# Patient Record
Sex: FEMALE | Race: BLACK OR AFRICAN AMERICAN | NOT HISPANIC OR LATINO | Employment: UNEMPLOYED | RURAL
[De-identification: names, ages, dates, MRNs, and addresses within clinical notes are randomized per-mention and may not be internally consistent; named-entity substitution may affect disease eponyms.]

---

## 2017-02-07 ENCOUNTER — TELEPHONE (OUTPATIENT)
Dept: FAMILY MEDICINE CLINIC | Facility: CLINIC | Age: 57
End: 2017-02-07

## 2017-02-07 RX ORDER — CEFDINIR 300 MG/1
300 CAPSULE ORAL 2 TIMES DAILY
Qty: 20 CAPSULE | Refills: 0 | Status: SHIPPED | OUTPATIENT
Start: 2017-02-07 | End: 2017-04-21

## 2017-02-15 ENCOUNTER — TELEPHONE (OUTPATIENT)
Dept: FAMILY MEDICINE CLINIC | Facility: CLINIC | Age: 57
End: 2017-02-15

## 2017-02-15 RX ORDER — LOSARTAN POTASSIUM 100 MG/1
100 TABLET ORAL DAILY
Qty: 90 TABLET | Refills: 0 | Status: SHIPPED | OUTPATIENT
Start: 2017-02-15 | End: 2017-10-04 | Stop reason: SDUPTHER

## 2017-04-05 PROCEDURE — G0123 SCREEN CERV/VAG THIN LAYER: HCPCS | Performed by: OBSTETRICS & GYNECOLOGY

## 2017-04-06 ENCOUNTER — LAB REQUISITION (OUTPATIENT)
Dept: LAB | Facility: HOSPITAL | Age: 57
End: 2017-04-06

## 2017-04-06 DIAGNOSIS — Z12.72 ENCOUNTER FOR SCREENING FOR MALIGNANT NEOPLASM OF VAGINA: ICD-10-CM

## 2017-04-06 LAB
GEN CATEG CVX/VAG CYTO-IMP: NORMAL
LAB AP CASE REPORT: NORMAL
LAB AP GYN ADDITIONAL INFORMATION: NORMAL
Lab: NORMAL
PATH INTERP SPEC-IMP: NORMAL
STAT OF ADQ CVX/VAG CYTO-IMP: NORMAL

## 2017-04-20 PROBLEM — Z00.00 WELLNESS EXAMINATION: Status: ACTIVE | Noted: 2017-04-20

## 2017-04-21 ENCOUNTER — OFFICE VISIT (OUTPATIENT)
Dept: FAMILY MEDICINE CLINIC | Facility: CLINIC | Age: 57
End: 2017-04-21

## 2017-04-21 VITALS
OXYGEN SATURATION: 94 % | TEMPERATURE: 98 F | BODY MASS INDEX: 33.13 KG/M2 | SYSTOLIC BLOOD PRESSURE: 130 MMHG | WEIGHT: 180 LBS | RESPIRATION RATE: 18 BRPM | HEIGHT: 62 IN | HEART RATE: 66 BPM | DIASTOLIC BLOOD PRESSURE: 70 MMHG

## 2017-04-21 DIAGNOSIS — F32.A DEPRESSION, UNSPECIFIED DEPRESSION TYPE: ICD-10-CM

## 2017-04-21 DIAGNOSIS — K21.9 GASTROESOPHAGEAL REFLUX DISEASE, ESOPHAGITIS PRESENCE NOT SPECIFIED: Chronic | ICD-10-CM

## 2017-04-21 DIAGNOSIS — N18.30 CHRONIC KIDNEY DISEASE, STAGE 3 (HCC): Chronic | ICD-10-CM

## 2017-04-21 DIAGNOSIS — I50.32 CHRONIC DIASTOLIC CONGESTIVE HEART FAILURE (HCC): Chronic | ICD-10-CM

## 2017-04-21 DIAGNOSIS — F41.9 ANXIETY: ICD-10-CM

## 2017-04-21 DIAGNOSIS — M06.9 RHEUMATOID ARTHRITIS, INVOLVING UNSPECIFIED SITE, UNSPECIFIED RHEUMATOID FACTOR PRESENCE: Chronic | ICD-10-CM

## 2017-04-21 DIAGNOSIS — I10 ESSENTIAL HYPERTENSION: Primary | Chronic | ICD-10-CM

## 2017-04-21 PROCEDURE — 99213 OFFICE O/P EST LOW 20 MIN: CPT | Performed by: FAMILY MEDICINE

## 2017-04-21 RX ORDER — CLORAZEPATE DIPOTASSIUM 7.5 MG/1
7.5 TABLET ORAL 2 TIMES DAILY
COMMUNITY
Start: 2017-04-18 | End: 2018-02-27 | Stop reason: ALTCHOICE

## 2017-06-05 ENCOUNTER — TELEPHONE (OUTPATIENT)
Dept: GASTROENTEROLOGY | Facility: CLINIC | Age: 57
End: 2017-06-05

## 2017-06-05 NOTE — TELEPHONE ENCOUNTER
No need to set up an office visit.  She just needs to have the colonoscopy done to reassess the polypectomy site.  Okay to schedule.    Abigail Weathers MD

## 2017-06-05 NOTE — TELEPHONE ENCOUNTER
She called stating that she has received recall letters for repeat colon in 1 year. I will scan everything to media for you to review.     Does she need to come in for office visit or can we just schedule colon? She is not having any problems.

## 2017-06-06 ENCOUNTER — PREP FOR SURGERY (OUTPATIENT)
Dept: OTHER | Facility: HOSPITAL | Age: 57
End: 2017-06-06

## 2017-06-06 DIAGNOSIS — Z86.010 HISTORY OF COLON POLYPS: Primary | ICD-10-CM

## 2017-06-06 NOTE — TELEPHONE ENCOUNTER
I have scheduled her colon for 7/28. I have pended colon order sets to you. Please sign and also send prep to pharmacy on file.

## 2017-06-12 RX ORDER — VENLAFAXINE HYDROCHLORIDE 75 MG/1
CAPSULE, EXTENDED RELEASE ORAL
Qty: 30 CAPSULE | Refills: 2 | Status: ON HOLD | OUTPATIENT
Start: 2017-06-12 | End: 2017-11-14

## 2017-07-28 ENCOUNTER — ANESTHESIA EVENT (OUTPATIENT)
Dept: GASTROENTEROLOGY | Facility: HOSPITAL | Age: 57
End: 2017-07-28

## 2017-07-28 ENCOUNTER — ANESTHESIA (OUTPATIENT)
Dept: GASTROENTEROLOGY | Facility: HOSPITAL | Age: 57
End: 2017-07-28

## 2017-07-28 ENCOUNTER — HOSPITAL ENCOUNTER (OUTPATIENT)
Facility: HOSPITAL | Age: 57
Setting detail: HOSPITAL OUTPATIENT SURGERY
Discharge: HOME | End: 2017-07-28
Attending: INTERNAL MEDICINE | Admitting: INTERNAL MEDICINE

## 2017-07-28 VITALS
BODY MASS INDEX: 32.2 KG/M2 | HEART RATE: 77 BPM | RESPIRATION RATE: 21 BRPM | DIASTOLIC BLOOD PRESSURE: 104 MMHG | HEIGHT: 62 IN | WEIGHT: 175 LBS | OXYGEN SATURATION: 99 % | TEMPERATURE: 97 F | SYSTOLIC BLOOD PRESSURE: 182 MMHG

## 2017-07-28 DIAGNOSIS — Z86.010 HISTORY OF COLON POLYPS: ICD-10-CM

## 2017-07-28 PROCEDURE — 45385 COLONOSCOPY W/LESION REMOVAL: CPT | Performed by: INTERNAL MEDICINE

## 2017-07-28 PROCEDURE — 25010000002 PROPOFOL 10 MG/ML EMULSION: Performed by: NURSE ANESTHETIST, CERTIFIED REGISTERED

## 2017-07-28 PROCEDURE — 88305 TISSUE EXAM BY PATHOLOGIST: CPT | Performed by: INTERNAL MEDICINE

## 2017-07-28 DEVICE — DEV CLIP ENDO RESOLUTION360 CONTRL ROT 235CM: Type: IMPLANTABLE DEVICE | Status: FUNCTIONAL

## 2017-07-28 RX ORDER — SODIUM CHLORIDE 9 MG/ML
500 INJECTION, SOLUTION INTRAVENOUS CONTINUOUS PRN
Status: DISCONTINUED | OUTPATIENT
Start: 2017-07-28 | End: 2017-07-28 | Stop reason: HOSPADM

## 2017-07-28 RX ORDER — PROPOFOL 10 MG/ML
VIAL (ML) INTRAVENOUS AS NEEDED
Status: DISCONTINUED | OUTPATIENT
Start: 2017-07-28 | End: 2017-07-28 | Stop reason: SURG

## 2017-07-28 RX ORDER — SODIUM CHLORIDE 0.9 % (FLUSH) 0.9 %
3 SYRINGE (ML) INJECTION AS NEEDED
Status: DISCONTINUED | OUTPATIENT
Start: 2017-07-28 | End: 2017-07-28 | Stop reason: HOSPADM

## 2017-07-28 RX ORDER — LIDOCAINE HYDROCHLORIDE 10 MG/ML
0.5 INJECTION, SOLUTION INFILTRATION; PERINEURAL ONCE AS NEEDED
Status: DISCONTINUED | OUTPATIENT
Start: 2017-07-28 | End: 2017-07-28 | Stop reason: CLARIF

## 2017-07-28 RX ADMIN — SODIUM CHLORIDE 500 ML: 9 INJECTION, SOLUTION INTRAVENOUS at 10:26

## 2017-07-28 RX ADMIN — PROPOFOL 50 MG: 10 INJECTION, EMULSION INTRAVENOUS at 11:34

## 2017-07-28 RX ADMIN — PROPOFOL 20 MG: 10 INJECTION, EMULSION INTRAVENOUS at 11:11

## 2017-07-28 RX ADMIN — PROPOFOL 80 MG: 10 INJECTION, EMULSION INTRAVENOUS at 11:10

## 2017-07-28 RX ADMIN — PROPOFOL 50 MG: 10 INJECTION, EMULSION INTRAVENOUS at 11:13

## 2017-07-28 RX ADMIN — PROPOFOL 50 MG: 10 INJECTION, EMULSION INTRAVENOUS at 11:21

## 2017-07-28 RX ADMIN — PROPOFOL 50 MG: 10 INJECTION, EMULSION INTRAVENOUS at 11:30

## 2017-07-28 RX ADMIN — PROPOFOL 50 MG: 10 INJECTION, EMULSION INTRAVENOUS at 11:18

## 2017-07-28 RX ADMIN — PROPOFOL 50 MG: 10 INJECTION, EMULSION INTRAVENOUS at 11:24

## 2017-07-28 RX ADMIN — PROPOFOL 50 MG: 10 INJECTION, EMULSION INTRAVENOUS at 11:15

## 2017-07-28 RX ADMIN — PROPOFOL 50 MG: 10 INJECTION, EMULSION INTRAVENOUS at 11:32

## 2017-07-28 RX ADMIN — PROPOFOL 50 MG: 10 INJECTION, EMULSION INTRAVENOUS at 11:38

## 2017-07-28 RX ADMIN — PROPOFOL 50 MG: 10 INJECTION, EMULSION INTRAVENOUS at 11:27

## 2017-07-28 NOTE — ANESTHESIA POSTPROCEDURE EVALUATION
Patient: Emilie Soto    Procedure Summary     Date Anesthesia Start Anesthesia Stop Room / Location    07/28/17 1104 1144 [facility] ENDOSCOPY 5 / [facility] ENDOSCOPY       Procedure Diagnosis Surgeon Provider    COLONOSCOPY WITH ANESTHESIA (N/A ) History of colon polyps  (History of colon polyps [Z86.010]) MD Moshe Hernandez CRNA          Anesthesia Type: general  Last vitals  BP   (!) 182/104 (07/28/17 1205)    Temp        Pulse   69 (07/28/17 1205)   Resp   20 (07/28/17 1205)    SpO2   99 % (07/28/17 1153)      Post Anesthesia Care and Evaluation    Patient location during evaluation: PHASE II  Patient participation: complete - patient participated  Level of consciousness: awake  Pain score: 0  Pain management: adequate  Airway patency: patent  Anesthetic complications: No anesthetic complications  PONV Status: none  Cardiovascular status: acceptable  Respiratory status: acceptable  Hydration status: acceptable  No anesthesia care post op

## 2017-07-28 NOTE — ANESTHESIA PREPROCEDURE EVALUATION
Anesthesia Evaluation     Patient summary reviewed   no history of anesthetic complications:  NPO Solid Status: > 8 hours       Airway   Mallampati: II  TM distance: >3 FB  Neck ROM: full  Dental      Pulmonary    (+) sleep apnea,   (-) asthma, not a smoker  Cardiovascular     Patient on routine beta blocker and Beta blocker given within 24 hours of surgery    (+) hypertension, hyperlipidemia  (-) pacemaker, past MI, cardiac stents    ROS comment: Pt has HOCM on previous TTE.    Neuro/Psych  (-) seizures, CVA  GI/Hepatic/Renal/Endo    (+) obesity,    (-) GERD, liver disease, no renal disease, diabetes    ROS Comment: Lupus with neuropathy on cellcept    Musculoskeletal     Abdominal    Substance History      OB/GYN          Other                                        Anesthesia Plan    ASA 3     general     intravenous induction   Anesthetic plan and risks discussed with patient.

## 2017-07-31 LAB
LAB AP CASE REPORT: NORMAL
LAB AP CLINICAL INFORMATION: NORMAL
Lab: NORMAL
PATH REPORT.FINAL DX SPEC: NORMAL
PATH REPORT.GROSS SPEC: NORMAL

## 2017-08-08 RX ORDER — VENLAFAXINE HYDROCHLORIDE 150 MG/1
CAPSULE, EXTENDED RELEASE ORAL
Qty: 30 CAPSULE | Refills: 5 | Status: ON HOLD | OUTPATIENT
Start: 2017-08-08 | End: 2017-11-14 | Stop reason: SDUPTHER

## 2017-09-22 PROBLEM — Z01.89 LABORATORY TEST: Status: ACTIVE | Noted: 2017-09-22

## 2017-10-04 RX ORDER — LOSARTAN POTASSIUM 100 MG/1
TABLET ORAL
Qty: 90 TABLET | Refills: 1 | Status: ON HOLD | OUTPATIENT
Start: 2017-10-04 | End: 2017-11-14

## 2017-10-06 RX ORDER — SPIRONOLACTONE 25 MG/1
TABLET ORAL
Qty: 30 TABLET | Refills: 5 | Status: ON HOLD | OUTPATIENT
Start: 2017-10-06 | End: 2017-11-14

## 2017-10-16 ENCOUNTER — OFFICE VISIT (OUTPATIENT)
Dept: FAMILY MEDICINE CLINIC | Facility: CLINIC | Age: 57
End: 2017-10-16

## 2017-10-16 VITALS
BODY MASS INDEX: 32.57 KG/M2 | WEIGHT: 177 LBS | SYSTOLIC BLOOD PRESSURE: 166 MMHG | DIASTOLIC BLOOD PRESSURE: 90 MMHG | OXYGEN SATURATION: 99 % | HEIGHT: 62 IN | HEART RATE: 57 BPM

## 2017-10-16 DIAGNOSIS — R53.83 FATIGUE, UNSPECIFIED TYPE: ICD-10-CM

## 2017-10-16 DIAGNOSIS — F41.9 ANXIETY: ICD-10-CM

## 2017-10-16 DIAGNOSIS — R03.0 ELEVATED BLOOD PRESSURE READING: ICD-10-CM

## 2017-10-16 DIAGNOSIS — I10 HYPERTENSION, UNSPECIFIED TYPE: Primary | Chronic | ICD-10-CM

## 2017-10-16 DIAGNOSIS — F32.A DEPRESSION, UNSPECIFIED DEPRESSION TYPE: ICD-10-CM

## 2017-10-16 DIAGNOSIS — I50.32 CHRONIC DIASTOLIC CONGESTIVE HEART FAILURE (HCC): Chronic | ICD-10-CM

## 2017-10-16 DIAGNOSIS — G47.33 OBSTRUCTIVE SLEEP APNEA: Chronic | ICD-10-CM

## 2017-10-16 PROCEDURE — 99214 OFFICE O/P EST MOD 30 MIN: CPT | Performed by: FAMILY MEDICINE

## 2017-10-16 RX ORDER — CYCLOBENZAPRINE HCL 5 MG
5 TABLET ORAL NIGHTLY PRN
COMMUNITY
Start: 2017-10-02 | End: 2018-08-28

## 2017-10-16 NOTE — PROGRESS NOTES
Subjective   Emilie Soto is a 57 y.o. female presenting with chief complaint of:   Chief Complaint   Patient presents with   • Hypertension   • Congestive Heart Failure   • Depression   • Rheumatoid Arthritis   • Chronic Kidney Disease        History of Present Illness :  Alone.   Has multiple problems to consider;  interval appointment.  One of these problems is HTN.       The HTN has been present for years/it is chronic.  The HTN is assumed essential/without testing needed to look for other.  The HTN is ? controlled manifest by todays blood pressure and no home monitoring.  Associated illness below.   Other chronic problem/s to consider:  Anxiety: This has been present for years/over a year.  It is chronic.  It is worse.  It is associated with stressors: work, taking care of father on dialysis, grandchildren, others.  Medications being used help.  Medications/Rx change not requested.  Depression: This has been present for years/over a year.  It is chronic.  It is worse.  It is associated with stressors: same as anxiety.   Medications being used help.Medications/Rx change not requested. No suicide ideation/intent.   Congestive heart failure/echo changes: This has been present for years/over a year.  It is chronic.  The CHF had features on echo of diastolic dysfunction.  This has been associated with SOB, LE edema on/off.   Saw cardiology years ago.   Arthritis-other:  Has chronic joint pain, stiffness, occ swelling involving: .   Has diagnosis: RA.  Sees  rheumatology.  Has plaquenil disease modifying Rx that requires monitoring.   Obstructive Sleep Apnea/Nocturnal hypoxemia:  This has been present for years/over a year.  It is chronic.    They are not compliant with use of Cpap machine    Has an/another acute issue today: fatigue.    The following portions of the patient's history were reviewed and updated as appropriate: allergies, current medications, past family history, past medical history, past social  history, past surgical history and problem list.  Records acquired and reviewed; TCC migrated.      Current Outpatient Prescriptions:   •  carvedilol (COREG) 25 MG tablet, Take 1 tablet by mouth 2 (Two) Times a Day., Disp: , Rfl:   •  cloNIDine (CATAPRES) 0.1 MG tablet, Take 0.1 mg by mouth 2 (Two) Times a Day. One by mouth twice daily as needed if BP greater than 160/100, Disp: , Rfl:   •  clorazepate (TRANXENE) 7.5 MG tablet, Take 1 tablet by mouth 2 (Two) Times a Day., Disp: , Rfl:   •  cyclobenzaprine (FLEXERIL) 5 MG tablet, , Disp: , Rfl:   •  estradiol (ESTRACE) 2 MG tablet, Take 2 mg by mouth Daily., Disp: , Rfl:   •  HYDROcodone-acetaminophen (NORCO) 7.5-325 MG per tablet, Take 1 tablet by mouth 2 (Two) Times a Day As Needed., Disp: , Rfl:   •  hydroxychloroquine (PLAQUENIL) 200 MG tablet, Take 1 tablet by mouth 2 (Two) Times a Day., Disp: , Rfl:   •  losartan (COZAAR) 100 MG tablet, TAKE 1 TABLET DAILY, Disp: 90 tablet, Rfl: 1  •  mycophenolate (CELLCEPT) 500 MG tablet, Take 1 tablet by mouth 2 (Two) Times a Day., Disp: , Rfl:   •  potassium chloride (K-DUR) 10 MEQ CR tablet, Take 10 mEq by mouth 2 (Two) Times a Day., Disp: , Rfl:   •  spironolactone (ALDACTONE) 25 MG tablet, TAKE ONE TABLET DAILY GENERIC FOR ALDACTONE, Disp: 30 tablet, Rfl: 5  •  traZODone (DESYREL) 100 MG tablet, Take 1 tablet by mouth Every Night. (Patient taking differently: Take 50 mg by mouth At Night As Needed.), Disp: 30 tablet, Rfl: 1  •  venlafaxine XR (EFFEXOR-XR) 150 MG 24 hr capsule, TAKE 1 CAPSULE DAILY GENERIC FOR EFFEXOR XR ALONG WITH 75MG XR, Disp: 30 capsule, Rfl: 5  •  venlafaxine XR (EFFEXOR-XR) 75 MG 24 hr capsule, TAKE 1 CAPSULE DAILY ALONG WITH 150MG TO EQUAL 225MG GENERIC FOR EFFEXOR XR, Disp: 30 capsule, Rfl: 2  •  verapamil SR (CALAN-SR) 240 MG CR tablet, Take 1 tablet by mouth 2 (Two) Times a Day., Disp: , Rfl:   •  vitamin D (ERGOCALCIFEROL) 99997 UNITS capsule capsule, Take 1 capsule by mouth 1 (One) Time Per  Week., Disp: , Rfl:     Allergies   Allergen Reactions   • Biaxin [Clarithromycin] Nausea And Vomiting       Review of Systems  GENERAL:  Active/slower with limits, speed, stamina for age work/home; wakes up fatigued even after 10 yrs sleep. . Sleep is not restful. No fever now.  ENDO:  No syncope, near or diaphoretic sweaty spells.  HEENT: No head injury  headache,  No vision change, No significant hearing loss.  Ears without pain/drainage.  No sore throat.  No significant nasal/sinus congestion/drainage. No epistaxis.  CHEST: No chest wall tenderness or mass. No cough, without wheeze.  NoSOB; no hemoptysis.  CV: No chest pain, palpitations, ankle edema.  GI: No heartburn, dysphagia.  No abdominal pain, diarrhea, constipation.  No rectal bleeding, or melena.    Colonoscopy/[facility]/Jasiel/4.22.16/1y  Colon-polyp/[facility]/Jasiel7.28.17/3y    :  Voids without dysuria, or incontinence to completion.  ORTHO: No painful/swollen joints but various on /off sore.  No change sore neck or back.  No acute neck or back pain without recent injury.   NEURO: No dizziness, weakness of extremities.  No numbness/paresthesias.   PSYCH: No memory loss.  Mood good; often anxious, depressed but/and not suicidal.  Tries to tolerate stress .     Results for orders placed or performed during the hospital encounter of 07/28/17   Tissue Pathology Exam   Result Value Ref Range    Case Report       Surgical Pathology Report                         Case: [id number]                                  Authorizing Provider:  Abigail Weathers MD          Collected:           07/28/2017 11:36 AM          Ordering Location:     [facility]     Received:            07/28/2017 02:25 PM                                 ENDOSCOPY                                                                    Pathologist:           Leann Jenkins MD                                                          Specimen:    Large Intestine, colon polyp at transverse               "                                   Clinical Information       PCP Alexandru Miles      Final Diagnosis       Colon, transverse polyp, polypectomy:  Tubular adenoma.  Negative for high grade dysplasia.       Gross Description       Submitted in formalin in a container labeled with the patient's name and date of birth and designated polyp at transverse are 2 polypoid fragments of reddish tan tissue measuring 0.5 x 0.4 x 0.2 cm and 0.6 x 0.4 x 0.2 cm.  The specimens are bisected and submitted in toto in block 1A.      Embedded Images         Lab Results   Component Value Date    HGBA1C 5.7 02/04/2016       Lab Results   Component Value Date     09/21/2016     02/05/2016     02/04/2016    K 4.6 09/21/2016    K 3.7 02/05/2016    K 3.5 02/04/2016     09/21/2016     02/05/2016     02/04/2016    CO2 28.0 09/21/2016    CO2 28 02/05/2016    CO2 29 02/04/2016    GLUCOSE 88 02/05/2016    GLUCOSE 110 (H) 02/04/2016    GLUCOSE 84 10/03/2015    BUN 12 09/21/2016    BUN 12 02/05/2016    BUN 10 02/04/2016    CREATININE 1.01 09/21/2016    CREATININE 1.09 02/05/2016    CREATININE 1.05 02/04/2016    CALCIUM 9.2 09/21/2016    CALCIUM 8.7 02/05/2016    CALCIUM 8.9 02/04/2016    EGFRCLEARA 63 02/05/2016    EGFRCLEARA 66 02/04/2016    EGFRCLEARA >60 10/03/2015       No results found for: PSA     Lab Results:  CBC:    Lab Results - Last 18 Months  Lab Units 09/21/16  0735   WBC 10*3/mm3 4.32*   HEMATOCRIT % 40.7     CMP:    Lab Results - Last 18 Months  Lab Units 09/21/16  0735   SODIUM mmol/L 143   CHLORIDE mmol/L 102   TOTAL CO2, ARTERIAL mmol/L 28.0   BUN mg/dL 12   CREATININE mg/dL 1.01   EGFR IF NONAFRICN AM mL/min/1.73 57*   EGFR IF AFRICN AM mL/min/1.73 69   CALCIUM mg/dL 9.2     THYROID:    Lab Results - Last 18 Months  Lab Units 09/21/16  0735   TSH mIU/mL 1.240     A1C:No results for input(s): HGBA1C in the last 93645 hours.    Objective   /90  Pulse 57  Ht 62\" (157.5 cm)  Wt 177 lb (80.3 " kg)  SpO2 99%  BMI 32.37 kg/m2    Physical Exam  GENERAL:  Well nourished/developed in no acute distress. Obese   SKIN: Turgor excellent, without wound, rash, lesion.  HEENT: Normal cephalic without trauma.  Pupils equal round reactive to light. Extraocular motions full without nystagmus.   External canals nonobstructive nontender without reddness. Tymphatic membranes josiane with cesar structures intact.   Oral cavity without growths, exudates, and moist.  Posterior pharynx without mass, obstruction, redness.  No thyromegaly, mass, tenderness, lymphadenopathy and supple.  CV: Regular rhythm.  No murmur, gallop,  edema. Posterior pulses intact.  No carotid bruits.  CHEST: No chest wall tenderness or mass.   LUNGS: Symmetric motion with clear to auscultation.  No dullness to percussion  ABD: Soft, nontender without mass.   ORTHO: Symmetric extremities without swelling/point tenderness.  Full gross range of motion.  NEURO: CN 2-12 grossly intact.  Symmetric facies. 1/4 x bicep knee equal reflexes.  UE/LE   3/5 strength throughout.  Nonfocal use extremities. Speech clear.  Intact light touch with monofilament, vibratory sensation with tuning fork; equal toes/distal feet.    PSYCH: Oriented x 3.  Pleasant calm, well kept.  Purposeful/directed conservation with intact short/long gross memory.     Assessment/Plan     1. Hypertension, unspecified type  ? controlled    2. Elevated blood pressure reading  asx acutely    3. Obstructive sleep apnea-not treating    4. Chronic diastolic congestive heart failure  Worse? Part of fatigue    5. Anxiety  chronic    6. Depression, unspecified depression type  chronic    7. Fatigue, unspecified type  No obvious exam findings; suggest component of depression/anxiety but ? Cardiac   - T4, Free  - TSH  - Adult Transthoracic Echo Complete W/ Cont if Necessary Per Protocol; Future      Rx: reviewed.  Any other changes above and:   LAB: reviewed/above.  Orders above and:   Wrap-up/other  instructions: discussed as applicable  Regular cardio exercise something everyone should consider and try to do; even if health limitations (ie find that exercise UE/LE/cardio that they can tolerate).  Normal weight a goal for everyone.  Healthy diet helpful for weight management, illness prevention.  If over 50-screening exams include men PSA/rectal exam, women mammograms, and  everyone colonoscopy screening for colon cancer.     Patient Instructions   Goal for your blood pressure is 140-130 top and 90-80 bottom and you feeling better. Use us/or home monitoring to prove this.     1st; lets check thyroid and echocardiogram  2nd; lets check sleep apnea/treatment          Follow up: Return for lab today;, Dr Miles-.  Future Appointments  Date Time Provider Department Center   1/23/2018 3:30 PM Alexandru Miles MD [facility] None

## 2017-10-16 NOTE — PATIENT INSTRUCTIONS
Goal for your blood pressure is 140-130 top and 90-80 bottom and you feeling better. Use us/or home monitoring to prove this.     1st; lets check thyroid and echocardiogram  2nd; lets check sleep apnea/treatment

## 2017-10-17 LAB
T4 FREE SERPL-MCNC: 0.98 NG/DL (ref 0.78–2.19)
TSH SERPL DL<=0.005 MIU/L-ACNC: 1.54 MIU/ML (ref 0.47–4.68)

## 2017-11-07 ENCOUNTER — HOSPITAL ENCOUNTER (OUTPATIENT)
Dept: CARDIOLOGY | Facility: HOSPITAL | Age: 57
Discharge: HOME | End: 2017-11-07
Attending: FAMILY MEDICINE | Admitting: FAMILY MEDICINE

## 2017-11-07 VITALS
HEIGHT: 62 IN | BODY MASS INDEX: 32.57 KG/M2 | SYSTOLIC BLOOD PRESSURE: 166 MMHG | WEIGHT: 177 LBS | DIASTOLIC BLOOD PRESSURE: 90 MMHG

## 2017-11-07 DIAGNOSIS — R53.83 FATIGUE, UNSPECIFIED TYPE: ICD-10-CM

## 2017-11-07 LAB
BH CV ECHO MEAS - AO MAX PG (FULL): 0.2 MMHG
BH CV ECHO MEAS - AO MAX PG: 6.5 MMHG
BH CV ECHO MEAS - AO MEAN PG (FULL): 1 MMHG
BH CV ECHO MEAS - AO MEAN PG: 4 MMHG
BH CV ECHO MEAS - AO ROOT AREA (BSA CORRECTED): 1.3
BH CV ECHO MEAS - AO ROOT AREA: 4.5 CM^2
BH CV ECHO MEAS - AO ROOT DIAM: 2.4 CM
BH CV ECHO MEAS - AO V2 MAX: 127 CM/SEC
BH CV ECHO MEAS - AO V2 MEAN: 85.6 CM/SEC
BH CV ECHO MEAS - AO V2 VTI: 27.2 CM
BH CV ECHO MEAS - AVA(I,A): 4 CM^2
BH CV ECHO MEAS - AVA(I,D): 4 CM^2
BH CV ECHO MEAS - AVA(V,A): 2.8 CM^2
BH CV ECHO MEAS - AVA(V,D): 2.8 CM^2
BH CV ECHO MEAS - BSA(HAYCOCK): 1.9 M^2
BH CV ECHO MEAS - BSA: 1.8 M^2
BH CV ECHO MEAS - BZI_BMI: 32.4 KILOGRAMS/M^2
BH CV ECHO MEAS - BZI_METRIC_HEIGHT: 157.5 CM
BH CV ECHO MEAS - BZI_METRIC_WEIGHT: 80.3 KG
BH CV ECHO MEAS - CONTRAST EF 4CH: 75.2 ML/M^2
BH CV ECHO MEAS - EDV(CUBED): 102.5 ML
BH CV ECHO MEAS - EDV(MOD-SP4): 122 ML
BH CV ECHO MEAS - EDV(TEICH): 101.3 ML
BH CV ECHO MEAS - EF(CUBED): 82 %
BH CV ECHO MEAS - EF(MOD-SP4): 75.2 %
BH CV ECHO MEAS - EF(TEICH): 74.8 %
BH CV ECHO MEAS - ESV(CUBED): 18.4 ML
BH CV ECHO MEAS - ESV(MOD-SP4): 30.2 ML
BH CV ECHO MEAS - ESV(TEICH): 25.6 ML
BH CV ECHO MEAS - FS: 43.6 %
BH CV ECHO MEAS - IVS/LVPW: 0.96
BH CV ECHO MEAS - IVSD: 1.8 CM
BH CV ECHO MEAS - LA DIMENSION: 4.7 CM
BH CV ECHO MEAS - LA/AO: 2
BH CV ECHO MEAS - LAT PEAK E' VEL: 4.7 CM/SEC
BH CV ECHO MEAS - LV DIASTOLIC VOL/BSA (35-75): 67.2 ML/M^2
BH CV ECHO MEAS - LV MASS(C)D: 412 GRAMS
BH CV ECHO MEAS - LV MASS(C)DI: 227 GRAMS/M^2
BH CV ECHO MEAS - LV MAX PG: 6.3 MMHG
BH CV ECHO MEAS - LV MEAN PG: 3 MMHG
BH CV ECHO MEAS - LV SYSTOLIC VOL/BSA (12-30): 16.6 ML/M^2
BH CV ECHO MEAS - LV V1 MAX: 125 CM/SEC
BH CV ECHO MEAS - LV V1 MEAN: 80.2 CM/SEC
BH CV ECHO MEAS - LV V1 VTI: 38.2 CM
BH CV ECHO MEAS - LVIDD: 4.7 CM
BH CV ECHO MEAS - LVIDS: 2.6 CM
BH CV ECHO MEAS - LVLD AP4: 6.6 CM
BH CV ECHO MEAS - LVLS AP4: 6 CM
BH CV ECHO MEAS - LVOT AREA (M): 2.8 CM^2
BH CV ECHO MEAS - LVOT AREA: 2.8 CM^2
BH CV ECHO MEAS - LVOT DIAM: 1.9 CM
BH CV ECHO MEAS - LVPWD: 1.9 CM
BH CV ECHO MEAS - MR ALIAS VEL: 30.8 CM/SEC
BH CV ECHO MEAS - MR ERO: 0.04 CM^2
BH CV ECHO MEAS - MR FLOW RATE: 17.4 CM^3/SEC
BH CV ECHO MEAS - MR MAX PG: 89.9 MMHG
BH CV ECHO MEAS - MR MAX VEL: 474 CM/SEC
BH CV ECHO MEAS - MR MEAN PG: 71 MMHG
BH CV ECHO MEAS - MR MEAN VEL: 400 CM/SEC
BH CV ECHO MEAS - MR PISA RADIUS: 0.3 CM
BH CV ECHO MEAS - MR PISA: 0.57 CM^2
BH CV ECHO MEAS - MR VOLUME: 7.5 ML
BH CV ECHO MEAS - MR VTI: 205 CM
BH CV ECHO MEAS - MV A MAX VEL: 108 CM/SEC
BH CV ECHO MEAS - MV DEC TIME: 0.26 SEC
BH CV ECHO MEAS - MV E MAX VEL: 93.5 CM/SEC
BH CV ECHO MEAS - MV E/A: 0.87
BH CV ECHO MEAS - RVDD: 2.6 CM
BH CV ECHO MEAS - SI(AO): 67.8 ML/M^2
BH CV ECHO MEAS - SI(CUBED): 46.3 ML/M^2
BH CV ECHO MEAS - SI(LVOT): 59.7 ML/M^2
BH CV ECHO MEAS - SI(MOD-SP4): 50.6 ML/M^2
BH CV ECHO MEAS - SI(TEICH): 41.8 ML/M^2
BH CV ECHO MEAS - SV(AO): 123.1 ML
BH CV ECHO MEAS - SV(CUBED): 84.1 ML
BH CV ECHO MEAS - SV(LVOT): 108.3 ML
BH CV ECHO MEAS - SV(MOD-SP4): 91.8 ML
BH CV ECHO MEAS - SV(TEICH): 75.8 ML
E/E' RATIO: 20
LEFT ATRIUM VOLUME INDEX: 35 ML/M2
LEFT ATRIUM VOLUME: 63.4 CM3
MAXIMAL PREDICTED HEART RATE: 164 BPM
STRESS TARGET HR: 139 BPM

## 2017-11-07 PROCEDURE — 93306 TTE W/DOPPLER COMPLETE: CPT | Performed by: INTERNAL MEDICINE

## 2017-11-07 PROCEDURE — 25010000002 PERFLUTREN 6.52 MG/ML SUSPENSION: Performed by: FAMILY MEDICINE

## 2017-11-07 PROCEDURE — 93306 TTE W/DOPPLER COMPLETE: CPT

## 2017-11-07 RX ADMIN — PERFLUTREN 8.48 MG: 6.52 INJECTION, SUSPENSION INTRAVENOUS at 09:03

## 2017-11-13 ENCOUNTER — APPOINTMENT (OUTPATIENT)
Dept: GENERAL RADIOLOGY | Facility: HOSPITAL | Age: 57
End: 2017-11-13

## 2017-11-13 ENCOUNTER — TELEPHONE (OUTPATIENT)
Dept: FAMILY MEDICINE CLINIC | Facility: CLINIC | Age: 57
End: 2017-11-13

## 2017-11-13 ENCOUNTER — HOSPITAL ENCOUNTER (OUTPATIENT)
Facility: HOSPITAL | Age: 57
Setting detail: OBSERVATION
Discharge: HOME | End: 2017-11-14
Attending: EMERGENCY MEDICINE | Admitting: FAMILY MEDICINE

## 2017-11-13 DIAGNOSIS — R07.9 CHEST PAIN, UNSPECIFIED TYPE: Primary | ICD-10-CM

## 2017-11-13 LAB
ALBUMIN SERPL-MCNC: 4.3 G/DL (ref 3.5–5)
ALBUMIN/GLOB SERPL: 1.2 G/DL (ref 1.1–2.5)
ALP SERPL-CCNC: 68 U/L (ref 24–120)
ALT SERPL W P-5'-P-CCNC: 26 U/L (ref 0–54)
ANION GAP SERPL CALCULATED.3IONS-SCNC: 11 MMOL/L (ref 4–13)
AST SERPL-CCNC: 26 U/L (ref 7–45)
BASOPHILS # BLD AUTO: 0.02 10*3/MM3 (ref 0–0.2)
BASOPHILS NFR BLD AUTO: 0.4 % (ref 0–2)
BILIRUB SERPL-MCNC: 0.6 MG/DL (ref 0.1–1)
BILIRUB UR QL STRIP: NEGATIVE
BUN BLD-MCNC: 12 MG/DL (ref 5–21)
BUN/CREAT SERPL: 9.7 (ref 7–25)
CALCIUM SPEC-SCNC: 9.3 MG/DL (ref 8.4–10.4)
CHLORIDE SERPL-SCNC: 99 MMOL/L (ref 98–110)
CLARITY UR: CLEAR
CO2 SERPL-SCNC: 30 MMOL/L (ref 24–31)
COLOR UR: YELLOW
CREAT BLD-MCNC: 1.24 MG/DL (ref 0.5–1.4)
D DIMER PPP FEU-MCNC: <0.22 MG/L (FEU) (ref 0–0.5)
DEPRECATED RDW RBC AUTO: 42.1 FL (ref 40–54)
EOSINOPHIL # BLD AUTO: 0.21 10*3/MM3 (ref 0–0.7)
EOSINOPHIL NFR BLD AUTO: 4.7 % (ref 0–4)
ERYTHROCYTE [DISTWIDTH] IN BLOOD BY AUTOMATED COUNT: 12.9 % (ref 12–15)
GFR SERPL CREATININE-BSD FRML MDRD: 54 ML/MIN/1.73
GLOBULIN UR ELPH-MCNC: 3.7 GM/DL
GLUCOSE BLD-MCNC: 79 MG/DL (ref 70–100)
GLUCOSE UR STRIP-MCNC: NEGATIVE MG/DL
HCT VFR BLD AUTO: 40.9 % (ref 37–47)
HGB BLD-MCNC: 13.5 G/DL (ref 12–16)
HGB UR QL STRIP.AUTO: NEGATIVE
HOLD SPECIMEN: NORMAL
HOLD SPECIMEN: NORMAL
IMM GRANULOCYTES # BLD: 0.01 10*3/MM3 (ref 0–0.03)
IMM GRANULOCYTES NFR BLD: 0.2 % (ref 0–5)
KETONES UR QL STRIP: NEGATIVE
LEUKOCYTE ESTERASE UR QL STRIP.AUTO: NEGATIVE
LYMPHOCYTES # BLD AUTO: 1.31 10*3/MM3 (ref 0.72–4.86)
LYMPHOCYTES NFR BLD AUTO: 29.1 % (ref 15–45)
MCH RBC QN AUTO: 29.7 PG (ref 28–32)
MCHC RBC AUTO-ENTMCNC: 33 G/DL (ref 33–36)
MCV RBC AUTO: 89.9 FL (ref 82–98)
MONOCYTES # BLD AUTO: 0.9 10*3/MM3 (ref 0.19–1.3)
MONOCYTES NFR BLD AUTO: 20 % (ref 4–12)
NEUTROPHILS # BLD AUTO: 2.05 10*3/MM3 (ref 1.87–8.4)
NEUTROPHILS NFR BLD AUTO: 45.6 % (ref 39–78)
NITRITE UR QL STRIP: NEGATIVE
PH UR STRIP.AUTO: <=5 [PH] (ref 5–8)
PLATELET # BLD AUTO: 252 10*3/MM3 (ref 130–400)
PMV BLD AUTO: 10.3 FL (ref 6–12)
POTASSIUM BLD-SCNC: 3.9 MMOL/L (ref 3.5–5.3)
PROT SERPL-MCNC: 8 G/DL (ref 6.3–8.7)
PROT UR QL STRIP: NEGATIVE
RBC # BLD AUTO: 4.55 10*6/MM3 (ref 4.2–5.4)
SODIUM BLD-SCNC: 140 MMOL/L (ref 135–145)
SP GR UR STRIP: 1.02 (ref 1–1.03)
TROPONIN I SERPL-MCNC: 0.01 NG/ML (ref 0–0.03)
TROPONIN I SERPL-MCNC: <0.012 NG/ML (ref 0–0.03)
TROPONIN I SERPL-MCNC: <0.012 NG/ML (ref 0–0.03)
UROBILINOGEN UR QL STRIP: NORMAL
WBC NRBC COR # BLD: 4.5 10*3/MM3 (ref 4.8–10.8)
WHOLE BLOOD HOLD SPECIMEN: NORMAL
WHOLE BLOOD HOLD SPECIMEN: NORMAL

## 2017-11-13 PROCEDURE — 81003 URINALYSIS AUTO W/O SCOPE: CPT | Performed by: FAMILY MEDICINE

## 2017-11-13 PROCEDURE — 85025 COMPLETE CBC W/AUTO DIFF WBC: CPT | Performed by: EMERGENCY MEDICINE

## 2017-11-13 PROCEDURE — 80053 COMPREHEN METABOLIC PANEL: CPT | Performed by: EMERGENCY MEDICINE

## 2017-11-13 PROCEDURE — 93005 ELECTROCARDIOGRAM TRACING: CPT | Performed by: EMERGENCY MEDICINE

## 2017-11-13 PROCEDURE — 99284 EMERGENCY DEPT VISIT MOD MDM: CPT

## 2017-11-13 PROCEDURE — 25010000002 ENOXAPARIN PER 10 MG: Performed by: FAMILY MEDICINE

## 2017-11-13 PROCEDURE — G0378 HOSPITAL OBSERVATION PER HR: HCPCS

## 2017-11-13 PROCEDURE — 71010 HC CHEST PA OR AP: CPT

## 2017-11-13 PROCEDURE — 84484 ASSAY OF TROPONIN QUANT: CPT | Performed by: EMERGENCY MEDICINE

## 2017-11-13 PROCEDURE — 85379 FIBRIN DEGRADATION QUANT: CPT | Performed by: EMERGENCY MEDICINE

## 2017-11-13 PROCEDURE — 96372 THER/PROPH/DIAG INJ SC/IM: CPT

## 2017-11-13 PROCEDURE — 93005 ELECTROCARDIOGRAM TRACING: CPT

## 2017-11-13 PROCEDURE — 84484 ASSAY OF TROPONIN QUANT: CPT | Performed by: INTERNAL MEDICINE

## 2017-11-13 PROCEDURE — 93010 ELECTROCARDIOGRAM REPORT: CPT | Performed by: INTERNAL MEDICINE

## 2017-11-13 RX ORDER — CLONIDINE HYDROCHLORIDE 0.1 MG/1
0.1 TABLET ORAL ONCE
Status: COMPLETED | OUTPATIENT
Start: 2017-11-13 | End: 2017-11-13

## 2017-11-13 RX ORDER — ASPIRIN 81 MG/1
324 TABLET, CHEWABLE ORAL ONCE
Status: COMPLETED | OUTPATIENT
Start: 2017-11-13 | End: 2017-11-13

## 2017-11-13 RX ORDER — SODIUM CHLORIDE 0.9 % (FLUSH) 0.9 %
1-10 SYRINGE (ML) INJECTION AS NEEDED
Status: DISCONTINUED | OUTPATIENT
Start: 2017-11-13 | End: 2017-11-14 | Stop reason: HOSPADM

## 2017-11-13 RX ORDER — SODIUM CHLORIDE 0.9 % (FLUSH) 0.9 %
10 SYRINGE (ML) INJECTION AS NEEDED
Status: DISCONTINUED | OUTPATIENT
Start: 2017-11-13 | End: 2017-11-14 | Stop reason: HOSPADM

## 2017-11-13 RX ADMIN — ASPIRIN 81 MG CHEWABLE TABLET 324 MG: 81 TABLET CHEWABLE at 14:54

## 2017-11-13 RX ADMIN — CLONIDINE HYDROCHLORIDE 0.1 MG: 0.1 TABLET ORAL at 16:44

## 2017-11-13 RX ADMIN — ENOXAPARIN SODIUM 40 MG: 40 INJECTION SUBCUTANEOUS at 18:22

## 2017-11-13 NOTE — ED PROVIDER NOTES
Subjective   Patient is a 57 y.o. female presenting with chest pain.   Chest Pain   Pain location:  Substernal area  Pain quality: aching and sharp    Pain radiates to:  Does not radiate  Pain severity:  Mild  Onset quality:  Gradual  Timing:  Intermittent  Progression:  Worsening  Chronicity:  New  Context: breathing    Context: not eating, not lifting, not movement, not raising an arm, not at rest, not stress and not trauma    Relieved by:  Nothing  Worsened by:  Deep breathing  Ineffective treatments:  None tried  Associated symptoms: no abdominal pain, no AICD problem, no altered mental status, no anxiety, no back pain, no claudication, no cough, no diaphoresis, no dizziness, no dysphagia, no fatigue, no fever, no headache, no heartburn, no lower extremity edema, no nausea, no numbness, no orthopnea, no palpitations, no shortness of breath, no syncope, no vomiting and no weakness    Risk factors: no aortic disease, no birth control, no coronary artery disease, no diabetes mellitus, no immobilization, not male, not obese, no prior DVT/PE, no smoking and no surgery        Review of Systems   Constitutional: Negative.  Negative for activity change, appetite change, chills, diaphoresis, fatigue and fever.   HENT: Negative for congestion, drooling, ear pain, facial swelling, hearing loss, sinus pressure, sore throat and trouble swallowing.    Eyes: Negative.  Negative for discharge.   Respiratory: Negative for cough and shortness of breath.    Cardiovascular: Positive for chest pain. Negative for palpitations, orthopnea, claudication and syncope.   Gastrointestinal: Negative for abdominal distention, abdominal pain, blood in stool, diarrhea, heartburn, nausea and vomiting.   Endocrine: Negative.  Negative for cold intolerance, heat intolerance, polydipsia, polyphagia and polyuria.   Genitourinary: Negative.  Negative for dysuria, flank pain and urgency.   Musculoskeletal: Negative.  Negative for arthralgias, back  pain, myalgias and neck stiffness.   Skin: Negative.  Negative for color change, pallor and rash.   Allergic/Immunologic: Negative.    Neurological: Negative.  Negative for dizziness, seizures, speech difficulty, weakness, numbness and headaches.   Hematological: Negative.  Negative for adenopathy.   All other systems reviewed and are negative.      Past Medical History:   Diagnosis Date   • CHF (congestive heart failure)    • CKD (chronic kidney disease)    • DDD (degenerative disc disease), cervical    • Depression    • GERD (gastroesophageal reflux disease)    • Heart murmur    • Hepatitis    • HTN (hypertension)    • RA (rheumatoid arthritis)    • Renal disease    • Sleep apnea        Allergies   Allergen Reactions   • Biaxin [Clarithromycin] Nausea And Vomiting       Past Surgical History:   Procedure Laterality Date   • COLONOSCOPY  04/22/2016   • COLONOSCOPY N/A 7/28/2017    Procedure: COLONOSCOPY WITH ANESTHESIA;  Surgeon: Abigail Weathers MD;  Location: [facility] ENDOSCOPY;  Service:    • EXPLORATORY LAPAROTOMY     • HYSTERECTOMY         History reviewed. No pertinent family history.    Social History     Social History   • Marital status:      Spouse name: N/A   • Number of children: N/A   • Years of education: N/A     Social History Main Topics   • Smoking status: Never Smoker   • Smokeless tobacco: Never Used   • Alcohol use 1.2 oz/week     2 Glasses of wine per week   • Drug use: No   • Sexual activity: Yes     Partners: Male     Birth control/ protection: Post-menopausal     Other Topics Concern   • None     Social History Narrative           Objective   Physical Exam   Constitutional: She is oriented to person, place, and time. She appears well-developed and well-nourished.   HENT:   Head: Normocephalic.   Right Ear: External ear normal.   Eyes: Conjunctivae are normal. Pupils are equal, round, and reactive to light.   Neck: Normal range of motion. Neck supple.   Cardiovascular: Normal rate, regular  rhythm, normal heart sounds and intact distal pulses.  PMI is not displaced.  Exam reveals no decreased pulses.    No murmur heard.  Pulmonary/Chest: Effort normal and breath sounds normal. No accessory muscle usage. No tachypnea. No respiratory distress. She has no decreased breath sounds. She has no wheezes. She has no rales. She exhibits no tenderness.   Abdominal: Soft. Bowel sounds are normal. There is no tenderness.   Musculoskeletal: Normal range of motion. She exhibits no edema or tenderness.   Lower extremity exam bilaterally is unremarkable.  There is no right or left calf tenderness .  There is no palpable venous cord.  No obvious difference in the size of the legs.  No pitting edema.  The dorsalis pedis and posterior tibial femoral and popliteal pulses are palpable and +2 bilaterally.  Homans sign is negative       Vascular Status -  Her exam exhibits right foot vasculature normal. Her exam exhibits no right foot edema. Her exam exhibits left foot vasculature normal. Her exam exhibits no left foot edema.  Neurological: She is alert and oriented to person, place, and time. She has normal reflexes. No cranial nerve deficit. Coordination normal.   Skin: Skin is warm. No rash noted. No erythema.   Nursing note and vitals reviewed.      Procedures         ED Course  ED Course   Comment By Time   Discussed with Dr. Miles the patient will be admitted for rule out Akash Lagos MD 11/13 1613   Wells’ Criteria for Pulmonary Embolism   Clinical Signs and Symptoms of DVT  Yes:3  no:0  PE Is #1 Diagnosis, or Equally Likely  Yes:3  No:0  Heart Rate greater than  100  Yes: 1.5  no: 0  Immobilization at least 3 days, or Surgery in the Previous 4 weeks  Yes: 1.5  no:0  Previous, objectively diagnosed PE or DVT  Yes:1.5  no:0   Hemoptysis  Yes:1  No:0  Malignancy w/ Treatment within 6 mo, or palliative  Yes 1  no 0  ?Patient risk is determined to be “PE Unlikely” (0-4 points, 12.1% incidence of PE): consider high  sensitivity d-dimer testing. ?If the dimer is negative consider stopping workup.  ?If the dimer is positive consider CTA.  ?Patient risk is determined to be “PE Likely” (>4 points, 37.1% incidence of PE): consider CTA testing. Akash Lagos MD 11/13 1613                  [facility]    Final diagnoses:   Chest pain, unspecified type            Akash Lagos MD  11/13/17 [id number]

## 2017-11-14 ENCOUNTER — APPOINTMENT (OUTPATIENT)
Dept: CARDIOLOGY | Facility: HOSPITAL | Age: 57
End: 2017-11-14
Attending: INTERNAL MEDICINE

## 2017-11-14 VITALS
HEIGHT: 60 IN | SYSTOLIC BLOOD PRESSURE: 93 MMHG | WEIGHT: 172.2 LBS | OXYGEN SATURATION: 98 % | RESPIRATION RATE: 20 BRPM | DIASTOLIC BLOOD PRESSURE: 51 MMHG | TEMPERATURE: 98.1 F | BODY MASS INDEX: 33.81 KG/M2 | HEART RATE: 65 BPM

## 2017-11-14 LAB
ANION GAP SERPL CALCULATED.3IONS-SCNC: 8 MMOL/L (ref 4–13)
BH CV STRESS BP STAGE 1: NORMAL
BH CV STRESS BP STAGE 2: NORMAL
BH CV STRESS DURATION MIN STAGE 1: 3
BH CV STRESS DURATION MIN STAGE 2: 2
BH CV STRESS DURATION SEC STAGE 1: 0
BH CV STRESS DURATION SEC STAGE 2: 36
BH CV STRESS GRADE STAGE 1: 10
BH CV STRESS GRADE STAGE 2: 12
BH CV STRESS HR STAGE 1: 115
BH CV STRESS HR STAGE 2: 139
BH CV STRESS METS STAGE 1: 5
BH CV STRESS METS STAGE 2: 7.5
BH CV STRESS PROTOCOL 1: NORMAL
BH CV STRESS RECOVERY BP: NORMAL MMHG
BH CV STRESS RECOVERY HR: 60 BPM
BH CV STRESS SPEED STAGE 1: 1.7
BH CV STRESS SPEED STAGE 2: 2.5
BH CV STRESS STAGE 1: 1
BH CV STRESS STAGE 2: 2
BUN BLD-MCNC: 15 MG/DL (ref 5–21)
BUN/CREAT SERPL: 11.2 (ref 7–25)
CALCIUM SPEC-SCNC: 9 MG/DL (ref 8.4–10.4)
CHLORIDE SERPL-SCNC: 103 MMOL/L (ref 98–110)
CO2 SERPL-SCNC: 32 MMOL/L (ref 24–31)
CREAT BLD-MCNC: 1.34 MG/DL (ref 0.5–1.4)
DEPRECATED RDW RBC AUTO: 42.8 FL (ref 40–54)
EOSINOPHIL # BLD MANUAL: 0.12 10*3/MM3 (ref 0–0.7)
EOSINOPHIL NFR BLD MANUAL: 3 % (ref 0–4)
ERYTHROCYTE [DISTWIDTH] IN BLOOD BY AUTOMATED COUNT: 12.9 % (ref 12–15)
GFR SERPL CREATININE-BSD FRML MDRD: 50 ML/MIN/1.73
GLUCOSE BLD-MCNC: 83 MG/DL (ref 70–100)
HCT VFR BLD AUTO: 38.5 % (ref 37–47)
HGB BLD-MCNC: 12.2 G/DL (ref 12–16)
LYMPHOCYTES # BLD MANUAL: 0.98 10*3/MM3 (ref 0.72–4.86)
LYMPHOCYTES NFR BLD MANUAL: 17 % (ref 4–12)
LYMPHOCYTES NFR BLD MANUAL: 25 % (ref 15–45)
MAXIMAL PREDICTED HEART RATE: 164 BPM
MCH RBC QN AUTO: 28.8 PG (ref 28–32)
MCHC RBC AUTO-ENTMCNC: 31.7 G/DL (ref 33–36)
MCV RBC AUTO: 90.8 FL (ref 82–98)
MONOCYTES # BLD AUTO: 0.67 10*3/MM3 (ref 0.19–1.3)
NEUTROPHILS # BLD AUTO: 2.04 10*3/MM3 (ref 1.87–8.4)
NEUTROPHILS NFR BLD MANUAL: 50 % (ref 39–78)
NEUTS BAND NFR BLD MANUAL: 2 % (ref 0–10)
PERCENT MAX PREDICTED HR: 84.76 %
PLAT MORPH BLD: NORMAL
PLATELET # BLD AUTO: 224 10*3/MM3 (ref 130–400)
PMV BLD AUTO: 10.3 FL (ref 6–12)
POTASSIUM BLD-SCNC: 4.2 MMOL/L (ref 3.5–5.3)
RBC # BLD AUTO: 4.24 10*6/MM3 (ref 4.2–5.4)
RBC MORPH BLD: NORMAL
SCAN SLIDE: NORMAL
SODIUM BLD-SCNC: 143 MMOL/L (ref 135–145)
STRESS BASELINE BP: NORMAL MMHG
STRESS BASELINE HR: 60 BPM
STRESS PERCENT HR: 100 %
STRESS POST ESTIMATED WORKLOAD: 7.5 METS
STRESS POST EXERCISE DUR MIN: 5 MIN
STRESS POST EXERCISE DUR SEC: 36 SEC
STRESS POST PEAK BP: NORMAL MMHG
STRESS POST PEAK HR: 139 BPM
STRESS TARGET HR: 139 BPM
TROPONIN I SERPL-MCNC: 0.01 NG/ML (ref 0–0.03)
VARIANT LYMPHS NFR BLD MANUAL: 3 % (ref 0–5)
WBC MORPH BLD: NORMAL
WBC NRBC COR # BLD: 3.93 10*3/MM3 (ref 4.8–10.8)

## 2017-11-14 PROCEDURE — G0378 HOSPITAL OBSERVATION PER HR: HCPCS

## 2017-11-14 PROCEDURE — 93350 STRESS TTE ONLY: CPT | Performed by: INTERNAL MEDICINE

## 2017-11-14 PROCEDURE — 80048 BASIC METABOLIC PNL TOTAL CA: CPT | Performed by: FAMILY MEDICINE

## 2017-11-14 PROCEDURE — 85007 BL SMEAR W/DIFF WBC COUNT: CPT | Performed by: FAMILY MEDICINE

## 2017-11-14 PROCEDURE — 63710000001 MYCOPHENOLATE MOFETIL PER 250 MG: Performed by: FAMILY MEDICINE

## 2017-11-14 PROCEDURE — 93017 CV STRESS TEST TRACING ONLY: CPT

## 2017-11-14 PROCEDURE — 99235 HOSP IP/OBS SAME DATE MOD 70: CPT | Performed by: FAMILY MEDICINE

## 2017-11-14 PROCEDURE — 99204 OFFICE O/P NEW MOD 45 MIN: CPT | Performed by: INTERNAL MEDICINE

## 2017-11-14 PROCEDURE — 84484 ASSAY OF TROPONIN QUANT: CPT | Performed by: INTERNAL MEDICINE

## 2017-11-14 PROCEDURE — 25010000002 PERFLUTREN 6.52 MG/ML SUSPENSION: Performed by: INTERNAL MEDICINE

## 2017-11-14 PROCEDURE — 93018 CV STRESS TEST I&R ONLY: CPT | Performed by: INTERNAL MEDICINE

## 2017-11-14 PROCEDURE — 93352 ADMIN ECG CONTRAST AGENT: CPT | Performed by: INTERNAL MEDICINE

## 2017-11-14 PROCEDURE — 85025 COMPLETE CBC W/AUTO DIFF WBC: CPT | Performed by: FAMILY MEDICINE

## 2017-11-14 PROCEDURE — 93350 STRESS TTE ONLY: CPT

## 2017-11-14 RX ORDER — HYDROCODONE BITARTRATE AND ACETAMINOPHEN 7.5; 325 MG/1; MG/1
1 TABLET ORAL EVERY 8 HOURS PRN
Status: DISCONTINUED | OUTPATIENT
Start: 2017-11-14 | End: 2017-11-14 | Stop reason: HOSPADM

## 2017-11-14 RX ORDER — POTASSIUM CHLORIDE 750 MG/1
20 CAPSULE, EXTENDED RELEASE ORAL 2 TIMES DAILY WITH MEALS
Status: DISCONTINUED | OUTPATIENT
Start: 2017-11-14 | End: 2017-11-14 | Stop reason: HOSPADM

## 2017-11-14 RX ORDER — TRAZODONE HYDROCHLORIDE 50 MG/1
50 TABLET ORAL NIGHTLY PRN
Status: DISCONTINUED | OUTPATIENT
Start: 2017-11-14 | End: 2017-11-14 | Stop reason: HOSPADM

## 2017-11-14 RX ORDER — MYCOPHENOLATE MOFETIL 500 MG/1
500 TABLET ORAL
Status: DISCONTINUED | OUTPATIENT
Start: 2017-11-14 | End: 2017-11-14 | Stop reason: HOSPADM

## 2017-11-14 RX ORDER — CARVEDILOL 25 MG/1
25 TABLET ORAL 2 TIMES DAILY WITH MEALS
Status: DISCONTINUED | OUTPATIENT
Start: 2017-11-14 | End: 2017-11-14 | Stop reason: HOSPADM

## 2017-11-14 RX ORDER — VERAPAMIL HYDROCHLORIDE 240 MG/1
240 TABLET, FILM COATED, EXTENDED RELEASE ORAL EVERY 12 HOURS SCHEDULED
Qty: 60 TABLET | Refills: 1 | Status: CANCELLED | OUTPATIENT
Start: 2017-11-14

## 2017-11-14 RX ORDER — VENLAFAXINE HYDROCHLORIDE 75 MG/1
75 CAPSULE, EXTENDED RELEASE ORAL DAILY
COMMUNITY
End: 2017-11-22 | Stop reason: SDUPTHER

## 2017-11-14 RX ORDER — AMLODIPINE BESYLATE 5 MG/1
5 TABLET ORAL DAILY
Qty: 30 TABLET | Refills: 5 | Status: SHIPPED | OUTPATIENT
Start: 2017-11-14 | End: 2018-02-27 | Stop reason: SDUPTHER

## 2017-11-14 RX ORDER — TRAZODONE HYDROCHLORIDE 100 MG/1
100 TABLET ORAL NIGHTLY
COMMUNITY
End: 2018-10-01 | Stop reason: DRUGHIGH

## 2017-11-14 RX ORDER — SPIRONOLACTONE 25 MG/1
25 TABLET ORAL DAILY
Status: DISCONTINUED | OUTPATIENT
Start: 2017-11-14 | End: 2017-11-14 | Stop reason: HOSPADM

## 2017-11-14 RX ORDER — ESTRADIOL 2 MG/1
2 TABLET ORAL DAILY
Status: DISCONTINUED | OUTPATIENT
Start: 2017-11-14 | End: 2017-11-14 | Stop reason: HOSPADM

## 2017-11-14 RX ORDER — CLONIDINE HYDROCHLORIDE 0.1 MG/1
0.1 TABLET ORAL 2 TIMES DAILY PRN
Status: DISCONTINUED | OUTPATIENT
Start: 2017-11-14 | End: 2017-11-14 | Stop reason: HOSPADM

## 2017-11-14 RX ORDER — CLONIDINE HYDROCHLORIDE 0.1 MG/1
0.1 TABLET ORAL 2 TIMES DAILY
Status: DISCONTINUED | OUTPATIENT
Start: 2017-11-14 | End: 2017-11-14

## 2017-11-14 RX ORDER — VENLAFAXINE HYDROCHLORIDE 75 MG/1
75 CAPSULE, EXTENDED RELEASE ORAL
Status: DISCONTINUED | OUTPATIENT
Start: 2017-11-14 | End: 2017-11-14 | Stop reason: HOSPADM

## 2017-11-14 RX ORDER — LOSARTAN POTASSIUM 100 MG/1
100 TABLET ORAL DAILY
COMMUNITY
End: 2018-03-30 | Stop reason: SDUPTHER

## 2017-11-14 RX ORDER — VERAPAMIL HYDROCHLORIDE 240 MG/1
240 TABLET, FILM COATED, EXTENDED RELEASE ORAL EVERY 12 HOURS SCHEDULED
Status: DISCONTINUED | OUTPATIENT
Start: 2017-11-14 | End: 2017-11-14 | Stop reason: HOSPADM

## 2017-11-14 RX ORDER — CYCLOBENZAPRINE HCL 5 MG
5 TABLET ORAL 3 TIMES DAILY PRN
Status: DISCONTINUED | OUTPATIENT
Start: 2017-11-14 | End: 2017-11-14 | Stop reason: HOSPADM

## 2017-11-14 RX ORDER — HYDROXYCHLOROQUINE SULFATE 200 MG/1
200 TABLET, FILM COATED ORAL 2 TIMES DAILY
Status: DISCONTINUED | OUTPATIENT
Start: 2017-11-14 | End: 2017-11-14 | Stop reason: HOSPADM

## 2017-11-14 RX ORDER — SPIRONOLACTONE 25 MG/1
25 TABLET ORAL DAILY
COMMUNITY
End: 2017-11-17 | Stop reason: SDUPTHER

## 2017-11-14 RX ORDER — VENLAFAXINE HYDROCHLORIDE 75 MG/1
150 CAPSULE, EXTENDED RELEASE ORAL
Status: DISCONTINUED | OUTPATIENT
Start: 2017-11-14 | End: 2017-11-14 | Stop reason: HOSPADM

## 2017-11-14 RX ORDER — VENLAFAXINE HYDROCHLORIDE 150 MG/1
150 CAPSULE, EXTENDED RELEASE ORAL DAILY
COMMUNITY
End: 2018-06-02 | Stop reason: SDUPTHER

## 2017-11-14 RX ORDER — LOSARTAN POTASSIUM 50 MG/1
100 TABLET ORAL
Status: DISCONTINUED | OUTPATIENT
Start: 2017-11-14 | End: 2017-11-14 | Stop reason: HOSPADM

## 2017-11-14 RX ORDER — CLORAZEPATE DIPOTASSIUM 3.75 MG/1
7.5 TABLET ORAL 2 TIMES DAILY
Status: DISCONTINUED | OUTPATIENT
Start: 2017-11-14 | End: 2017-11-14 | Stop reason: HOSPADM

## 2017-11-14 RX ADMIN — CARVEDILOL 25 MG: 25 TABLET, FILM COATED ORAL at 09:28

## 2017-11-14 RX ADMIN — LOSARTAN POTASSIUM 100 MG: 50 TABLET ORAL at 09:27

## 2017-11-14 RX ADMIN — ESTRADIOL 2 MG: 2 TABLET ORAL at 09:28

## 2017-11-14 RX ADMIN — SPIRONOLACTONE 25 MG: 25 TABLET ORAL at 09:27

## 2017-11-14 RX ADMIN — PERFLUTREN 8.48 MG: 6.52 INJECTION, SUSPENSION INTRAVENOUS at 11:14

## 2017-11-14 RX ADMIN — VENLAFAXINE HYDROCHLORIDE 150 MG: 75 CAPSULE, EXTENDED RELEASE ORAL at 09:28

## 2017-11-14 RX ADMIN — POTASSIUM CHLORIDE 20 MEQ: 750 CAPSULE, EXTENDED RELEASE ORAL at 12:17

## 2017-11-14 RX ADMIN — HYDROXYCHLOROQUINE SULFATE 200 MG: 200 TABLET, FILM COATED ORAL at 09:28

## 2017-11-14 RX ADMIN — MYCOPHENOLATE MOFETIL 500 MG: 500 TABLET ORAL at 09:28

## 2017-11-14 RX ADMIN — CLORAZEPATE DIPOTASSIUM 7.5 MG: 3.75 TABLET ORAL at 12:17

## 2017-11-14 RX ADMIN — VERAPAMIL HYDROCHLORIDE 240 MG: 240 TABLET, FILM COATED, EXTENDED RELEASE ORAL at 09:28

## 2017-11-14 RX ADMIN — VENLAFAXINE HYDROCHLORIDE 75 MG: 75 CAPSULE, EXTENDED RELEASE ORAL at 09:29

## 2017-11-14 NOTE — CONSULTS
"  [facility] CONSULT NOTE    Patient Care Team:  Alexandru Miles MD as PCP - General  Alexandru Miles MD as PCP - Family Medicine  Alexandru Miles MD  REASON FOR REFERRAL: chest pain  Chief complaint : chest pain    Subjective     Patient is a 57 y.o. female with hypertension and rheumatoid arthritis who was admitted from the emergency department yesterday after presenting with acute onset chest discomfort.  She describes acute onset, moderately severe, centrally located sharp chest pain that was worse with deep breathing.  No identifiable alleviating factors.  Pain was persistent for several hours yesterday but has now subsided.  She denies any recent history of upper respiratory infection, but does state that she got her flu shot 2 weeks ago and felt somewhat like she \"had a cold\" after that.      Review of Systems   Review of Systems   Constitutional: Negative for fatigue and unexpected weight change.   HENT: Negative for nosebleeds and trouble swallowing.    Respiratory: Negative for cough, shortness of breath and wheezing.    Cardiovascular: Positive for chest pain. Negative for palpitations and leg swelling.   Gastrointestinal: Negative for abdominal distention, abdominal pain, blood in stool and vomiting.   Endocrine: Negative for cold intolerance, heat intolerance, polydipsia, polyphagia and polyuria.   Genitourinary: Negative for hematuria and vaginal bleeding.   Musculoskeletal: Negative for arthralgias and joint swelling.   Skin: Negative for color change and pallor.   Neurological: Negative for seizures, syncope and weakness.   Hematological: Does not bruise/bleed easily.   Psychiatric/Behavioral: Negative for confusion. The patient is not nervous/anxious.        History  Past Medical History:   Diagnosis Date   • CHF (congestive heart failure)    • CKD (chronic kidney disease)    • DDD (degenerative disc disease), cervical    • Depression    • GERD " (gastroesophageal reflux disease)    • Heart murmur    • Hepatitis    • HTN (hypertension)    • RA (rheumatoid arthritis)    • Renal disease    • Sleep apnea      Past Surgical History:   Procedure Laterality Date   • COLONOSCOPY  04/22/2016   • COLONOSCOPY N/A 7/28/2017    Procedure: COLONOSCOPY WITH ANESTHESIA;  Surgeon: Abigail Weathers MD;  Location: [facility] ENDOSCOPY;  Service:    • EXPLORATORY LAPAROTOMY     • HYSTERECTOMY       History reviewed. No pertinent family history.  Social History   Substance Use Topics   • Smoking status: Never Smoker   • Smokeless tobacco: Never Used   • Alcohol use 1.2 oz/week     2 Glasses of wine per week     Prescriptions Prior to Admission   Medication Sig Dispense Refill Last Dose   • carvedilol (COREG) 25 MG tablet Take 1 tablet by mouth 2 (Two) Times a Day.   Taking   • cloNIDine (CATAPRES) 0.1 MG tablet Take 0.1 mg by mouth 2 (Two) Times a Day. One by mouth twice daily as needed if BP greater than 160/100   Taking   • clorazepate (TRANXENE) 7.5 MG tablet Take 1 tablet by mouth 2 (Two) Times a Day.   Taking   • cyclobenzaprine (FLEXERIL) 5 MG tablet    Taking   • estradiol (ESTRACE) 2 MG tablet Take 2 mg by mouth Daily.   Taking   • HYDROcodone-acetaminophen (NORCO) 7.5-325 MG per tablet Take 1 tablet by mouth 2 (Two) Times a Day As Needed.   Taking   • hydroxychloroquine (PLAQUENIL) 200 MG tablet Take 1 tablet by mouth 2 (Two) Times a Day.   Taking   • losartan (COZAAR) 100 MG tablet TAKE 1 TABLET DAILY 90 tablet 1 Taking   • mycophenolate (CELLCEPT) 500 MG tablet Take 1 tablet by mouth 2 (Two) Times a Day.   Taking   • potassium chloride (K-DUR) 10 MEQ CR tablet Take 10 mEq by mouth 2 (Two) Times a Day.   Taking   • spironolactone (ALDACTONE) 25 MG tablet TAKE ONE TABLET DAILY GENERIC FOR ALDACTONE 30 tablet 5 Taking   • traZODone (DESYREL) 100 MG tablet Take 1 tablet by mouth Every Night. (Patient taking differently: Take 50 mg by mouth At Night As Needed.) 30 tablet 1  Taking   • venlafaxine XR (EFFEXOR-XR) 150 MG 24 hr capsule TAKE 1 CAPSULE DAILY GENERIC FOR EFFEXOR XR ALONG WITH 75MG XR 30 capsule 5 Taking   • venlafaxine XR (EFFEXOR-XR) 75 MG 24 hr capsule TAKE 1 CAPSULE DAILY ALONG WITH 150MG TO EQUAL 225MG GENERIC FOR EFFEXOR XR 30 capsule 2 Taking   • verapamil SR (CALAN-SR) 240 MG CR tablet Take 1 tablet by mouth 2 (Two) Times a Day.   Taking   • vitamin D (ERGOCALCIFEROL) 64613 UNITS capsule capsule Take 1 capsule by mouth 1 (One) Time Per Week.   Taking     Allergies:  Biaxin [clarithromycin]    Objective     Vital Signs  Temp:  [96.9 °F (36.1 °C)-98.3 °F (36.8 °C)] 96.9 °F (36.1 °C)  Heart Rate:  [55-70] 55  Resp:  [14-20] 20  BP: (108-171)/(54-91) 151/77    Physical Exam:  Physical Exam   Constitutional: No distress.   HENT:   Mouth/Throat: Oropharynx is clear. Pharynx is normal.   Neck: Normal range of motion and thyroid normal. Neck supple. No JVD present. No thyromegaly present.   Cardiovascular: Normal rate, regular rhythm, S1 normal, S2 normal, normal heart sounds, intact distal pulses and normal pulses.   No extrasystoles are present. PMI is not displaced.  Exam reveals no gallop.    No murmur heard.  Pulmonary/Chest: Effort normal and breath sounds normal.   Abdominal: Soft. Bowel sounds are normal. She exhibits no distension. There is no splenomegaly or hepatomegaly. There is no tenderness.   Musculoskeletal: She exhibits no edema or tenderness.   Neurological: She is alert and oriented to person, place, and time.   Skin: Skin is warm and dry.     Results Review:   Lab Results (last 72 hours)     Procedure Component Value Units Date/Time    CBC & Differential [[id number]] Collected:  11/13/17 1349    Specimen:  Blood Updated:  11/13/17 1401    Narrative:       The following orders were created for panel order CBC & Differential.  Procedure                               Abnormality         Status                     ---------                                -----------         ------                     CBC Auto Differential[[id number]]        Abnormal            Final result                 Please view results for these tests on the individual orders.    CBC Auto Differential [[id number]]  (Abnormal) Collected:  11/13/17 1349    Specimen:  Blood Updated:  11/13/17 1401     WBC 4.50 (L) 10*3/mm3      RBC 4.55 10*6/mm3      Hemoglobin 13.5 g/dL      Hematocrit 40.9 %      MCV 89.9 fL      MCH 29.7 pg      MCHC 33.0 g/dL      RDW 12.9 %      RDW-SD 42.1 fl      MPV 10.3 fL      Platelets 252 10*3/mm3      Neutrophil % 45.6 %      Lymphocyte % 29.1 %      Monocyte % 20.0 (H) %      Eosinophil % 4.7 (H) %      Basophil % 0.4 %      Immature Grans % 0.2 %      Neutrophils, Absolute 2.05 10*3/mm3      Lymphocytes, Absolute 1.31 10*3/mm3      Monocytes, Absolute 0.90 10*3/mm3      Eosinophils, Absolute 0.21 10*3/mm3      Basophils, Absolute 0.02 10*3/mm3      Immature Grans, Absolute 0.01 10*3/mm3     Comprehensive Metabolic Panel [[id number]]  (Abnormal) Collected:  11/13/17 1349    Specimen:  Blood Updated:  11/13/17 1419     Glucose 79 mg/dL      BUN 12 mg/dL      Creatinine 1.24 mg/dL      Sodium 140 mmol/L      Potassium 3.9 mmol/L      Chloride 99 mmol/L      CO2 30.0 mmol/L      Calcium 9.3 mg/dL      Total Protein 8.0 g/dL      Albumin 4.30 g/dL      ALT (SGPT) 26 U/L      AST (SGOT) 26 U/L      Alkaline Phosphatase 68 U/L      Total Bilirubin 0.6 mg/dL      eGFR   Amer 54 (L) mL/min/1.73      Globulin 3.7 gm/dL      A/G Ratio 1.2 g/dL      BUN/Creatinine Ratio 9.7     Anion Gap 11.0 mmol/L     [place] Draw [[id number]] Collected:  11/13/17 1349    Specimen:  Blood Updated:  11/13/17 1501    Narrative:       The following orders were created for panel order [place] Draw.  Procedure                               Abnormality         Status                     ---------                               -----------         ------                     Light Blue  Top[[id number]]                                   Final result               Green Top (Gel)[[id number]]                                  Final result               Lavender Top[[id number]]                                     Final result               Red Top[[id number]]                                          Final result                 Please view results for these tests on the individual orders.    Light Blue Top [[id number]] Collected:  11/13/17 1349    Specimen:  Blood Updated:  11/13/17 1501     Extra Tube hold for add-on      Auto resulted       Green Top (Gel) [[id number]] Collected:  11/13/17 1349    Specimen:  Blood Updated:  11/13/17 1501     Extra Tube Hold for add-ons.      Auto resulted.       Lavender Top [[id number]] Collected:  11/13/17 1349    Specimen:  Blood Updated:  11/13/17 1501     Extra Tube hold for add-on      Auto resulted       Red Top [[id number]] Collected:  11/13/17 1349    Specimen:  Blood Updated:  11/13/17 1501     Extra Tube Hold for add-ons.      Auto resulted.       Troponin [[id number]]  (Normal) Collected:  11/13/17 1349    Specimen:  Blood Updated:  11/13/17 1502     Troponin I <0.012 ng/mL     D-dimer, Quantitative [[id number]]  (Normal) Collected:  11/13/17 1349    Specimen:  Blood Updated:  11/13/17 1505     D-Dimer, Quantitative <0.22 mg/L (FEU)     Narrative:       Reference Range is 0-0.50 mg/L FEU. However, results <0.50 mg/L FEU tends to rule out DVT or PE. Results >0.50 mg/L FEU are not useful in predicting absence or presence of DVT or PE.    Troponin [[id number]]  (Normal) Collected:  11/13/17 1632    Specimen:  Blood Updated:  11/13/17 1705     Troponin I <0.012 ng/mL     Urinalysis With / Culture If Indicated - [[id number]]  (Normal) Collected:  11/13/17 1830    Specimen:  Urine Updated:  11/13/17 1853     Color, UA Yellow     Appearance, UA Clear     pH, UA <=5.0     Specific Gravity, UA 1.019     Glucose, UA Negative     Ketones, UA Negative     Bilirubin, UA Negative      Blood, UA Negative     Protein, UA Negative     Leuk Esterase, UA Negative     Nitrite, UA Negative     Urobilinogen, UA 1.0 E.U./dL    Narrative:       Urine microscopic not indicated.    Troponin [[id number]]  (Normal) Collected:  11/13/17 2038    Specimen:  Blood Updated:  11/13/17 2108     Troponin I 0.013 ng/mL     Basic Metabolic Panel [[id number]]  (Abnormal) Collected:  11/14/17 0301    Specimen:  Blood Updated:  11/14/17 0406     Glucose 83 mg/dL      BUN 15 mg/dL      Creatinine 1.34 mg/dL      Sodium 143 mmol/L      Potassium 4.2 mmol/L      Chloride 103 mmol/L      CO2 32.0 (H) mmol/L      Calcium 9.0 mg/dL      eGFR  African Amer 50 (L) mL/min/1.73      BUN/Creatinine Ratio 11.2     Anion Gap 8.0 mmol/L     Narrative:       GFR Normal >60  Chronic Kidney Disease <60  Kidney Failure <15    Troponin [[id number]]  (Normal) Collected:  11/14/17 0301    Specimen:  Blood Updated:  11/14/17 0412     Troponin I 0.013 ng/mL     CBC & Differential [[id number]] Collected:  11/14/17 0301    Specimen:  Blood Updated:  11/14/17 0535    Narrative:       The following orders were created for panel order CBC & Differential.  Procedure                               Abnormality         Status                     ---------                               -----------         ------                     Manual Differential[[id number]]          Abnormal            Final result               Scan Slide[[id number]]                                       Final result               CBC Auto Differential[[id number]]        Abnormal            Final result                 Please view results for these tests on the individual orders.    CBC Auto Differential [[id number]]  (Abnormal) Collected:  11/14/17 0301    Specimen:  Blood Updated:  11/14/17 0535     WBC 3.93 (L) 10*3/mm3      RBC 4.24 10*6/mm3      Hemoglobin 12.2 g/dL      Hematocrit 38.5 %      MCV 90.8 fL      MCH 28.8 pg      MCHC 31.7 (L) g/dL      RDW 12.9 %      RDW-SD 42.8 fl       MPV 10.3 fL      Platelets 224 10*3/mm3     Scan Slide [[id number]] Collected:  11/14/17 0301    Specimen:  Blood Updated:  11/14/17 0535     Scan Slide --      See Manual Differential Results       Manual Differential [[id number]]  (Abnormal) Collected:  11/14/17 0301    Specimen:  Blood Updated:  11/14/17 0535     Neutrophil % 50.0 %      Lymphocyte % 25.0 %      Monocyte % 17.0 (H) %      Eosinophil % 3.0 %      Bands %  2.0 %      Atypical Lymphocyte % 3.0 %      Neutrophils Absolute 2.04 10*3/mm3      Lymphocytes Absolute 0.98 10*3/mm3      Monocytes Absolute 0.67 10*3/mm3      Eosinophils Absolute 0.12 10*3/mm3      RBC Morphology Normal     WBC Morphology Normal     Platelet Morphology Normal        Prior testing reviewed: Lexiscan nuclear perfusion study performed February 2016 was low risk with no evidence of hypoperfusion.    All ECGs were personally reviewed by myself, with interpretations to follow:  -11/13/17 at 1334: NSR, LVH, T-wave inversion in leads V4-V6, 1, aVL, and 2, septal Q waves  -11/13/17 at 1632: NSR, LVH, T-wave inversion in leads V4-V6, 1, aVL, and 2, septal Q waves  *Of note, both the following ECGs are similar in appearance to previous ones from February 2016.  Findings are consistent with LVH and repolarization abnormalities.    Chest x-ray report reviewed from 11/13/17: Cardiomegaly.  No other acute cardiopulmonary process.    Assessment/Plan     1.  Atypical chest pain-patient does have risk factors for coronary artery disease, but ECGs have no ischemic changes (abnormalities are the same as ECGs dating back at least to last year, and are consistent with LVH with repolarization abnormalities) and serial biomarkers are negative.  -Proceed today with stress echocardiogram (exercise with possible pharmacologic conversion if necessary to achieve target heart rate, as this patient does not have diagnosed coronary disease so must reach target heart rate to make test valid)  -If stress  test is low risk, no further cardiac workup recommended    2.  Essential hypertension - reportedly poorly controlled.  I would suggest continuing her home regimen of carvedilol 25 mg twice daily, losartan 100 mg daily, spironolactone 25 mg daily.  I would also suggest switching her calcium channel blocker from verapamil to amlodipine 5 mg daily for improved BP control.    I discussed the patients findings and my recommendations with patient and the referring provider, Dr. Miles.     Jarred Melchor MD  11/14/17  7:45 AM

## 2017-11-14 NOTE — H&P
COMBO HISTORY PHYSICIAL/DISCHARGE SUMMARY (short stay note)    Patient ID: Emilie Soto  MRN: [id number]     Acct:  [id number]    Admit Date: 11/13/2017   Discharge Date: 11/14/2017  Date of service: 11/14/2017    Consults:    [facility]-Jarred Melchor MD    PATIENT PROFILE: The patient was a 58 y/o  black female resident of [place]; she was cooperative.      CHIEF COMPLAINT: I had a chest pain     HISTORY OF PRESENT ILLNESS: Sudden onset day of admit of a R sided chest pain/pressure.  Was rest/no worse exertion.  Mildly made her feel short of breath.  No cough, wheeze, hemoptysis, rashes of chest.  Persisted and presented to ED/with neg labs, EKGS.  With risk factors was felt to need further cardiac testing and it was too late in day to do anything other than admit to observe/consider testing tomorrow.     CHILDHOOD: unremarkable.      ALLERGIES:   AMOXICILLIN  AND BACTRIM (RASH).   BIAXIN (GI UPSET).   CIPRO (RASH).   DURICEF (RASH).  KETAC (GI UPSET AND DIZZINESS).  PERCOCET (ITCHING).   RELAFEN (SHORTNESS BREATH).  SEASONALE, WELLBUTRIN (ITCHING, NAUSEA AND HOT FLASHES).   ZMAX (GI UPSET).      HOME MEDICATIONS:  Prior to Admission medications    Medication Sig Start Date End Date Taking? Authorizing Provider   carvedilol (COREG) 25 MG tablet Take 1 tablet by mouth 2 (Two) Times a Day. 10/12/16   Historical Provider, MD   cloNIDine (CATAPRES) 0.1 MG tablet Take 0.1 mg by mouth 2 (Two) Times a Day. One by mouth twice daily as needed if BP greater than 160/100    Historical Provider, MD   clorazepate (TRANXENE) 7.5 MG tablet Take 1 tablet by mouth 2 (Two) Times a Day. 4/18/17   Historical Provider, MD   cyclobenzaprine (FLEXERIL) 5 MG tablet  10/2/17   Historical Provider, MD   estradiol (ESTRACE) 2 MG tablet Take 2 mg by mouth Daily. 10/12/16   Historical Provider, MD   HYDROcodone-acetaminophen (NORCO) 7.5-325 MG per tablet Take 1 tablet by mouth 2 (Two) Times a Day As Needed. 9/27/16   Historical  Provider, MD   hydroxychloroquine (PLAQUENIL) 200 MG tablet Take 1 tablet by mouth 2 (Two) Times a Day. 10/12/16   Historical Provider, MD   losartan (COZAAR) 100 MG tablet TAKE 1 TABLET DAILY 10/4/17   Alexandru Miles MD   mycophenolate (CELLCEPT) 500 MG tablet Take 1 tablet by mouth 2 (Two) Times a Day. 10/12/16   Historical Provider, MD   potassium chloride (K-DUR) 10 MEQ CR tablet Take 10 mEq by mouth 2 (Two) Times a Day.    Historical Provider, MD   spironolactone (ALDACTONE) 25 MG tablet TAKE ONE TABLET DAILY GENERIC FOR ALDACTONE 10/6/17   Alexandru Miles MD   traZODone (DESYREL) 100 MG tablet Take 1 tablet by mouth Every Night.  Patient taking differently: Take 50 mg by mouth At Night As Needed. 16   Alexandru Miles MD   venlafaxine XR (EFFEXOR-XR) 150 MG 24 hr capsule TAKE 1 CAPSULE DAILY GENERIC FOR EFFEXOR XR ALONG WITH 75MG XR 17   Alexandru Miles MD   venlafaxine XR (EFFEXOR-XR) 75 MG 24 hr capsule TAKE 1 CAPSULE DAILY ALONG WITH 150MG TO EQUAL 225MG GENERIC FOR EFFEXOR XR 17   Alexandru Miles MD   verapamil SR (CALAN-SR) 240 MG CR tablet Take 1 tablet by mouth 2 (Two) Times a Day. 10/12/16   Historical Provider, MD   vitamin D (ERGOCALCIFEROL) 73516 UNITS capsule capsule Take 1 capsule by mouth 1 (One) Time Per Week. 10/12/16   Historical Provider, MD     HOSPITALIZATIONS AND SURGERIES AND PROCEDURES:    1. She is  2, para 2, AB 0. Right thigh biopsy, Dr. Page at [facility]  on 2011.   2. Liver biopsy 06/10/2011.   3. Exploratory laparoscopy 1983.  4. JOSEPH and BSO for benign reasons, Dr. Clayton, [facility],  2012.  5. Colonoscopy for bleeding, [facility] by Dr. Weathers 10/04/2015;  repeat in 6 months.   6. Heart catheter with LVH, negative arteries, Dr. Park [facility] 12/10/2010.  Colonoscopy/[facility]/Jasiel/4.22.16/1y  Colon-polyp/DESHAWN/Jasiel7.28.17/3y    ADMISSION DATES:  Include:  1. [place]  through  03/17/1995 for chest pain.   2. [facility] 12/09 through 12/11/2010 for chest pain.  3. Next, 06/10 through 06/15/2011 for possible Imuran hepatitis 03/28 through  03/21/2012 for hysterectomy, Dr. Clayton.  4. Next, 10/01 through 10/02/2015 for rectal bleeding.  5. Next, 10/03 through 10/06/2015 for rectal bleeding.  6. Next, 02/04 through 02/05/2016 for chest pain.   7. Next 2.4.16-2.5.16 chest pain.      FAMILY HISTORY:  Hypertension in mother, diabetes in paternal grandmother and  father. Unknown cancer in mother and kidney disease in father and paternal  aunt.      HABITS:  Never smoked, though exposed to secondary smoke for 18 years, which  stopped at age 19 with no alcohol or drugs.      SOCIAL HISTORY:   once 1983,  1985; remarried in 1992. 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 She has 2 children.        Review of Systems    GENERAL:  Active/slower with limits, speed, stamina for age. Sleep is often restless/worried. No fever now.  ENDO:  No syncope, near or diaphoretic sweaty spells.  HEENT: No head injury same/occ headache,  No vision change, No significant hearing loss.  Ears without pain/drainage.  No sore throat.  No significant nasal/sinus congestion/drainage. No epistaxis.  CHEST: No chest wall tenderness or mass. No significant cough,  without wheeze.  No SOB; no hemoptysis.  CV: No other chest pain, palpitations, ankle edema.  GI: No heartburn, dysphagia.  No abdominal pain, diarrhea, constipation.  No rectal bleeding, or melena.    :  Voids without dysuria, or incontinence to completion.  ORTHO: No painful/swollen joints but various on /off sore.  No change near daily sore neck or back.  No acute neck or back pain without recent injury.   NEURO: No dizziness, weakness of extremities.  No change occ LE numbness/paresthesias.   PSYCH: No memory loss.  Mood good; recent frequent anxious, depressed but/and not suicidal.  Tries to tolerate stress but  "caring for elderly father with family discord. .    PHYSICAL EXAMINATION:  /78 (BP Location: Right arm, Patient Position: Sitting)  Pulse 67  Temp 98.1 °F (36.7 °C) (Oral)   Resp 20  Ht 60\" (152.4 cm)  Wt 172 lb 3.2 oz (78.1 kg)  SpO2 98%  BMI 33.63 kg/m2    GENERAL:  Well nourished/developed in no acute distress. Obese   SKIN: Turgor excellent, without wound, rash, lesion.  HEENT: Normal cephalic without trauma.  Pupils equal round reactive to light. Extraocular motions full without nystagmus.   External canals nonobstructive nontender without reddness.   Oral cavity without growths, exudates, and moist.  Posterior pharynx without mass, obstruction, redness.  No thyromegaly, mass, tenderness, lymphadenopathy and supple.  CV: Regular rhythm.  No murmur, gallop,  edema. Posterior pulses intact.  No carotid bruits.  CHEST: No chest wall tenderness or mass.   LUNGS: Symmetric motion with clear to auscultation.  No dullness to percussion  ABD: Soft, nontender without mass.   ORTHO: Symmetric extremities without swelling/point tenderness.  Full gross range of motion.  NEURO: CN 2-12 grossly intact.  Symmetric facies. 1/4 x bicep equal reflexes.  UE/LE   3/5 strength throughout.  Nonfocal use extremities. Speech clear. Intact light touch with monofilament, vibratory sensation with tuning fork; equal toes/distal feet.    PSYCH: Oriented x 3.  Pleasant calm, well kept.  Purposeful/directed conservation with intact short/long gross memory.      ASSESSMENT/PROBLEM LIST:   58 y/o black female  ALLERGIES AND INTOLERANCES, see above.   Procedural history, see above.   Family history, see above.   Secondhand smoke exposure.    2, para 2,  0.   Menopausal, surgical.  Hormone replacement therapy.   Obesity.   Hypertension.  Previous murmur.  LVH.   Hypertrophic obstructive cardiomyopathy by echocardiogram and  catheterization did not show this.  Hyperkalemia.   Chronic kidney disease, stage 3.  Sleep " apnea with positive study in 2002.   Gastroesophageal reflux.   Colon polyps.  Hepatitis, while on Imuran.    Chronic constipation.   Chronic depression.   Chronic anxiety.   Chronic neck pain  Cervical disc disease.  Cervical spine spondylosis.   Cervical radiculopathy, off and on upper extremities.   Chronic low back pain.   Collagen vascular disease.  Polymyositis.   Systemic lupus erythematosus.   Fibromyalgia.   Chronic narcotic use.   Pulmonary fibrosis by CT/opinion of pulmonary, collagen vascular-related.  Steroid-induced hyperglycemia.   Rectal bleeding.   Melena.   Chronic fatigue.   Intermittent anemia.   Abnormal/false positive stress testing.     REASON FOR ADMISSION:    Chest pain  Elevated blood pressure  Anxiety-[facility]    HOSPITAL COARSE:  Troponin serially and EKGs were unremarkable.  She had a EST/echo and I was called and told it was normal.  She had already agreed if this was the case that she wanted to go home and rest/see if GI/other testing was needed later.  Labs were unremarkable (see below).  CXR was neg. Her calan was change to norvasc and her blood pressure improved.     Labs included:     Labs  Lab Results (last 24 hours)     Procedure Component Value Units Date/Time    CBC & Differential [[id number]] Collected:  11/13/17 1349    Specimen:  Blood Updated:  11/13/17 1401    Narrative:       The following orders were created for panel order CBC & Differential.  Procedure                               Abnormality         Status                     ---------                               -----------         ------                     CBC Auto Differential[[id number]]        Abnormal            Final result                 Please view results for these tests on the individual orders.    CBC Auto Differential [[id number]]  (Abnormal) Collected:  11/13/17 1349    Specimen:  Blood Updated:  11/13/17 1401     WBC 4.50 (L) 10*3/mm3      RBC 4.55 10*6/mm3      Hemoglobin 13.5 g/dL      Hematocrit 40.9  %      MCV 89.9 fL      MCH 29.7 pg      MCHC 33.0 g/dL      RDW 12.9 %      RDW-SD 42.1 fl      MPV 10.3 fL      Platelets 252 10*3/mm3      Neutrophil % 45.6 %      Lymphocyte % 29.1 %      Monocyte % 20.0 (H) %      Eosinophil % 4.7 (H) %      Basophil % 0.4 %      Immature Grans % 0.2 %      Neutrophils, Absolute 2.05 10*3/mm3      Lymphocytes, Absolute 1.31 10*3/mm3      Monocytes, Absolute 0.90 10*3/mm3      Eosinophils, Absolute 0.21 10*3/mm3      Basophils, Absolute 0.02 10*3/mm3      Immature Grans, Absolute 0.01 10*3/mm3     Comprehensive Metabolic Panel [[id number]]  (Abnormal) Collected:  11/13/17 1349    Specimen:  Blood Updated:  11/13/17 1419     Glucose 79 mg/dL      BUN 12 mg/dL      Creatinine 1.24 mg/dL      Sodium 140 mmol/L      Potassium 3.9 mmol/L      Chloride 99 mmol/L      CO2 30.0 mmol/L      Calcium 9.3 mg/dL      Total Protein 8.0 g/dL      Albumin 4.30 g/dL      ALT (SGPT) 26 U/L      AST (SGOT) 26 U/L      Alkaline Phosphatase 68 U/L      Total Bilirubin 0.6 mg/dL      eGFR   Amer 54 (L) mL/min/1.73      Globulin 3.7 gm/dL      A/G Ratio 1.2 g/dL      BUN/Creatinine Ratio 9.7     Anion Gap 11.0 mmol/L     [place] Draw [[id number]] Collected:  11/13/17 1349    Specimen:  Blood Updated:  11/13/17 1501    Narrative:       The following orders were created for panel order [place] Draw.  Procedure                               Abnormality         Status                     ---------                               -----------         ------                     Light Blue Top[[id number]]                                   Final result               Green Top (Gel)[[id number]]                                  Final result               Lavender Top[[id number]]                                     Final result               Red Top[[id number]]                                          Final result                 Please view results for these tests on the individual orders.    Light Blue Top  [[id number]] Collected:  11/13/17 1349    Specimen:  Blood Updated:  11/13/17 1501     Extra Tube hold for add-on      Auto resulted       Green Top (Gel) [[id number]] Collected:  11/13/17 1349    Specimen:  Blood Updated:  11/13/17 1501     Extra Tube Hold for add-ons.      Auto resulted.       Lavender Top [[id number]] Collected:  11/13/17 1349    Specimen:  Blood Updated:  11/13/17 1501     Extra Tube hold for add-on      Auto resulted       Red Top [[id number]] Collected:  11/13/17 1349    Specimen:  Blood Updated:  11/13/17 1501     Extra Tube Hold for add-ons.      Auto resulted.       Troponin [[id number]]  (Normal) Collected:  11/13/17 1349    Specimen:  Blood Updated:  11/13/17 1502     Troponin I <0.012 ng/mL     D-dimer, Quantitative [[id number]]  (Normal) Collected:  11/13/17 1349    Specimen:  Blood Updated:  11/13/17 1505     D-Dimer, Quantitative <0.22 mg/L (FEU)     Narrative:       Reference Range is 0-0.50 mg/L FEU. However, results <0.50 mg/L FEU tends to rule out DVT or PE. Results >0.50 mg/L FEU are not useful in predicting absence or presence of DVT or PE.    Troponin [[id number]]  (Normal) Collected:  11/13/17 1632    Specimen:  Blood Updated:  11/13/17 1705     Troponin I <0.012 ng/mL     Urinalysis With / Culture If Indicated - [[id number]]  (Normal) Collected:  11/13/17 1830    Specimen:  Urine Updated:  11/13/17 1853     Color, UA Yellow     Appearance, UA Clear     pH, UA <=5.0     Specific Gravity, UA 1.019     Glucose, UA Negative     Ketones, UA Negative     Bilirubin, UA Negative     Blood, UA Negative     Protein, UA Negative     Leuk Esterase, UA Negative     Nitrite, UA Negative     Urobilinogen, UA 1.0 E.U./dL    Narrative:       Urine microscopic not indicated.    Troponin [[id number]]  (Normal) Collected:  11/13/17 2038    Specimen:  Blood Updated:  11/13/17 2108     Troponin I 0.013 ng/mL     Basic Metabolic Panel [[id number]]  (Abnormal) Collected:  11/14/17 0301    Specimen:  Blood  Updated:  11/14/17 0406     Glucose 83 mg/dL      BUN 15 mg/dL      Creatinine 1.34 mg/dL      Sodium 143 mmol/L      Potassium 4.2 mmol/L      Chloride 103 mmol/L      CO2 32.0 (H) mmol/L      Calcium 9.0 mg/dL      eGFR  African Amer 50 (L) mL/min/1.73      BUN/Creatinine Ratio 11.2     Anion Gap 8.0 mmol/L     Narrative:       GFR Normal >60  Chronic Kidney Disease <60  Kidney Failure <15    Troponin [[id number]]  (Normal) Collected:  11/14/17 0301    Specimen:  Blood Updated:  11/14/17 0412     Troponin I 0.013 ng/mL     CBC & Differential [[id number]] Collected:  11/14/17 0301    Specimen:  Blood Updated:  11/14/17 0535    Narrative:       The following orders were created for panel order CBC & Differential.  Procedure                               Abnormality         Status                     ---------                               -----------         ------                     Manual Differential[[id number]]          Abnormal            Final result               Scan Slide[[id number]]                                       Final result               CBC Auto Differential[[id number]]        Abnormal            Final result                 Please view results for these tests on the individual orders.    CBC Auto Differential [[id number]]  (Abnormal) Collected:  11/14/17 0301    Specimen:  Blood Updated:  11/14/17 0535     WBC 3.93 (L) 10*3/mm3      RBC 4.24 10*6/mm3      Hemoglobin 12.2 g/dL      Hematocrit 38.5 %      MCV 90.8 fL      MCH 28.8 pg      MCHC 31.7 (L) g/dL      RDW 12.9 %      RDW-SD 42.8 fl      MPV 10.3 fL      Platelets 224 10*3/mm3     Scan Slide [[id number]] Collected:  11/14/17 0301    Specimen:  Blood Updated:  11/14/17 0535     Scan Slide --      See Manual Differential Results       Manual Differential [[id number]]  (Abnormal) Collected:  11/14/17 0301    Specimen:  Blood Updated:  11/14/17 0535     Neutrophil % 50.0 %      Lymphocyte % 25.0 %      Monocyte % 17.0 (H) %      Eosinophil % 3.0 %       Bands %  2.0 %      Atypical Lymphocyte % 3.0 %      Neutrophils Absolute 2.04 10*3/mm3      Lymphocytes Absolute 0.98 10*3/mm3      Monocytes Absolute 0.67 10*3/mm3      Eosinophils Absolute 0.12 10*3/mm3      RBC Morphology Normal     WBC Morphology Normal     Platelet Morphology Normal          Lab Results:  CBC:   Results from last 7 days  Lab Units 11/14/17  0301 11/13/17  1349   WBC 10*3/mm3 3.93* 4.50*   HEMOGLOBIN g/dL 12.2 13.5   HEMATOCRIT % 38.5 40.9   PLATELETS 10*3/mm3 224 252     BMP:  Results from last 7 days  Lab Units 11/14/17  0301 11/13/17  1349   SODIUM mmol/L 143 140   POTASSIUM mmol/L 4.2 3.9   CHLORIDE mmol/L 103 99   CO2 mmol/L 32.0* 30.0   BUN mg/dL 15 12   CREATININE mg/dL 1.34 1.24   GLUCOSE mg/dL 83 79   CALCIUM mg/dL 9.0 9.3   ALT (SGPT) U/L  --  26       DISCHARGE ASSESSMENT:  Chest pain-appears noncardiac  Elevated blood pressure  Hypertension  Anxiety-acute/chronic  Lupus  CKD3    PLAN:   See AVS      Discharge Medications:   Emilie Soto   Home Medication Instructions TREVON:[id number]    Printed on:11/14/17 0830   Medication Information                      carvedilol (COREG) 25 MG tablet  Take 1 tablet by mouth 2 (Two) Times a Day.             cloNIDine (CATAPRES) 0.1 MG tablet  Take 0.1 mg by mouth 2 (Two) Times a Day. One by mouth twice daily as needed if BP greater than 160/100             clorazepate (TRANXENE) 7.5 MG tablet  Take 1 tablet by mouth 2 (Two) Times a Day.             cyclobenzaprine (FLEXERIL) 5 MG tablet               estradiol (ESTRACE) 2 MG tablet  Take 2 mg by mouth Daily.             HYDROcodone-acetaminophen (NORCO) 7.5-325 MG per tablet  Take 1 tablet by mouth 2 (Two) Times a Day As Needed.             hydroxychloroquine (PLAQUENIL) 200 MG tablet  Take 1 tablet by mouth 2 (Two) Times a Day.             losartan (COZAAR) 100 MG tablet  TAKE 1 TABLET DAILY             mycophenolate (CELLCEPT) 500 MG tablet  Take 1 tablet by mouth 2 (Two) Times a Day.              potassium chloride (K-DUR) 10 MEQ CR tablet  Take 10 mEq by mouth 2 (Two) Times a Day.             spironolactone (ALDACTONE) 25 MG tablet  TAKE ONE TABLET DAILY GENERIC FOR ALDACTONE             traZODone (DESYREL) 100 MG tablet  Take 1 tablet by mouth Every Night.             venlafaxine XR (EFFEXOR-XR) 150 MG 24 hr capsule  TAKE 1 CAPSULE DAILY GENERIC FOR EFFEXOR XR ALONG WITH 75MG XR             venlafaxine XR (EFFEXOR-XR) 75 MG 24 hr capsule  TAKE 1 CAPSULE DAILY ALONG WITH 150MG TO EQUAL 225MG GENERIC FOR EFFEXOR XR             verapamil SR (CALAN-SR) 240 MG CR tablet  Take 1 tablet by mouth 2 (Two) Times a Day.             vitamin D (ERGOCALCIFEROL) 86224 UNITS capsule capsule  Take 1 capsule by mouth 1 (One) Time Per Week.               Discharge Care Plan/Instructions:   Activity:   No work this week  Gradually increase as able    Diet:   Cardiac/low fat    Follow-up Appointments:   Dr Miles 11.17.17 and call for time    Future Appointments  Date Time Provider Department Center   1/23/2018 3:30 PM Alexandru Miles MD [facility] None       CONDITION: stable/improved    PROGNOSIS: good

## 2017-11-14 NOTE — PLAN OF CARE
Problem: Patient Care Overview (Adult)  Goal: Plan of Care Review  Outcome: Ongoing (interventions implemented as appropriate)    11/14/17 1530   Coping/Psychosocial Response Interventions   Plan Of Care Reviewed With patient   Patient Care Overview   Progress improving   Outcome Evaluation   Outcome Summary/Follow up Plan STRESS ECHO TODAY- NEGATIVE FOR ISCHEMIA. AWAITING ORDERS PER DR TUTTLE.       Goal: Adult Individualization and Mutuality  Outcome: Ongoing (interventions implemented as appropriate)  Goal: Discharge Needs Assessment  Outcome: Ongoing (interventions implemented as appropriate)    Problem: Acute Coronary Syndrome (ACS) (Adult)  Goal: Signs and Symptoms of Listed Potential Problems Will be Absent or Manageable (Acute Coronary Syndrome)  Outcome: Ongoing (interventions implemented as appropriate)

## 2017-11-14 NOTE — PROGRESS NOTES
Discharge Planning Assessment  [facility]     Patient Name: Emilie Soto  MRN: [id number]  Today's Date: 11/14/2017    Admit Date: 11/13/2017          Discharge Needs Assessment       11/14/17 1548    Living Environment    Lives With spouse    Living Arrangements house    Type of Financial/Environmental Concern none    Transportation Available family or friend will provide    Living Environment    Provides Primary Care For no one    Able to Return to Prior Living Arrangements yes    Discharge Needs Assessment    Concerns To Be Addressed no discharge needs identified;denies needs/concerns at this time    Discharge Disposition home or self-care            Discharge Plan       11/14/17 7997    Case Management/Social Work Plan    Plan PT resides at home with spouse and plans to dc to the same. PT states that she has been trying to get approved for prescription discount programs, as her co-pays for medications are very high. She also plans to speak with Dr. Miles about the possibility of changing some of her medications to more cost effecttive options. SW has also advised PT that the [facility] is a good resource that can potentially assist with medications/medical supplies. PT denies any dc needs at this time. Will follow.     Patient/Family In Agreement With Plan yes        Discharge Placement     No information found                Demographic Summary     None            Functional Status     None            Psychosocial     None            Abuse/Neglect     None            Legal     None            Substance Abuse     None            Patient Forms     None          MARSHA Cole

## 2017-11-17 ENCOUNTER — OFFICE VISIT (OUTPATIENT)
Dept: FAMILY MEDICINE CLINIC | Facility: CLINIC | Age: 57
End: 2017-11-17

## 2017-11-17 VITALS
HEIGHT: 60 IN | DIASTOLIC BLOOD PRESSURE: 80 MMHG | TEMPERATURE: 98.6 F | OXYGEN SATURATION: 98 % | SYSTOLIC BLOOD PRESSURE: 134 MMHG | WEIGHT: 175 LBS | HEART RATE: 66 BPM | RESPIRATION RATE: 18 BRPM | BODY MASS INDEX: 34.36 KG/M2

## 2017-11-17 DIAGNOSIS — I50.32 CHRONIC DIASTOLIC CONGESTIVE HEART FAILURE (HCC): Chronic | ICD-10-CM

## 2017-11-17 DIAGNOSIS — F41.9 ANXIETY: ICD-10-CM

## 2017-11-17 DIAGNOSIS — R07.9 CHEST PAIN, UNSPECIFIED TYPE: ICD-10-CM

## 2017-11-17 DIAGNOSIS — K21.9 GASTROESOPHAGEAL REFLUX DISEASE, ESOPHAGITIS PRESENCE NOT SPECIFIED: Chronic | ICD-10-CM

## 2017-11-17 DIAGNOSIS — M06.9 RHEUMATOID ARTHRITIS, INVOLVING UNSPECIFIED SITE, UNSPECIFIED RHEUMATOID FACTOR PRESENCE: Chronic | ICD-10-CM

## 2017-11-17 DIAGNOSIS — I10 HYPERTENSION, UNSPECIFIED TYPE: Primary | Chronic | ICD-10-CM

## 2017-11-17 DIAGNOSIS — F32.A DEPRESSION, UNSPECIFIED DEPRESSION TYPE: ICD-10-CM

## 2017-11-17 PROCEDURE — 99214 OFFICE O/P EST MOD 30 MIN: CPT | Performed by: FAMILY MEDICINE

## 2017-11-17 RX ORDER — SPIRONOLACTONE 25 MG/1
TABLET ORAL
Qty: 90 TABLET | Refills: 1 | Status: SHIPPED | OUTPATIENT
Start: 2017-11-17 | End: 2018-08-28 | Stop reason: SDUPTHER

## 2017-11-17 NOTE — PROGRESS NOTES
Subjective   Emilie Soto is a 57 y.o. female presenting with chief complaint of:   Chief Complaint   Patient presents with   • Shortness of Breath     [facility] F/U   • Chest Pain     [facility] F/U       History of Present Illness :  unaccompanied.  Here for primarily a/an Acute on chronic issue today.   Has has  multiple chronic problems to consider and was in     Admit Date: 11/13/2017   Discharge Date: 11/14/201  DISCHARGE ASSESSMENT:  Chest pain-appears noncardiac  Elevated blood pressure  Hypertension  Anxiety-acute/chronic  Lupus  CKD3    Since home and off work less chest pain and fatigue; feels better.  Aware no work stress and stress of elderly father having some affect.  EST/echo was neg.     Other chronic problem/s to consider:   HTN.  The HTN has been present for years/it is chronic.  The HTN is assumed essential/without testing needed to look for other.  The HTN is now appearing controlled manifest by todays blood pressure and no home monitoring.  Associated illness below.   Anxiety: This has been present for years/over a year.  It is chronic.  It is worse.  It is associated with stressors: work/home.  Medications being used help.   Medications/Rx change not requested.  Depression: This has been present for years/over a year.  It is chronic.  It is worse.  It is associated with stressors: work/home and health.   Medications being used help.  Medications/Rx change not requested.   No suicide ideation/intent.   CKD3/renal insufficiency: This has been present for years/over a year.  It is chronic.  It is stable by serial labs here.   Previously and today warned of risks NSAID-many other Rx (best to be sure any prescriber aware of kidney issue), x-ray contrast, dehydration.  Associated with fatigue, occ LE edema.   Arthritis-other:  Has chronic joint pain, stiffness, occ swelling involving: .   Has diagnosis: RA.  Sees [facility]/ rheumatology.  Has disease modifying Rx that requires monitoring; plaquenil.    Has  an/another acute issue today: none.    The following portions of the patient's history were reviewed and updated as appropriate: allergies, current medications, past family history, past medical history, past social history, past surgical history and problem list.  Records acquired and reviewed; TCC migrated.      Current Outpatient Prescriptions:   •  amLODIPine (NORVASC) 5 MG tablet, Take 1 tablet by mouth Daily., Disp: 30 tablet, Rfl: 5  •  carvedilol (COREG) 25 MG tablet, Take 1 tablet by mouth 2 (Two) Times a Day., Disp: , Rfl:   •  cloNIDine (CATAPRES) 0.1 MG tablet, Take 0.1 mg by mouth 2 (Two) Times a Day. One by mouth twice daily as needed if BP greater than 160/100, Disp: , Rfl:   •  clorazepate (TRANXENE) 7.5 MG tablet, Take 7.5 mg by mouth 2 (Two) Times a Day., Disp: , Rfl:   •  cyclobenzaprine (FLEXERIL) 5 MG tablet, Take 5 mg by mouth At Night As Needed for Muscle Spasms., Disp: , Rfl:   •  estradiol (ESTRACE) 2 MG tablet, Take 2 mg by mouth Daily., Disp: , Rfl:   •  HYDROcodone-acetaminophen (NORCO) 7.5-325 MG per tablet, Take 1 tablet by mouth 2 (Two) Times a Day As Needed for Moderate Pain ., Disp: , Rfl:   •  hydroxychloroquine (PLAQUENIL) 200 MG tablet, Take 1 tablet by mouth 2 (Two) Times a Day., Disp: , Rfl:   •  losartan (COZAAR) 100 MG tablet, Take 100 mg by mouth Daily., Disp: , Rfl:   •  mycophenolate (CELLCEPT) 500 MG tablet, Take 1 tablet by mouth 2 (Two) Times a Day., Disp: , Rfl:   •  potassium chloride (K-DUR) 10 MEQ CR tablet, Take 10 mEq by mouth 2 (Two) Times a Day., Disp: , Rfl:   •  traZODone (DESYREL) 100 MG tablet, Take 100 mg by mouth Every Night., Disp: , Rfl:   •  venlafaxine XR (EFFEXOR-XR) 150 MG 24 hr capsule, Take 150 mg by mouth Daily., Disp: , Rfl:   •  venlafaxine XR (EFFEXOR-XR) 75 MG 24 hr capsule, Take 75 mg by mouth Daily., Disp: , Rfl:   •  vitamin D (ERGOCALCIFEROL) 50407 UNITS capsule capsule, Take 1 capsule by mouth 1 (One) Time Per Week., Disp: , Rfl:   •   spironolactone (ALDACTONE) 25 MG tablet, TAKE ONE TABLET DAILY GENERIC FOR ALDACTONE, Disp: 90 tablet, Rfl: 1    Allergies   Allergen Reactions   • Biaxin [Clarithromycin] Nausea And Vomiting       Review of Systems  GENERAL:  Inactive/slower with limits, speed, stamina for age and told to rest. Sleep is ok. No fever now.  ENDO:  No syncope, near or diaphoretic sweaty spells.  HEENT: No head injury same/occ headache,  No vision change, No significant hearing loss.  Ears without pain/drainage.  No sore throat.  No significant nasal/sinus congestion/drainage. No epistaxis.  CHEST: No chest wall tenderness or mass. No cough, without wheeze.  No SOB; no hemoptysis.  CV: No chest pain, palpitations, ankle edema.  GI: No heartburn, dysphagia.  No abdominal pain, diarrhea, constipation.  No rectal bleeding, or melena.    :  Voids without dysuria, or incontinence to completion.  ORTHO: No painful/swollen joints but various on /off sore.  No change occ sore neck or back.  No acute neck or back pain without recent injury.   NEURO: No dizziness, weakness of extremities.  No numbness/paresthesias.   PSYCH: No memory loss.  Mood good; on/off anxious, depressed but/and not suicidal.  Tries to tolerate stress .     Results for orders placed or performed during the hospital encounter of 11/13/17   Comprehensive Metabolic Panel   Result Value Ref Range    Glucose 79 70 - 100 mg/dL    BUN 12 5 - 21 mg/dL    Creatinine 1.24 0.50 - 1.40 mg/dL    Sodium 140 135 - 145 mmol/L    Potassium 3.9 3.5 - 5.3 mmol/L    Chloride 99 98 - 110 mmol/L    CO2 30.0 24.0 - 31.0 mmol/L    Calcium 9.3 8.4 - 10.4 mg/dL    Total Protein 8.0 6.3 - 8.7 g/dL    Albumin 4.30 3.50 - 5.00 g/dL    ALT (SGPT) 26 0 - 54 U/L    AST (SGOT) 26 7 - 45 U/L    Alkaline Phosphatase 68 24 - 120 U/L    Total Bilirubin 0.6 0.1 - 1.0 mg/dL    eGFR  African Amer 54 (L) >60 mL/min/1.73    Globulin 3.7 gm/dL    A/G Ratio 1.2 1.1 - 2.5 g/dL    BUN/Creatinine Ratio 9.7 7.0 - 25.0     Anion Gap 11.0 4.0 - 13.0 mmol/L   Troponin   Result Value Ref Range    Troponin I <0.012 0.000 - 0.034 ng/mL   CBC Auto Differential   Result Value Ref Range    WBC 4.50 (L) 4.80 - 10.80 10*3/mm3    RBC 4.55 4.20 - 5.40 10*6/mm3    Hemoglobin 13.5 12.0 - 16.0 g/dL    Hematocrit 40.9 37.0 - 47.0 %    MCV 89.9 82.0 - 98.0 fL    MCH 29.7 28.0 - 32.0 pg    MCHC 33.0 33.0 - 36.0 g/dL    RDW 12.9 12.0 - 15.0 %    RDW-SD 42.1 40.0 - 54.0 fl    MPV 10.3 6.0 - 12.0 fL    Platelets 252 130 - 400 10*3/mm3    Neutrophil % 45.6 39.0 - 78.0 %    Lymphocyte % 29.1 15.0 - 45.0 %    Monocyte % 20.0 (H) 4.0 - 12.0 %    Eosinophil % 4.7 (H) 0.0 - 4.0 %    Basophil % 0.4 0.0 - 2.0 %    Immature Grans % 0.2 0.0 - 5.0 %    Neutrophils, Absolute 2.05 1.87 - 8.40 10*3/mm3    Lymphocytes, Absolute 1.31 0.72 - 4.86 10*3/mm3    Monocytes, Absolute 0.90 0.19 - 1.30 10*3/mm3    Eosinophils, Absolute 0.21 0.00 - 0.70 10*3/mm3    Basophils, Absolute 0.02 0.00 - 0.20 10*3/mm3    Immature Grans, Absolute 0.01 0.00 - 0.03 10*3/mm3   D-dimer, Quantitative   Result Value Ref Range    D-Dimer, Quantitative <0.22 0.00 - 0.50 mg/L (FEU)   Troponin   Result Value Ref Range    Troponin I <0.012 0.000 - 0.034 ng/mL   Urinalysis With / Culture If Indicated -   Result Value Ref Range    Color, UA Yellow Yellow, Straw    Appearance, UA Clear Clear    pH, UA <=5.0 5.0 - 8.0    Specific Gravity, UA 1.019 1.005 - 1.030    Glucose, UA Negative Negative    Ketones, UA Negative Negative    Bilirubin, UA Negative Negative    Blood, UA Negative Negative    Protein, UA Negative Negative    Leuk Esterase, UA Negative Negative    Nitrite, UA Negative Negative    Urobilinogen, UA 1.0 E.U./dL 0.2 - 1.0 E.U./dL   Troponin   Result Value Ref Range    Troponin I 0.013 0.000 - 0.034 ng/mL   Basic Metabolic Panel   Result Value Ref Range    Glucose 83 70 - 100 mg/dL    BUN 15 5 - 21 mg/dL    Creatinine 1.34 0.50 - 1.40 mg/dL    Sodium 143 135 - 145 mmol/L    Potassium 4.2  3.5 - 5.3 mmol/L    Chloride 103 98 - 110 mmol/L    CO2 32.0 (H) 24.0 - 31.0 mmol/L    Calcium 9.0 8.4 - 10.4 mg/dL    eGFR  African Amer 50 (L) >60 mL/min/1.73    BUN/Creatinine Ratio 11.2 7.0 - 25.0    Anion Gap 8.0 4.0 - 13.0 mmol/L   Troponin   Result Value Ref Range    Troponin I 0.013 0.000 - 0.034 ng/mL   CBC Auto Differential   Result Value Ref Range    WBC 3.93 (L) 4.80 - 10.80 10*3/mm3    RBC 4.24 4.20 - 5.40 10*6/mm3    Hemoglobin 12.2 12.0 - 16.0 g/dL    Hematocrit 38.5 37.0 - 47.0 %    MCV 90.8 82.0 - 98.0 fL    MCH 28.8 28.0 - 32.0 pg    MCHC 31.7 (L) 33.0 - 36.0 g/dL    RDW 12.9 12.0 - 15.0 %    RDW-SD 42.8 40.0 - 54.0 fl    MPV 10.3 6.0 - 12.0 fL    Platelets 224 130 - 400 10*3/mm3   Scan Slide   Result Value Ref Range    Scan Slide     Light Blue Top   Result Value Ref Range    Extra Tube hold for add-on    Green Top (Gel)   Result Value Ref Range    Extra Tube Hold for add-ons.    Lavender Top   Result Value Ref Range    Extra Tube hold for add-on    Red Top   Result Value Ref Range    Extra Tube Hold for add-ons.    Manual Differential   Result Value Ref Range    Neutrophil % 50.0 39.0 - 78.0 %    Lymphocyte % 25.0 15.0 - 45.0 %    Monocyte % 17.0 (H) 4.0 - 12.0 %    Eosinophil % 3.0 0.0 - 4.0 %    Bands %  2.0 0.0 - 10.0 %    Atypical Lymphocyte % 3.0 0.0 - 5.0 %    Neutrophils Absolute 2.04 1.87 - 8.40 10*3/mm3    Lymphocytes Absolute 0.98 0.72 - 4.86 10*3/mm3    Monocytes Absolute 0.67 0.19 - 1.30 10*3/mm3    Eosinophils Absolute 0.12 0.00 - 0.70 10*3/mm3    RBC Morphology Normal Normal    WBC Morphology Normal Normal    Platelet Morphology Normal Normal   Adult Stress Echo W/ Cont or Stress Agent if Necessary Per Protocol   Result Value Ref Range    BH CV STRESS PROTOCOL 1 Parth     Stage 1 1     HR Stage 1 115     BP Stage 1 173/101     Duration Min Stage 1 3     Duration Sec Stage 1 0     Grade Stage 1 10     Speed Stage 1 1.7     BH CV STRESS METS STAGE 1 5     Stage 2 2     HR Stage 2 139      BP Stage 2 192/105     Duration Min Stage 2 2     Duration Sec Stage 2 36     Grade Stage 2 12     Speed Stage 2 2.5     BH CV STRESS METS STAGE 2 7.5     Baseline HR 60 bpm    Baseline /93 mmHg    Peak  bpm    Percent Max Pred HR 84.76 %    Percent Target  %    Peak /105 mmHg    Recovery HR 60 bpm    Recovery /93 mmHg    Target HR (85%) 139 bpm    Max. Pred. HR (100%) 164 bpm    Exercise duration (min) 5 min    Exercise duration (sec) 36 sec    Estimated workload 7.5 METS       Lab Results   Component Value Date    HGBA1C 5.7 02/04/2016       Lab Results   Component Value Date     11/14/2017     11/13/2017     09/21/2016    K 4.2 11/14/2017    K 3.9 11/13/2017    K 4.6 09/21/2016     11/14/2017    CL 99 11/13/2017     09/21/2016    CO2 32.0 (H) 11/14/2017    CO2 30.0 11/13/2017    CO2 28.0 09/21/2016    GLUCOSE 83 11/14/2017    GLUCOSE 79 11/13/2017    GLUCOSE 88 02/05/2016    BUN 15 11/14/2017    BUN 12 11/13/2017    BUN 12 09/21/2016    CREATININE 1.34 11/14/2017    CREATININE 1.24 11/13/2017    CREATININE 1.01 09/21/2016    CALCIUM 9.0 11/14/2017    CALCIUM 9.3 11/13/2017    CALCIUM 9.2 09/21/2016    EGFRCLEARA 63 02/05/2016    EGFRCLEARA 66 02/04/2016    EGFRCLEARA >60 10/03/2015       No results found for: PSA     Lab Results:  CBC:  Lab Results - Last 18 Months  Lab Units 11/14/17  0301 11/13/17  1349 09/21/16  0735   WBC 10*3/mm3 3.93* 4.50* 4.32*   HEMATOCRIT % 38.5 40.9 40.7     CMP:  Lab Results - Last 18 Months  Lab Units 11/14/17  0301 11/13/17  1349 09/21/16  0735   SODIUM mmol/L 143 140 143   CHLORIDE mmol/L 103 99 102   CO2 mmol/L 32.0* 30.0  --    TOTAL CO2, ARTERIAL mmol/L  --   --  28.0   BUN mg/dL 15 12 12   CREATININE mg/dL 1.34 1.24 1.01   EGFR IF NONAFRICN AM mL/min/1.73  --   --  57*   EGFR IF AFRICN AM mL/min/1.73 50* 54* 69   CALCIUM mg/dL 9.0 9.3 9.2     THYROID:  Lab Results - Last 18 Months  Lab Units 10/16/17  2804  "09/21/16  0735   TSH mIU/mL 1.540 1.240   FREE T4 ng/dL 0.98  --      A1C:No results for input(s): HGBA1C in the last 19259 hours.    Objective   /80  Pulse 66  Temp 98.6 °F (37 °C) (Oral)   Resp 18  Ht 60\" (152.4 cm)  Wt 175 lb (79.4 kg)  SpO2 98%  Breastfeeding? No  BMI 34.18 kg/m2    Physical Exam  GENERAL:  Well nourished/developed in no acute distress.   SKIN: Turgor excellent, without wound, rash, lesion.  HEENT: Normal cephalic without trauma.  Pupils equal round reactive to light. Extraocular motions full without nystagmus.   External canals nonobstructive nontender without reddness. Tymphatic membranes josiane with cesar structures intact.   Oral cavity without growths, exudates, and moist.  Posterior pharynx without mass, obstruction, redness.  No thyromegaly, mass, tenderness, lymphadenopathy and supple.  CV: Regular rhythm.  No murmur, gallop,  edema. Posterior pulses intact.  No carotid bruits.  CHEST: No chest wall tenderness or mass.   LUNGS: Symmetric motion with clear to auscultation.  No dullness to percussion  ABD: Soft, nontender without mass.   ORTHO: Symmetric extremities without swelling/point tenderness.  Full gross range of motion.  NEURO: CN 2-12 grossly intact.  Symmetric facies. 1/4 x bicep knee equal reflexes.  UE/LE   3/5 strength throughout.  Nonfocal use extremities. Speech clear.  Intact light touch with monofilament, vibratory sensation with tuning fork; equal toes/distal feet.    PSYCH: Oriented x 3.  Pleasant calm, well kept.  Purposeful/directed conservation with intact short/long gross memory.     Assessment/Plan     1. Hypertension, unspecified type  Better than initial hospital elevation    2. Chronic diastolic congestive heart failure    3. Anxiety  Chronic/acute    4. Depression, unspecified depression type  Chronic/acute    5. Gastroesophageal reflux disease, esophagitis presence not specifie    6. Rheumatoid arthritis, involving unspecified site, unspecified " rheumatoid factor presence    Rx: reviewed.  Any other changes above and:   LAB: reviewed/above.  Orders above and:   Wrap-up/other instructions: discussed as applicable  Regular cardio exercise something everyone should consider and try to do; even if health limitations (ie find that exercise UE/LE/cardio that they can tolerate).  Normal weight a goal for everyone.  Healthy diet helpful for weight management, illness prevention.  If over 50-screening exams include men PSA/rectal exam, women mammograms, and  everyone colonoscopy screening for colon cancer.     Patient Instructions   If more bad chest pain; try tranxene first (and give brief time to see if helps)        Follow up: Return for move 1.23.18 apt back one month. .  Future Appointments  Date Time Provider Department Center   1/23/2018 3:30 PM Alexandru Miles MD [facility] None

## 2017-11-22 RX ORDER — VENLAFAXINE HYDROCHLORIDE 75 MG/1
CAPSULE, EXTENDED RELEASE ORAL
Qty: 30 CAPSULE | Refills: 5 | Status: SHIPPED | OUTPATIENT
Start: 2017-11-22 | End: 2018-09-01 | Stop reason: SDUPTHER

## 2017-11-27 ENCOUNTER — TELEPHONE (OUTPATIENT)
Dept: FAMILY MEDICINE CLINIC | Facility: CLINIC | Age: 57
End: 2017-11-27

## 2017-11-27 RX ORDER — AZITHROMYCIN 250 MG/1
TABLET, FILM COATED ORAL
Qty: 6 TABLET | Refills: 0 | Status: SHIPPED | OUTPATIENT
Start: 2017-11-27 | End: 2017-12-29

## 2017-11-27 NOTE — TELEPHONE ENCOUNTER
"Vm \"I have fever, sore throat, and cough\"  dtr tested positive for strep   Verbal per Dr Miles may have last/notified pt and Rx to [facility]2    "

## 2017-12-19 ENCOUNTER — TELEPHONE (OUTPATIENT)
Dept: FAMILY MEDICINE CLINIC | Facility: CLINIC | Age: 57
End: 2017-12-19

## 2017-12-19 RX ORDER — ONDANSETRON 4 MG/1
4 TABLET, FILM COATED ORAL EVERY 8 HOURS PRN
Qty: 10 TABLET | Refills: 0 | Status: SHIPPED | OUTPATIENT
Start: 2017-12-19 | End: 2018-08-28

## 2017-12-19 NOTE — TELEPHONE ENCOUNTER
Write note-off until further notice  Call progress note tomorrow; if ill tomorrow will be seen  Clear liquids another 24 hr; liquids very important/calories optional  Zofran if needed  NO food handling

## 2017-12-19 NOTE — TELEPHONE ENCOUNTER
Pt called and states this her 2nd day of missed work because of vomiting and diarrhea and needs a work excuse willing to be seen [phone]

## 2017-12-20 NOTE — TELEPHONE ENCOUNTER
Pt called with update today and states she has had no diarrhea or vomiting since about 8:30 last night states she would like to go back to work tomorrow [phone], work excuse needs to be from 12-18-17- 12/20/17

## 2017-12-29 RX ORDER — METHYLPREDNISOLONE 4 MG/1
TABLET ORAL
Qty: 21 TABLET | Refills: 0 | Status: SHIPPED | OUTPATIENT
Start: 2017-12-29 | End: 2018-02-15

## 2017-12-29 RX ORDER — CEFDINIR 300 MG/1
300 CAPSULE ORAL
Qty: 20 CAPSULE | Refills: 0 | Status: SHIPPED | OUTPATIENT
Start: 2017-12-29 | End: 2018-02-15

## 2018-01-22 RX ORDER — VERAPAMIL HYDROCHLORIDE 240 MG/1
TABLET, FILM COATED, EXTENDED RELEASE ORAL
Qty: 180 TABLET | Refills: 1 | Status: SHIPPED | OUTPATIENT
Start: 2018-01-22 | End: 2018-03-01 | Stop reason: SDUPTHER

## 2018-02-07 ENCOUNTER — TELEPHONE (OUTPATIENT)
Dept: FAMILY MEDICINE CLINIC | Facility: CLINIC | Age: 58
End: 2018-02-07

## 2018-02-07 NOTE — TELEPHONE ENCOUNTER
"Yoselin CAMARILLO, \"pt called for refill of Calan and she is on Norvasc, both are calcium channel blockers, is she supposed to be on both?\" spoke to Yoselin CAMARILLO and will have to clarify for both, pt stated she has been taking both since 11-14-17  "

## 2018-02-08 NOTE — TELEPHONE ENCOUNTER
Current Outpatient Prescriptions:   •  amLODIPine (NORVASC) 5 MG tablet, Take 1 tablet by mouth Daily., Disp: 30 tablet, Rfl: 5  •  carvedilol (COREG) 25 MG tablet, Take 1 tablet by mouth 2 (Two) Times a Day., Disp: , Rfl:   •  cefdinir (OMNICEF) 300 MG capsule, Take 1 capsule by mouth 2 (Two) Times a Day., Disp: 20 capsule, Rfl: 0  •  cloNIDine (CATAPRES) 0.1 MG tablet, Take 0.1 mg by mouth 2 (Two) Times a Day. One by mouth twice daily as needed if BP greater than 160/100, Disp: , Rfl:   •  clorazepate (TRANXENE) 7.5 MG tablet, Take 7.5 mg by mouth 2 (Two) Times a Day., Disp: , Rfl:   •  cyclobenzaprine (FLEXERIL) 5 MG tablet, Take 5 mg by mouth At Night As Needed for Muscle Spasms., Disp: , Rfl:   •  estradiol (ESTRACE) 2 MG tablet, Take 2 mg by mouth Daily., Disp: , Rfl:   •  HYDROcodone-acetaminophen (NORCO) 7.5-325 MG per tablet, Take 1 tablet by mouth 2 (Two) Times a Day As Needed for Moderate Pain ., Disp: , Rfl:   •  hydroxychloroquine (PLAQUENIL) 200 MG tablet, Take 1 tablet by mouth 2 (Two) Times a Day., Disp: , Rfl:   •  losartan (COZAAR) 100 MG tablet, Take 100 mg by mouth Daily., Disp: , Rfl:   •  MethylPREDNISolone (MEDROL, ALISON,) 4 MG tablet, Take as directed on package instructions., Disp: 21 tablet, Rfl: 0  •  mycophenolate (CELLCEPT) 500 MG tablet, Take 1 tablet by mouth 2 (Two) Times a Day., Disp: , Rfl:   •  ondansetron (ZOFRAN) 4 MG tablet, Take 1 tablet by mouth Every 8 (Eight) Hours As Needed for Nausea or Vomiting., Disp: 10 tablet, Rfl: 0  •  potassium chloride (K-DUR) 10 MEQ CR tablet, Take 10 mEq by mouth 2 (Two) Times a Day., Disp: , Rfl:   •  spironolactone (ALDACTONE) 25 MG tablet, TAKE ONE TABLET DAILY GENERIC FOR ALDACTONE, Disp: 90 tablet, Rfl: 1  •  traZODone (DESYREL) 100 MG tablet, Take 100 mg by mouth Every Night., Disp: , Rfl:   •  venlafaxine XR (EFFEXOR-XR) 150 MG 24 hr capsule, Take 150 mg by mouth Daily., Disp: , Rfl:   •  venlafaxine XR (EFFEXOR-XR) 75 MG 24 hr capsule,  TAKE 1 CAPSULE DAILY ALONG WITH 150MG TO EQUAL 225MG GENERIC FOR EFFEXOR XR, Disp: 30 capsule, Rfl: 5  •  verapamil SR (CALAN-SR) 240 MG CR tablet, TAKE ONE TABLET TWICE DAILY GENERIC FOR CALAN SR, Disp: 180 tablet, Rfl: 1  •  vitamin D (ERGOCALCIFEROL) 00379 UNITS capsule capsule, Take 1 capsule by mouth 1 (One) Time Per Week., Disp: , Rfl:     Its because she needs the calan for heart issues and max is 480 AND she has such a problem with bp she needs the norvasc  Stay on both for now

## 2018-02-27 ENCOUNTER — OFFICE VISIT (OUTPATIENT)
Dept: FAMILY MEDICINE CLINIC | Facility: CLINIC | Age: 58
End: 2018-02-27

## 2018-02-27 VITALS
RESPIRATION RATE: 18 BRPM | DIASTOLIC BLOOD PRESSURE: 92 MMHG | OXYGEN SATURATION: 99 % | SYSTOLIC BLOOD PRESSURE: 146 MMHG | BODY MASS INDEX: 33.96 KG/M2 | HEIGHT: 60 IN | HEART RATE: 90 BPM | TEMPERATURE: 98.3 F | WEIGHT: 173 LBS

## 2018-02-27 DIAGNOSIS — N18.30 CHRONIC KIDNEY DISEASE, STAGE 3 (HCC): Chronic | ICD-10-CM

## 2018-02-27 DIAGNOSIS — F32.A DEPRESSION, UNSPECIFIED DEPRESSION TYPE: ICD-10-CM

## 2018-02-27 DIAGNOSIS — I50.32 CHRONIC DIASTOLIC CONGESTIVE HEART FAILURE (HCC): Chronic | ICD-10-CM

## 2018-02-27 DIAGNOSIS — K21.9 GASTROESOPHAGEAL REFLUX DISEASE, ESOPHAGITIS PRESENCE NOT SPECIFIED: Chronic | ICD-10-CM

## 2018-02-27 DIAGNOSIS — M06.9 RHEUMATOID ARTHRITIS, INVOLVING UNSPECIFIED SITE, UNSPECIFIED RHEUMATOID FACTOR PRESENCE: Chronic | ICD-10-CM

## 2018-02-27 DIAGNOSIS — I10 HYPERTENSION, UNSPECIFIED TYPE: Primary | Chronic | ICD-10-CM

## 2018-02-27 DIAGNOSIS — F41.9 ANXIETY: ICD-10-CM

## 2018-02-27 PROBLEM — Z79.890 HORMONE REPLACEMENT THERAPY (HRT): Status: ACTIVE | Noted: 2018-02-27

## 2018-02-27 PROBLEM — E89.40 SURGICAL MENOPAUSE: Status: ACTIVE | Noted: 2018-02-27

## 2018-02-27 PROCEDURE — 99213 OFFICE O/P EST LOW 20 MIN: CPT | Performed by: FAMILY MEDICINE

## 2018-02-27 RX ORDER — CARVEDILOL 25 MG/1
25 TABLET ORAL 2 TIMES DAILY WITH MEALS
Qty: 60 TABLET | Refills: 5 | Status: SHIPPED | OUTPATIENT
Start: 2018-02-27 | End: 2018-10-01 | Stop reason: SDUPTHER

## 2018-02-27 NOTE — PROGRESS NOTES
Subjective   Emilie Soto is a 57 y.o. female presenting with chief complaint of:   Chief Complaint   Patient presents with   • Hypertension   • Heartburn   • Chronic Kidney Disease       History of Present Illness :  Alone.   Has multiple chronic problems to consider that might have a bearing on today's issues;  an interval appointment.       1. Hypertension, unspecified type    2. Chronic diastolic congestive heart failure    3. Chronic kidney disease, stage 3    4. Anxiety    5. Gastroesophageal reflux disease, esophagitis presence not specified    6. Rheumatoid arthritis, involving unspecified site, unspecified rheumatoid factor presence    7. Depression, unspecified depression type      Other chronic problem/s to consider:   HTN.  The HTN has been present for years/it is chronic.  The HTN is assumed essential/without testing needed to look for other.  The HTN is not controlled manifest by todays blood pressure and same home monitoring.  Associated illness below.  Ran out of coreg and was not sure she was suppose to stay off.  Recent notice both on norvasc 5 and max Calan (and with this difficult to treat bp)  Anxiety: This has been present for years/over a year.  It is chronic.  It is variable.  It is associated with stressors: work/home; especially daughter/grandchildren having to live with them.  Medications being used help.   Medications/Rx change not requested.  Congestive heart failure/echo changes: This has been present for years/over a year.  It is chronic.  The CHF had features on echo of diastolic dysfunction.  This has been associated with SOB, LE edema on/off.   CKD3/renal insufficiency: This has been present for years/over a year.  It is chronic.  It is stable by serial labs here. Previously and today warned of risks NSAID-many other Rx (best to be sure any prescriber aware of kidney issue), x-ray contrast, dehydration.  Associated with fatigue, occ LE edema.   Arthritis-other:  Has chronic joint  pain, stiffness, occ swelling involving: many joints.   Has diagnosis: RA.  Sees [facility]/ rheumatology.  Has plaquenil disease modifying Rx that requires monitoring.   GE reflux/heartburn: This has been present for years/over a year.  It is a chronic condition.   It is stable as there is no change in infrequent heartburn and no dysphagia.  Medication required to control symptoms.  No Rx currently.     Has an/another acute issue today: none.    The following portions of the patient's history were reviewed and updated as appropriate: allergies, current medications, past family history, past medical history, past social history, past surgical history and problem list.  Records acquired and reviewed; TCC migrated.      Current Outpatient Prescriptions:   •  amLODIPine (NORVASC) 5 MG tablet, Take 1 tablet by mouth Daily., Disp: 30 tablet, Rfl: 5  •  cyclobenzaprine (FLEXERIL) 5 MG tablet, Take 5 mg by mouth At Night As Needed for Muscle Spasms., Disp: , Rfl:   •  estradiol (ESTRACE) 2 MG tablet, Take 2 mg by mouth Daily., Disp: , Rfl:   •  HYDROcodone-acetaminophen (NORCO) 7.5-325 MG per tablet, Take 1 tablet by mouth 2 (Two) Times a Day As Needed for Moderate Pain ., Disp: , Rfl:   •  hydroxychloroquine (PLAQUENIL) 200 MG tablet, Take 1 tablet by mouth 2 (Two) Times a Day., Disp: , Rfl:   •  losartan (COZAAR) 100 MG tablet, Take 100 mg by mouth Daily., Disp: , Rfl:   •  potassium chloride (K-DUR) 10 MEQ CR tablet, Take 10 mEq by mouth 2 (Two) Times a Day., Disp: , Rfl:   •  spironolactone (ALDACTONE) 25 MG tablet, TAKE ONE TABLET DAILY GENERIC FOR ALDACTONE, Disp: 90 tablet, Rfl: 1  •  traZODone (DESYREL) 100 MG tablet, Take 100 mg by mouth Every Night., Disp: , Rfl:   •  venlafaxine XR (EFFEXOR-XR) 150 MG 24 hr capsule, Take 150 mg by mouth Daily., Disp: , Rfl:   •  venlafaxine XR (EFFEXOR-XR) 75 MG 24 hr capsule, TAKE 1 CAPSULE DAILY ALONG WITH 150MG TO EQUAL 225MG GENERIC FOR EFFEXOR XR, Disp: 30 capsule, Rfl: 5  •   verapamil SR (CALAN-SR) 240 MG CR tablet, TAKE ONE TABLET TWICE DAILY GENERIC FOR CALAN SR, Disp: 180 tablet, Rfl: 1  •  vitamin D (ERGOCALCIFEROL) 42948 UNITS capsule capsule, Take 1 capsule by mouth 1 (One) Time Per Week., Disp: , Rfl:   •  carvedilol (COREG) 25 MG tablet, Take 1 tablet by mouth 2 (Two) Times a Day., Disp: , Rfl:  Not taking  •  cloNIDine (CATAPRES) 0.1 MG tablet, Take 0.1 mg by mouth 2 (Two) Times a Day. One by mouth twice daily as needed if BP greater than 160/100, Disp: , Rfl:   •  mycophenolate (CELLCEPT) 500 MG tablet, Take 1 tablet by mouth 2 (Two) Times a Day., Disp: , Rfl:   •  ondansetron (ZOFRAN) 4 MG tablet, Take 1 tablet by mouth Every 8 (Eight) Hours As Needed for Nausea or Vomiting., Disp: 10 tablet, Rfl: 0    No problems with medications.  Refills if needed done    Allergies   Allergen Reactions   • Biaxin [Clarithromycin] Nausea And Vomiting       Review of Systems  GENERAL:  Active/slower with limits, speed, stamina for age and desire. Sleep is ok; occ hard falling/staying with worry. No fever now.  SKIN: No  rash/skin lesion of concern:   ENDO:  No syncope, near or diaphoretic sweaty spells..  HEENT: No recent head injury; but same/occ headache,  No vision change, No significant hearing loss.  Ears without pain/drainage.  No sore throat.  No significant nasal/sinus congestion/drainage. No epistaxis.  CHEST: No chest wall tenderness or mass. No cough, without wheeze.  No SOB; no hemoptysis.  CV: No chest pain, palpitations, ankle edema.  GI: No heartburn, dysphagia.  No abdominal pain, diarrhea, constipation.  No rectal bleeding, or melena.    Colonoscopy/[facility]/4.22.16/1y  Colon-polyp/[facility]/Jasiel7.28.17/3y    :  Voids without dysuria, or  incontinence to completion.  ORTHO: No painful/swollen joints but various on /off sore.  No change some sore neck or back.  No acute neck or back pain without recent injury.  NEURO: No dizziness, weakness of extremities.  No  numbness/paresthesias.   PSYCH: No memory loss.  Mood good; often anxious, depressed but/and not suicidal.  Tries to tolerate stress .     Results for orders placed or performed during the hospital encounter of 11/13/17   Comprehensive Metabolic Panel   Result Value Ref Range    Glucose 79 70 - 100 mg/dL    BUN 12 5 - 21 mg/dL    Creatinine 1.24 0.50 - 1.40 mg/dL    Sodium 140 135 - 145 mmol/L    Potassium 3.9 3.5 - 5.3 mmol/L    Chloride 99 98 - 110 mmol/L    CO2 30.0 24.0 - 31.0 mmol/L    Calcium 9.3 8.4 - 10.4 mg/dL    Total Protein 8.0 6.3 - 8.7 g/dL    Albumin 4.30 3.50 - 5.00 g/dL    ALT (SGPT) 26 0 - 54 U/L    AST (SGOT) 26 7 - 45 U/L    Alkaline Phosphatase 68 24 - 120 U/L    Total Bilirubin 0.6 0.1 - 1.0 mg/dL    eGFR  African Amer 54 (L) >60 mL/min/1.73    Globulin 3.7 gm/dL    A/G Ratio 1.2 1.1 - 2.5 g/dL    BUN/Creatinine Ratio 9.7 7.0 - 25.0    Anion Gap 11.0 4.0 - 13.0 mmol/L   Troponin   Result Value Ref Range    Troponin I <0.012 0.000 - 0.034 ng/mL   CBC Auto Differential   Result Value Ref Range    WBC 4.50 (L) 4.80 - 10.80 10*3/mm3    RBC 4.55 4.20 - 5.40 10*6/mm3    Hemoglobin 13.5 12.0 - 16.0 g/dL    Hematocrit 40.9 37.0 - 47.0 %    MCV 89.9 82.0 - 98.0 fL    MCH 29.7 28.0 - 32.0 pg    MCHC 33.0 33.0 - 36.0 g/dL    RDW 12.9 12.0 - 15.0 %    RDW-SD 42.1 40.0 - 54.0 fl    MPV 10.3 6.0 - 12.0 fL    Platelets 252 130 - 400 10*3/mm3    Neutrophil % 45.6 39.0 - 78.0 %    Lymphocyte % 29.1 15.0 - 45.0 %    Monocyte % 20.0 (H) 4.0 - 12.0 %    Eosinophil % 4.7 (H) 0.0 - 4.0 %    Basophil % 0.4 0.0 - 2.0 %    Immature Grans % 0.2 0.0 - 5.0 %    Neutrophils, Absolute 2.05 1.87 - 8.40 10*3/mm3    Lymphocytes, Absolute 1.31 0.72 - 4.86 10*3/mm3    Monocytes, Absolute 0.90 0.19 - 1.30 10*3/mm3    Eosinophils, Absolute 0.21 0.00 - 0.70 10*3/mm3    Basophils, Absolute 0.02 0.00 - 0.20 10*3/mm3    Immature Grans, Absolute 0.01 0.00 - 0.03 10*3/mm3   D-dimer, Quantitative   Result Value Ref Range    D-Dimer,  Quantitative <0.22 0.00 - 0.50 mg/L (FEU)   Troponin   Result Value Ref Range    Troponin I <0.012 0.000 - 0.034 ng/mL   Urinalysis With / Culture If Indicated -   Result Value Ref Range    Color, UA Yellow Yellow, Straw    Appearance, UA Clear Clear    pH, UA <=5.0 5.0 - 8.0    Specific Gravity, UA 1.019 1.005 - 1.030    Glucose, UA Negative Negative    Ketones, UA Negative Negative    Bilirubin, UA Negative Negative    Blood, UA Negative Negative    Protein, UA Negative Negative    Leuk Esterase, UA Negative Negative    Nitrite, UA Negative Negative    Urobilinogen, UA 1.0 E.U./dL 0.2 - 1.0 E.U./dL   Troponin   Result Value Ref Range    Troponin I 0.013 0.000 - 0.034 ng/mL   Basic Metabolic Panel   Result Value Ref Range    Glucose 83 70 - 100 mg/dL    BUN 15 5 - 21 mg/dL    Creatinine 1.34 0.50 - 1.40 mg/dL    Sodium 143 135 - 145 mmol/L    Potassium 4.2 3.5 - 5.3 mmol/L    Chloride 103 98 - 110 mmol/L    CO2 32.0 (H) 24.0 - 31.0 mmol/L    Calcium 9.0 8.4 - 10.4 mg/dL    eGFR  African Amer 50 (L) >60 mL/min/1.73    BUN/Creatinine Ratio 11.2 7.0 - 25.0    Anion Gap 8.0 4.0 - 13.0 mmol/L   Troponin   Result Value Ref Range    Troponin I 0.013 0.000 - 0.034 ng/mL   CBC Auto Differential   Result Value Ref Range    WBC 3.93 (L) 4.80 - 10.80 10*3/mm3    RBC 4.24 4.20 - 5.40 10*6/mm3    Hemoglobin 12.2 12.0 - 16.0 g/dL    Hematocrit 38.5 37.0 - 47.0 %    MCV 90.8 82.0 - 98.0 fL    MCH 28.8 28.0 - 32.0 pg    MCHC 31.7 (L) 33.0 - 36.0 g/dL    RDW 12.9 12.0 - 15.0 %    RDW-SD 42.8 40.0 - 54.0 fl    MPV 10.3 6.0 - 12.0 fL    Platelets 224 130 - 400 10*3/mm3   Scan Slide   Result Value Ref Range    Scan Slide     Light Blue Top   Result Value Ref Range    Extra Tube hold for add-on    Green Top (Gel)   Result Value Ref Range    Extra Tube Hold for add-ons.    Lavender Top   Result Value Ref Range    Extra Tube hold for add-on    Red Top   Result Value Ref Range    Extra Tube Hold for add-ons.    Manual Differential   Result  Value Ref Range    Neutrophil % 50.0 39.0 - 78.0 %    Lymphocyte % 25.0 15.0 - 45.0 %    Monocyte % 17.0 (H) 4.0 - 12.0 %    Eosinophil % 3.0 0.0 - 4.0 %    Bands %  2.0 0.0 - 10.0 %    Atypical Lymphocyte % 3.0 0.0 - 5.0 %    Neutrophils Absolute 2.04 1.87 - 8.40 10*3/mm3    Lymphocytes Absolute 0.98 0.72 - 4.86 10*3/mm3    Monocytes Absolute 0.67 0.19 - 1.30 10*3/mm3    Eosinophils Absolute 0.12 0.00 - 0.70 10*3/mm3    RBC Morphology Normal Normal    WBC Morphology Normal Normal    Platelet Morphology Normal Normal   Adult Stress Echo W/ Cont or Stress Agent if Necessary Per Protocol   Result Value Ref Range    BH CV STRESS PROTOCOL 1 Parth     Stage 1 1     HR Stage 1 115     BP Stage 1 173/101     Duration Min Stage 1 3     Duration Sec Stage 1 0     Grade Stage 1 10     Speed Stage 1 1.7     BH CV STRESS METS STAGE 1 5     Stage 2 2     HR Stage 2 139     BP Stage 2 192/105     Duration Min Stage 2 2     Duration Sec Stage 2 36     Grade Stage 2 12     Speed Stage 2 2.5     BH CV STRESS METS STAGE 2 7.5     Baseline HR 60 bpm    Baseline /93 mmHg    Peak  bpm    Percent Max Pred HR 84.76 %    Percent Target  %    Peak /105 mmHg    Recovery HR 60 bpm    Recovery /93 mmHg    Target HR (85%) 139 bpm    Max. Pred. HR (100%) 164 bpm    Exercise duration (min) 5 min    Exercise duration (sec) 36 sec    Estimated workload 7.5 METS       Lab Results   Component Value Date    HGBA1C 5.7 02/04/2016       Lab Results   Component Value Date     11/14/2017     11/13/2017     09/21/2016    K 4.2 11/14/2017    K 3.9 11/13/2017    K 4.6 09/21/2016     11/14/2017    CL 99 11/13/2017     09/21/2016    CO2 32.0 (H) 11/14/2017    CO2 30.0 11/13/2017    CO2 28.0 09/21/2016    GLUCOSE 83 11/14/2017    GLUCOSE 79 11/13/2017    GLUCOSE 88 02/05/2016    BUN 15 11/14/2017    BUN 12 11/13/2017    BUN 12 09/21/2016    CREATININE 1.34 11/14/2017    CREATININE 1.24 11/13/2017     "CREATININE 1.01 09/21/2016    CALCIUM 9.0 11/14/2017    CALCIUM 9.3 11/13/2017    CALCIUM 9.2 09/21/2016    EGFRCLEARA 63 02/05/2016    EGFRCLEARA 66 02/04/2016    EGFRCLEARA >60 10/03/2015       No results found for: PSA     Lab Results:  CBC:    Lab Results - Last 18 Months  Lab Units 11/14/17  0301 11/13/17  1349 09/21/16  0735   WBC 10*3/mm3 3.93* 4.50* 4.32*   HEMATOCRIT % 38.5 40.9 40.7     CMP:    Lab Results - Last 18 Months  Lab Units 11/14/17  0301 11/13/17  1349 09/21/16  0735   SODIUM mmol/L 143 140 143   CHLORIDE mmol/L 103 99 102   CO2 mmol/L 32.0* 30.0  --    TOTAL CO2, ARTERIAL mmol/L  --   --  28.0   BUN mg/dL 15 12 12   CREATININE mg/dL 1.34 1.24 1.01   EGFR IF NONAFRICN AM mL/min/1.73  --   --  57*   EGFR IF AFRICN AM mL/min/1.73 50* 54* 69   CALCIUM mg/dL 9.0 9.3 9.2     THYROID:    Lab Results - Last 18 Months  Lab Units 10/16/17  1435 09/21/16  0735   TSH mIU/mL 1.540 1.240   FREE T4 ng/dL 0.98  --      A1C:No results for input(s): HGBA1C in the last 81923 hours.    Objective   /92 (BP Location: Left arm, Patient Position: Sitting, Cuff Size: Large Adult)  Pulse 90  Temp 98.3 °F (36.8 °C) (Oral)   Resp 18  Ht 152.4 cm (60\")  Wt 78.5 kg (173 lb)  SpO2 99%  BMI 33.79 kg/m2    Physical Exam  GENERAL:  Well nourished/developed in no acute distress. BMI noted: 33.8  SKIN: Turgor excellent, without wound, rash, lesion  HEENT: Normal cephalic without trauma.  Pupils equal round reactive to light. Extraocular motions full without nystagmus.   External canals nonobstructive nontender without reddness. Tymphatic membranes josiaen with cesar structures intact.   Oral cavity without growths, exudates, and moist.  Posterior pharynx without mass, obstruction, redness.  No thyromegaly, mass, tenderness, lymphadenopathy and supple.  CV: Regular rhythm.  No murmur, gallop,  edema. Posterior pulses intact.  No carotid bruits.  CHEST: No chest wall tenderness or mass.   LUNGS: Symmetric motion with " clear to auscultation.   ABD: Soft, nontender without mass.   ORTHO: Symmetric extremities without swelling/point tenderness.  Full gross range of motion; few sore fingers.  NEURO: CN 2-12 grossly intact.  Symmetric facies. 1/4 x bicep equal reflexes.  UE/LE   3/5 strength throughout.  Nonfocal use extremities. Speech clear. Intact light touch with monofilament, vibratory sensation with tuning fork; equal toes/distal feet.    PSYCH: Oriented x 3.  Pleasant calm, well kept.   Purposeful/directed conservation with intact short/long gross memory.     Assessment/Plan     1. Hypertension, unspecified type    2. Chronic diastolic congestive heart failure    3. Chronic kidney disease, stage 3    4. Anxiety    5. Gastroesophageal reflux disease, esophagitis presence not specified    6. Rheumatoid arthritis, involving unspecified site, unspecified rheumatoid factor presence    7. Depression, unspecified depression type        Rx: reviewed/changes:  Same  Back on coreg; Rx written  Home bp written    LAB: reviewed/orders:   Any lab others want; (we wish to attempt not to duplicate so we need copies of labs done outside the office/out of [facility]); OR can have all labs here (bring the order from all other doctors) and we will forward to all specialist  6m CBC, CMP, sed rate, CK-total, CR-protein  12m CBC, CMP, LIPID, TSH, fT4, CK-total, Vit D, sed rate, CR-protein             Discussions:   Patient's BMI is above normal parameters. Follow-up plan includes:  exercise counseling and nutrition counseling.  Non-smoker  See Dr Clayton for gyne    Patient Instructions   Goal for your blood pressure is 130 range top and 80 range bottom and you feeling better. Use us/or home monitoring to prove this. (if not able to check; report any feeling of low blood pressure)    Regular cardio exercise something everyone should consider and try to do; even if health limitations (ie find that exercise UE/LE/cardio that they can tolerate). (as we  discussed)  Normal weight a goal for everyone (as we discussed)  Healthy diet helpful for weight management, illness prevention (as we discussed)  If over 50-screening exams include men PSA/rectal exam, women mammograms, and  everyone colonoscopy screening for colon cancer.            Follow up: Return for lab during/or just before next apt;, Dr Miles-, 6 m;.  Future Appointments  Date Time Provider Department Center   8/20/2018 3:45 PM Alexandru Miles MD [facility] None

## 2018-02-27 NOTE — PATIENT INSTRUCTIONS
Goal for your blood pressure is 130 range top and 80 range bottom and you feeling better. Use us/or home monitoring to prove this. (if not able to check; report any feeling of low blood pressure)    Regular cardio exercise something everyone should consider and try to do; even if health limitations (ie find that exercise UE/LE/cardio that they can tolerate). (as we discussed)  Normal weight a goal for everyone (as we discussed)  Healthy diet helpful for weight management, illness prevention (as we discussed)  If over 50-screening exams include men PSA/rectal exam, women mammograms, and  everyone colonoscopy screening for colon cancer.

## 2018-02-28 RX ORDER — AMLODIPINE BESYLATE 5 MG/1
TABLET ORAL
Qty: 30 TABLET | Refills: 5 | Status: SHIPPED | OUTPATIENT
Start: 2018-02-28 | End: 2018-10-01 | Stop reason: SDUPTHER

## 2018-03-01 RX ORDER — VERAPAMIL HYDROCHLORIDE 240 MG/1
TABLET, FILM COATED, EXTENDED RELEASE ORAL
Qty: 180 TABLET | Refills: 1 | Status: SHIPPED | OUTPATIENT
Start: 2018-03-01 | End: 2018-10-02 | Stop reason: SDUPTHER

## 2018-03-30 RX ORDER — LOSARTAN POTASSIUM 100 MG/1
TABLET ORAL
Qty: 30 TABLET | Refills: 5 | Status: SHIPPED | OUTPATIENT
Start: 2018-03-30 | End: 2018-03-30 | Stop reason: SDUPTHER

## 2018-03-30 RX ORDER — LOSARTAN POTASSIUM 100 MG/1
100 TABLET ORAL DAILY
Qty: 30 TABLET | Refills: 0 | Status: SHIPPED | OUTPATIENT
Start: 2018-03-30 | End: 2018-06-11 | Stop reason: SDUPTHER

## 2018-04-11 ENCOUNTER — LAB REQUISITION (OUTPATIENT)
Dept: LAB | Facility: HOSPITAL | Age: 58
End: 2018-04-11

## 2018-04-11 DIAGNOSIS — Z12.72 ENCOUNTER FOR SCREENING FOR MALIGNANT NEOPLASM OF VAGINA: ICD-10-CM

## 2018-04-11 PROCEDURE — G0123 SCREEN CERV/VAG THIN LAYER: HCPCS | Performed by: OBSTETRICS & GYNECOLOGY

## 2018-06-04 RX ORDER — VENLAFAXINE HYDROCHLORIDE 150 MG/1
CAPSULE, EXTENDED RELEASE ORAL
Qty: 30 CAPSULE | Refills: 5 | Status: SHIPPED | OUTPATIENT
Start: 2018-06-04 | End: 2018-12-27 | Stop reason: SDUPTHER

## 2018-06-11 RX ORDER — LOSARTAN POTASSIUM 100 MG/1
100 TABLET ORAL DAILY
Qty: 90 TABLET | Refills: 1 | Status: SHIPPED | OUTPATIENT
Start: 2018-06-11 | End: 2019-04-02 | Stop reason: SDUPTHER

## 2018-07-24 ENCOUNTER — OFFICE VISIT (OUTPATIENT)
Dept: FAMILY MEDICINE CLINIC | Facility: CLINIC | Age: 58
End: 2018-07-24

## 2018-07-24 VITALS
DIASTOLIC BLOOD PRESSURE: 76 MMHG | OXYGEN SATURATION: 98 % | SYSTOLIC BLOOD PRESSURE: 132 MMHG | WEIGHT: 173 LBS | TEMPERATURE: 98.6 F | RESPIRATION RATE: 18 BRPM | BODY MASS INDEX: 33.96 KG/M2 | HEIGHT: 60 IN | HEART RATE: 70 BPM

## 2018-07-24 DIAGNOSIS — I10 HYPERTENSION, UNSPECIFIED TYPE: Chronic | ICD-10-CM

## 2018-07-24 DIAGNOSIS — F41.9 ANXIETY: Primary | ICD-10-CM

## 2018-07-24 DIAGNOSIS — F32.A DEPRESSION, UNSPECIFIED DEPRESSION TYPE: ICD-10-CM

## 2018-07-24 PROCEDURE — 99213 OFFICE O/P EST LOW 20 MIN: CPT | Performed by: FAMILY MEDICINE

## 2018-07-24 RX ORDER — ALPRAZOLAM 0.5 MG/1
0.5 TABLET ORAL 2 TIMES DAILY PRN
Qty: 20 TABLET | Refills: 0 | Status: SHIPPED | OUTPATIENT
Start: 2018-07-24 | End: 2018-08-28 | Stop reason: SDUPTHER

## 2018-07-25 NOTE — PROGRESS NOTES
Subjective   Emilie Soto is a 57 y.o. female presenting with chief complaint of:   Chief Complaint   Patient presents with   • Stress   • Anxiety       History of Present Illness :  Alone.  Here for primarily an acute issue today; increased anxiety.   Has multiple chronic problems to consider that might have a bearing on today's issues; not an interval appointment.       Chronic/acute problems to review today:   1. Anxiety: chronic/worse.  Gave up home to move into apartment.  Going to go for [employer].  Work keeps getting more responsibilty; went to car today and cried.   supportive.  Nonsuicidal.  Thinking disability with her arthritis history.    2. Depression, unspecified depression type: chronic/same as anxiety.    3. Hypertension, unspecified type: chronic/stable pattern with bp checks here.      Has an/another acute issue today: none.    The following portions of the patient's history were reviewed and updated as appropriate: allergies, current medications, past family history, past medical history, past social history, past surgical history and problem list.  Records acquired and reviewed; TCC migrated.      Current Outpatient Prescriptions:   •  amLODIPine (NORVASC) 5 MG tablet, TAKE ONE TABLET DAILY GENERIC FOR NORVASC, Disp: 30 tablet, Rfl: 5  •  carvedilol (COREG) 25 MG tablet, Take 1 tablet by mouth 2 (Two) Times a Day With Meals., Disp: 60 tablet, Rfl: 5  •  cloNIDine (CATAPRES) 0.1 MG tablet, Take 0.1 mg by mouth 2 (Two) Times a Day. One by mouth twice daily as needed if BP greater than 160/100, Disp: , Rfl:   •  cyclobenzaprine (FLEXERIL) 5 MG tablet, Take 5 mg by mouth At Night As Needed for Muscle Spasms., Disp: , Rfl:   •  estradiol (ESTRACE) 2 MG tablet, Take 2 mg by mouth Daily., Disp: , Rfl:   •  HYDROcodone-acetaminophen (NORCO) 7.5-325 MG per tablet, Take 1 tablet by mouth 2 (Two) Times a Day As Needed for Moderate Pain ., Disp: , Rfl:   •  hydroxychloroquine (PLAQUENIL) 200 MG  tablet, Take 1 tablet by mouth 2 (Two) Times a Day., Disp: , Rfl:   •  losartan (COZAAR) 100 MG tablet, Take 1 tablet by mouth Daily., Disp: 90 tablet, Rfl: 1  •  mycophenolate (CELLCEPT) 500 MG tablet, Take 1 tablet by mouth 2 (Two) Times a Day., Disp: , Rfl:   •  ondansetron (ZOFRAN) 4 MG tablet, Take 1 tablet by mouth Every 8 (Eight) Hours As Needed for Nausea or Vomiting., Disp: 10 tablet, Rfl: 0  •  potassium chloride (K-DUR) 10 MEQ CR tablet, Take 10 mEq by mouth 2 (Two) Times a Day., Disp: , Rfl:   •  spironolactone (ALDACTONE) 25 MG tablet, TAKE ONE TABLET DAILY GENERIC FOR ALDACTONE, Disp: 90 tablet, Rfl: 1  •  traZODone (DESYREL) 100 MG tablet, Take 100 mg by mouth Every Night., Disp: , Rfl:   •  venlafaxine XR (EFFEXOR-XR) 150 MG 24 hr capsule, TAKE 1 CAPSULE DAILY GENERIC FOR EFFEXOR XR ALONG WITH 75MG XR, Disp: 30 capsule, Rfl: 5  •  venlafaxine XR (EFFEXOR-XR) 75 MG 24 hr capsule, TAKE 1 CAPSULE DAILY ALONG WITH 150MG TO EQUAL 225MG GENERIC FOR EFFEXOR XR, Disp: 30 capsule, Rfl: 5  •  verapamil SR (CALAN-SR) 240 MG CR tablet, TAKE ONE TABLET TWICE DAILY GENERIC FOR CALAN SR, Disp: 180 tablet, Rfl: 1  •  vitamin D (ERGOCALCIFEROL) 18045 UNITS capsule capsule, Take 1 capsule by mouth 1 (One) Time Per Week., Disp: , Rfl:   •  ALPRAZolam (XANAX) 0.5 MG tablet, Take 1 tablet by mouth 2 (Two) Times a Day As Needed for Anxiety., Disp: 20 tablet, Rfl: 0    No problems with medications.  Refills if needed done    Allergies   Allergen Reactions   • Biaxin [Clarithromycin] Nausea And Vomiting       Review of Systems  GENERAL:  Active/slower with limits, speed, stamina for age and desire. Sleep is ok; occ hard falling/staying with worry. No fever now.  SKIN: No  rash/skin lesion of concern:   ENDO:  No syncope, near or diaphoretic sweaty spells..  HEENT: No recent head injury; but same/occ headache,  No vision change, No significant hearing loss.  Ears without pain/drainage.  No sore throat.  No significant  nasal/sinus congestion/drainage. No epistaxis.  CHEST: No chest wall tenderness or mass. No cough, without wheeze.  No SOB; no hemoptysis.  CV: No chest pain, palpitations, ankle edema.  GI: No heartburn, dysphagia.  No abdominal pain, diarrhea, constipation.  No rectal bleeding, or melena.    :  Voids without dysuria, or  incontinence to completion.  ORTHO: No painful/swollen joints but various on /off sore.  No change some sore neck or back.  No acute neck or back pain without recent injury.  NEURO: No dizziness, weakness of extremities.  No numbness/paresthesias.   PSYCH: No memory loss.  Mood good; often anxious, depressed but/and not suicidal.  Tries to tolerate stress .     Screening:  Mammogram: 5.8.18  Bone density: 4.21.11  Low dose CT chest: NA  GI:   Colonoscopy/[facility]/Jasiel/4.22.16/1y  Colon-polyp/[facility]/Reed7.28.17/3y  Prostate: NA  Usual lab order  Any lab others want; (we wish to attempt not to duplicate so we need copies of labs done outside the office/out of [facility]); OR can have all labs here (bring the order from all other doctors) and we will forward to all specialist  6m CBC, CMP, sed rate, CK-total, CR-protein  12m CBC, CMP, LIPID, TSH, fT4, CK-total, Vit D, sed rate, CR-protein    Results for orders placed or performed in visit on 04/11/18   Liquid-based Pap Smear, Screening   Result Value Ref Range    Case Report       Gynecologic Cytology Report                       Case: [id number]                                  Authorizing Provider:  Nolberto Clayton MD    Collected:           04/11/2018 03:37 PM          First Screen:          Josseline Gomez             Received:            04/11/2018 03:37 PM          Specimen:    Liquid-Based Pap, Screening, Vagina                                                        Interpretation Negative for intraepithelial lesion or malignancy      General Categorization Within normal limits     Specimen Adequacy Satisfactory for evaluation     Additional  "Information       Disclaimer: Cervical cytology is a screening test primarily for squamous cancer and its precursors and has associated false-negative and false-positive results.  Technologies such as liquid-based preparations may decrease but will not eliminate all false-negative results.  Follow-up of unexplained clinical signs and symptoms is recommended to minimize false-negative results. (The [place] System for Reporting Cervical Cytology: Servin, 2015).      Embedded Images         No results found for: PSA     Lab Results:  CBC:    Lab Results - Last 18 Months  Lab Units 11/14/17  0301 11/13/17  1349   WBC 10*3/mm3 3.93* 4.50*   HEMOGLOBIN g/dL 12.2 13.5   HEMATOCRIT % 38.5 40.9   PLATELETS 10*3/mm3 224 252      BMP/CMP:    Lab Results - Last 18 Months  Lab Units 11/14/17  0301 11/13/17  1349   SODIUM mmol/L 143 140   POTASSIUM mmol/L 4.2 3.9   CHLORIDE mmol/L 103 99   CO2 mmol/L 32.0* 30.0   BUN mg/dL 15 12   CREATININE mg/dL 1.34 1.24   EGFR IF AFRICN AM mL/min/1.73 50* 54*   CALCIUM mg/dL 9.0 9.3     HEPATIC:    Lab Results - Last 18 Months  Lab Units 11/13/17  1349   ALT (SGPT) U/L 26   AST (SGOT) U/L 26   ALK PHOS U/L 68     THYROID:    Lab Results - Last 18 Months  Lab Units 10/16/17  1435   TSH mIU/mL 1.540   FREE T4 ng/dL 0.98     A1C:No results for input(s): HGBA1C in the last 60362 hours.  PSA:No results for input(s): PSA in the last 23334 hours.    Objective   /76   Pulse 70   Temp 98.6 °F (37 °C) (Oral)   Resp 18   Ht 152.4 cm (60\")   Wt 78.5 kg (173 lb)   SpO2 98%   Breastfeeding? No   BMI 33.79 kg/m²   Body mass index is 33.79 kg/m².    Physical Exam  GENERAL:  Well nourished/developed in no acute distress. BMI noted: 33.8  SKIN: Turgor excellent, without wound, rash, lesion  HEENT: Normal cephalic without trauma.  Pupils equal round reactive to light. Extraocular motions full without nystagmus.   External canals nonobstructive nontender without reddness. Tymphatic membranes " josiane with cesar structures intact.   Oral cavity without growths, exudates, and moist.  Posterior pharynx without mass, obstruction, redness.  No thyromegaly, mass, tenderness, lymphadenopathy and supple.  CV: Regular rhythm.  No murmur, gallop,  edema. Posterior pulses intact.  No carotid bruits.  CHEST: No chest wall tenderness or mass.   LUNGS: Symmetric motion with clear to auscultation.   ABD: Soft, nontender without mass.   ORTHO: Symmetric extremities without swelling/point tenderness.  Full gross range of motion; few sore fingers.  NEURO: CN 2-12 grossly intact.  Symmetric facies. 1/4 x bicep equal reflexes.  UE/LE   3/5 strength throughout.  Nonfocal use extremities. Speech clear. Intact light touch with monofilament, vibratory sensation with tuning fork; equal toes/distal feet.    PSYCH: Oriented x 3.  Pleasant calm, well kept.   Purposeful/directed conservation with intact short/long gross memory.     Assessment/Plan     1. Anxiety    2. Depression, unspecified depression type    3. Hypertension, unspecified type      Rx: reviewed/changes:  Xanax 0.5 bid prn #20 NR; use sparingly     LAB/Testing/Referrals: reviewed/orders:   Today: none  No orders of the defined types were placed in this encounter.      Discussions:   No work this week; time to rest and take stock.  Stop for work return note end of week  If wants to consider disability would prefer she see psych  Body mass index is 33.79 kg/m².   Patient's Body mass index is 33.79 kg/m². BMI is above normal parameters. Recommendations include: exercise counseling, nutrition counseling and as able; helps moods usually.  Non-smoker      There are no Patient Instructions on file for this visit.    Follow up: Return for as planned.  Future Appointments  Date Time Provider Department Center   8/20/2018 3:45 PM Alexandru Miles MD [facility] None

## 2018-08-01 ENCOUNTER — TELEPHONE (OUTPATIENT)
Dept: FAMILY MEDICINE CLINIC | Facility: CLINIC | Age: 58
End: 2018-08-01

## 2018-08-01 NOTE — TELEPHONE ENCOUNTER
I would go to  ED;   Reasons;   Needs heart testing/stress testing likely AND  Getting there now/close to AM slight chance she can get testing through ED and if not her heart; at least know that and then go home.

## 2018-08-01 NOTE — TELEPHONE ENCOUNTER
"Vm \"I wanted to let Dr Miles that I had chest pain yesterday and my arm was also hurting. I didn't know what to do so I just stayed at work and made it. I just wanted to know if Dr Miles had any suggestions?  "

## 2018-08-28 ENCOUNTER — OFFICE VISIT (OUTPATIENT)
Dept: FAMILY MEDICINE CLINIC | Facility: CLINIC | Age: 58
End: 2018-08-28

## 2018-08-28 VITALS
DIASTOLIC BLOOD PRESSURE: 74 MMHG | RESPIRATION RATE: 18 BRPM | TEMPERATURE: 98.4 F | SYSTOLIC BLOOD PRESSURE: 130 MMHG | WEIGHT: 168.5 LBS | BODY MASS INDEX: 33.08 KG/M2 | HEIGHT: 60 IN | OXYGEN SATURATION: 98 % | HEART RATE: 69 BPM

## 2018-08-28 DIAGNOSIS — F32.A DEPRESSION, UNSPECIFIED DEPRESSION TYPE: ICD-10-CM

## 2018-08-28 DIAGNOSIS — I10 HYPERTENSION, UNSPECIFIED TYPE: Chronic | ICD-10-CM

## 2018-08-28 DIAGNOSIS — F41.9 ANXIETY: Primary | ICD-10-CM

## 2018-08-28 DIAGNOSIS — I50.32 CHRONIC DIASTOLIC CONGESTIVE HEART FAILURE (HCC): Chronic | ICD-10-CM

## 2018-08-28 DIAGNOSIS — N18.30 CHRONIC KIDNEY DISEASE, STAGE 3 (HCC): Chronic | ICD-10-CM

## 2018-08-28 PROCEDURE — 99213 OFFICE O/P EST LOW 20 MIN: CPT | Performed by: FAMILY MEDICINE

## 2018-08-28 RX ORDER — POTASSIUM CHLORIDE 750 MG/1
10 TABLET, FILM COATED, EXTENDED RELEASE ORAL 2 TIMES DAILY WITH MEALS
Qty: 180 TABLET | Refills: 1 | Status: SHIPPED | OUTPATIENT
Start: 2018-08-28 | End: 2019-12-18 | Stop reason: SDUPTHER

## 2018-08-28 RX ORDER — SPIRONOLACTONE 25 MG/1
25 TABLET ORAL DAILY
Qty: 90 TABLET | Refills: 1 | Status: SHIPPED | OUTPATIENT
Start: 2018-08-28 | End: 2019-03-04 | Stop reason: SDUPTHER

## 2018-08-28 RX ORDER — ALPRAZOLAM 0.5 MG/1
0.5 TABLET ORAL 2 TIMES DAILY PRN
Qty: 20 TABLET | Refills: 0 | Status: SHIPPED | OUTPATIENT
Start: 2018-08-28 | End: 2019-01-16 | Stop reason: SDUPTHER

## 2018-08-28 NOTE — PROGRESS NOTES
Subjective   Emilie Soto is a 57 y.o. female presenting with chief complaint of:   Chief Complaint   Patient presents with   • Anxiety     Patient is here for a 6 month follow-up.   • Hypertension       History of Present Illness :  Alone.  Here for primarily an acute issue today; f/u last visit.   Has multiple chronic problems to consider that might have a bearing on today's issues;  an interval appointment.       Chronic/acute problems to review today:   1. Anxiety: chronic/acute and continues.  Quit her job due to stress and plans to apply unemployment and then disability.   is afraid of financial realities.    2. Depression, unspecified depression type: chronic/acute and continues; same as anxiety.    3. Chronic kidney disease, stage 3: chronic/stable with regular labs.    4. Hypertension, unspecified type: chronic/stable with bps recent here.    5. Chronic diastolic congestive heart failure (CMS/HCC): chronic/stable degree of on/off leg edema and mild SOB with exertion.      Has an/another acute issue today: none.    The following portions of the patient's history were reviewed and updated as appropriate: allergies, current medications, past family history, past medical history, past social history, past surgical history and problem list.      Current Outpatient Prescriptions:   •  ALPRAZolam (XANAX) 0.5 MG tablet, Take 1 tablet by mouth 2 (Two) Times a Day As Needed for Anxiety., Disp: 20 tablet, Rfl: 0  •  amLODIPine (NORVASC) 5 MG tablet, TAKE ONE TABLET DAILY GENERIC FOR NORVASC, Disp: 30 tablet, Rfl: 5  •  carvedilol (COREG) 25 MG tablet, Take 1 tablet by mouth 2 (Two) Times a Day With Meals., Disp: 60 tablet, Rfl: 5  •  cloNIDine (CATAPRES) 0.1 MG tablet, Take 0.1 mg by mouth 2 (Two) Times a Day. One by mouth twice daily as needed if BP greater than 160/100, Disp: , Rfl:   •  cyclobenzaprine (FLEXERIL) 5 MG tablet, Take 5 mg by mouth At Night As Needed for Muscle Spasms., Disp: , Rfl:   •   estradiol (ESTRACE) 2 MG tablet, Take 2 mg by mouth Daily., Disp: , Rfl:   •  HYDROcodone-acetaminophen (NORCO) 7.5-325 MG per tablet, Take 1 tablet by mouth 2 (Two) Times a Day As Needed for Moderate Pain ., Disp: , Rfl:   •  hydroxychloroquine (PLAQUENIL) 200 MG tablet, Take 1 tablet by mouth 2 (Two) Times a Day., Disp: , Rfl:   •  losartan (COZAAR) 100 MG tablet, Take 1 tablet by mouth Daily., Disp: 90 tablet, Rfl: 1  •  mycophenolate (CELLCEPT) 500 MG tablet, Take 1 tablet by mouth 2 (Two) Times a Day., Disp: , Rfl:   •  ondansetron (ZOFRAN) 4 MG tablet, Take 1 tablet by mouth Every 8 (Eight) Hours As Needed for Nausea or Vomiting., Disp: 10 tablet, Rfl: 0  •  potassium chloride (K-DUR) 10 MEQ CR tablet, Take 10 mEq by mouth 2 (Two) Times a Day., Disp: , Rfl:   •  spironolactone (ALDACTONE) 25 MG tablet, TAKE ONE TABLET DAILY GENERIC FOR ALDACTONE, Disp: 90 tablet, Rfl: 1  •  traZODone (DESYREL) 100 MG tablet, Take 100 mg by mouth Every Night., Disp: , Rfl:   •  venlafaxine XR (EFFEXOR-XR) 150 MG 24 hr capsule, TAKE 1 CAPSULE DAILY GENERIC FOR EFFEXOR XR ALONG WITH 75MG XR, Disp: 30 capsule, Rfl: 5  •  venlafaxine XR (EFFEXOR-XR) 75 MG 24 hr capsule, TAKE 1 CAPSULE DAILY ALONG WITH 150MG TO EQUAL 225MG GENERIC FOR EFFEXOR XR, Disp: 30 capsule, Rfl: 5  •  verapamil SR (CALAN-SR) 240 MG CR tablet, TAKE ONE TABLET TWICE DAILY GENERIC FOR CALAN SR, Disp: 180 tablet, Rfl: 1  •  vitamin D (ERGOCALCIFEROL) 14523 UNITS capsule capsule, Take 1 capsule by mouth 1 (One) Time Per Week., Disp: , Rfl:     No problems with medications.  Refills if needed done    Allergies   Allergen Reactions   • Biaxin [Clarithromycin] Nausea And Vomiting       Review of Systems  GENERAL:  Active/slower with limits, speed, stamina for age and desire. Sleep is ok; occ hard falling/staying with worry. No fever now.  SKIN: No  rash/skin lesion of concern:   ENDO:  No syncope, near or diaphoretic sweaty spells..  HEENT: No recent head injury; but  same/occ headache,  No vision change, No significant hearing loss.  Ears without pain/drainage.  No sore throat.  No significant nasal/sinus congestion/drainage. No epistaxis.  CHEST: No chest wall tenderness or mass. No cough, without wheeze.  No SOB; no hemoptysis.  CV: No chest pain, palpitations, ankle edema.  GI: No heartburn, dysphagia.  No abdominal pain, diarrhea, constipation.  No rectal bleeding, or melena.    :  Voids without dysuria, or  incontinence to completion.  ORTHO: No painful/swollen joints but various on /off sore.  No change some sore neck or back.  No acute neck or back pain without recent injury.  NEURO: No dizziness, weakness of extremities.  No numbness/paresthesias.   PSYCH: No memory loss.  Mood good; often anxious, depressed but/and not suicidal.  Tries to tolerate stress .      Screening:  Mammogram: 5.8.18  Bone density: 4.21.11  Low dose CT chest: NA  GI:   Colonoscopy/DESHAWNP/Jasiel/4.22.16/1y  Colon-polyp/DESHAWN/Jasiel7.28.17/3y  Prostate: NA  Usual lab order  Any lab others want; (we wish to attempt not to duplicate so we need copies of labs done outside the office/out of [facility]); OR can have all labs here (bring the order from all other doctors) and we will forward to all specialist  6m CBC, CMP, sed rate, CK-total, CR-protein  12m CBC, CMP, LIPID, TSH, fT4, CK-total, Vit D, sed rate, CR-protein    Results for orders placed or performed in visit on 04/11/18   Liquid-based Pap Smear, Screening   Result Value Ref Range    Case Report       Gynecologic Cytology Report                       Case: [id number]                                  Authorizing Provider:  Nolberto Clayton MD    Collected:           04/11/2018 03:37 PM          First Screen:          Josseline Gomez             Received:            04/11/2018 03:37 PM          Specimen:    Liquid-Based Pap, Screening, Vagina                                                        Interpretation Negative for intraepithelial lesion or  "malignancy      General Categorization Within normal limits     Specimen Adequacy Satisfactory for evaluation     Additional Information       Disclaimer: Cervical cytology is a screening test primarily for squamous cancer and its precursors and has associated false-negative and false-positive results.  Technologies such as liquid-based preparations may decrease but will not eliminate all false-negative results.  Follow-up of unexplained clinical signs and symptoms is recommended to minimize false-negative results. (The [place] System for Reporting Cervical Cytology: Servin, 2015).      Embedded Images         No results found for: PSA     Lab Results:  CBC:    Lab Results - Last 18 Months  Lab Units 11/14/17  0301 11/13/17  1349   WBC 10*3/mm3 3.93* 4.50*   HEMOGLOBIN g/dL 12.2 13.5   HEMATOCRIT % 38.5 40.9   PLATELETS 10*3/mm3 224 252      BMP/CMP:    Lab Results - Last 18 Months  Lab Units 11/14/17  0301 11/13/17  1349   SODIUM mmol/L 143 140   POTASSIUM mmol/L 4.2 3.9   CHLORIDE mmol/L 103 99   CO2 mmol/L 32.0* 30.0   BUN mg/dL 15 12   CREATININE mg/dL 1.34 1.24   EGFR IF AFRICN AM mL/min/1.73 50* 54*   CALCIUM mg/dL 9.0 9.3     HEPATIC:    Lab Results - Last 18 Months  Lab Units 11/13/17  1349   ALT (SGPT) U/L 26   AST (SGOT) U/L 26   ALK PHOS U/L 68     THYROID:    Lab Results - Last 18 Months  Lab Units 10/16/17  1435   TSH mIU/mL 1.540     A1C:No results for input(s): HGBA1C in the last 20698 hours.  PSA:No results for input(s): PSA in the last 26053 hours.    Objective   /74 (BP Location: Left arm, Patient Position: Sitting, Cuff Size: Adult)   Pulse 69   Temp 98.4 °F (36.9 °C) (Oral)   Resp 18   Ht 152.4 cm (60\")   Wt 76.4 kg (168 lb 8 oz)   SpO2 98%   BMI 32.91 kg/m²   Body mass index is 32.91 kg/m².    Physical Exam  GENERAL:  Well nourished/developed in no acute distress. BMI noted: 33.8  SKIN: Turgor excellent, without wound, rash, lesion  HEENT: Normal cephalic without trauma.  Pupils " equal round reactive to light. Extraocular motions full without nystagmus.   External canals nonobstructive nontender without reddness. Tymphatic membranes josiane with cesar structures intact.   Oral cavity without growths, exudates, and moist.  Posterior pharynx without mass, obstruction, redness.  No thyromegaly, mass, tenderness, lymphadenopathy and supple.  CV: Regular rhythm.  No murmur, gallop,  edema. Posterior pulses intact.  No carotid bruits.  CHEST: No chest wall tenderness or mass.   LUNGS: Symmetric motion with clear to auscultation.   ABD: Soft, nontender without mass.   ORTHO: Symmetric extremities without swelling/point tenderness.  Full gross range of motion; few sore fingers.  NEURO: CN 2-12 grossly intact.  Symmetric facies. 1/4 x bicep equal reflexes.  UE/LE   3/5 strength throughout.  Nonfocal use extremities. Speech clear. Intact light touch with monofilament, vibratory sensation with tuning fork; equal toes/distal feet.    PSYCH: Oriented x 3.  Pleasant calm, well kept.   Purposeful/directed conservation with intact short/long gross memory.        Assessment/Plan     1. Anxiety    2. Depression, unspecified depression type    3. Chronic kidney disease, stage 3    4. Hypertension, unspecified type    5. Chronic diastolic congestive heart failure (CMS/HCC)        Rx: reviewed/changes:  Same  Refills as needed    LAB/Testing/Referrals: reviewed/orders:   Today: as above  No orders of the defined types were placed in this encounter.      Discussions:     Body mass index is 32.91 kg/m².   Patient's Body mass index is 32.91 kg/m². BMI is above normal parameters. Recommendations include: exercise counseling, nutrition counseling and which could help anxiety/depression too.  Non-smoker      There are no Patient Instructions on file for this visit.    Follow up: Return for lab during/or just before next apt;, Dr Miles-, 6 m;.  No future appointments.

## 2018-09-05 RX ORDER — VENLAFAXINE HYDROCHLORIDE 75 MG/1
CAPSULE, EXTENDED RELEASE ORAL
Qty: 30 CAPSULE | Refills: 5 | Status: SHIPPED | OUTPATIENT
Start: 2018-09-05 | End: 2019-03-04 | Stop reason: SDUPTHER

## 2018-09-07 ENCOUNTER — TELEPHONE (OUTPATIENT)
Dept: FAMILY MEDICINE CLINIC | Facility: CLINIC | Age: 58
End: 2018-09-07

## 2018-09-07 NOTE — TELEPHONE ENCOUNTER
Left a message on 's phone and this time of day she needs to go to the ER to get this taken care of. Unable to reach Pt

## 2018-09-07 NOTE — TELEPHONE ENCOUNTER
More history; what is going on    Cannot find karrie in T[facility], epic    If we dont have an understanding of this; she needs to go to an ED

## 2018-10-01 DIAGNOSIS — I50.32 CHRONIC DIASTOLIC CONGESTIVE HEART FAILURE (HCC): Chronic | ICD-10-CM

## 2018-10-01 DIAGNOSIS — I10 HYPERTENSION, UNSPECIFIED TYPE: Chronic | ICD-10-CM

## 2018-10-01 RX ORDER — AMLODIPINE BESYLATE 5 MG/1
TABLET ORAL
Qty: 30 TABLET | Refills: 5 | Status: SHIPPED | OUTPATIENT
Start: 2018-10-01 | End: 2019-07-16 | Stop reason: SDUPTHER

## 2018-10-01 RX ORDER — CARVEDILOL 25 MG/1
TABLET ORAL
Qty: 180 TABLET | Refills: 1 | Status: SHIPPED | OUTPATIENT
Start: 2018-10-01 | End: 2020-01-27 | Stop reason: SDUPTHER

## 2018-10-01 RX ORDER — TRAZODONE HYDROCHLORIDE 50 MG/1
TABLET ORAL
Qty: 60 TABLET | Refills: 5 | Status: SHIPPED | OUTPATIENT
Start: 2018-10-01 | End: 2019-07-02 | Stop reason: SDUPTHER

## 2018-10-02 RX ORDER — VERAPAMIL HYDROCHLORIDE 240 MG/1
240 TABLET, FILM COATED, EXTENDED RELEASE ORAL 2 TIMES DAILY
Qty: 180 TABLET | Refills: 1 | Status: SHIPPED | OUTPATIENT
Start: 2018-10-02 | End: 2020-01-27 | Stop reason: SDUPTHER

## 2018-10-26 ENCOUNTER — FLU SHOT (OUTPATIENT)
Dept: FAMILY MEDICINE CLINIC | Facility: CLINIC | Age: 58
End: 2018-10-26

## 2018-10-26 DIAGNOSIS — Z23 NEED FOR INFLUENZA VACCINATION: ICD-10-CM

## 2018-10-27 PROCEDURE — 90471 IMMUNIZATION ADMIN: CPT | Performed by: FAMILY MEDICINE

## 2018-10-27 PROCEDURE — 90686 IIV4 VACC NO PRSV 0.5 ML IM: CPT | Performed by: FAMILY MEDICINE

## 2018-12-27 RX ORDER — VENLAFAXINE HYDROCHLORIDE 150 MG/1
CAPSULE, EXTENDED RELEASE ORAL
Qty: 30 CAPSULE | Refills: 5 | Status: SHIPPED | OUTPATIENT
Start: 2018-12-27 | End: 2019-09-26 | Stop reason: SDUPTHER

## 2019-01-16 DIAGNOSIS — F41.9 ANXIETY: ICD-10-CM

## 2019-01-16 DIAGNOSIS — F32.A DEPRESSION, UNSPECIFIED DEPRESSION TYPE: ICD-10-CM

## 2019-01-16 RX ORDER — ALPRAZOLAM 0.5 MG/1
0.5 TABLET ORAL 2 TIMES DAILY PRN
Qty: 20 TABLET | Refills: 0 | Status: SHIPPED | OUTPATIENT
Start: 2019-01-16 | End: 2019-04-05 | Stop reason: SDUPTHER

## 2019-01-16 NOTE — TELEPHONE ENCOUNTER
"Pt came \"im depressed and im not sure if my blood sugar has been low or what?\" pt brought in meter to be downloaded, pt wants to know if her dose of effexor could be increased to 300mg xl daily? Has been on 150 mg xl, and 75 xl daily for years?\" or does she need to be changed to something else or just get a refill of xanax?    [place] ref[place] 8-28-18    [place]  [facility]      "

## 2019-01-24 ENCOUNTER — CLINICAL SUPPORT (OUTPATIENT)
Dept: FAMILY MEDICINE CLINIC | Facility: CLINIC | Age: 59
End: 2019-01-24

## 2019-01-24 ENCOUNTER — TELEPHONE (OUTPATIENT)
Dept: FAMILY MEDICINE CLINIC | Facility: CLINIC | Age: 59
End: 2019-01-24

## 2019-01-24 DIAGNOSIS — R05.9 COUGH: Primary | ICD-10-CM

## 2019-01-24 LAB
EXPIRATION DATE: NORMAL
FLUAV AG NPH QL: NEGATIVE
FLUBV AG NPH QL: NEGATIVE
INTERNAL CONTROL: NORMAL
Lab: NORMAL

## 2019-01-24 PROCEDURE — 87804 INFLUENZA ASSAY W/OPTIC: CPT | Performed by: FAMILY MEDICINE

## 2019-01-24 RX ORDER — AZITHROMYCIN 250 MG/1
TABLET, FILM COATED ORAL
Qty: 6 TABLET | Refills: 0 | Status: SHIPPED | OUTPATIENT
Start: 2019-01-24 | End: 2019-03-28

## 2019-01-24 NOTE — TELEPHONE ENCOUNTER
She says she has a terrible cough. Chest hurts from coughing so much. Coughing up green mucous. Wants to get something sent to her pharmacy.Has been sick for 3 days. No fever

## 2019-01-24 NOTE — TELEPHONE ENCOUNTER
Consider nurse flu test; especially if body aches, HA, fever    If flu low risk/neg test then redo last

## 2019-02-06 ENCOUNTER — TELEPHONE (OUTPATIENT)
Dept: FAMILY MEDICINE CLINIC | Facility: CLINIC | Age: 59
End: 2019-02-06

## 2019-02-06 RX ORDER — OSELTAMIVIR PHOSPHATE 75 MG/1
75 CAPSULE ORAL DAILY
Qty: 10 CAPSULE | Refills: 0 | Status: SHIPPED | OUTPATIENT
Start: 2019-02-06 | End: 2019-04-05

## 2019-03-04 RX ORDER — SPIRONOLACTONE 25 MG/1
TABLET ORAL
Qty: 90 TABLET | Refills: 1 | Status: SHIPPED | OUTPATIENT
Start: 2019-03-04 | End: 2019-12-18 | Stop reason: SDUPTHER

## 2019-03-04 RX ORDER — VENLAFAXINE HYDROCHLORIDE 75 MG/1
CAPSULE, EXTENDED RELEASE ORAL
Qty: 30 CAPSULE | Refills: 5 | Status: SHIPPED | OUTPATIENT
Start: 2019-03-04 | End: 2019-09-26 | Stop reason: SDUPTHER

## 2019-03-04 RX ORDER — POTASSIUM CHLORIDE 750 MG/1
TABLET, EXTENDED RELEASE ORAL
Qty: 180 TABLET | Refills: 1 | Status: SHIPPED | OUTPATIENT
Start: 2019-03-04 | End: 2019-08-15 | Stop reason: SDUPTHER

## 2019-03-28 ENCOUNTER — TELEPHONE (OUTPATIENT)
Dept: FAMILY MEDICINE CLINIC | Facility: CLINIC | Age: 59
End: 2019-03-28

## 2019-03-28 ENCOUNTER — CLINICAL SUPPORT (OUTPATIENT)
Dept: FAMILY MEDICINE CLINIC | Facility: CLINIC | Age: 59
End: 2019-03-28

## 2019-03-28 DIAGNOSIS — J02.9 SORE THROAT: Primary | ICD-10-CM

## 2019-03-28 LAB
EXPIRATION DATE: NORMAL
EXPIRATION DATE: NORMAL
FLUAV AG NPH QL: NEGATIVE
FLUBV AG NPH QL: NEGATIVE
INTERNAL CONTROL: NORMAL
INTERNAL CONTROL: NORMAL
Lab: NORMAL
Lab: NORMAL
S PYO AG THROAT QL: NEGATIVE

## 2019-03-28 PROCEDURE — 87804 INFLUENZA ASSAY W/OPTIC: CPT | Performed by: FAMILY MEDICINE

## 2019-03-28 PROCEDURE — 87880 STREP A ASSAY W/OPTIC: CPT | Performed by: FAMILY MEDICINE

## 2019-03-28 RX ORDER — AZITHROMYCIN 250 MG/1
TABLET, FILM COATED ORAL
Qty: 6 TABLET | Refills: 0 | Status: SHIPPED | OUTPATIENT
Start: 2019-03-28 | End: 2019-04-05

## 2019-03-28 NOTE — TELEPHONE ENCOUNTER
She says she has been sick since Sunday with a sore throat and ears hurting. Blowing out green mucous. Ran a low grade temp earlier in the week. Would like to get something called to [facility]

## 2019-04-02 RX ORDER — LOSARTAN POTASSIUM 100 MG/1
TABLET ORAL
Qty: 30 TABLET | Refills: 6 | Status: SHIPPED | OUTPATIENT
Start: 2019-04-02 | End: 2019-12-18 | Stop reason: SDUPTHER

## 2019-04-05 DIAGNOSIS — F32.A DEPRESSION, UNSPECIFIED DEPRESSION TYPE: ICD-10-CM

## 2019-04-05 DIAGNOSIS — F41.9 ANXIETY: ICD-10-CM

## 2019-04-05 RX ORDER — ALPRAZOLAM 0.5 MG/1
0.5 TABLET ORAL 2 TIMES DAILY PRN
Qty: 20 TABLET | Refills: 0 | Status: SHIPPED | OUTPATIENT
Start: 2019-04-05 | End: 2019-05-02 | Stop reason: SDUPTHER

## 2019-04-23 DIAGNOSIS — Z11.59 NEED FOR HEPATITIS C SCREENING TEST: ICD-10-CM

## 2019-04-23 DIAGNOSIS — I10 HYPERTENSION, UNSPECIFIED TYPE: ICD-10-CM

## 2019-04-23 DIAGNOSIS — R73.01 IMPAIRED FASTING GLUCOSE: ICD-10-CM

## 2019-04-23 DIAGNOSIS — E55.9 VITAMIN D DEFICIENCY: ICD-10-CM

## 2019-04-23 DIAGNOSIS — R53.83 FATIGUE, UNSPECIFIED TYPE: ICD-10-CM

## 2019-04-23 DIAGNOSIS — M06.9 RHEUMATOID ARTHRITIS, INVOLVING UNSPECIFIED SITE, UNSPECIFIED RHEUMATOID FACTOR PRESENCE: ICD-10-CM

## 2019-04-23 DIAGNOSIS — F32.A DEPRESSION, UNSPECIFIED DEPRESSION TYPE: Primary | ICD-10-CM

## 2019-04-24 PROBLEM — N28.9 RENAL INSUFFICIENCY: Status: ACTIVE | Noted: 2019-04-24

## 2019-04-24 LAB
25(OH)D3+25(OH)D2 SERPL-MCNC: 42.3 NG/ML (ref 30–100)
ALBUMIN SERPL-MCNC: 4.2 G/DL (ref 3.5–5.2)
ALBUMIN/GLOB SERPL: 1.1 G/DL
ALP SERPL-CCNC: 62 U/L (ref 39–117)
ALT SERPL-CCNC: 14 U/L (ref 1–33)
AST SERPL-CCNC: 15 U/L (ref 1–32)
BASOPHILS # BLD AUTO: 0.03 10*3/MM3 (ref 0–0.2)
BASOPHILS NFR BLD AUTO: 0.7 % (ref 0–1.5)
BILIRUB SERPL-MCNC: 0.2 MG/DL (ref 0.2–1.2)
BUN SERPL-MCNC: 11 MG/DL (ref 6–20)
BUN/CREAT SERPL: 10 (ref 7–25)
CALCIUM SERPL-MCNC: 9.8 MG/DL (ref 8.6–10.5)
CHLORIDE SERPL-SCNC: 101 MMOL/L (ref 98–107)
CHOLEST SERPL-MCNC: 133 MG/DL (ref 0–200)
CHOLEST/HDLC SERPL: 2.29 {RATIO}
CK SERPL-CCNC: 94 U/L (ref 20–180)
CO2 SERPL-SCNC: 28.1 MMOL/L (ref 22–29)
CREAT SERPL-MCNC: 1.1 MG/DL (ref 0.57–1)
CRP SERPL-MCNC: 2.19 MG/DL (ref 0–0.5)
EOSINOPHIL # BLD AUTO: 0.06 10*3/MM3 (ref 0–0.4)
EOSINOPHIL NFR BLD AUTO: 1.4 % (ref 0.3–6.2)
ERYTHROCYTE [DISTWIDTH] IN BLOOD BY AUTOMATED COUNT: 13.2 % (ref 12.3–15.4)
ERYTHROCYTE [SEDIMENTATION RATE] IN BLOOD BY WESTERGREN METHOD: 33 MM/HR (ref 0–30)
GLOBULIN SER CALC-MCNC: 3.7 GM/DL
GLUCOSE SERPL-MCNC: 84 MG/DL (ref 65–99)
HBA1C MFR BLD: 5.23 % (ref 4.8–5.6)
HCT VFR BLD AUTO: 41.6 % (ref 34–46.6)
HCV AB S/CO SERPL IA: <0.1 S/CO RATIO (ref 0–0.9)
HDLC SERPL-MCNC: 58 MG/DL (ref 40–60)
HGB BLD-MCNC: 12.6 G/DL (ref 12–15.9)
IMM GRANULOCYTES # BLD AUTO: 0.01 10*3/MM3 (ref 0–0.05)
IMM GRANULOCYTES NFR BLD AUTO: 0.2 % (ref 0–0.5)
LDLC SERPL CALC-MCNC: 61 MG/DL (ref 0–100)
LYMPHOCYTES # BLD AUTO: 1.11 10*3/MM3 (ref 0.7–3.1)
LYMPHOCYTES NFR BLD AUTO: 26.8 % (ref 19.6–45.3)
MCH RBC QN AUTO: 29.5 PG (ref 26.6–33)
MCHC RBC AUTO-ENTMCNC: 30.3 G/DL (ref 31.5–35.7)
MCV RBC AUTO: 97.4 FL (ref 79–97)
MONOCYTES # BLD AUTO: 0.6 10*3/MM3 (ref 0.1–0.9)
MONOCYTES NFR BLD AUTO: 14.5 % (ref 5–12)
NEUTROPHILS # BLD AUTO: 2.33 10*3/MM3 (ref 1.7–7)
NEUTROPHILS NFR BLD AUTO: 56.4 % (ref 42.7–76)
NRBC BLD AUTO-RTO: 0 /100 WBC (ref 0–0.2)
PLATELET # BLD AUTO: 267 10*3/MM3 (ref 140–450)
POTASSIUM SERPL-SCNC: 4 MMOL/L (ref 3.5–5.2)
PROT SERPL-MCNC: 7.9 G/DL (ref 6–8.5)
RBC # BLD AUTO: 4.27 10*6/MM3 (ref 3.77–5.28)
SODIUM SERPL-SCNC: 139 MMOL/L (ref 136–145)
T4 FREE SERPL-MCNC: 1.13 NG/DL (ref 0.93–1.7)
TRIGL SERPL-MCNC: 72 MG/DL (ref 0–150)
TSH SERPL DL<=0.005 MIU/L-ACNC: 1.26 MIU/ML (ref 0.27–4.2)
VLDLC SERPL CALC-MCNC: 14.4 MG/DL
WBC # BLD AUTO: 4.14 10*3/MM3 (ref 3.4–10.8)

## 2019-05-02 DIAGNOSIS — F41.9 ANXIETY: ICD-10-CM

## 2019-05-02 DIAGNOSIS — F32.A DEPRESSION, UNSPECIFIED DEPRESSION TYPE: ICD-10-CM

## 2019-05-02 RX ORDER — ALPRAZOLAM 0.5 MG/1
0.5 TABLET ORAL 2 TIMES DAILY PRN
Qty: 20 TABLET | Refills: 0 | Status: SHIPPED | OUTPATIENT
Start: 2019-05-02 | End: 2019-07-22 | Stop reason: SDUPTHER

## 2019-05-14 PROCEDURE — G0123 SCREEN CERV/VAG THIN LAYER: HCPCS | Performed by: OBSTETRICS & GYNECOLOGY

## 2019-05-15 ENCOUNTER — LAB REQUISITION (OUTPATIENT)
Dept: LAB | Facility: HOSPITAL | Age: 59
End: 2019-05-15

## 2019-05-15 DIAGNOSIS — Z12.72 ENCOUNTER FOR SCREENING FOR MALIGNANT NEOPLASM OF VAGINA: ICD-10-CM

## 2019-05-15 LAB
GEN CATEG CVX/VAG CYTO-IMP: NORMAL
LAB AP CASE REPORT: NORMAL
LAB AP GYN ADDITIONAL INFORMATION: NORMAL
PATH INTERP SPEC-IMP: NORMAL
STAT OF ADQ CVX/VAG CYTO-IMP: NORMAL

## 2019-05-29 ENCOUNTER — OFFICE VISIT (OUTPATIENT)
Dept: PSYCHIATRY | Age: 59
End: 2019-05-29
Payer: COMMERCIAL

## 2019-05-29 DIAGNOSIS — F33.2 SEVERE EPISODE OF RECURRENT MAJOR DEPRESSIVE DISORDER, WITHOUT PSYCHOTIC FEATURES (HCC): Primary | ICD-10-CM

## 2019-05-29 DIAGNOSIS — F41.9 ANXIETY: ICD-10-CM

## 2019-05-29 PROCEDURE — 90791 PSYCH DIAGNOSTIC EVALUATION: CPT | Performed by: COUNSELOR
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
## 2019-07-02 ENCOUNTER — TELEPHONE (OUTPATIENT)
Dept: FAMILY MEDICINE CLINIC | Facility: CLINIC | Age: 59
End: 2019-07-02

## 2019-07-02 DIAGNOSIS — J06.9 ACUTE URI: Primary | ICD-10-CM

## 2019-07-02 RX ORDER — CEFDINIR 300 MG/1
300 CAPSULE ORAL
Qty: 20 CAPSULE | Refills: 0 | Status: SHIPPED | OUTPATIENT
Start: 2019-07-02 | End: 2019-07-22

## 2019-07-02 RX ORDER — TRAZODONE HYDROCHLORIDE 50 MG/1
TABLET ORAL
Qty: 60 TABLET | Refills: 5 | Status: SHIPPED | OUTPATIENT
Start: 2019-07-02 | End: 2019-12-18 | Stop reason: SDUPTHER

## 2019-07-02 RX ORDER — METHYLPREDNISOLONE 4 MG/1
TABLET ORAL
Qty: 21 TABLET | Refills: 0 | Status: SHIPPED | OUTPATIENT
Start: 2019-07-02 | End: 2019-07-22

## 2019-07-02 NOTE — TELEPHONE ENCOUNTER
"Pt called \"I am not going to get over this, I have been coughing and blowing green stuff for almost five weeks, just when I think it is getting better it comes back. The otc's are not helping any more\"    Verbal may repeat last omnicef and medrol to MD2  "

## 2019-07-16 RX ORDER — AMLODIPINE BESYLATE 5 MG/1
TABLET ORAL
Qty: 30 TABLET | Refills: 5 | Status: SHIPPED | OUTPATIENT
Start: 2019-07-16 | End: 2019-12-18 | Stop reason: SDUPTHER

## 2019-07-22 ENCOUNTER — TELEPHONE (OUTPATIENT)
Dept: FAMILY MEDICINE CLINIC | Facility: CLINIC | Age: 59
End: 2019-07-22

## 2019-07-22 DIAGNOSIS — F32.A DEPRESSION, UNSPECIFIED DEPRESSION TYPE: ICD-10-CM

## 2019-07-22 DIAGNOSIS — K21.9 GASTROESOPHAGEAL REFLUX DISEASE, ESOPHAGITIS PRESENCE NOT SPECIFIED: Primary | ICD-10-CM

## 2019-07-22 DIAGNOSIS — F41.9 ANXIETY: ICD-10-CM

## 2019-07-22 RX ORDER — RANITIDINE 150 MG/1
150 TABLET ORAL 2 TIMES DAILY
Qty: 60 TABLET | Refills: 1 | Status: SHIPPED | OUTPATIENT
Start: 2019-07-22 | End: 2019-07-23

## 2019-07-22 RX ORDER — ALPRAZOLAM 0.5 MG/1
0.5 TABLET ORAL 2 TIMES DAILY PRN
Qty: 20 TABLET | Refills: 0 | Status: SHIPPED | OUTPATIENT
Start: 2019-07-22 | End: 2019-10-03 | Stop reason: SDUPTHER

## 2019-07-22 NOTE — TELEPHONE ENCOUNTER
"Vm \"refill my xanax and something for heartburn\"     pt has GERD dx with no meds    Pt had in [facility] (Carafate/sucralfate , 1g  #= 120, Sig: one by mouth three times each day)      "

## 2019-07-22 NOTE — TELEPHONE ENCOUNTER
Spoke to RABIA Wyatt and there is major interaction with Cellcept and prilosec, and was advised that Zantac, that is not a proton pump inhibitor may be ok?    Xanax last refill 5-20-19, #20    [place]  wn[place]

## 2019-07-23 RX ORDER — RANITIDINE 150 MG/1
150 TABLET ORAL NIGHTLY
Qty: 30 TABLET | Refills: 1 | Status: SHIPPED | OUTPATIENT
Start: 2019-07-23 | End: 2019-08-15

## 2019-08-14 DIAGNOSIS — M33.20 POLYMYOSITIS (HCC): Primary | ICD-10-CM

## 2019-08-15 ENCOUNTER — OFFICE VISIT (OUTPATIENT)
Dept: FAMILY MEDICINE CLINIC | Facility: CLINIC | Age: 59
End: 2019-08-15

## 2019-08-15 VITALS
RESPIRATION RATE: 18 BRPM | TEMPERATURE: 98.7 F | BODY MASS INDEX: 35.34 KG/M2 | OXYGEN SATURATION: 97 % | SYSTOLIC BLOOD PRESSURE: 118 MMHG | DIASTOLIC BLOOD PRESSURE: 82 MMHG | HEART RATE: 68 BPM | WEIGHT: 180 LBS | HEIGHT: 60 IN

## 2019-08-15 DIAGNOSIS — N28.9 RENAL INSUFFICIENCY: ICD-10-CM

## 2019-08-15 DIAGNOSIS — I50.30 DIASTOLIC CONGESTIVE HEART FAILURE, UNSPECIFIED HF CHRONICITY (HCC): Chronic | ICD-10-CM

## 2019-08-15 DIAGNOSIS — F32.A DEPRESSION, UNSPECIFIED DEPRESSION TYPE: ICD-10-CM

## 2019-08-15 DIAGNOSIS — M06.9 RHEUMATOID ARTHRITIS, INVOLVING UNSPECIFIED SITE, UNSPECIFIED RHEUMATOID FACTOR PRESENCE: Chronic | ICD-10-CM

## 2019-08-15 DIAGNOSIS — K21.9 GASTROESOPHAGEAL REFLUX DISEASE, ESOPHAGITIS PRESENCE NOT SPECIFIED: Chronic | ICD-10-CM

## 2019-08-15 DIAGNOSIS — I10 ESSENTIAL HYPERTENSION: Primary | Chronic | ICD-10-CM

## 2019-08-15 DIAGNOSIS — J84.10 PULMONARY FIBROSIS (HCC): ICD-10-CM

## 2019-08-15 DIAGNOSIS — F41.9 ANXIETY: ICD-10-CM

## 2019-08-15 LAB
ALBUMIN SERPL-MCNC: 4.1 G/DL (ref 3.5–5.2)
ALBUMIN/GLOB SERPL: 1.2 G/DL
ALP SERPL-CCNC: 65 U/L (ref 39–117)
ALT SERPL-CCNC: 10 U/L (ref 1–33)
AST SERPL-CCNC: 17 U/L (ref 1–32)
BASOPHILS # BLD AUTO: 0.02 10*3/MM3 (ref 0–0.2)
BASOPHILS NFR BLD AUTO: 0.5 % (ref 0–1.5)
BILIRUB SERPL-MCNC: 0.2 MG/DL (ref 0.2–1.2)
BUN SERPL-MCNC: 13 MG/DL (ref 6–20)
BUN/CREAT SERPL: 11.4 (ref 7–25)
CALCIUM SERPL-MCNC: 9.2 MG/DL (ref 8.6–10.5)
CHLORIDE SERPL-SCNC: 102 MMOL/L (ref 98–107)
CO2 SERPL-SCNC: 29.4 MMOL/L (ref 22–29)
CREAT SERPL-MCNC: 1.14 MG/DL (ref 0.57–1)
CRP SERPL-MCNC: 1.97 MG/DL (ref 0–0.5)
EOSINOPHIL # BLD AUTO: 0.11 10*3/MM3 (ref 0–0.4)
EOSINOPHIL NFR BLD AUTO: 2.7 % (ref 0.3–6.2)
ERYTHROCYTE [DISTWIDTH] IN BLOOD BY AUTOMATED COUNT: 13.2 % (ref 12.3–15.4)
ERYTHROCYTE [SEDIMENTATION RATE] IN BLOOD BY WESTERGREN METHOD: 38 MM/HR (ref 0–30)
GLOBULIN SER CALC-MCNC: 3.4 GM/DL
GLUCOSE SERPL-MCNC: 74 MG/DL (ref 65–99)
HCT VFR BLD AUTO: 42.3 % (ref 34–46.6)
HGB BLD-MCNC: 12.8 G/DL (ref 12–15.9)
IMM GRANULOCYTES # BLD AUTO: 0.02 10*3/MM3 (ref 0–0.05)
IMM GRANULOCYTES NFR BLD AUTO: 0.5 % (ref 0–0.5)
LYMPHOCYTES # BLD AUTO: 1.24 10*3/MM3 (ref 0.7–3.1)
LYMPHOCYTES NFR BLD AUTO: 30.7 % (ref 19.6–45.3)
MCH RBC QN AUTO: 30 PG (ref 26.6–33)
MCHC RBC AUTO-ENTMCNC: 30.3 G/DL (ref 31.5–35.7)
MCV RBC AUTO: 99.3 FL (ref 79–97)
MONOCYTES # BLD AUTO: 0.67 10*3/MM3 (ref 0.1–0.9)
MONOCYTES NFR BLD AUTO: 16.6 % (ref 5–12)
NEUTROPHILS # BLD AUTO: 1.98 10*3/MM3 (ref 1.7–7)
NEUTROPHILS NFR BLD AUTO: 49 % (ref 42.7–76)
NRBC BLD AUTO-RTO: 0 /100 WBC (ref 0–0.2)
PLATELET # BLD AUTO: 234 10*3/MM3 (ref 140–450)
POTASSIUM SERPL-SCNC: 4 MMOL/L (ref 3.5–5.2)
PROT SERPL-MCNC: 7.5 G/DL (ref 6–8.5)
RBC # BLD AUTO: 4.26 10*6/MM3 (ref 3.77–5.28)
SODIUM SERPL-SCNC: 142 MMOL/L (ref 136–145)
WBC # BLD AUTO: 4.04 10*3/MM3 (ref 3.4–10.8)

## 2019-08-15 PROCEDURE — 99214 OFFICE O/P EST MOD 30 MIN: CPT | Performed by: FAMILY MEDICINE

## 2019-08-15 RX ORDER — FOLIC ACID 1 MG/1
1 TABLET ORAL DAILY
COMMUNITY
Start: 2019-07-16 | End: 2021-08-05 | Stop reason: SDUPTHER

## 2019-08-15 RX ORDER — RANITIDINE 150 MG/1
150 TABLET ORAL 2 TIMES DAILY
Qty: 60 TABLET | Refills: 3
Start: 2019-08-15 | End: 2019-10-31

## 2019-08-15 NOTE — PROGRESS NOTES
Subjective   Emilie Soto is a 58 y.o. female presenting with chief complaint of:   Chief Complaint   Patient presents with   • Anxiety   • Hypertension       History of Present Illness :  With  with neighbor child.  Young lady..       Has multiple chronic problems to consider that might have a bearing on today's issues;  an interval appointment.       Chronic/acute problems reviewed today:   1. Essential hypertension: Chronic/stable. Stable here/if home blood pressures.  No significant chest pain, SOB, LE edema, orthopnea, near syncope, dizziness/light headness.      2. Diastolic congestive heart failure, unspecified HF chronicity (CMS/HCC): Chronic/stable.  Denies significant sob, orthopnea, leg edema, weight gain.  Aware of influence diet/salt and watching weight at home.       3. Pulmonary fibrosis (CMS/HCC):  Chronic/stable.  no significant shortness of breath cough wheeze   4. Gastroesophageal reflux disease, esophagitis presence not specified: Chronic/stable.  Controlled heartburn, reflux; no dysphagia, melena; but occ heartubrn.   Rx Zantac/helps.     5. Rheumatoid arthritis, involving unspecified site, unspecified rheumatoid factor presence (CMS/HCC) chronic/variable degree of joint discomforts with no significant swelling.  Sees Dr. Pérez he gets medications which controls her symptoms and is closely monitored with labs and clinical response.:    6. Renal insufficiency once:  (CMS/HCC) she once had a diminished GFR but is since returned to normal.  May been more acute renal insufficiency.:    7. Depression, unspecified depression type: No significant  mood swings, down moods, nervousness, difficulty with concentration to function home/work.  Others close have not been concerned.  No suicide ideation/intent.  Rx helps and seeing mental health      8. Anxiety: see depression.      Has an/another acute issue today: none.    The following portions of the patient's history were reviewed and updated as  appropriate: allergies, current medications, past family history, past medical history, past social history, past surgical history and problem list.      Current Outpatient Medications:   •  ALPRAZolam (XANAX) 0.5 MG tablet, Take 1 tablet by mouth 2 (Two) Times a Day As Needed for Anxiety., Disp: 20 tablet, Rfl: 0  •  amLODIPine (NORVASC) 5 MG tablet, TAKE ONE TABLET DAILY GENERIC FOR NORVASC(ALSO TAKES VERAPAMIL), Disp: 30 tablet, Rfl: 5  •  carvedilol (COREG) 25 MG tablet, TAKE ONE TABLET TWICE DAILY GENERIC FOR COREG, Disp: 180 tablet, Rfl: 1  •  estradiol (ESTRACE) 2 MG tablet, Take 1 mg by mouth Daily., Disp: , Rfl:   •  folic acid (FOLVITE) 1 MG tablet, Take 1 tablet by mouth Daily., Disp: , Rfl:   •  HYDROcodone-acetaminophen (NORCO) 7.5-325 MG per tablet, Take 1 tablet by mouth 2 (Two) Times a Day As Needed for Moderate Pain ., Disp: , Rfl:   •  hydroxychloroquine (PLAQUENIL) 200 MG tablet, Take 1 tablet by mouth 2 (Two) Times a Day., Disp: , Rfl:   •  losartan (COZAAR) 100 MG tablet, TAKE ONE TABLET DAILY GENERIC FOR COZAAR, Disp: 30 tablet, Rfl: 6  •  mycophenolate (CELLCEPT) 500 MG tablet, Take 1 tablet by mouth 2 (Two) Times a Day., Disp: , Rfl:   •  potassium chloride (K-DUR) 10 MEQ CR tablet, Take 1 tablet by mouth 2 (Two) Times a Day With Meals., Disp: 180 tablet, Rfl: 1  •  PROAIR  (90 Base) MCG/ACT inhaler, USE 2 PUFFS EVERY FOUR TO SIX HOURS AS NEEDED, Disp: 8.5 g, Rfl: 5  •  raNITIdine (ZANTAC) 150 MG tablet, Take 1 tablet by mouth Every Night., Disp: 30 tablet, Rfl: 1  •  spironolactone (ALDACTONE) 25 MG tablet, TAKE ONE TABLET DAILY, Disp: 90 tablet, Rfl: 1  •  traZODone (DESYREL) 50 MG tablet, TAKE 1-2 TABLETS AT BEDTIME AS NEEDED (Patient taking differently: 100 mg. 2 TABLETS AT BEDTIME AS NEEDED), Disp: 60 tablet, Rfl: 5  •  venlafaxine XR (EFFEXOR-XR) 150 MG 24 hr capsule, TAKE 1 CAPSULE DAILY GENERIC FOR EFFEXOR XR ALONG WITH 75MG XR, Disp: 30 capsule, Rfl: 5  •  venlafaxine XR  (EFFEXOR-XR) 75 MG 24 hr capsule, TAKE 1 CAPSULE DAILY ALONG WITH 150MG TO EQUAL 225MG GENERIC FOR EFFEXOR XR, Disp: 30 capsule, Rfl: 5  •  verapamil SR (CALAN-SR) 240 MG CR tablet, Take 1 tablet by mouth 2 (Two) Times a Day., Disp: 180 tablet, Rfl: 1  •  vitamin D (ERGOCALCIFEROL) 04419 UNITS capsule capsule, Take 1 capsule by mouth 1 (One) Time Per Week., Disp: , Rfl:   •  cloNIDine (CATAPRES) 0.1 MG tablet, Take 0.1 mg by mouth 2 (Two) Times a Day. One by mouth twice daily as needed if BP greater than 160/100, Disp: , Rfl:     No problems with medications.  Refills if needed done    Allergies   Allergen Reactions   • Biaxin [Clarithromycin] Nausea And Vomiting       Review of Systems  GENERAL:  Active/slower with limits, speed, stamina for age and desire. Sleep is ok; occ hard falling/staying with worry. No fever now.  SKIN: No  rash/skin lesion of concern:   ENDO:  No syncope, near or diaphoretic sweaty spells..  HEENT: No recent head injury; but same/occ headache.   No vision change.   No significant hearing loss.  Ears without pain/drainage.  No sore throat.  No significant nasal/sinus congestion/drainage. No epistaxis.  CHEST: No chest wall tenderness or mass. No cough, without wheeze.  No SOB; no hemoptysis.  CV: No chest pain, palpitations, ankle edema.  GI: No dysphagia.  No abdominal pain, diarrhea, constipation.  No rectal bleeding, or melena.  Occ heartburn still.   :  Voids without dysuria, or  incontinence to completion.  ORTHO: No painful/swollen joints but various on /off sore.  No change some sore neck or back.  No acute neck or back pain without recent injury.  NEURO: No dizziness, weakness of extremities.  No numbness/paresthesias.   PSYCH: No memory loss.  Mood good; often anxious, depressed but/and not suicidal.  Tries to tolerate stress .      Screening:  Mammogram: 5.2019L  Bone density: 4.21.11  Low dose CT chest: Tobacco-smoker/none: NA  GI:    Colonoscopy/[facility]/Jasiel/4.22.16/1y  Colon-polyp//Jasiel7.28.17/3y  Prostate: NA  Usual lab order  Any lab others want; (we wish to attempt not to duplicate so we need copies of labs done outside the office/out of [facility]); OR can have all labs here (bring the order from all other doctors) and we will forward to all specialist  6m CBC, CMP, sed rate, CK-total, CR-protein  12m CBC, CMP, LIPID, TSH, fT4, CK-total, Vit D, sed rate, CR-protein       Lab Results:  Results for orders placed or performed in visit on 08/14/19   Comprehensive metabolic panel   Result Value Ref Range    Glucose 74 65 - 99 mg/dL    BUN 13 6 - 20 mg/dL    Creatinine 1.14 (H) 0.57 - 1.00 mg/dL    eGFR Non African Am 49 (L) >60 mL/min/1.73    eGFR African Am 60 (L) >60 mL/min/1.73    BUN/Creatinine Ratio 11.4 7.0 - 25.0    Sodium 142 136 - 145 mmol/L    Potassium 4.0 3.5 - 5.2 mmol/L    Chloride 102 98 - 107 mmol/L    Total CO2 29.4 (H) 22.0 - 29.0 mmol/L    Calcium 9.2 8.6 - 10.5 mg/dL    Total Protein 7.5 6.0 - 8.5 g/dL    Albumin 4.10 3.50 - 5.20 g/dL    Globulin 3.4 gm/dL    A/G Ratio 1.2 g/dL    Total Bilirubin 0.2 0.2 - 1.2 mg/dL    Alkaline Phosphatase 65 39 - 117 U/L    AST (SGOT) 17 1 - 32 U/L    ALT (SGPT) 10 1 - 33 U/L   C-reactive Protein   Result Value Ref Range    C-Reactive Protein 1.97 (H) 0.00 - 0.50 mg/dL   Sedimentation Rate   Result Value Ref Range    Sed Rate 38 (H) 0 - 30 mm/hr   CBC & Differential   Result Value Ref Range    WBC 4.04 3.40 - 10.80 10*3/mm3    RBC 4.26 3.77 - 5.28 10*6/mm3    Hemoglobin 12.8 12.0 - 15.9 g/dL    Hematocrit 42.3 34.0 - 46.6 %    MCV 99.3 (H) 79.0 - 97.0 fL    MCH 30.0 26.6 - 33.0 pg    MCHC 30.3 (L) 31.5 - 35.7 g/dL    RDW 13.2 12.3 - 15.4 %    Platelets 234 140 - 450 10*3/mm3    Neutrophil Rel % 49.0 42.7 - 76.0 %    Lymphocyte Rel % 30.7 19.6 - 45.3 %    Monocyte Rel % 16.6 (H) 5.0 - 12.0 %    Eosinophil Rel % 2.7 0.3 - 6.2 %    Basophil Rel % 0.5 0.0 - 1.5 %    Neutrophils Absolute 1.98 1.70 -  "7.00 10*3/mm3    Lymphocytes Absolute 1.24 0.70 - 3.10 10*3/mm3    Monocytes Absolute 0.67 0.10 - 0.90 10*3/mm3    Eosinophils Absolute 0.11 0.00 - 0.40 10*3/mm3    Basophils Absolute 0.02 0.00 - 0.20 10*3/mm3    Immature Granulocyte Rel % 0.5 0.0 - 0.5 %    Immature Grans Absolute 0.02 0.00 - 0.05 10*3/mm3    nRBC 0.0 0.0 - 0.2 /100 WBC       A1C:  Lab Results - Last 18 Months   Lab Units 04/23/19  0925   HEMOGLOBIN A1C % 5.23     PSA:No results for input(s): PSA in the last 96027 hours.  CBC:  Lab Results - Last 18 Months   Lab Units 08/14/19  1010 04/23/19  0925   WBC 10*3/mm3 4.04 4.14   HEMOGLOBIN g/dL 12.8 12.6   HEMATOCRIT % 42.3 41.6   PLATELETS 10*3/mm3 234 267      BMP/CMP:  Lab Results - Last 18 Months   Lab Units 08/14/19  1010 04/23/19  0925   SODIUM mmol/L 142 139   POTASSIUM mmol/L 4.0 4.0   CHLORIDE mmol/L 102 101   TOTAL CO2 mmol/L 29.4* 28.1   GLUCOSE mg/dL 74 84   BUN mg/dL 13 11   CREATININE mg/dL 1.14* 1.10*   EGFR IF NONAFRICN AM mL/min/1.73 49* 51*   EGFR IF AFRICN AM mL/min/1.73 60* 62   CALCIUM mg/dL 9.2 9.8     HEPATIC:  Lab Results - Last 18 Months   Lab Units 08/14/19  1010 04/23/19  0925   ALT (SGPT) U/L 10 14   AST (SGOT) U/L 17 15   ALK PHOS U/L 65 62     THYROID:  Lab Results - Last 18 Months   Lab Units 04/23/19  0925   TSH mIU/mL 1.260       Objective   /82 (BP Location: Left arm, Patient Position: Sitting, Cuff Size: Large Adult)   Pulse 68   Temp 98.7 °F (37.1 °C) (Oral)   Resp 18   Ht 152.4 cm (60\")   Wt 81.6 kg (180 lb)   SpO2 97%   Breastfeeding? No   BMI 35.15 kg/m²   Body mass index is 35.15 kg/m².    Physical Exam  GENERAL:  Well nourished/developed in no acute distress. BMI noted: 33.8  SKIN: Turgor excellent, without wound, rash, lesion  HEENT: Normal cephalic without trauma.  Pupils equal round reactive to light. Extraocular motions full without nystagmus.   External canals nonobstructive nontender without reddness. Tymphatic membranes josiane with cesar " structures intact.   Oral cavity without growths, exudates, and moist.  Posterior pharynx without mass, obstruction, redness.  No thyromegaly, mass, tenderness, lymphadenopathy and supple.  CV: Regular rhythm.  No murmur, gallop,  edema. Posterior pulses intact.  No carotid bruits.  CHEST: No chest wall tenderness or mass.   LUNGS: Symmetric motion with clear to auscultation.   ABD: Soft, nontender without mass.   ORTHO: Symmetric extremities without swelling/point tenderness.  Full gross range of motion; few sore fingers.  NEURO: CN 2-12 grossly intact.  Symmetric facies. 1/4 x bicep equal reflexes.  UE/LE   3/5 strength throughout.  Nonfocal use extremities. Speech clear. Intact light touch with monofilament, vibratory sensation with tuning fork; equal toes/distal feet.    PSYCH: Oriented x 3.  Pleasant calm, well kept.   Purposeful/directed conservation with intact short/long gross memory.     Assessment/Plan     1. Essential hypertension    2. Diastolic congestive heart failure, unspecified HF chronicity (CMS/HCC)    3. Pulmonary fibrosis (CMS/HCC)    4. Gastroesophageal reflux disease, esophagitis presence not specified    5. Rheumatoid arthritis, involving unspecified site, unspecified rheumatoid factor presence (CMS/HCC)    6. Chronic kidney disease, stage 3 (CMS/HCC)    7. Depression, unspecified depression type    8. Anxiety      Her blood pressure doing well.  There is no signs of heart failure.  There is no signs of significant symptoms respiratory wise from pulmonary fibrosis.  She still having occasional heartburn and could modify her Zantac to at least twice a day on as needed basis.  Previous chronic kidney disease is probably more reflective of renal insufficiency at one time; his GFR is improved back to normal.  Depression anxiety still active but medicines help when she got counseling and is controlling her symptomatology to a tolerable level.    Rx: reviewed/changes:  New Medications Ordered This  Visit   Medications   • raNITIdine (ZANTAC) 150 MG tablet     Sig: Take 1 tablet by mouth 2 (Two) Times a Day.     Dispense:  60 tablet     Refill:  3     LAB/Testing/Referrals: reviewed/orders:   Today:   No orders of the defined types were placed in this encounter.    Chronic/recurrent labs above or change to:   same     Discussions:   Increase Zantac 150 bid prn  Body mass index is 35.15 kg/m².  Patient's Body mass index is 35.15 kg/m². BMI is above normal parameters. Recommendations include: exercise counseling, nutrition counseling and as before.    Tobacco use reviewed:    Non-smoker    There are no Patient Instructions on file for this visit.    Follow up: Return for lab;, Dr Miles-, 6 m;.  Future Appointments   Date Time Provider Department Center   2/25/2020  9:45 AM Alexandru Miles MD [facility] None

## 2019-08-16 ENCOUNTER — TELEPHONE (OUTPATIENT)
Dept: GASTROENTEROLOGY | Facility: CLINIC | Age: 59
End: 2019-08-16

## 2019-08-16 NOTE — TELEPHONE ENCOUNTER
Please call her to schedule a colonoscopy.  She wasn't sure if she is due or not, but she definitely is.  Needs an office appt due to comorbidities.    Abigail Weathers MD

## 2019-08-16 NOTE — TELEPHONE ENCOUNTER
----- Message from Alexandru Miles MD sent at 8/15/2019  9:54 PM CDT -----  I saw her today and her last colonoscopy said repeat in 3 years; 2017 with me in 2020.  She says she had a post card saying this time for another one.  Could you please review and let her know (I am going to be out of town for 10 days).

## 2019-08-16 NOTE — TELEPHONE ENCOUNTER
Lina, can you please call pt to set up for office visit for colonoscopy? See Dr. Weathers's message.

## 2019-08-29 ENCOUNTER — OFFICE VISIT (OUTPATIENT)
Dept: GASTROENTEROLOGY | Facility: CLINIC | Age: 59
End: 2019-08-29

## 2019-08-29 VITALS
BODY MASS INDEX: 32.39 KG/M2 | TEMPERATURE: 97.3 F | OXYGEN SATURATION: 100 % | WEIGHT: 176 LBS | DIASTOLIC BLOOD PRESSURE: 78 MMHG | HEIGHT: 62 IN | HEART RATE: 64 BPM | SYSTOLIC BLOOD PRESSURE: 126 MMHG

## 2019-08-29 DIAGNOSIS — Z86.010 HX OF COLONIC POLYPS: Primary | ICD-10-CM

## 2019-08-29 DIAGNOSIS — Z80.0 FAMILY HISTORY OF COLON CANCER: ICD-10-CM

## 2019-08-29 PROBLEM — Z86.0100 HX OF COLONIC POLYPS: Status: ACTIVE | Noted: 2019-08-29

## 2019-08-29 PROCEDURE — S0285 CNSLT BEFORE SCREEN COLONOSC: HCPCS | Performed by: NURSE PRACTITIONER

## 2019-08-29 RX ORDER — SODIUM, POTASSIUM,MAG SULFATES 17.5-3.13G
1 SOLUTION, RECONSTITUTED, ORAL ORAL EVERY 12 HOURS
Qty: 2 BOTTLE | Refills: 0 | Status: ON HOLD | OUTPATIENT
Start: 2019-08-29 | End: 2019-10-10

## 2019-08-29 NOTE — PROGRESS NOTES
Chief Complaint   Patient presents with   • Colon Cancer Screening     Pt presents today for colon recall-last colon was 7/28/2017; Personal history of colon polyps     Subjective   HPI  Emilie Soto is a 58 y.o. female who presents as a referral for preventative maintenance. She has no complaints of nausea or vomiting. No change in bowels. No wt loss. No BRBPR. No melena. There is a family hx for intestinal cancer in father >60 paternal uncle colorectal cancer >60. No abdominal pain. There was no Cologuard screening this year. The patients last colonoscopy 4/22/16 revealed adenomatous polyps.    Past Medical History:   Diagnosis Date   • CHF (congestive heart failure) (CMS/HCC)    • CKD (chronic kidney disease)    • DDD (degenerative disc disease), cervical    • Depression    • GERD (gastroesophageal reflux disease)    • Heart murmur    • Hepatitis cholestatic    • History of colon polyps    • HOCM (hypertrophic obstructive cardiomyopathy) (CMS/HCC)    • HTN (hypertension)    • RA (rheumatoid arthritis) (CMS/HCC)    • Renal disease    • Sleep apnea      Past Surgical History:   Procedure Laterality Date   • COLONOSCOPY  04/22/2016    One 16mm tubulovillous adenomatous polyp at the ileocecal valve-Clip was placed-injected; The examination was otherwise normal on direct and retroflexion views; Repeat 1 year   • COLONOSCOPY N/A 7/28/2017    A tattoo was seen at the ileocecal valve-A post-polypectomy scar was found at the tattoo site; One 12mm tubular adenomatous flat polyp in the transverse colon-Clip (MR conditional) was placed; The examination was otherwise normal on direct and retroflexion views; Repeat 2 years   • COLONOSCOPY  10/04/2015    Bleeding at the ileocecal valve secondary to previous polypectomy-Clip was placed; No specimens collected; Repeat 6 months for retreatment   • COLONOSCOPY  09/30/2015    Very large multilobulated tubular adenomatous polyp opposite the ileocecal valve-removed piecemeal-at least  95% removed; One less than 1cm tubular adenomatous polyp in the ascending colon; Repeat 6-12 months to reassess the large polyp   • EXPLORATORY LAPAROTOMY     • HYSTERECTOMY     • LIVER BIOPSY  06/14/2011    Cholestatic hepatitis-see report       Current Outpatient Medications:   •  ALPRAZolam (XANAX) 0.5 MG tablet, Take 1 tablet by mouth 2 (Two) Times a Day As Needed for Anxiety., Disp: 20 tablet, Rfl: 0  •  amLODIPine (NORVASC) 5 MG tablet, TAKE ONE TABLET DAILY GENERIC FOR NORVASC(ALSO TAKES VERAPAMIL), Disp: 30 tablet, Rfl: 5  •  carvedilol (COREG) 25 MG tablet, TAKE ONE TABLET TWICE DAILY GENERIC FOR COREG, Disp: 180 tablet, Rfl: 1  •  cloNIDine (CATAPRES) 0.1 MG tablet, Take 0.1 mg by mouth 2 (Two) Times a Day. One by mouth twice daily as needed if BP greater than 160/100, Disp: , Rfl:   •  estradiol (ESTRACE) 2 MG tablet, Take 1 mg by mouth Daily., Disp: , Rfl:   •  folic acid (FOLVITE) 1 MG tablet, Take 1 tablet by mouth Daily., Disp: , Rfl:   •  HYDROcodone-acetaminophen (NORCO) 7.5-325 MG per tablet, Take 1 tablet by mouth 2 (Two) Times a Day As Needed for Moderate Pain ., Disp: , Rfl:   •  hydroxychloroquine (PLAQUENIL) 200 MG tablet, Take 1 tablet by mouth 2 (Two) Times a Day., Disp: , Rfl:   •  losartan (COZAAR) 100 MG tablet, TAKE ONE TABLET DAILY GENERIC FOR COZAAR, Disp: 30 tablet, Rfl: 6  •  mycophenolate (CELLCEPT) 500 MG tablet, Take 1 tablet by mouth 2 (Two) Times a Day., Disp: , Rfl:   •  potassium chloride (K-DUR) 10 MEQ CR tablet, Take 1 tablet by mouth 2 (Two) Times a Day With Meals., Disp: 180 tablet, Rfl: 1  •  PROAIR  (90 Base) MCG/ACT inhaler, USE 2 PUFFS EVERY FOUR TO SIX HOURS AS NEEDED, Disp: 8.5 g, Rfl: 5  •  raNITIdine (ZANTAC) 150 MG tablet, Take 1 tablet by mouth 2 (Two) Times a Day., Disp: 60 tablet, Rfl: 3  •  spironolactone (ALDACTONE) 25 MG tablet, TAKE ONE TABLET DAILY, Disp: 90 tablet, Rfl: 1  •  traZODone (DESYREL) 50 MG tablet, TAKE 1-2 TABLETS AT BEDTIME AS NEEDED  (Patient taking differently: 100 mg. 2 TABLETS AT BEDTIME AS NEEDED), Disp: 60 tablet, Rfl: 5  •  venlafaxine XR (EFFEXOR-XR) 150 MG 24 hr capsule, TAKE 1 CAPSULE DAILY GENERIC FOR EFFEXOR XR ALONG WITH 75MG XR, Disp: 30 capsule, Rfl: 5  •  venlafaxine XR (EFFEXOR-XR) 75 MG 24 hr capsule, TAKE 1 CAPSULE DAILY ALONG WITH 150MG TO EQUAL 225MG GENERIC FOR EFFEXOR XR, Disp: 30 capsule, Rfl: 5  •  verapamil SR (CALAN-SR) 240 MG CR tablet, Take 1 tablet by mouth 2 (Two) Times a Day., Disp: 180 tablet, Rfl: 1  •  vitamin D (ERGOCALCIFEROL) 03707 UNITS capsule capsule, Take 1 capsule by mouth 1 (One) Time Per Week., Disp: , Rfl:   •  sodium-potassium-magnesium sulfates (SUPREP BOWEL PREP KIT) 17.5-3.13-1.6 GM/177ML solution oral solution, Take 1 bottle by mouth Every 12 (Twelve) Hours. Split dose prep as directed by office instructions provided.  2 bottles = one kit., Disp: 2 bottle, Rfl: 0  Allergies   Allergen Reactions   • Bactrim [Sulfamethoxazole-Trimethoprim] Unknown (See Comments)     Unknown   • Biaxin [Clarithromycin] Nausea And Vomiting   • Ciprofloxacin Hcl Unknown (See Comments)     Unknown   • Duricef [Cefadroxil] Unknown (See Comments)     Unknown   • Ketek [Telithromycin] Unknown (See Comments)     Unknown   • Relafen [Nabumetone] Unknown (See Comments)     Unknown   • Wellbutrin [Bupropion] Unknown (See Comments)     Unknown   • Zithromax [Azithromycin] Unknown (See Comments)     Unknown       Social History     Socioeconomic History   • Marital status:      Spouse name: Not on file   • Number of children: Not on file   • Years of education: Not on file   • Highest education level: Not on file   Tobacco Use   • Smoking status: Never Smoker   • Smokeless tobacco: Never Used   Substance and Sexual Activity   • Alcohol use: Yes     Alcohol/week: 1.2 oz     Types: 2 Glasses of wine per week     Comment: Occasionally   • Drug use: No   • Sexual activity: Yes     Partners: Male     Birth control/protection:  "Post-menopausal     Family History   Problem Relation Age of Onset   • Throat cancer Mother    • Diabetes Father    • Cancer Father         Pt reports he had part of his intestines removed   • Breast cancer Sister    • Colon cancer Neg Hx    • Colon polyps Neg Hx    • Esophageal cancer Neg Hx    • Liver cancer Neg Hx    • Liver disease Neg Hx    • Rectal cancer Neg Hx    • Stomach cancer Neg Hx        REVIEW OF SYSTEMS  General: well appearing, no fever chills or sweats, no unexplained wt loss  HEENT: no acute visual or hearing disturbances  Cardiovascular: No chest pain or palpitations  Pulmonary: No shortness of breath, coughing, wheezing or hemoptysis  : No burning, urgency, hematuria, or dysuria  Musculoskeletal: No joint pain or stiffness  Peripheral: no edema  Skin: No lesions or rashes  Neuro: No dizziness, headaches, stroke, syncope  Endocrine: No hot or cold intolerances  Hematological: No blood dyscrasias    Objective   Vitals:    08/29/19 1007   BP: 126/78   BP Location: Left arm   Patient Position: Sitting   Cuff Size: Adult   Pulse: 64   Temp: 97.3 °F (36.3 °C)   TempSrc: Tympanic   SpO2: 100%   Weight: 79.8 kg (176 lb)   Height: 157.5 cm (62\")     Body mass index is 32.19 kg/m².    PHYSICAL EXAM  General: age appropriate well nourished well appearing, no acute distress  Head: normocephalic and atraumatic  Global assessment-supple  Neck-No JVD noted, no lymphadenopathy  Pulmonary-clear to auscultation bilaterally, normal respiratory effort  Cardiovascular-normal rate and rhythm, normal heart sounds, S1 and S2 noted  Abdomen-soft, non tender, non distended, normal bowel sounds all 4 quadrants, no hepatosplenomegaly noted  Extremities-No clubbing cyanosis or edema  Neuro-Non focal, converses appropriately, awake, alert, oriented    Imaging Results (most recent)     None        Assessment/Plan   Emilie was seen today for colon cancer screening.    Diagnoses and all orders for this visit:    Hx of colonic " polyps  -     Case Request; Standing  -     Case Request    Family history of colon cancer  Comments:  intestinal cancer in father >60 paternal uncle colorectal cancer >60.  Orders:  -     Case Request; Standing  -     Case Request    Other orders  -     Follow Anesthesia Guidelines / Standing Orders; Future  -     Obtain Informed Consent; Future  -     Implement Anesthesia Orders Day of Procedure; Standing  -     Obtain Informed Consent; Standing  -     Verify bowel prep was successful; Standing  -     sodium-potassium-magnesium sulfates (SUPREP BOWEL PREP KIT) 17.5-3.13-1.6 GM/177ML solution oral solution; Take 1 bottle by mouth Every 12 (Twelve) Hours. Split dose prep as directed by office instructions provided.  2 bottles = one kit.      COLONOSCOPY WITH ANESTHESIA (N/A)       Body mass index is 32.19 kg/m². Patient's Body mass index is 32.19 kg/m². BMI is above normal parameters. Recommendations include: nutrition counseling.      All risks, benefits, alternatives, and indications of colonoscopy procedure have been discussed with the patient. Risks to include perforation of the colon requiring possible surgery or colostomy, risk of bleeding from biopsies or removal of colon tissue, possibility of missing a colon polyp or cancer, or adverse drug reaction.  Benefits to include the diagnosis and management of disease of the colon and rectum. Alternatives to include barium enema, radiographic evaluation, lab testing or no intervention. Pt verbalizes understanding and agrees.

## 2019-09-09 ENCOUNTER — OFFICE VISIT (OUTPATIENT)
Dept: FAMILY MEDICINE CLINIC | Facility: CLINIC | Age: 59
End: 2019-09-09

## 2019-09-09 VITALS
BODY MASS INDEX: 33.49 KG/M2 | OXYGEN SATURATION: 99 % | RESPIRATION RATE: 16 BRPM | TEMPERATURE: 98.4 F | SYSTOLIC BLOOD PRESSURE: 106 MMHG | HEIGHT: 62 IN | WEIGHT: 182 LBS | HEART RATE: 70 BPM | DIASTOLIC BLOOD PRESSURE: 70 MMHG

## 2019-09-09 DIAGNOSIS — L30.9 DERMATITIS: Primary | ICD-10-CM

## 2019-09-09 PROCEDURE — 99212 OFFICE O/P EST SF 10 MIN: CPT | Performed by: NURSE PRACTITIONER

## 2019-09-09 NOTE — PROGRESS NOTES
Subjective   Emilie Soto is a 58 y.o. female.     She reports a possible insect bite to the left elbow.  She denies seeing the insect itself, denies seeing a spider.  Denies ulceration, pain.  Reports the raised area around the left elbow with complaints of itching.  Onset of symptoms x1 week.  No alleviating factors or aggravating factors reported.         The following portions of the patient's history were reviewed and updated as appropriate: allergies, current medications, past family history, past medical history, past social history, past surgical history and problem list.    Review of Systems   Constitutional: Negative.    HENT: Negative.    Eyes: Negative.    Respiratory: Negative.    Cardiovascular: Negative.    Gastrointestinal: Negative.    Endocrine: Negative.    Genitourinary: Negative.    Musculoskeletal: Negative.    Skin:        Positive for possible insect bite, raised area around left elbow, positive for itching.   Allergic/Immunologic: Negative.    Neurological: Negative.    Hematological: Negative.    Psychiatric/Behavioral: Negative.        Objective   Physical Exam   Constitutional: She is oriented to person, place, and time. She appears well-developed and well-nourished.   HENT:   Head: Normocephalic and atraumatic.   Eyes: EOM are normal. Pupils are equal, round, and reactive to light.   Neck: Normal range of motion. Neck supple.   Cardiovascular: Normal rate and regular rhythm.   Pulmonary/Chest: Effort normal and breath sounds normal.   Abdominal: Soft. Bowel sounds are normal.   Musculoskeletal: Normal range of motion.   Neurological: She is alert and oriented to person, place, and time.   Skin: Skin is warm and dry. Capillary refill takes less than 2 seconds.   Just inferior to left elbow there is an approximately 7 cm round raised area.  No ulceration is present.  No palpable tenderness.  No sign of bull's-eye rash.  The area has a raised hives.  No open area or integrity issue.   BP  "106/70 (BP Location: Left arm, Patient Position: Sitting, Cuff Size: Large Adult)   Pulse 70   Temp 98.4 °F (36.9 °C) (Oral)   Resp 16   Ht 157.5 cm (62\")   Wt 82.6 kg (182 lb)   SpO2 99%   Breastfeeding? No   BMI 33.29 kg/m²       Assessment/Plan   Emilie was seen today for insect bite.    Diagnoses and all orders for this visit:    Dermatitis    Other orders  -     triamcinolone (KENALOG) 0.1 % ointment; Apply  topically to the appropriate area as directed 2 (Two) Times a Day for 7 days. Use a pea-sized amount or less to affected area.      Advised and educated plan of care, PRN f/u, call if signs of infection.         "

## 2019-09-09 NOTE — PATIENT INSTRUCTIONS
If any ulceration occurs or increased redness, pain, or drainage, call the office.  [phone].  We may then suspect a soft-skin tissue infection, otherwise, we will treat the itching and hive with topical steroids.

## 2019-09-26 RX ORDER — VENLAFAXINE HYDROCHLORIDE 150 MG/1
CAPSULE, EXTENDED RELEASE ORAL
Qty: 30 CAPSULE | Refills: 5 | Status: SHIPPED | OUTPATIENT
Start: 2019-09-26 | End: 2019-11-26

## 2019-10-03 DIAGNOSIS — F41.9 ANXIETY: ICD-10-CM

## 2019-10-03 DIAGNOSIS — F32.A DEPRESSION, UNSPECIFIED DEPRESSION TYPE: ICD-10-CM

## 2019-10-03 RX ORDER — ALPRAZOLAM 0.5 MG/1
0.5 TABLET ORAL 2 TIMES DAILY PRN
Qty: 20 TABLET | Refills: 0 | Status: SHIPPED | OUTPATIENT
Start: 2019-10-03 | End: 2019-12-19 | Stop reason: SDUPTHER

## 2019-10-10 ENCOUNTER — ANESTHESIA (OUTPATIENT)
Dept: GASTROENTEROLOGY | Facility: HOSPITAL | Age: 59
End: 2019-10-10

## 2019-10-10 ENCOUNTER — HOSPITAL ENCOUNTER (OUTPATIENT)
Facility: HOSPITAL | Age: 59
Setting detail: HOSPITAL OUTPATIENT SURGERY
Discharge: HOME | End: 2019-10-10
Attending: INTERNAL MEDICINE | Admitting: INTERNAL MEDICINE

## 2019-10-10 ENCOUNTER — ANESTHESIA EVENT (OUTPATIENT)
Dept: GASTROENTEROLOGY | Facility: HOSPITAL | Age: 59
End: 2019-10-10

## 2019-10-10 VITALS
HEIGHT: 62 IN | WEIGHT: 174 LBS | TEMPERATURE: 97.1 F | RESPIRATION RATE: 17 BRPM | SYSTOLIC BLOOD PRESSURE: 108 MMHG | HEART RATE: 67 BPM | DIASTOLIC BLOOD PRESSURE: 59 MMHG | BODY MASS INDEX: 32.02 KG/M2 | OXYGEN SATURATION: 97 %

## 2019-10-10 DIAGNOSIS — Z86.010 HX OF COLONIC POLYPS: ICD-10-CM

## 2019-10-10 DIAGNOSIS — Z80.0 FAMILY HISTORY OF COLON CANCER: ICD-10-CM

## 2019-10-10 PROCEDURE — 45385 COLONOSCOPY W/LESION REMOVAL: CPT | Performed by: INTERNAL MEDICINE

## 2019-10-10 PROCEDURE — 88305 TISSUE EXAM BY PATHOLOGIST: CPT | Performed by: INTERNAL MEDICINE

## 2019-10-10 PROCEDURE — 25010000002 PROPOFOL 10 MG/ML EMULSION: Performed by: NURSE ANESTHETIST, CERTIFIED REGISTERED

## 2019-10-10 RX ORDER — PROPOFOL 10 MG/ML
VIAL (ML) INTRAVENOUS AS NEEDED
Status: DISCONTINUED | OUTPATIENT
Start: 2019-10-10 | End: 2019-10-10 | Stop reason: SURG

## 2019-10-10 RX ORDER — SODIUM CHLORIDE 0.9 % (FLUSH) 0.9 %
10 SYRINGE (ML) INJECTION AS NEEDED
Status: DISCONTINUED | OUTPATIENT
Start: 2019-10-10 | End: 2019-10-10 | Stop reason: HOSPADM

## 2019-10-10 RX ORDER — SODIUM CHLORIDE 9 MG/ML
500 INJECTION, SOLUTION INTRAVENOUS CONTINUOUS PRN
Status: DISCONTINUED | OUTPATIENT
Start: 2019-10-10 | End: 2019-10-10 | Stop reason: HOSPADM

## 2019-10-10 RX ADMIN — PROPOFOL 300 MG: 10 INJECTION, EMULSION INTRAVENOUS at 10:17

## 2019-10-10 RX ADMIN — SODIUM CHLORIDE 500 ML: 0.9 INJECTION, SOLUTION INTRAVENOUS at 09:18

## 2019-10-10 RX ADMIN — PROPOFOL 50 MG: 10 INJECTION, EMULSION INTRAVENOUS at 10:16

## 2019-10-10 RX ADMIN — LIDOCAINE HYDROCHLORIDE 50 MG: 20 INJECTION, SOLUTION INTRAVENOUS at 10:16

## 2019-10-10 NOTE — ANESTHESIA PREPROCEDURE EVALUATION
Anesthesia Evaluation     Patient summary reviewed   no history of anesthetic complications:  NPO Solid Status: > 8 hours             Airway   Mallampati: II  TM distance: >3 FB  Neck ROM: full  Dental      Pulmonary    (+) asthma,   Cardiovascular   Exercise tolerance: good (4-7 METS)    (+) hypertension,       Neuro/Psych- negative ROS  GI/Hepatic/Renal/Endo    (+) obesity,  GERD,      Musculoskeletal         ROS comment: Lupus    Abdominal    Substance History      OB/GYN          Other                        Anesthesia Plan    ASA 2     MAC     Anesthetic plan, all risks, benefits, and alternatives have been provided, discussed and informed consent has been obtained with: patient.

## 2019-10-10 NOTE — ANESTHESIA POSTPROCEDURE EVALUATION
"Patient: Emilie Soto    Procedure Summary     Date:  10/10/19 Room / Location:  [facility] ENDOSCOPY 4 / [facility]    Anesthesia Start:  1014 Anesthesia Stop:  1038    Procedure:  COLONOSCOPY WITH ANESTHESIA (N/A ) Diagnosis:       Hx of colonic polyps      Family history of colon cancer      (Hx of colonic polyps [Z86.010])      (Family history of colon cancer [Z80.0])    Surgeon:  Abigail Weathers MD Provider:  Deepa Torres CRNA    Anesthesia Type:  MAC ASA Status:  2          Anesthesia Type: MAC  Last vitals  BP   (!) 84/55 (10/10/19 1037)   Temp   97.1 °F (36.2 °C) (10/10/19 0846)   Pulse   60 (10/10/19 0846)   Resp   19 (10/10/19 1037)     SpO2   97 % (10/10/19 1037)     Post Anesthesia Care and Evaluation    Patient location during evaluation: PACU  Patient participation: complete - patient participated  Level of consciousness: awake and alert  Pain management: adequate  Airway patency: patent  Anesthetic complications: No anesthetic complications    Cardiovascular status: acceptable  Respiratory status: acceptable  Hydration status: acceptable    Comments: Blood pressure (!) 84/55, pulse 60, temperature 97.1 °F (36.2 °C), temperature source Temporal, resp. rate 19, height 157.5 cm (62\"), weight 78.9 kg (174 lb), SpO2 97 %, not currently breastfeeding.    Pt discharged from PACU based on jayjay score >8      "

## 2019-10-10 NOTE — H&P
Chief Complaint:   Colon polyps    Subjective     HPI:   She has had adenomatous colon polyps removed.  Last colonoscopy was over 3 years ago.  She had a 16 mm tubulovillous adenomatous polyp at the ileocecal valve.  She has also had a polyp marked with ink    Past Medical History:   Past Medical History:   Diagnosis Date   • CHF (congestive heart failure) (CMS/HCC)    • CKD (chronic kidney disease)    • DDD (degenerative disc disease), cervical    • Depression    • Fibromyalgia    • GERD (gastroesophageal reflux disease)    • Heart murmur    • Hepatitis cholestatic    • History of colon polyps    • HOCM (hypertrophic obstructive cardiomyopathy) (CMS/HCC)    • HTN (hypertension)    • RA (rheumatoid arthritis) (CMS/HCC)    • Renal disease    • Sleep apnea        Past Surgical History:  Past Surgical History:   Procedure Laterality Date   • COLONOSCOPY  04/22/2016    One 16mm tubulovillous adenomatous polyp at the ileocecal valve-Clip was placed-injected; The examination was otherwise normal on direct and retroflexion views; Repeat 1 year   • COLONOSCOPY N/A 7/28/2017    A tattoo was seen at the ileocecal valve-A post-polypectomy scar was found at the tattoo site; One 12mm tubular adenomatous flat polyp in the transverse colon-Clip (MR conditional) was placed; The examination was otherwise normal on direct and retroflexion views; Repeat 2 years   • COLONOSCOPY  10/04/2015    Bleeding at the ileocecal valve secondary to previous polypectomy-Clip was placed; No specimens collected; Repeat 6 months for retreatment   • COLONOSCOPY  09/30/2015    Very large multilobulated tubular adenomatous polyp opposite the ileocecal valve-removed piecemeal-at least 95% removed; One less than 1cm tubular adenomatous polyp in the ascending colon; Repeat 6-12 months to reassess the large polyp   • EXPLORATORY LAPAROTOMY     • HYSTERECTOMY     • LIVER BIOPSY  06/14/2011    Cholestatic hepatitis-see report        Family History:  Family  History   Problem Relation Age of Onset   • Throat cancer Mother    • Diabetes Father    • Cancer Father         Pt reports he had part of his intestines removed   • Breast cancer Sister    • Colon cancer Neg Hx    • Colon polyps Neg Hx    • Esophageal cancer Neg Hx    • Liver cancer Neg Hx    • Liver disease Neg Hx    • Rectal cancer Neg Hx    • Stomach cancer Neg Hx        Social History:   reports that she has never smoked. She has never used smokeless tobacco. She reports that she drinks about 1.2 oz of alcohol per week. She reports that she does not use drugs.    Medications:   Medications Prior to Admission   Medication Sig Dispense Refill Last Dose   • ALPRAZolam (XANAX) 0.5 MG tablet Take 1 tablet by mouth 2 (Two) Times a Day As Needed for Anxiety. 20 tablet 0 Past Week at Unknown time   • amLODIPine (NORVASC) 5 MG tablet TAKE ONE TABLET DAILY GENERIC FOR NORVASC(ALSO TAKES VERAPAMIL) 30 tablet 5 10/10/2019 at 0700   • carvedilol (COREG) 25 MG tablet TAKE ONE TABLET TWICE DAILY GENERIC FOR COREG 180 tablet 1 10/10/2019 at 0700   • estradiol (ESTRACE) 2 MG tablet Take 1 mg by mouth Daily.   10/10/2019 at 0700   • folic acid (FOLVITE) 1 MG tablet Take 1 tablet by mouth Daily.   10/10/2019 at 0700   • HYDROcodone-acetaminophen (NORCO) 7.5-325 MG per tablet Take 1 tablet by mouth 2 (Two) Times a Day As Needed for Moderate Pain .   10/10/2019 at 0700   • hydroxychloroquine (PLAQUENIL) 200 MG tablet Take 1 tablet by mouth 2 (Two) Times a Day.   10/9/2019 at Unknown time   • losartan (COZAAR) 100 MG tablet TAKE ONE TABLET DAILY GENERIC FOR COZAAR 30 tablet 6 10/10/2019 at 97762   • mycophenolate (CELLCEPT) 500 MG tablet Take 1 tablet by mouth 2 (Two) Times a Day.   10/9/2019 at Unknown time   • potassium chloride (K-DUR) 10 MEQ CR tablet Take 1 tablet by mouth 2 (Two) Times a Day With Meals. 180 tablet 1 10/9/2019 at Unknown time   • PROAIR  (90 Base) MCG/ACT inhaler USE 2 PUFFS EVERY FOUR TO SIX HOURS AS  "NEEDED 8.5 g 5 Past Month at Unknown time   • raNITIdine (ZANTAC) 150 MG tablet Take 1 tablet by mouth 2 (Two) Times a Day. 60 tablet 3 Past Week at Unknown time   • spironolactone (ALDACTONE) 25 MG tablet TAKE ONE TABLET DAILY 90 tablet 1 10/10/2019 at 0700   • traZODone (DESYREL) 50 MG tablet TAKE 1-2 TABLETS AT BEDTIME AS NEEDED (Patient taking differently: 100 mg. 2 TABLETS AT BEDTIME AS NEEDED) 60 tablet 5 10/9/2019 at Unknown time   • venlafaxine XR (EFFEXOR-XR) 150 MG 24 hr capsule TAKE 1 CAPSULE DAILY GENERIC FOR EFFEXOR XR ALONG WITH 75MG XR 30 capsule 5 10/9/2019 at Unknown time   • verapamil SR (CALAN-SR) 240 MG CR tablet Take 1 tablet by mouth 2 (Two) Times a Day. 180 tablet 1 10/9/2019 at Unknown time   • vitamin D (ERGOCALCIFEROL) 83309 UNITS capsule capsule Take 1 capsule by mouth 1 (One) Time Per Week.   Past Week at Unknown time   • cloNIDine (CATAPRES) 0.1 MG tablet Take 0.1 mg by mouth 2 (Two) Times a Day. One by mouth twice daily as needed if BP greater than 160/100   More than a month at Unknown time       Allergies:  Bactrim [sulfamethoxazole-trimethoprim]; Biaxin [clarithromycin]; Ciprofloxacin hcl; Duricef [cefadroxil]; Ketek [telithromycin]; Relafen [nabumetone]; Wellbutrin [bupropion]; and Zithromax [azithromycin]    ROS:    General: Weight stable  Resp: No SOA  Cardiovascular: No CP      Objective     /71 (Patient Position: Sitting)   Pulse 60   Temp 97.1 °F (36.2 °C) (Temporal)   Resp 18   Ht 157.5 cm (62\")   Wt 78.9 kg (174 lb)   SpO2 97%   BMI 31.83 kg/m²     Physical Exam   Constitutional: Pt is oriented to person, place, and in no distress.  Eyes: No icterus  ENMT: Unremarkable   Cardiovascular: Normal rate, regular rhythm.    Pulmonary/Chest: No distress.  No audible wheezes  Abdominal: Soft.   Skin: Skin is warm and dry.   Psychiatric: Mood, memory, affect and judgment appear normal.     Assessment/Plan     Diagnosis:  Colon polyps    Anticipated Surgical " Procedure:  Colonoscopy    The risks, benefits, and alternatives of colonoscopy were reviewed with the patient today.  Risks including perforation of the colon possibly requiring surgery or colostomy.  Additional risks include risk of bleeding from biopsies or removal of colon tissue.  There is also the risk of a drug reaction or problems with anesthesia.  This will be discussed with the further by the anesthesia team on the day of the procedure.  Lastly there is a possibility of missing a colon polyp or cancer.  The benefits include the diagnosis and management of disease of the colon and rectum.  Alternatives to colonoscopy include barium enema, laboratory testing, radiographic evaluation, or no intervention.  The patient verbalizes understanding and agrees.    Much of this encounter note is an electronic transcription/translation of spoken language to printed text. The electronic translation of spoken language may permit erroneous, or at times, nonsensical words or phrases to be inadvertently transcribed; although I have reviewed the note for such errors, some may still exist.

## 2019-10-11 LAB
CYTO UR: NORMAL
LAB AP CASE REPORT: NORMAL
LAB AP CLINICAL INFORMATION: NORMAL
PATH REPORT.FINAL DX SPEC: NORMAL
PATH REPORT.GROSS SPEC: NORMAL

## 2019-10-31 ENCOUNTER — OFFICE VISIT (OUTPATIENT)
Dept: FAMILY MEDICINE CLINIC | Facility: CLINIC | Age: 59
End: 2019-10-31

## 2019-10-31 VITALS
WEIGHT: 178 LBS | TEMPERATURE: 98 F | BODY MASS INDEX: 32.76 KG/M2 | HEART RATE: 67 BPM | OXYGEN SATURATION: 99 % | RESPIRATION RATE: 18 BRPM | HEIGHT: 62 IN | SYSTOLIC BLOOD PRESSURE: 132 MMHG | DIASTOLIC BLOOD PRESSURE: 80 MMHG

## 2019-10-31 DIAGNOSIS — K21.9 GASTROESOPHAGEAL REFLUX DISEASE, ESOPHAGITIS PRESENCE NOT SPECIFIED: ICD-10-CM

## 2019-10-31 DIAGNOSIS — F32.A DEPRESSION, UNSPECIFIED DEPRESSION TYPE: Primary | ICD-10-CM

## 2019-10-31 PROCEDURE — 99214 OFFICE O/P EST MOD 30 MIN: CPT | Performed by: NURSE PRACTITIONER

## 2019-10-31 RX ORDER — OMEPRAZOLE 20 MG/1
20 CAPSULE, DELAYED RELEASE ORAL DAILY
Qty: 30 CAPSULE | Refills: 5 | Status: SHIPPED | OUTPATIENT
Start: 2019-10-31 | End: 2020-02-04 | Stop reason: SDUPTHER

## 2019-10-31 RX ORDER — ARIPIPRAZOLE 2 MG/1
2 TABLET ORAL DAILY
Qty: 30 TABLET | Refills: 1 | Status: SHIPPED | OUTPATIENT
Start: 2019-10-31 | End: 2019-11-26 | Stop reason: SDUPTHER

## 2019-10-31 NOTE — PROGRESS NOTES
SUBJECTIVE    Chief Complaint:  Increased anxiety, evaluation of GERD    History of Present Illness:  This 59 y.o. female was seen in the office today for increased anxiety and depression.  She reports occasional panic attacks where she has difficulty breathing.  She is on Effexor 225 mg p.o. daily, trazodone 50 mg p.o. daily.  She reports she has tried other SSRIs including Lexapro and the SSNRI Cymbalta with max doses and no effectiveness.  She denies any suicidal aeration, self-harm or desire to harm others.    We are managing her for GERD, she has been on Zantac.  Her Zantac did get recalled and she tried to go without, she reports an acute flareup this week.    Past Medical, Surgical, Social, and Family History:  Past Medical History:   Diagnosis Date   • CHF (congestive heart failure) (CMS/HCC)    • CKD (chronic kidney disease)    • DDD (degenerative disc disease), cervical    • Depression    • Fibromyalgia    • GERD (gastroesophageal reflux disease)    • Heart murmur    • Hepatitis cholestatic    • History of colon polyps    • HOCM (hypertrophic obstructive cardiomyopathy) (CMS/HCC)    • HTN (hypertension)    • RA (rheumatoid arthritis) (CMS/HCC)    • Renal disease    • Sleep apnea      Past Surgical History:   Procedure Laterality Date   • COLONOSCOPY  04/22/2016    One 16mm tubulovillous adenomatous polyp at the ileocecal valve-Clip was placed-injected; The examination was otherwise normal on direct and retroflexion views; Repeat 1 year   • COLONOSCOPY N/A 7/28/2017    A tattoo was seen at the ileocecal valve-A post-polypectomy scar was found at the tattoo site; One 12mm tubular adenomatous flat polyp in the transverse colon-Clip (MR conditional) was placed; The examination was otherwise normal on direct and retroflexion views; Repeat 2 years   • COLONOSCOPY  10/04/2015    Bleeding at the ileocecal valve secondary to previous polypectomy-Clip was placed; No specimens collected; Repeat 6 months for  retreatment   • COLONOSCOPY  09/30/2015    Very large multilobulated tubular adenomatous polyp opposite the ileocecal valve-removed piecemeal-at least 95% removed; One less than 1cm tubular adenomatous polyp in the ascending colon; Repeat 6-12 months to reassess the large polyp   • COLONOSCOPY N/A 10/10/2019    Procedure: COLONOSCOPY WITH ANESTHESIA;  Surgeon: Abigail Weathers MD;  Location: [facility] ENDOSCOPY;  Service: Gastroenterology   • EXPLORATORY LAPAROTOMY     • HYSTERECTOMY     • LIVER BIOPSY  06/14/2011    Cholestatic hepatitis-see report     Social History     Socioeconomic History   • Marital status:      Spouse name: Not on file   • Number of children: Not on file   • Years of education: Not on file   • Highest education level: Not on file   Tobacco Use   • Smoking status: Never Smoker   • Smokeless tobacco: Never Used   Substance and Sexual Activity   • Alcohol use: Yes     Alcohol/week: 1.2 oz     Types: 2 Glasses of wine per week     Comment: Occasionally   • Drug use: No   • Sexual activity: Yes     Partners: Male     Birth control/protection: Post-menopausal     Family History   Problem Relation Age of Onset   • Throat cancer Mother    • Diabetes Father    • Cancer Father         Pt reports he had part of his intestines removed   • Breast cancer Sister    • Colon cancer Neg Hx    • Colon polyps Neg Hx    • Esophageal cancer Neg Hx    • Liver cancer Neg Hx    • Liver disease Neg Hx    • Rectal cancer Neg Hx    • Stomach cancer Neg Hx      Review of Systems   Constitutional: Negative for chills, diaphoresis and fatigue.   HENT: Negative for congestion, ear discharge, ear pain, rhinorrhea and sinus pressure.    Eyes: Negative for pain, discharge and itching.   Respiratory: Negative for cough, chest tightness, shortness of breath and wheezing.    Cardiovascular: Negative for chest pain and palpitations.   Gastrointestinal: Negative for abdominal distention and abdominal pain.   Endocrine: Negative  "for cold intolerance and heat intolerance.   Genitourinary: Negative for dysuria, flank pain, hematuria and urgency.   Musculoskeletal: Negative for arthralgias and myalgias.   Skin: Negative for rash, skin lesions and bruise.   Allergic/Immunologic: Negative for environmental allergies and food allergies.   Neurological: Negative for dizziness, speech difficulty and numbness.   Hematological: Negative for adenopathy. Does not bruise/bleed easily.   Psychiatric/Behavioral: Positive for depressed mood and stress. Negative for agitation, behavioral problems, self-injury and suicidal ideas. The patient is not nervous/anxious.      OBJECTIVE    Vital Signs:  /80 (BP Location: Left arm)   Pulse 67   Temp 98 °F (36.7 °C) (Tympanic)   Resp 18   Ht 157.5 cm (62\")   Wt 80.7 kg (178 lb)   SpO2 99%   BMI 32.56 kg/m²   Physical Exam   Constitutional: She is oriented to person, place, and time. No distress.   HENT:   Head: Normocephalic and atraumatic.   Mouth/Throat: No oropharyngeal exudate.   Eyes: Conjunctivae and EOM are normal. Pupils are equal, round, and reactive to light.   Neck: Normal range of motion. Neck supple. No JVD present. No tracheal deviation present. No thyromegaly present.   Cardiovascular: Normal rate, regular rhythm, normal heart sounds and intact distal pulses. Exam reveals no gallop and no friction rub.   No murmur heard.  Pulmonary/Chest: Effort normal and breath sounds normal. No respiratory distress. She has no rales.   Abdominal: Soft. Bowel sounds are normal. She exhibits no distension and no mass. There is no tenderness. There is no rebound and no guarding.   Musculoskeletal: Normal range of motion. She exhibits no edema, tenderness or deformity.   Lymphadenopathy:     She has no cervical adenopathy.   Neurological: She is alert and oriented to person, place, and time.   Skin: Skin is warm and dry. Capillary refill takes less than 2 seconds. No rash noted. She is not diaphoretic. "   Psychiatric: Her speech is normal and behavior is normal. Judgment and thought content normal. Cognition and memory are normal. She exhibits a depressed mood.   Openly voices feelings, she is tearful with conversation.   Nursing note and vitals reviewed.      ASSESSMENT/PLAN    Diagnoses and all orders for this visit:    1. Depression, unspecified depression type (Primary)  -     ARIPiprazole (ABILIFY) 2 MG tablet; Take 1 tablet by mouth Daily.  Dispense: 30 tablet; Refill: 1    2. Gastroesophageal reflux disease, esophagitis presence not specified  -     omeprazole (priLOSEC) 20 MG capsule; Take 1 capsule by mouth Daily.  Dispense: 30 capsule; Refill: 5     -d/c zantac - recalled    Discussion:  Advised and educated plan of care.  We will follow-up in 1 month.  Advised that the Abilify is used as an adjunct to refractory mood disorders whereas maximum doses of regular medication has been tried.  Advised to continue following up with [facility].  Advised that they do have a psychiatrist on staff and can also evaluate meds, she responded that she would rather myself and Dr. Miles manage her medication and she just use the mental health place for therapy.    Follow-up:  Return in about 4 weeks (around 11/28/2019).    Note e-Signed by SUGEY Gillespie on 10/31/2019 at 1:24 PM

## 2019-11-08 ENCOUNTER — TELEPHONE (OUTPATIENT)
Dept: FAMILY MEDICINE CLINIC | Facility: CLINIC | Age: 59
End: 2019-11-08

## 2019-11-08 RX ORDER — AZITHROMYCIN 250 MG/1
TABLET, FILM COATED ORAL
Qty: 6 TABLET | Refills: 0 | Status: SHIPPED | OUTPATIENT
Start: 2019-11-08 | End: 2019-11-18

## 2019-11-08 RX ORDER — METHYLPREDNISOLONE 4 MG/1
TABLET ORAL
Qty: 1 EACH | Refills: 0 | Status: SHIPPED | OUTPATIENT
Start: 2019-11-08 | End: 2019-11-14

## 2019-11-08 NOTE — TELEPHONE ENCOUNTER
Pt called and is very sick; sore throat, cough, dizzy, fever, and cold chills. Wanted to see if she can get an ABX?

## 2019-11-18 ENCOUNTER — TELEPHONE (OUTPATIENT)
Dept: FAMILY MEDICINE CLINIC | Facility: CLINIC | Age: 59
End: 2019-11-18

## 2019-11-18 RX ORDER — TOBRAMYCIN 3 MG/ML
2 SOLUTION/ DROPS OPHTHALMIC
Qty: 5 ML | Refills: 1 | Status: SHIPPED | OUTPATIENT
Start: 2019-11-18 | End: 2020-06-29

## 2019-11-18 NOTE — TELEPHONE ENCOUNTER
"Vm \"I have pink eye and I got some otc medication from [facility] to relieve it, but I need something to get rid of it. Can Dr Miles call me something in?\"  "

## 2019-11-19 DIAGNOSIS — I50.30 DIASTOLIC CONGESTIVE HEART FAILURE, UNSPECIFIED HF CHRONICITY (HCC): Primary | ICD-10-CM

## 2019-11-19 DIAGNOSIS — N28.9 RENAL INSUFFICIENCY: ICD-10-CM

## 2019-11-19 DIAGNOSIS — J84.10 PULMONARY FIBROSIS (HCC): ICD-10-CM

## 2019-11-19 DIAGNOSIS — M06.9 RHEUMATOID ARTHRITIS, INVOLVING UNSPECIFIED SITE, UNSPECIFIED RHEUMATOID FACTOR PRESENCE: ICD-10-CM

## 2019-11-20 LAB
ALBUMIN SERPL-MCNC: 4.2 G/DL (ref 3.5–5.2)
ALBUMIN/GLOB SERPL: 1.6 G/DL
ALP SERPL-CCNC: 55 U/L (ref 39–117)
ALT SERPL-CCNC: 11 U/L (ref 1–33)
AST SERPL-CCNC: 13 U/L (ref 1–32)
BASOPHILS # BLD AUTO: 0.03 10*3/MM3 (ref 0–0.2)
BASOPHILS NFR BLD AUTO: 0.5 % (ref 0–1.5)
BILIRUB SERPL-MCNC: <0.2 MG/DL (ref 0.2–1.2)
BUN SERPL-MCNC: 12 MG/DL (ref 6–20)
BUN/CREAT SERPL: 10.9 (ref 7–25)
CALCIUM SERPL-MCNC: 9.1 MG/DL (ref 8.6–10.5)
CHLORIDE SERPL-SCNC: 102 MMOL/L (ref 98–107)
CO2 SERPL-SCNC: 27.8 MMOL/L (ref 22–29)
CREAT SERPL-MCNC: 1.1 MG/DL (ref 0.57–1)
CRP SERPL-MCNC: 4.93 MG/DL (ref 0–0.5)
EOSINOPHIL # BLD AUTO: 0.12 10*3/MM3 (ref 0–0.4)
EOSINOPHIL NFR BLD AUTO: 1.8 % (ref 0.3–6.2)
ERYTHROCYTE [DISTWIDTH] IN BLOOD BY AUTOMATED COUNT: 12 % (ref 12.3–15.4)
ERYTHROCYTE [SEDIMENTATION RATE] IN BLOOD BY WESTERGREN METHOD: 31 MM/HR (ref 0–30)
GLOBULIN SER CALC-MCNC: 2.7 GM/DL
GLUCOSE SERPL-MCNC: 88 MG/DL (ref 65–99)
HCT VFR BLD AUTO: 39.7 % (ref 34–46.6)
HGB BLD-MCNC: 13 G/DL (ref 12–15.9)
IMM GRANULOCYTES # BLD AUTO: 0.04 10*3/MM3 (ref 0–0.05)
IMM GRANULOCYTES NFR BLD AUTO: 0.6 % (ref 0–0.5)
LYMPHOCYTES # BLD AUTO: 1.39 10*3/MM3 (ref 0.7–3.1)
LYMPHOCYTES NFR BLD AUTO: 21.3 % (ref 19.6–45.3)
MCH RBC QN AUTO: 30 PG (ref 26.6–33)
MCHC RBC AUTO-ENTMCNC: 32.7 G/DL (ref 31.5–35.7)
MCV RBC AUTO: 91.7 FL (ref 79–97)
MONOCYTES # BLD AUTO: 0.97 10*3/MM3 (ref 0.1–0.9)
MONOCYTES NFR BLD AUTO: 14.9 % (ref 5–12)
NEUTROPHILS # BLD AUTO: 3.98 10*3/MM3 (ref 1.7–7)
NEUTROPHILS NFR BLD AUTO: 60.9 % (ref 42.7–76)
NRBC BLD AUTO-RTO: 0 /100 WBC (ref 0–0.2)
PLATELET # BLD AUTO: 267 10*3/MM3 (ref 140–450)
POTASSIUM SERPL-SCNC: 4.3 MMOL/L (ref 3.5–5.2)
PROT SERPL-MCNC: 6.9 G/DL (ref 6–8.5)
RBC # BLD AUTO: 4.33 10*6/MM3 (ref 3.77–5.28)
SODIUM SERPL-SCNC: 140 MMOL/L (ref 136–145)
WBC # BLD AUTO: 6.53 10*3/MM3 (ref 3.4–10.8)

## 2019-11-26 ENCOUNTER — OFFICE VISIT (OUTPATIENT)
Dept: FAMILY MEDICINE CLINIC | Facility: CLINIC | Age: 59
End: 2019-11-26

## 2019-11-26 VITALS
HEIGHT: 62 IN | WEIGHT: 176 LBS | TEMPERATURE: 98.5 F | RESPIRATION RATE: 18 BRPM | BODY MASS INDEX: 32.39 KG/M2 | DIASTOLIC BLOOD PRESSURE: 80 MMHG | OXYGEN SATURATION: 100 % | SYSTOLIC BLOOD PRESSURE: 134 MMHG | HEART RATE: 79 BPM

## 2019-11-26 DIAGNOSIS — F32.A DEPRESSION, UNSPECIFIED DEPRESSION TYPE: ICD-10-CM

## 2019-11-26 PROCEDURE — 99212 OFFICE O/P EST SF 10 MIN: CPT | Performed by: NURSE PRACTITIONER

## 2019-11-26 RX ORDER — VENLAFAXINE HYDROCHLORIDE 225 MG/1
225 TABLET, EXTENDED RELEASE ORAL
Qty: 90 EACH | Refills: 3 | Status: SHIPPED | OUTPATIENT
Start: 2019-11-26 | End: 2020-12-01

## 2019-11-26 RX ORDER — ARIPIPRAZOLE 2 MG/1
2 TABLET ORAL DAILY
Qty: 90 TABLET | Refills: 3 | Status: SHIPPED | OUTPATIENT
Start: 2019-11-26 | End: 2020-02-25

## 2019-11-26 NOTE — PROGRESS NOTES
SUBJECTIVE    Chief Complaint:  Reevaluation of depression    History of Present Illness:  This 59 y.o. female was seen in the office today for reevaluation of depression.  1 month follow-up after starting Abilify 2 mg p.o. daily.  She reports she has missed a couple doses and did not do well on those days, she reports overall depression is much better on the day she does take it.  She reports is been well tolerated thus far.  She is requesting 90-day supply since she is followed up today.  Also wanting to change over the 90-day supply on her venlafaxine.  She is currently on 225 mg p.o. daily.    Past Medical, Surgical, Social, and Family History:  Past Medical History:   Diagnosis Date   • CHF (congestive heart failure) (CMS/HCC)    • CKD (chronic kidney disease)    • DDD (degenerative disc disease), cervical    • Depression    • Fibromyalgia    • GERD (gastroesophageal reflux disease)    • Heart murmur    • Hepatitis cholestatic    • History of colon polyps    • HOCM (hypertrophic obstructive cardiomyopathy) (CMS/HCC)    • HTN (hypertension)    • RA (rheumatoid arthritis) (CMS/HCC)    • Renal disease    • Sleep apnea      Past Surgical History:   Procedure Laterality Date   • COLONOSCOPY  04/22/2016    One 16mm tubulovillous adenomatous polyp at the ileocecal valve-Clip was placed-injected; The examination was otherwise normal on direct and retroflexion views; Repeat 1 year   • COLONOSCOPY N/A 7/28/2017    A tattoo was seen at the ileocecal valve-A post-polypectomy scar was found at the tattoo site; One 12mm tubular adenomatous flat polyp in the transverse colon-Clip (MR conditional) was placed; The examination was otherwise normal on direct and retroflexion views; Repeat 2 years   • COLONOSCOPY  10/04/2015    Bleeding at the ileocecal valve secondary to previous polypectomy-Clip was placed; No specimens collected; Repeat 6 months for retreatment   • COLONOSCOPY  09/30/2015    Very large multilobulated tubular  adenomatous polyp opposite the ileocecal valve-removed piecemeal-at least 95% removed; One less than 1cm tubular adenomatous polyp in the ascending colon; Repeat 6-12 months to reassess the large polyp   • COLONOSCOPY N/A 10/10/2019    Procedure: COLONOSCOPY WITH ANESTHESIA;  Surgeon: Abigail Weathers MD;  Location: [facility] ENDOSCOPY;  Service: Gastroenterology   • EXPLORATORY LAPAROTOMY     • HYSTERECTOMY     • LIVER BIOPSY  06/14/2011    Cholestatic hepatitis-see report     Social History     Socioeconomic History   • Marital status:      Spouse name: Not on file   • Number of children: Not on file   • Years of education: Not on file   • Highest education level: Not on file   Tobacco Use   • Smoking status: Never Smoker   • Smokeless tobacco: Never Used   Substance and Sexual Activity   • Alcohol use: Yes     Alcohol/week: 1.2 oz     Types: 2 Glasses of wine per week     Comment: Occasionally   • Drug use: No   • Sexual activity: Yes     Partners: Male     Birth control/protection: Post-menopausal     Family History   Problem Relation Age of Onset   • Throat cancer Mother    • Diabetes Father    • Cancer Father         Pt reports he had part of his intestines removed   • Breast cancer Sister    • Colon cancer Neg Hx    • Colon polyps Neg Hx    • Esophageal cancer Neg Hx    • Liver cancer Neg Hx    • Liver disease Neg Hx    • Rectal cancer Neg Hx    • Stomach cancer Neg Hx      Review of Systems   Constitutional: Negative for chills, diaphoresis and fatigue.   HENT: Negative for congestion, ear discharge, ear pain, rhinorrhea and sinus pressure.    Eyes: Negative for pain, discharge and itching.   Respiratory: Negative for cough, chest tightness, shortness of breath and wheezing.    Cardiovascular: Negative for chest pain and palpitations.   Gastrointestinal: Negative for abdominal distention and abdominal pain.   Endocrine: Negative for cold intolerance and heat intolerance.   Genitourinary: Negative for  "dysuria, flank pain, hematuria and urgency.   Musculoskeletal: Negative for arthralgias and myalgias.   Skin: Negative for rash, skin lesions and bruise.   Allergic/Immunologic: Negative for environmental allergies and food allergies.   Neurological: Negative for dizziness, speech difficulty and numbness.   Hematological: Negative for adenopathy. Does not bruise/bleed easily.   Psychiatric/Behavioral: Positive for depressed mood (reported as improved). Negative for agitation, behavioral problems, self-injury, suicidal ideas and stress. The patient is not nervous/anxious.      OBJECTIVE    Vital Signs:  /80 (BP Location: Left arm)   Pulse 79   Temp 98.5 °F (36.9 °C) (Oral)   Resp 18   Ht 157.5 cm (62\")   Wt 79.8 kg (176 lb)   SpO2 100%   BMI 32.19 kg/m²   Physical Exam   Constitutional: She is oriented to person, place, and time. No distress.   HENT:   Head: Normocephalic and atraumatic.   Mouth/Throat: No oropharyngeal exudate.   Eyes: Conjunctivae and EOM are normal. Pupils are equal, round, and reactive to light.   Neck: Normal range of motion. Neck supple. No JVD present. No tracheal deviation present. No thyromegaly present.   Cardiovascular: Normal rate, regular rhythm, normal heart sounds and intact distal pulses. Exam reveals no gallop and no friction rub.   No murmur heard.  Pulmonary/Chest: Effort normal and breath sounds normal. No respiratory distress. She has no rales.   Abdominal: Soft. Bowel sounds are normal. She exhibits no distension and no mass. There is no tenderness. There is no rebound and no guarding.   Musculoskeletal: Normal range of motion. She exhibits no edema, tenderness or deformity.   Lymphadenopathy:     She has no cervical adenopathy.   Neurological: She is alert and oriented to person, place, and time.   Skin: Skin is warm and dry. Capillary refill takes less than 2 seconds. No rash noted. She is not diaphoretic.   Psychiatric: She has a normal mood and affect. Her " behavior is normal. Judgment and thought content normal.   Nursing note and vitals reviewed.    ASSESSMENT/PLAN    Diagnoses and all orders for this visit:    1. Depression, unspecified depression type  -     ARIPiprazole (ABILIFY) 2 MG tablet; Take 1 tablet by mouth Daily.  Dispense: 90 tablet; Refill: 3  -     venlafaxine 225 MG tablet sustained-release 24 hour 24 hr tablet; Take 1 tablet by mouth Daily With Breakfast.  Dispense: 90 each; Refill: 3    Discussion:  Advised and educated plan of care.  Advised to continue medication, advised improved compliance.  Follow-up with Dr. Miles as scheduled already.    Follow-up:  Return for Next scheduled follow up -  Dr. Miles as scheduled already.    Note e-Signed by SUGEY Gillespie on 11/26/2019 at 3:18 PM   Note Text (......Xxx Chief Complaint.): This diagnosis correlates with the Detail Level: Zone Other (Free Text): Liquid nitrogen

## 2019-12-04 RX ORDER — LOSARTAN POTASSIUM 100 MG/1
TABLET ORAL
Qty: 90 TABLET | Refills: 4 | Status: SHIPPED | OUTPATIENT
Start: 2019-12-04 | End: 2021-02-08

## 2019-12-18 RX ORDER — TRAZODONE HYDROCHLORIDE 50 MG/1
100 TABLET ORAL NIGHTLY
Qty: 90 TABLET | Refills: 2 | Status: SHIPPED | OUTPATIENT
Start: 2019-12-18 | End: 2020-01-30 | Stop reason: SDUPTHER

## 2019-12-18 RX ORDER — POTASSIUM CHLORIDE 750 MG/1
10 TABLET, FILM COATED, EXTENDED RELEASE ORAL 2 TIMES DAILY WITH MEALS
Qty: 180 TABLET | Refills: 2 | Status: SHIPPED | OUTPATIENT
Start: 2019-12-18 | End: 2021-04-26

## 2019-12-18 RX ORDER — AMLODIPINE BESYLATE 5 MG/1
5 TABLET ORAL DAILY
Qty: 90 TABLET | Refills: 2 | Status: SHIPPED | OUTPATIENT
Start: 2019-12-18 | End: 2020-10-15

## 2019-12-18 RX ORDER — SPIRONOLACTONE 25 MG/1
25 TABLET ORAL DAILY
Qty: 90 TABLET | Refills: 2 | Status: SHIPPED | OUTPATIENT
Start: 2019-12-18 | End: 2020-10-15

## 2019-12-19 DIAGNOSIS — N28.9 RENAL INSUFFICIENCY: Primary | ICD-10-CM

## 2019-12-19 DIAGNOSIS — F32.A DEPRESSION, UNSPECIFIED DEPRESSION TYPE: ICD-10-CM

## 2019-12-19 DIAGNOSIS — F41.9 ANXIETY: ICD-10-CM

## 2019-12-19 DIAGNOSIS — N18.30 CHRONIC KIDNEY DISEASE, STAGE 3 (HCC): ICD-10-CM

## 2019-12-19 RX ORDER — ALPRAZOLAM 0.5 MG/1
0.5 TABLET ORAL 2 TIMES DAILY PRN
Qty: 20 TABLET | Refills: 0 | Status: SHIPPED | OUTPATIENT
Start: 2019-12-19 | End: 2020-03-23 | Stop reason: SDUPTHER

## 2020-01-17 DIAGNOSIS — M33.20 POLYMYOSITIS (HCC): Primary | ICD-10-CM

## 2020-01-20 LAB
ALBUMIN SERPL-MCNC: 4.2 G/DL (ref 3.5–5.2)
ALBUMIN/GLOB SERPL: 1.4 G/DL
ALP SERPL-CCNC: 77 U/L (ref 39–117)
ALT SERPL-CCNC: 13 U/L (ref 1–33)
AST SERPL-CCNC: 16 U/L (ref 1–32)
BASOPHILS # BLD MANUAL: 0.05 10*3/MM3 (ref 0–0.2)
BASOPHILS NFR BLD MANUAL: 1.3 % (ref 0–1.5)
BILIRUB SERPL-MCNC: 0.2 MG/DL (ref 0.2–1.2)
BUN SERPL-MCNC: 17 MG/DL (ref 6–20)
BUN/CREAT SERPL: 15.2 (ref 7–25)
CALCIUM SERPL-MCNC: 9.4 MG/DL (ref 8.6–10.5)
CHLORIDE SERPL-SCNC: 101 MMOL/L (ref 98–107)
CK SERPL-CCNC: 91 U/L (ref 20–180)
CO2 SERPL-SCNC: 28 MMOL/L (ref 22–29)
CREAT SERPL-MCNC: 1.12 MG/DL (ref 0.57–1)
CRP SERPL-MCNC: 2.38 MG/DL (ref 0–0.5)
DIFFERENTIAL COMMENT: ABNORMAL
EOSINOPHIL # BLD MANUAL: 0.3 10*3/MM3 (ref 0–0.4)
EOSINOPHIL NFR BLD MANUAL: 7.7 % (ref 0.3–6.2)
ERYTHROCYTE [DISTWIDTH] IN BLOOD BY AUTOMATED COUNT: 12.7 % (ref 12.3–15.4)
ERYTHROCYTE [SEDIMENTATION RATE] IN BLOOD BY WESTERGREN METHOD: 35 MM/HR (ref 0–30)
GLOBULIN SER CALC-MCNC: 2.9 GM/DL
GLUCOSE SERPL-MCNC: 80 MG/DL (ref 65–99)
HCT VFR BLD AUTO: 38.9 % (ref 34–46.6)
HGB BLD-MCNC: 12.9 G/DL (ref 12–15.9)
LYMPHOCYTES # BLD MANUAL: 1.47 10*3/MM3 (ref 0.7–3.1)
LYMPHOCYTES NFR BLD MANUAL: 37.2 % (ref 19.6–45.3)
MCH RBC QN AUTO: 29.7 PG (ref 26.6–33)
MCHC RBC AUTO-ENTMCNC: 33.2 G/DL (ref 31.5–35.7)
MCV RBC AUTO: 89.6 FL (ref 79–97)
MONOCYTES # BLD MANUAL: 0.45 10*3/MM3 (ref 0.1–0.9)
MONOCYTES NFR BLD MANUAL: 11.5 % (ref 5–12)
NEUTROPHILS # BLD MANUAL: 1.57 10*3/MM3 (ref 1.7–7)
NEUTROPHILS NFR BLD MANUAL: 39.7 % (ref 42.7–76)
NRBC BLD AUTO-RTO: 0 /100 WBC (ref 0–0.2)
PLATELET # BLD AUTO: 232 10*3/MM3 (ref 140–450)
PLATELET BLD QL SMEAR: ABNORMAL
POTASSIUM SERPL-SCNC: 4.6 MMOL/L (ref 3.5–5.2)
PROT SERPL-MCNC: 7.1 G/DL (ref 6–8.5)
RBC # BLD AUTO: 4.34 10*6/MM3 (ref 3.77–5.28)
RBC MORPH BLD: ABNORMAL
SODIUM SERPL-SCNC: 141 MMOL/L (ref 136–145)
WBC # BLD AUTO: 3.95 10*3/MM3 (ref 3.4–10.8)

## 2020-01-27 DIAGNOSIS — I50.32 CHRONIC DIASTOLIC CONGESTIVE HEART FAILURE (HCC): Chronic | ICD-10-CM

## 2020-01-27 DIAGNOSIS — I10 HYPERTENSION, UNSPECIFIED TYPE: Chronic | ICD-10-CM

## 2020-01-27 RX ORDER — CARVEDILOL 25 MG/1
25 TABLET ORAL 2 TIMES DAILY
Qty: 180 TABLET | Refills: 1 | Status: SHIPPED | OUTPATIENT
Start: 2020-01-27 | End: 2020-08-06 | Stop reason: SDUPTHER

## 2020-01-27 RX ORDER — VERAPAMIL HYDROCHLORIDE 240 MG/1
240 TABLET, FILM COATED, EXTENDED RELEASE ORAL 2 TIMES DAILY
Qty: 180 TABLET | Refills: 1 | Status: SHIPPED | OUTPATIENT
Start: 2020-01-27 | End: 2020-08-06 | Stop reason: SDUPTHER

## 2020-01-30 RX ORDER — TRAZODONE HYDROCHLORIDE 50 MG/1
100 TABLET ORAL NIGHTLY
Qty: 180 TABLET | Refills: 2 | Status: SHIPPED | OUTPATIENT
Start: 2020-01-30 | End: 2020-12-01

## 2020-01-30 NOTE — TELEPHONE ENCOUNTER
Pt called to get a 90 day script and not a 60 day script for the Trazadone through a mail order pharmacy.

## 2020-02-04 DIAGNOSIS — K21.9 GASTROESOPHAGEAL REFLUX DISEASE, ESOPHAGITIS PRESENCE NOT SPECIFIED: ICD-10-CM

## 2020-02-04 RX ORDER — OMEPRAZOLE 20 MG/1
20 CAPSULE, DELAYED RELEASE ORAL DAILY
Qty: 90 CAPSULE | Refills: 3 | Status: SHIPPED | OUTPATIENT
Start: 2020-02-04 | End: 2020-11-02

## 2020-02-25 ENCOUNTER — OFFICE VISIT (OUTPATIENT)
Dept: FAMILY MEDICINE CLINIC | Facility: CLINIC | Age: 60
End: 2020-02-25

## 2020-02-25 VITALS
DIASTOLIC BLOOD PRESSURE: 82 MMHG | RESPIRATION RATE: 14 BRPM | BODY MASS INDEX: 33.49 KG/M2 | OXYGEN SATURATION: 98 % | SYSTOLIC BLOOD PRESSURE: 138 MMHG | WEIGHT: 182 LBS | TEMPERATURE: 98.2 F | HEIGHT: 62 IN | HEART RATE: 82 BPM

## 2020-02-25 DIAGNOSIS — M79.604 RIGHT LEG PAIN: ICD-10-CM

## 2020-02-25 DIAGNOSIS — G47.33 OBSTRUCTIVE SLEEP APNEA: Chronic | ICD-10-CM

## 2020-02-25 DIAGNOSIS — G89.29 CHRONIC BACK PAIN, UNSPECIFIED BACK LOCATION, UNSPECIFIED BACK PAIN LATERALITY: Chronic | ICD-10-CM

## 2020-02-25 DIAGNOSIS — I10 ESSENTIAL HYPERTENSION: Chronic | ICD-10-CM

## 2020-02-25 DIAGNOSIS — M06.9 RHEUMATOID ARTHRITIS, INVOLVING UNSPECIFIED SITE, UNSPECIFIED RHEUMATOID FACTOR PRESENCE: Chronic | ICD-10-CM

## 2020-02-25 DIAGNOSIS — Z12.31 ENCOUNTER FOR SCREENING MAMMOGRAM FOR BREAST CANCER: Primary | ICD-10-CM

## 2020-02-25 DIAGNOSIS — I50.30 DIASTOLIC CONGESTIVE HEART FAILURE, UNSPECIFIED HF CHRONICITY (HCC): Chronic | ICD-10-CM

## 2020-02-25 DIAGNOSIS — F41.9 ANXIETY: ICD-10-CM

## 2020-02-25 DIAGNOSIS — K21.9 GASTROESOPHAGEAL REFLUX DISEASE, ESOPHAGITIS PRESENCE NOT SPECIFIED: Chronic | ICD-10-CM

## 2020-02-25 DIAGNOSIS — N28.9 RENAL INSUFFICIENCY: ICD-10-CM

## 2020-02-25 DIAGNOSIS — G47.00 INSOMNIA, UNSPECIFIED TYPE: ICD-10-CM

## 2020-02-25 DIAGNOSIS — F32.A DEPRESSION, UNSPECIFIED DEPRESSION TYPE: ICD-10-CM

## 2020-02-25 DIAGNOSIS — H91.93 BILATERAL HEARING LOSS, UNSPECIFIED HEARING LOSS TYPE: ICD-10-CM

## 2020-02-25 DIAGNOSIS — M54.9 CHRONIC BACK PAIN, UNSPECIFIED BACK LOCATION, UNSPECIFIED BACK PAIN LATERALITY: Chronic | ICD-10-CM

## 2020-02-25 PROCEDURE — 99213 OFFICE O/P EST LOW 20 MIN: CPT | Performed by: FAMILY MEDICINE

## 2020-02-25 NOTE — PROGRESS NOTES
Subjective   Emilie Soto is a 59 y.o. female presenting with chief complaint of:   Chief Complaint   Patient presents with   • Follow-up   • Spasms     Right leg   • Depression     discuss abilify       History of Present Illness :  Alone.       Has multiple chronic problems to consider that might have a bearing on today's issues;  an interval appointment.       Chronic/acute problems reviewed today:   1. Encounter for screening mammogram for breast cancer: Chronic/ongoing yearly need to review for breast cancer.  No breast pain, masses, discharge.       2. Essential hypertension Chronic/stable. Stable here/if home blood pressures.  No significant chest pain, SOB, LE edema, orthopnea, near syncope, dizziness/light headness.      3. Diastolic congestive heart failure, unspecified HF chronicity (CMS/HCC) Chronic/stable.  Denies significant sob, orthopnea, leg edema, weight gain.  Aware of influence diet/salt and watching weight at home.       4. Gastroesophageal reflux disease, esophagitis presence not specified Chronic/stable.  Controlled heartburn, reflux without dysphagia, melena.  Rx used, needed-doing ok.      5. Chronic back pain, unspecified back location, unspecified back pain laterality : chronic/variable 0-3/10 lower back pain with infrequent/rare/occ radiation to LE.  No change LE numbness.  Has bladder/bowel control. No desire to change approach of care.  24 hr soreness/pain on/off upper R thigh.       6. Rheumatoid arthritis, involving unspecified site, unspecified rheumatoid factor presence (CMS/HCC) : chronic/variable various joint pains; still sees Beto.  No swollen joints.     7. Renal insufficiency-? 3-watching lab: Chronic/stable.  No yet nephrology.  No dysuria.  Previously warned/reminded:   To avoid further kidney function decline  A. Treat any time you think you have infection  B. Stay hydrated (dont get dehydrated); drink at least 60 oz fluid every 24 hr (1800 cc or nearly a 2L)  C. Do not  allow any xrays with dye WITHOUT the doctor ordering checking your renal function  D. Do not get new medications without the doctor considering your renal condition  E. Do not use motrin/ibuprofen, alleve/naprosyn and these types of medications     8. Anxiety Chronic/variable:  On/off anxiety tolerated somewhat.  Rx helps and not interested in Rx change. Stress ongoing various issues.  Hopes to hear from disability 2-3 months.  Seeing mental health/counselor helping.  Decided to only use 1/2 ability; then stop it recently.  No difference on/off.       9. Depression, unspecified depression type chronic/variable without significant  mood swings, down moods, nervousness, difficulty with concentration to function home/work.  Others close have not been concerned.  No suicide ideation/intent.  Rx helps; see anxiety also.      10. RHIANNON: (cpap) Chronic/stable.  Chronic/uncertain: Previous opinion her diagnosis of sleep apnea.  Personal decision by the patient to forego treatment.  Patient declines having any issues with falling or staying asleep and any issues of hypersomnia with accidents or results of that during the day.  No sleep apnea witnessed.      11. Bilateral hearing loss, unspecified hearing loss type: chronic/recent realization hearing decreased; offered/declined ENT review.    12. Right leg pain: acute/24 hr R upper thigh.  See back.    13. Insomnia, unspecified type: chronic/variable see RHIANNON; decision to not treat.  Desyrel helps.      Has an/another acute issue today: none.    The following portions of the patient's history were reviewed and updated as appropriate: allergies, current medications, past family history, past medical history, past social history, past surgical history and problem list.      Current Outpatient Medications:   •  ALPRAZolam (XANAX) 0.5 MG tablet, Take 1 tablet by mouth 2 (Two) Times a Day As Needed for Anxiety., Disp: 20 tablet, Rfl: 0  •  amLODIPine (NORVASC) 5 MG tablet, Take 1  tablet by mouth Daily., Disp: 90 tablet, Rfl: 2  •  carvedilol (COREG) 25 MG tablet, Take 1 tablet by mouth 2 (Two) Times a Day., Disp: 180 tablet, Rfl: 1  •  cloNIDine (CATAPRES) 0.1 MG tablet, Take 0.1 mg by mouth 2 (Two) Times a Day. One by mouth twice daily as needed if BP greater than 160/100, Disp: , Rfl:   •  estradiol (ESTRACE) 2 MG tablet, Take 1 mg by mouth Daily., Disp: , Rfl:   •  folic acid (FOLVITE) 1 MG tablet, Take 1 tablet by mouth Daily., Disp: , Rfl:   •  glucose blood test strip, Use as instructed, Disp: 100 each, Rfl: 3  •  HYDROcodone-acetaminophen (NORCO) 7.5-325 MG per tablet, Take 1 tablet by mouth 2 (Two) Times a Day As Needed for Moderate Pain ., Disp: , Rfl:   •  hydroxychloroquine (PLAQUENIL) 200 MG tablet, Take 1 tablet by mouth 2 (Two) Times a Day., Disp: , Rfl:   •  losartan (COZAAR) 100 MG tablet, TAKE 1 TABLET DAILY, Disp: 90 tablet, Rfl: 4  •  mycophenolate (CELLCEPT) 500 MG tablet, Take 1 tablet by mouth 2 (Two) Times a Day., Disp: , Rfl:   •  omeprazole (priLOSEC) 20 MG capsule, Take 1 capsule by mouth Daily., Disp: 90 capsule, Rfl: 3  •  potassium chloride (K-DUR) 10 MEQ CR tablet, Take 1 tablet by mouth 2 (Two) Times a Day With Meals., Disp: 180 tablet, Rfl: 2  •  PROAIR  (90 Base) MCG/ACT inhaler, USE 2 PUFFS EVERY FOUR TO SIX HOURS AS NEEDED, Disp: 8.5 g, Rfl: 5  •  spironolactone (ALDACTONE) 25 MG tablet, Take 1 tablet by mouth Daily., Disp: 90 tablet, Rfl: 2  •  tobramycin 0.3 % solution ophthalmic solution, Administer 2 drops to both eyes Every 4 (Four) Hours. Or affected eye, Disp: 5 mL, Rfl: 1  •  traZODone (DESYREL) 50 MG tablet, Take 2 tablets by mouth Every Night. 2 TABLETS AT BEDTIME AS NEEDED, Disp: 180 tablet, Rfl: 2  •  venlafaxine 225 MG tablet sustained-release 24 hour 24 hr tablet, Take 1 tablet by mouth Daily With Breakfast., Disp: 90 each, Rfl: 3  •  verapamil SR (CALAN-SR) 240 MG CR tablet, Take 1 tablet by mouth 2 (Two) Times a Day., Disp: 180  tablet, Rfl: 1  •  vitamin D (ERGOCALCIFEROL) 57146 UNITS capsule capsule, Take 1 capsule by mouth 1 (One) Time Per Week., Disp: , Rfl:     No problems with medications.  Refills if needed done    Allergies   Allergen Reactions   • Abilify [Aripiprazole] Other (See Comments)     ? insomnia   • Bactrim [Sulfamethoxazole-Trimethoprim] Unknown (See Comments)     Unknown   • Biaxin [Clarithromycin] Nausea And Vomiting   • Ciprofloxacin Hcl Unknown (See Comments)     Unknown   • Duricef [Cefadroxil] Unknown (See Comments)     Unknown   • Ketek [Telithromycin] Unknown (See Comments)     Unknown   • Relafen [Nabumetone] Unknown (See Comments)     Unknown   • Wellbutrin [Bupropion] Unknown (See Comments)     Unknown       Review of Systems  GENERAL:  Active/slower with limits, speed, stamina for age and desire. Sleep is ok; occ hard falling/staying with worry. No fever now.  SKIN: No rash/skin lesion of concern:   ENDO:  No syncope, near or diaphoretic sweaty spells..  HEENT: No recent head injury; but same/occ headache.   No vision change.   Decline hearing loss.  Ears without pain/drainage.  No sore throat.  No significant nasal/sinus congestion/drainage. No epistaxis.  CHEST: No chest wall tenderness or mass. No cough, without wheeze.  No SOB; no hemoptysis.  CV: No chest pain, palpitations, ankle edema.  GI: No dysphagia.  No abdominal pain, diarrhea, constipation.  No rectal bleeding, or melena.  Occ heartburn still.   :  Voids without dysuria, or incontinence to completion.  ORTHO: No painful/swollen joints but various on /off sore.  No change some sore neck or back.  No acute neck or back pain without recent injury.  NEURO: No dizziness, weakness of extremities.  No numbness/paresthesias.   PSYCH: No memory loss.  Mood good; often anxious, depressed but/and not suicidal.  Tries to tolerate stress .      Screening:  Gyne: Forrest/confirmed 2.25.20  Mammogram: 5.2019L  Bone density: 4.21.11  Low dose CT chest:  Tobacco-smoker/none: NA  GI:   Colonoscopy/[facility]/Jasiel/4.22.16/1y  Colon-polyp/[facility]/Jasiel7.28.17/3y  Prostate: NA  Usual lab order  Any lab others want; (we wish to attempt not to duplicate so we need copies of labs done outside the office/out of epic); OR can have all labs here (bring the order from all other doctors) and we will forward to all specialist  6m CBC, CMP, sed rate, CK-total, CR-protein  12m CBC, CMP, LIPID, TSH, fT4, CK-total, Vit D, sed rate, CR-protein    Lab Results:  Results for orders placed or performed in visit on 01/17/20   Comprehensive metabolic panel   Result Value Ref Range    Glucose 80 65 - 99 mg/dL    BUN 17 6 - 20 mg/dL    Creatinine 1.12 (H) 0.57 - 1.00 mg/dL    eGFR Non African Am 50 (L) >60 mL/min/1.73    eGFR African Am 61 >60 mL/min/1.73    BUN/Creatinine Ratio 15.2 7.0 - 25.0    Sodium 141 136 - 145 mmol/L    Potassium 4.6 3.5 - 5.2 mmol/L    Chloride 101 98 - 107 mmol/L    Total CO2 28.0 22.0 - 29.0 mmol/L    Calcium 9.4 8.6 - 10.5 mg/dL    Total Protein 7.1 6.0 - 8.5 g/dL    Albumin 4.20 3.50 - 5.20 g/dL    Globulin 2.9 gm/dL    A/G Ratio 1.4 g/dL    Total Bilirubin 0.2 0.2 - 1.2 mg/dL    Alkaline Phosphatase 77 39 - 117 U/L    AST (SGOT) 16 1 - 32 U/L    ALT (SGPT) 13 1 - 33 U/L   Sedimentation rate, automated   Result Value Ref Range    Sed Rate 35 (H) 0 - 30 mm/hr   C-reactive protein   Result Value Ref Range    C-Reactive Protein 2.38 (H) 0.00 - 0.50 mg/dL   CK   Result Value Ref Range    Creatine Kinase 91 20 - 180 U/L   Manual Differential   Result Value Ref Range    Neutrophil Rel % 39.7 (L) 42.7 - 76.0 %    Lymphocyte Rel % 37.2 19.6 - 45.3 %    Monocyte Rel % 11.5 5.0 - 12.0 %    Eosinophil Rel % 7.7 (H) 0.3 - 6.2 %    Basophil Rel % 1.3 0.0 - 1.5 %    Neutrophils Absolute 1.57 (L) 1.70 - 7.00 10*3/mm3    Lymphocytes Absolute 1.47 0.70 - 3.10 10*3/mm3    Monocytes Absolute 0.45 0.10 - 0.90 10*3/mm3    Eosinophil Abs 0.30 0.00 - 0.40 10*3/mm3    Basophils Absolute 0.05 0.00 -  "0.20 10*3/mm3    Differential Comment Comment     Comment Comment     Plt Comment Comment    CBC & Differential   Result Value Ref Range    WBC 3.95 3.40 - 10.80 10*3/mm3    RBC 4.34 3.77 - 5.28 10*6/mm3    Hemoglobin 12.9 12.0 - 15.9 g/dL    Hematocrit 38.9 34.0 - 46.6 %    MCV 89.6 79.0 - 97.0 fL    MCH 29.7 26.6 - 33.0 pg    MCHC 33.2 31.5 - 35.7 g/dL    RDW 12.7 12.3 - 15.4 %    Platelets 232 140 - 450 10*3/mm3    nRBC 0.0 0.0 - 0.2 /100 WBC       A1C:  Lab Results - Last 18 Months   Lab Units 04/23/19  0925   HEMOGLOBIN A1C % 5.23     PSA:No results for input(s): PSA in the last 36375 hours.  CBC:  Lab Results - Last 18 Months   Lab Units 01/17/20  1159 11/19/19  0831 08/14/19  1010 04/23/19  0925   WBC 10*3/mm3 3.95 6.53 4.04 4.14   HEMOGLOBIN g/dL 12.9 13.0 12.8 12.6   HEMATOCRIT % 38.9 39.7 42.3 41.6   PLATELETS 10*3/mm3 232 267 234 267      BMP/CMP:  Lab Results - Last 18 Months   Lab Units 01/17/20  1159 11/19/19  0831 08/14/19  1010 04/23/19  0925   SODIUM mmol/L 141 140 142 139   POTASSIUM mmol/L 4.6 4.3 4.0 4.0   CHLORIDE mmol/L 101 102 102 101   TOTAL CO2 mmol/L 28.0 27.8 29.4* 28.1   GLUCOSE mg/dL 80 88 74 84   BUN mg/dL 17 12 13 11   CREATININE mg/dL 1.12* 1.10* 1.14* 1.10*   EGFR IF NONAFRICN AM mL/min/1.73 50* 51* 49* 51*   EGFR IF AFRICN AM mL/min/1.73 61 62 60* 62   CALCIUM mg/dL 9.4 9.1 9.2 9.8     HEPATIC:  Lab Results - Last 18 Months   Lab Units 01/17/20  1159 11/19/19  0831 08/14/19  1010 04/23/19  0925   ALT (SGPT) U/L 13 11 10 14   AST (SGOT) U/L 16 13 17 15   ALK PHOS U/L 77 55 65 62     THYROID:  Lab Results - Last 18 Months   Lab Units 04/23/19  0925   TSH mIU/mL 1.260       Objective   /82 (BP Location: Left arm)   Pulse 82   Temp 98.2 °F (36.8 °C)   Resp 14   Ht 157.5 cm (62\")   Wt 82.6 kg (182 lb)   SpO2 98%   BMI 33.29 kg/m²   Body mass index is 33.29 kg/m².    Physical Exam  GENERAL:  Well nourished/developed in no acute distress. BMI noted: 33.8  SKIN: Turgor " excellent, without wound, rash, lesion  HEENT: Normal cephalic without trauma.  Pupils equal round reactive to light. Extraocular motions full without nystagmus.   External canals nonobstructive nontender without reddness. Tymphatic membranes josiane with cesar structures intact.   Oral cavity without growths, exudates, and moist.  Posterior pharynx without mass, obstruction, redness.  No thyromegaly, mass, tenderness, lymphadenopathy and supple.  CV: Regular rhythm.  No murmur, gallop,  edema. Posterior pulses intact.  No carotid bruits.  CHEST: No chest wall tenderness or mass.   LUNGS: Symmetric motion with clear to auscultation.   ABD: Soft, nontender without mass.   ORTHO: Symmetric extremities without swelling/point tenderness.  Full gross range of motion; few sore fingers.  NEURO: CN 2-12 grossly intact.  Symmetric facies. 1/4 x bicep equal reflexes.  UE/LE   3/5 strength throughout.  Nonfocal use extremities. Speech clear. Intact light touch with monofilament, vibratory sensation with tuning fork; equal toes/distal feet.    PSYCH: Oriented x 3.  Pleasant calm, well kept.   Purposeful/directed conservation with intact short/long gross memory.   Assessment/Plan     1. Encounter for screening mammogram for breast cancer    2. Essential hypertension    3. Diastolic congestive heart failure, unspecified HF chronicity (CMS/HCC)    4. Gastroesophageal reflux disease, esophagitis presence not specified    5. Chronic back pain, unspecified back location, unspecified back pain laterality    6. Rheumatoid arthritis, involving unspecified site, unspecified rheumatoid factor presence (CMS/HCC)    7. Renal insufficiency-? 3-watching lab    8. Anxiety    9. Depression, unspecified depression type    10. RHIANNON: (cpap)    11. Bilateral hearing loss, unspecified hearing loss type    12. Right leg pain    13. Insomnia, unspecified type      Rx: reviewed/changes:  No orders of the defined types were placed in this  encounter.    LAB/Testing/Referrals: reviewed/orders:   Today:   Orders Placed This Encounter   Procedures   • Mammo Screening Digital Tomosynthesis Bilateral With CAD   • DEXA Bone Density Axial     Chronic/recurrent labs above or change to:   same     Discussions:   Skip 1-2 days treadmill with thigh issues.    If leg pain continues; ? xrays  Screening; mammogram/bone density  Same labs  Weight loss  Body mass index is 33.29 kg/m².  Patient's Body mass index is 33.29 kg/m². BMI is above normal parameters. Recommendations include: exercise counseling, nutrition counseling and as before.    Tobacco use reviewed:    Non-smoker  Emilie Soto  reports that she has never smoked. She has never used smokeless tobacco..   Patient Instructions   Your weight today is too high.  You should consider some degree of weight loss (consistant with your abilities and your other health issues)      The fundamentals for wanting to lose weight are:   1. You need a plan.  Your plan might/should likely not be the same as other people (it's what works for you that works)  2. You need to know how much you want to lose.  For health purposes 10% of your body weight is a good start and often a reasonable amount to lose AND keep off.   3. Your plan will need a start date (after the wedding, etc).    4. I advise a plan for what you want to lose each week and therefore you can come close to knowing how long it will take for you to reach your goal.  1#.week is reasonable and 2# week is safe; faster is not advised.   5. With the above; then you need to decide how many calories you may eat each day.  Using a phone liana like [employer], or [employer] will calculate that for you.  We will be happy to do this if you need our help.  We do not advise fasting; calories below 1000 ashlee a day for anyone.  Stick to your daily calories by controlling sweets, snacks, portion size, and binge eating.   6. You will want to decide how much exercise you can,  want or like to do to make this weight loss happen.  Exercise is always important for a weight loss program.   Be sure however and pick one that will not interere with your other health problems.  We would be happy to give you suggestions.  The most effective exercise is something you enjoy as you need to do this regularily; at least 20 minutes/3 times a week.   7. Consider an accountability aide whether it be the phone liana, a friend joining you on this endeavor, a membership to a gym, a referral to a weight loss program; whatever works for you (or MyChart below)   8. If told acceptable for you; we recommend decreasing carbohydrate intake to a goal of 100 gm/24 hr AND as little carbohydrate intake as possible after mid-day.  Also no breads and carb/sweets.   9. We offer/suggest twice a week MyChart reporting of your weight loss progress as a way to encourage compliance.     ########################            Follow up: Return for lab;, Dr Miles-, 6 m;.  Future Appointments   Date Time Provider Department Center   9/10/2020  9:30 AM LAB [facility] None   9/15/2020 10:30 AM Alexandru Miles MD [facility] None

## 2020-02-25 NOTE — PATIENT INSTRUCTIONS
Your weight today is too high.  You should consider some degree of weight loss (consistant with your abilities and your other health issues)      The fundamentals for wanting to lose weight are:   1. You need a plan.  Your plan might/should likely not be the same as other people (it's what works for you that works)  2. You need to know how much you want to lose.  For health purposes 10% of your body weight is a good start and often a reasonable amount to lose AND keep off.   3. Your plan will need a start date (after the wedding, etc).    4. I advise a plan for what you want to lose each week and therefore you can come close to knowing how long it will take for you to reach your goal.  1#.week is reasonable and 2# week is safe; faster is not advised.   5. With the above; then you need to decide how many calories you may eat each day.  Using a phone liana like [employer], or [employer] will calculate that for you.  We will be happy to do this if you need our help.  We do not advise fasting; calories below 1000 ashlee a day for anyone.  Stick to your daily calories by controlling sweets, snacks, portion size, and binge eating.   6. You will want to decide how much exercise you can, want or like to do to make this weight loss happen.  Exercise is always important for a weight loss program.   Be sure however and pick one that will not interere with your other health problems.  We would be happy to give you suggestions.  The most effective exercise is something you enjoy as you need to do this regularily; at least 20 minutes/3 times a week.   7. Consider an accountability aide whether it be the phone liana, a friend joining you on this endeavor, a membership to a gym, a referral to a weight loss program; whatever works for you (or MyChart below)   8. If told acceptable for you; we recommend decreasing carbohydrate intake to a goal of 100 gm/24 hr AND as little carbohydrate intake as possible after mid-day.  Also no breads  and carb/sweets.   9. We offer/suggest twice a week MyChart reporting of your weight loss progress as a way to encourage compliance.     ########################

## 2020-02-28 ENCOUNTER — OFFICE VISIT (OUTPATIENT)
Dept: FAMILY MEDICINE CLINIC | Facility: CLINIC | Age: 60
End: 2020-02-28

## 2020-02-28 ENCOUNTER — TELEPHONE (OUTPATIENT)
Dept: FAMILY MEDICINE CLINIC | Facility: CLINIC | Age: 60
End: 2020-02-28

## 2020-02-28 VITALS
SYSTOLIC BLOOD PRESSURE: 122 MMHG | DIASTOLIC BLOOD PRESSURE: 78 MMHG | HEART RATE: 84 BPM | WEIGHT: 182 LBS | OXYGEN SATURATION: 99 % | TEMPERATURE: 97.3 F | RESPIRATION RATE: 16 BRPM | HEIGHT: 62 IN | BODY MASS INDEX: 33.49 KG/M2

## 2020-02-28 DIAGNOSIS — J02.9 ACUTE PHARYNGITIS, UNSPECIFIED ETIOLOGY: ICD-10-CM

## 2020-02-28 DIAGNOSIS — R07.0 THROAT PAIN: ICD-10-CM

## 2020-02-28 DIAGNOSIS — R68.89 FLU-LIKE SYMPTOMS: ICD-10-CM

## 2020-02-28 PROCEDURE — 87804 INFLUENZA ASSAY W/OPTIC: CPT | Performed by: NURSE PRACTITIONER

## 2020-02-28 PROCEDURE — 99213 OFFICE O/P EST LOW 20 MIN: CPT | Performed by: NURSE PRACTITIONER

## 2020-02-28 PROCEDURE — 87880 STREP A ASSAY W/OPTIC: CPT | Performed by: NURSE PRACTITIONER

## 2020-02-28 RX ORDER — AZITHROMYCIN 250 MG/1
TABLET, FILM COATED ORAL
Qty: 6 TABLET | Refills: 0 | Status: SHIPPED | OUTPATIENT
Start: 2020-02-28 | End: 2020-03-04

## 2020-02-28 NOTE — PROGRESS NOTES
Subjective   Chief Complaint:  Throat pain, body aches, cough    History of Present Illness:  This 59 y.o. female was seen in the office today for throat pain, body aches and cough lasting less than 2 days onset.  Reports not feeling well today.    Allergies   Allergen Reactions   • Abilify [Aripiprazole] Other (See Comments)     ? insomnia   • Bactrim [Sulfamethoxazole-Trimethoprim] Unknown (See Comments)     Unknown   • Biaxin [Clarithromycin] Nausea And Vomiting   • Ciprofloxacin Hcl Unknown (See Comments)     Unknown   • Duricef [Cefadroxil] Unknown (See Comments)     Unknown   • Ketek [Telithromycin] Unknown (See Comments)     Unknown   • Relafen [Nabumetone] Unknown (See Comments)     Unknown   • Wellbutrin [Bupropion] Unknown (See Comments)     Unknown      Current Outpatient Medications on File Prior to Visit   Medication Sig   • ALPRAZolam (XANAX) 0.5 MG tablet Take 1 tablet by mouth 2 (Two) Times a Day As Needed for Anxiety.   • amLODIPine (NORVASC) 5 MG tablet Take 1 tablet by mouth Daily.   • carvedilol (COREG) 25 MG tablet Take 1 tablet by mouth 2 (Two) Times a Day.   • cloNIDine (CATAPRES) 0.1 MG tablet Take 0.1 mg by mouth 2 (Two) Times a Day. One by mouth twice daily as needed if BP greater than 160/100   • estradiol (ESTRACE) 2 MG tablet Take 1 mg by mouth Daily.   • folic acid (FOLVITE) 1 MG tablet Take 1 tablet by mouth Daily.   • glucose blood test strip Use as instructed   • HYDROcodone-acetaminophen (NORCO) 7.5-325 MG per tablet Take 1 tablet by mouth 2 (Two) Times a Day As Needed for Moderate Pain .   • hydroxychloroquine (PLAQUENIL) 200 MG tablet Take 1 tablet by mouth 2 (Two) Times a Day.   • losartan (COZAAR) 100 MG tablet TAKE 1 TABLET DAILY   • mycophenolate (CELLCEPT) 500 MG tablet Take 1 tablet by mouth 2 (Two) Times a Day.   • omeprazole (priLOSEC) 20 MG capsule Take 1 capsule by mouth Daily.   • potassium chloride (K-DUR) 10 MEQ CR tablet Take 1 tablet by mouth 2 (Two) Times a Day  With Meals.   • PROAIR  (90 Base) MCG/ACT inhaler USE 2 PUFFS EVERY FOUR TO SIX HOURS AS NEEDED   • spironolactone (ALDACTONE) 25 MG tablet Take 1 tablet by mouth Daily.   • tobramycin 0.3 % solution ophthalmic solution Administer 2 drops to both eyes Every 4 (Four) Hours. Or affected eye   • traZODone (DESYREL) 50 MG tablet Take 2 tablets by mouth Every Night. 2 TABLETS AT BEDTIME AS NEEDED   • venlafaxine 225 MG tablet sustained-release 24 hour 24 hr tablet Take 1 tablet by mouth Daily With Breakfast.   • verapamil SR (CALAN-SR) 240 MG CR tablet Take 1 tablet by mouth 2 (Two) Times a Day.   • vitamin D (ERGOCALCIFEROL) 31685 UNITS capsule capsule Take 1 capsule by mouth 1 (One) Time Per Week.     No current facility-administered medications on file prior to visit.       Past Medical, Surgical, Social, and Family History:  Past Medical History:   Diagnosis Date   • CHF (congestive heart failure) (CMS/HCC)    • CKD (chronic kidney disease)    • DDD (degenerative disc disease), cervical    • Depression    • Fibromyalgia    • GERD (gastroesophageal reflux disease)    • Heart murmur    • Hepatitis cholestatic    • History of colon polyps    • HOCM (hypertrophic obstructive cardiomyopathy) (CMS/HCC)    • HTN (hypertension)    • RA (rheumatoid arthritis) (CMS/HCC)    • Renal disease    • Sleep apnea      Past Surgical History:   Procedure Laterality Date   • COLONOSCOPY  04/22/2016    One 16mm tubulovillous adenomatous polyp at the ileocecal valve-Clip was placed-injected; The examination was otherwise normal on direct and retroflexion views; Repeat 1 year   • COLONOSCOPY N/A 7/28/2017    A tattoo was seen at the ileocecal valve-A post-polypectomy scar was found at the tattoo site; One 12mm tubular adenomatous flat polyp in the transverse colon-Clip (MR conditional) was placed; The examination was otherwise normal on direct and retroflexion views; Repeat 2 years   • COLONOSCOPY  10/04/2015    Bleeding at the  ileocecal valve secondary to previous polypectomy-Clip was placed; No specimens collected; Repeat 6 months for retreatment   • COLONOSCOPY  09/30/2015    Very large multilobulated tubular adenomatous polyp opposite the ileocecal valve-removed piecemeal-at least 95% removed; One less than 1cm tubular adenomatous polyp in the ascending colon; Repeat 6-12 months to reassess the large polyp   • COLONOSCOPY N/A 10/10/2019    Procedure: COLONOSCOPY WITH ANESTHESIA;  Surgeon: Abigail Weathers MD;  Location: [facility] ENDOSCOPY;  Service: Gastroenterology   • EXPLORATORY LAPAROTOMY     • HYSTERECTOMY     • LIVER BIOPSY  06/14/2011    Cholestatic hepatitis-see report     Social History     Socioeconomic History   • Marital status:      Spouse name: Not on file   • Number of children: Not on file   • Years of education: Not on file   • Highest education level: Not on file   Tobacco Use   • Smoking status: Never Smoker   • Smokeless tobacco: Never Used   Substance and Sexual Activity   • Alcohol use: Yes     Alcohol/week: 2.0 standard drinks     Types: 2 Glasses of wine per week     Comment: Occasionally   • Drug use: No   • Sexual activity: Yes     Partners: Male     Birth control/protection: Post-menopausal     Family History   Problem Relation Age of Onset   • Throat cancer Mother    • Diabetes Father    • Cancer Father         Pt reports he had part of his intestines removed   • Breast cancer Sister    • Colon cancer Neg Hx    • Colon polyps Neg Hx    • Esophageal cancer Neg Hx    • Liver cancer Neg Hx    • Liver disease Neg Hx    • Rectal cancer Neg Hx    • Stomach cancer Neg Hx      Review of Systems   Constitutional: Negative for appetite change, chills and fever.   HENT: Positive for congestion and sore throat.    Respiratory: Positive for cough. Negative for shortness of breath.    Cardiovascular: Negative for chest pain and palpitations.   Musculoskeletal: Positive for myalgias. Negative for arthralgias.  "    Objective   Physical Exam   Constitutional: She is oriented to person, place, and time. No distress.   HENT:   Head: Normocephalic and atraumatic.   Nose: Nose normal.   Mouth/Throat: Oropharyngeal exudate and posterior oropharyngeal erythema present.   Eyes: Pupils are equal, round, and reactive to light. Conjunctivae and EOM are normal.   Neck: Normal range of motion. Neck supple. No JVD present. No tracheal deviation present. No thyromegaly present.   Cardiovascular: Normal rate, regular rhythm, normal heart sounds and intact distal pulses. Exam reveals no gallop and no friction rub.   No murmur heard.  Pulmonary/Chest: Effort normal and breath sounds normal. No respiratory distress. She has no wheezes.   Abdominal: Soft. Bowel sounds are normal. There is no tenderness. There is no guarding.   Musculoskeletal: Normal range of motion. She exhibits no edema, tenderness or deformity.   Lymphadenopathy:     She has no cervical adenopathy.   Neurological: She is alert and oriented to person, place, and time.   Skin: Skin is warm and dry. Capillary refill takes less than 2 seconds. She is not diaphoretic.   Psychiatric: She has a normal mood and affect. Her behavior is normal. Judgment and thought content normal.   Nursing note and vitals reviewed.  /78 (BP Location: Left arm)   Pulse 84   Temp 97.3 °F (36.3 °C) (Temporal)   Resp 16   Ht 157.5 cm (62\")   Wt 82.6 kg (182 lb)   SpO2 99%   BMI 33.29 kg/m²     Lab, Imaging, and Diagnostic Results Review:  POC Influenza A Negative  POC Influenza B Negative  POC Strep Negative    Assessment/Plan   Diagnoses and all orders for this visit:    1. Flu-like symptoms  -     POC Influenza A / B    2. Throat pain  -     POC Rapid Strep A    3. Acute pharyngitis, unspecified etiology  -     azithromycin (ZITHROMAX Z-ALISON) 250 MG tablet; Take 2 tablets the first day, then 1 tablet daily for 4 days.  Dispense: 6 tablet; Refill: 0    Discussion:  Advised and educated " plan of care.  Antibiotics as ordered, follow-up as needed.    Follow-up:  No follow-ups on file.    Note e-Signed by SUGEY Gillespie on 02/28/2020 at 1:57 PM

## 2020-02-28 NOTE — TELEPHONE ENCOUNTER
Pt called and said she has been having headaches, body aching, feels like she has a fever. Pt is requesting a Z-tevin to preventfurther sickness      [facility]- confirmed      Please contact pt once Rx called in.

## 2020-03-09 ENCOUNTER — OFFICE VISIT (OUTPATIENT)
Dept: FAMILY MEDICINE CLINIC | Facility: CLINIC | Age: 60
End: 2020-03-09

## 2020-03-09 VITALS
HEART RATE: 79 BPM | WEIGHT: 182 LBS | OXYGEN SATURATION: 96 % | BODY MASS INDEX: 33.49 KG/M2 | SYSTOLIC BLOOD PRESSURE: 104 MMHG | HEIGHT: 62 IN | DIASTOLIC BLOOD PRESSURE: 70 MMHG | RESPIRATION RATE: 16 BRPM | TEMPERATURE: 96.5 F

## 2020-03-09 DIAGNOSIS — R68.89 FLU-LIKE SYMPTOMS: ICD-10-CM

## 2020-03-09 DIAGNOSIS — J02.9 PHARYNGITIS, UNSPECIFIED ETIOLOGY: ICD-10-CM

## 2020-03-09 DIAGNOSIS — J32.9 SINUSITIS, UNSPECIFIED CHRONICITY, UNSPECIFIED LOCATION: ICD-10-CM

## 2020-03-09 PROCEDURE — 87804 INFLUENZA ASSAY W/OPTIC: CPT | Performed by: NURSE PRACTITIONER

## 2020-03-09 PROCEDURE — 99213 OFFICE O/P EST LOW 20 MIN: CPT | Performed by: NURSE PRACTITIONER

## 2020-03-09 RX ORDER — CEFDINIR 300 MG/1
300 CAPSULE ORAL 2 TIMES DAILY
Qty: 20 CAPSULE | Refills: 0 | Status: SHIPPED | OUTPATIENT
Start: 2020-03-09 | End: 2020-03-19

## 2020-03-09 RX ORDER — METHYLPREDNISOLONE 4 MG/1
TABLET ORAL
Qty: 1 EACH | Refills: 0 | Status: SHIPPED | OUTPATIENT
Start: 2020-03-09 | End: 2020-06-29

## 2020-03-09 NOTE — PROGRESS NOTES
Subjective   Chief Complaint:  Nausea    Diarrhea    Sore Throat    Cough      History of Present Illness:  This 59 y.o. female was seen in the office today for nausea, diarrhea, cough and sore throat.  Reports not feeling well since last Friday.    Allergies   Allergen Reactions   • Abilify [Aripiprazole] Other (See Comments)     ? insomnia   • Bactrim [Sulfamethoxazole-Trimethoprim] Unknown (See Comments)     Unknown   • Biaxin [Clarithromycin] Nausea And Vomiting   • Ciprofloxacin Hcl Unknown (See Comments)     Unknown   • Duricef [Cefadroxil] Unknown (See Comments)     Unknown   • Ketek [Telithromycin] Unknown (See Comments)     Unknown   • Relafen [Nabumetone] Unknown (See Comments)     Unknown   • Wellbutrin [Bupropion] Unknown (See Comments)     Unknown      Current Outpatient Medications on File Prior to Visit   Medication Sig   • ALPRAZolam (XANAX) 0.5 MG tablet Take 1 tablet by mouth 2 (Two) Times a Day As Needed for Anxiety.   • amLODIPine (NORVASC) 5 MG tablet Take 1 tablet by mouth Daily.   • carvedilol (COREG) 25 MG tablet Take 1 tablet by mouth 2 (Two) Times a Day.   • cloNIDine (CATAPRES) 0.1 MG tablet Take 0.1 mg by mouth 2 (Two) Times a Day. One by mouth twice daily as needed if BP greater than 160/100   • estradiol (ESTRACE) 2 MG tablet Take 1 mg by mouth Daily.   • folic acid (FOLVITE) 1 MG tablet Take 1 tablet by mouth Daily.   • glucose blood test strip Use as instructed   • HYDROcodone-acetaminophen (NORCO) 7.5-325 MG per tablet Take 1 tablet by mouth 2 (Two) Times a Day As Needed for Moderate Pain .   • hydroxychloroquine (PLAQUENIL) 200 MG tablet Take 1 tablet by mouth 2 (Two) Times a Day.   • losartan (COZAAR) 100 MG tablet TAKE 1 TABLET DAILY   • mycophenolate (CELLCEPT) 500 MG tablet Take 1 tablet by mouth 2 (Two) Times a Day.   • omeprazole (priLOSEC) 20 MG capsule Take 1 capsule by mouth Daily.   • potassium chloride (K-DUR) 10 MEQ CR tablet Take 1 tablet by mouth 2 (Two) Times a Day  With Meals.   • PROAIR  (90 Base) MCG/ACT inhaler USE 2 PUFFS EVERY FOUR TO SIX HOURS AS NEEDED   • spironolactone (ALDACTONE) 25 MG tablet Take 1 tablet by mouth Daily.   • tobramycin 0.3 % solution ophthalmic solution Administer 2 drops to both eyes Every 4 (Four) Hours. Or affected eye   • traZODone (DESYREL) 50 MG tablet Take 2 tablets by mouth Every Night. 2 TABLETS AT BEDTIME AS NEEDED   • venlafaxine 225 MG tablet sustained-release 24 hour 24 hr tablet Take 1 tablet by mouth Daily With Breakfast.   • verapamil SR (CALAN-SR) 240 MG CR tablet Take 1 tablet by mouth 2 (Two) Times a Day.   • vitamin D (ERGOCALCIFEROL) 44937 UNITS capsule capsule Take 1 capsule by mouth 1 (One) Time Per Week.     No current facility-administered medications on file prior to visit.       Past Medical, Surgical, Social, and Family History:  Past Medical History:   Diagnosis Date   • CHF (congestive heart failure) (CMS/HCC)    • CKD (chronic kidney disease)    • DDD (degenerative disc disease), cervical    • Depression    • Fibromyalgia    • GERD (gastroesophageal reflux disease)    • Heart murmur    • Hepatitis cholestatic    • History of colon polyps    • HOCM (hypertrophic obstructive cardiomyopathy) (CMS/HCC)    • HTN (hypertension)    • RA (rheumatoid arthritis) (CMS/HCC)    • Renal disease    • Sleep apnea      Past Surgical History:   Procedure Laterality Date   • COLONOSCOPY  04/22/2016    One 16mm tubulovillous adenomatous polyp at the ileocecal valve-Clip was placed-injected; The examination was otherwise normal on direct and retroflexion views; Repeat 1 year   • COLONOSCOPY N/A 7/28/2017    A tattoo was seen at the ileocecal valve-A post-polypectomy scar was found at the tattoo site; One 12mm tubular adenomatous flat polyp in the transverse colon-Clip (MR conditional) was placed; The examination was otherwise normal on direct and retroflexion views; Repeat 2 years   • COLONOSCOPY  10/04/2015    Bleeding at the  ileocecal valve secondary to previous polypectomy-Clip was placed; No specimens collected; Repeat 6 months for retreatment   • COLONOSCOPY  09/30/2015    Very large multilobulated tubular adenomatous polyp opposite the ileocecal valve-removed piecemeal-at least 95% removed; One less than 1cm tubular adenomatous polyp in the ascending colon; Repeat 6-12 months to reassess the large polyp   • COLONOSCOPY N/A 10/10/2019    Procedure: COLONOSCOPY WITH ANESTHESIA;  Surgeon: Abigail Weathers MD;  Location: [facility] ENDOSCOPY;  Service: Gastroenterology   • EXPLORATORY LAPAROTOMY     • HYSTERECTOMY     • LIVER BIOPSY  06/14/2011    Cholestatic hepatitis-see report     Social History     Socioeconomic History   • Marital status:      Spouse name: Not on file   • Number of children: Not on file   • Years of education: Not on file   • Highest education level: Not on file   Tobacco Use   • Smoking status: Never Smoker   • Smokeless tobacco: Never Used   Substance and Sexual Activity   • Alcohol use: Yes     Alcohol/week: 2.0 standard drinks     Types: 2 Glasses of wine per week     Comment: Occasionally   • Drug use: No   • Sexual activity: Yes     Partners: Male     Birth control/protection: Post-menopausal     Family History   Problem Relation Age of Onset   • Throat cancer Mother    • Diabetes Father    • Cancer Father         Pt reports he had part of his intestines removed   • Breast cancer Sister    • Colon cancer Neg Hx    • Colon polyps Neg Hx    • Esophageal cancer Neg Hx    • Liver cancer Neg Hx    • Liver disease Neg Hx    • Rectal cancer Neg Hx    • Stomach cancer Neg Hx      Review of Systems   Constitutional: Negative for chills and fever.   HENT: Positive for congestion, sinus pressure and sore throat.    Eyes: Negative for blurred vision, pain and redness.   Respiratory: Positive for cough. Negative for chest tightness, shortness of breath and wheezing.    Cardiovascular: Negative for chest pain and  palpitations.   Gastrointestinal: Positive for diarrhea and nausea. Negative for abdominal distention and abdominal pain.   Endocrine: Negative for cold intolerance and heat intolerance.   Genitourinary: Negative for dysuria, flank pain, hematuria and urgency.   Musculoskeletal: Negative for arthralgias and myalgias.   Skin: Negative for rash, skin lesions and bruise.   Allergic/Immunologic: Negative for environmental allergies and food allergies.   Neurological: Negative for dizziness, speech difficulty and numbness.   Hematological: Negative for adenopathy. Does not bruise/bleed easily.   Psychiatric/Behavioral: Negative for behavioral problems and stress. The patient is not nervous/anxious.      Objective   Physical Exam   Constitutional: She is oriented to person, place, and time. No distress.   HENT:   Head: Normocephalic and atraumatic.   Right Ear: Hearing and tympanic membrane normal.   Left Ear: Hearing and tympanic membrane normal.   Nose: Right sinus exhibits maxillary sinus tenderness and frontal sinus tenderness. Left sinus exhibits maxillary sinus tenderness and frontal sinus tenderness.   Mouth/Throat: Oropharyngeal exudate and posterior oropharyngeal erythema present.   Eyes: Pupils are equal, round, and reactive to light. Conjunctivae and EOM are normal.   Neck: Normal range of motion. Neck supple. No JVD present. No tracheal deviation present. No thyromegaly present.   Cardiovascular: Normal rate, regular rhythm, normal heart sounds and intact distal pulses. Exam reveals no gallop and no friction rub.   No murmur heard.  Pulmonary/Chest: Effort normal and breath sounds normal. No respiratory distress. She has no wheezes.   Abdominal: Soft. Bowel sounds are normal. There is no tenderness. There is no guarding.   Musculoskeletal: Normal range of motion. She exhibits no edema, tenderness or deformity.   Lymphadenopathy:     She has no cervical adenopathy.   Neurological: She is alert and oriented to  "person, place, and time.   Skin: Skin is warm and dry. Capillary refill takes less than 2 seconds. She is not diaphoretic.   Psychiatric: She has a normal mood and affect. Her behavior is normal. Judgment and thought content normal.   Nursing note and vitals reviewed.  /70 (BP Location: Left arm)   Pulse 79   Temp 96.5 °F (35.8 °C) (Temporal)   Resp 16   Ht 157.5 cm (62\")   Wt 82.6 kg (182 lb)   SpO2 96%   BMI 33.29 kg/m²     Lab, Imaging, and Diagnostic Results Review:  POC Influenza A Negative  POC Influenza B Negative    Assessment/Plan   Diagnoses and all orders for this visit:    1. Flu-like symptoms  -     POC Influenza A / B    2. Pharyngitis, unspecified etiology  -     cefdinir (OMNICEF) 300 MG capsule; Take 1 capsule by mouth 2 (Two) Times a Day for 10 days.  Dispense: 20 capsule; Refill: 0  -     methylPREDNISolone (MEDROL, ALISON,) 4 MG tablet; Take as directed on package instructions.  Dispense: 1 each; Refill: 0    3. Sinusitis, unspecified chronicity, unspecified location  -     cefdinir (OMNICEF) 300 MG capsule; Take 1 capsule by mouth 2 (Two) Times a Day for 10 days.  Dispense: 20 capsule; Refill: 0  -     methylPREDNISolone (MEDROL, ALISON,) 4 MG tablet; Take as directed on package instructions.  Dispense: 1 each; Refill: 0    Discussion:  Advised and educated plan of care.  Antibiotics and steroids as ordered, follow-up as needed.  Advised to push fluids today.    Follow-up:  Return if symptoms worsen or fail to improve.    Note e-Signed by SUGEY Gillespie on 03/09/2020 at 12:04 PM  "

## 2020-03-23 DIAGNOSIS — F41.9 ANXIETY: ICD-10-CM

## 2020-03-23 DIAGNOSIS — F32.A DEPRESSION, UNSPECIFIED DEPRESSION TYPE: ICD-10-CM

## 2020-03-23 RX ORDER — ALPRAZOLAM 0.5 MG/1
0.5 TABLET ORAL 2 TIMES DAILY PRN
Qty: 20 TABLET | Refills: 0 | Status: SHIPPED | OUTPATIENT
Start: 2020-03-23 | End: 2020-05-14

## 2020-03-23 RX ORDER — HYDROCODONE BITARTRATE AND ACETAMINOPHEN 7.5; 325 MG/1; MG/1
1 TABLET ORAL 2 TIMES DAILY PRN
Qty: 60 TABLET | Refills: 0 | Status: SHIPPED | OUTPATIENT
Start: 2020-03-23 | End: 2021-06-16 | Stop reason: SDUPTHER

## 2020-03-23 NOTE — TELEPHONE ENCOUNTER
Pt called in stating that she needs refills.    Pt Contact: [phone]    Pt Meds: ALPRAZolam (XANAX) 0.5 MG tablet  HYDROcodone-acetaminophen (NORCO) 7.5-325 MG per tablet

## 2020-05-14 DIAGNOSIS — F32.A DEPRESSION, UNSPECIFIED DEPRESSION TYPE: ICD-10-CM

## 2020-05-14 DIAGNOSIS — F41.9 ANXIETY: ICD-10-CM

## 2020-05-14 RX ORDER — ALPRAZOLAM 0.5 MG/1
TABLET ORAL
Qty: 20 TABLET | Refills: 0 | Status: SHIPPED | OUTPATIENT
Start: 2020-05-14 | End: 2020-06-26 | Stop reason: SDUPTHER

## 2020-06-08 DIAGNOSIS — Z12.31 ENCOUNTER FOR SCREENING MAMMOGRAM FOR BREAST CANCER: ICD-10-CM

## 2020-06-11 DIAGNOSIS — M33.20 POLYMYOSITIS (HCC): Primary | ICD-10-CM

## 2020-06-15 ENCOUNTER — LAB (OUTPATIENT)
Dept: FAMILY MEDICINE CLINIC | Facility: CLINIC | Age: 60
End: 2020-06-15

## 2020-06-15 ENCOUNTER — RESULTS ENCOUNTER (OUTPATIENT)
Dept: FAMILY MEDICINE CLINIC | Facility: CLINIC | Age: 60
End: 2020-06-15

## 2020-06-15 DIAGNOSIS — M33.20 POLYMYOSITIS (HCC): ICD-10-CM

## 2020-06-16 LAB
ALBUMIN SERPL-MCNC: 4.2 G/DL (ref 3.5–5.2)
ALBUMIN/GLOB SERPL: 1.4 G/DL
ALP SERPL-CCNC: 66 U/L (ref 39–117)
ALT SERPL-CCNC: 10 U/L (ref 1–33)
AST SERPL-CCNC: 16 U/L (ref 1–32)
BASOPHILS # BLD AUTO: 0.02 10*3/MM3 (ref 0–0.2)
BASOPHILS NFR BLD AUTO: 0.4 % (ref 0–1.5)
BILIRUB SERPL-MCNC: 0.2 MG/DL (ref 0.2–1.2)
BUN SERPL-MCNC: 14 MG/DL (ref 6–20)
BUN/CREAT SERPL: 11.7 (ref 7–25)
CALCIUM SERPL-MCNC: 9.2 MG/DL (ref 8.6–10.5)
CHLORIDE SERPL-SCNC: 102 MMOL/L (ref 98–107)
CK SERPL-CCNC: 106 U/L (ref 20–180)
CO2 SERPL-SCNC: 28.1 MMOL/L (ref 22–29)
CREAT SERPL-MCNC: 1.2 MG/DL (ref 0.57–1)
CRP SERPL-MCNC: 2.33 MG/DL (ref 0–0.5)
EOSINOPHIL # BLD AUTO: 0.18 10*3/MM3 (ref 0–0.4)
EOSINOPHIL NFR BLD AUTO: 3.9 % (ref 0.3–6.2)
ERYTHROCYTE [DISTWIDTH] IN BLOOD BY AUTOMATED COUNT: 13.4 % (ref 12.3–15.4)
ERYTHROCYTE [SEDIMENTATION RATE] IN BLOOD BY WESTERGREN METHOD: 42 MM/HR (ref 0–30)
GLOBULIN SER CALC-MCNC: 3.1 GM/DL
GLUCOSE SERPL-MCNC: 86 MG/DL (ref 65–99)
HCT VFR BLD AUTO: 39.4 % (ref 34–46.6)
HGB BLD-MCNC: 12.9 G/DL (ref 12–15.9)
IMM GRANULOCYTES # BLD AUTO: 0.04 10*3/MM3 (ref 0–0.05)
IMM GRANULOCYTES NFR BLD AUTO: 0.9 % (ref 0–0.5)
LYMPHOCYTES # BLD AUTO: 1.4 10*3/MM3 (ref 0.7–3.1)
LYMPHOCYTES NFR BLD AUTO: 30 % (ref 19.6–45.3)
MCH RBC QN AUTO: 30.2 PG (ref 26.6–33)
MCHC RBC AUTO-ENTMCNC: 32.7 G/DL (ref 31.5–35.7)
MCV RBC AUTO: 92.3 FL (ref 79–97)
MONOCYTES # BLD AUTO: 0.73 10*3/MM3 (ref 0.1–0.9)
MONOCYTES NFR BLD AUTO: 15.7 % (ref 5–12)
NEUTROPHILS # BLD AUTO: 2.29 10*3/MM3 (ref 1.7–7)
NEUTROPHILS NFR BLD AUTO: 49.1 % (ref 42.7–76)
NRBC BLD AUTO-RTO: 0 /100 WBC (ref 0–0.2)
PLATELET # BLD AUTO: 233 10*3/MM3 (ref 140–450)
POTASSIUM SERPL-SCNC: 4 MMOL/L (ref 3.5–5.2)
PROT SERPL-MCNC: 7.3 G/DL (ref 6–8.5)
RBC # BLD AUTO: 4.27 10*6/MM3 (ref 3.77–5.28)
SODIUM SERPL-SCNC: 139 MMOL/L (ref 136–145)
WBC # BLD AUTO: 4.66 10*3/MM3 (ref 3.4–10.8)

## 2020-06-19 DIAGNOSIS — M06.9 RHEUMATOID ARTHRITIS, INVOLVING UNSPECIFIED SITE, UNSPECIFIED RHEUMATOID FACTOR PRESENCE: Chronic | ICD-10-CM

## 2020-06-22 PROBLEM — M85.80 OSTEOPENIA: Status: ACTIVE | Noted: 2020-06-22

## 2020-06-26 DIAGNOSIS — F41.9 ANXIETY: ICD-10-CM

## 2020-06-26 DIAGNOSIS — F32.A DEPRESSION, UNSPECIFIED DEPRESSION TYPE: ICD-10-CM

## 2020-06-26 RX ORDER — ALPRAZOLAM 0.5 MG/1
0.5 TABLET ORAL 2 TIMES DAILY PRN
Qty: 20 TABLET | Refills: 0 | Status: SHIPPED | OUTPATIENT
Start: 2020-06-26 | End: 2020-08-20 | Stop reason: SDUPTHER

## 2020-06-29 ENCOUNTER — OFFICE VISIT (OUTPATIENT)
Dept: FAMILY MEDICINE CLINIC | Facility: CLINIC | Age: 60
End: 2020-06-29

## 2020-06-29 VITALS
SYSTOLIC BLOOD PRESSURE: 120 MMHG | TEMPERATURE: 97.9 F | HEIGHT: 62 IN | WEIGHT: 188.2 LBS | HEART RATE: 58 BPM | OXYGEN SATURATION: 98 % | BODY MASS INDEX: 34.63 KG/M2 | RESPIRATION RATE: 16 BRPM | DIASTOLIC BLOOD PRESSURE: 70 MMHG

## 2020-06-29 DIAGNOSIS — Z79.890 HORMONE REPLACEMENT THERAPY (HRT): ICD-10-CM

## 2020-06-29 DIAGNOSIS — N28.9 RENAL INSUFFICIENCY: ICD-10-CM

## 2020-06-29 DIAGNOSIS — M85.80 OSTEOPENIA, UNSPECIFIED LOCATION: Primary | ICD-10-CM

## 2020-06-29 DIAGNOSIS — E89.40 SURGICAL MENOPAUSE: ICD-10-CM

## 2020-06-29 DIAGNOSIS — J84.10 PULMONARY FIBROSIS (HCC): ICD-10-CM

## 2020-06-29 PROCEDURE — 99213 OFFICE O/P EST LOW 20 MIN: CPT | Performed by: FAMILY MEDICINE

## 2020-06-29 RX ORDER — ALENDRONATE SODIUM 70 MG/1
70 TABLET ORAL
Qty: 4 TABLET | Refills: 5 | Status: SHIPPED | OUTPATIENT
Start: 2020-06-29 | End: 2020-10-13 | Stop reason: SDUPTHER

## 2020-06-29 RX ORDER — ALBUTEROL SULFATE 90 UG/1
1 AEROSOL, METERED RESPIRATORY (INHALATION) EVERY 4 HOURS PRN
Qty: 8.5 G | Refills: 5 | Status: SHIPPED | OUTPATIENT
Start: 2020-06-29 | End: 2020-10-13 | Stop reason: SDUPTHER

## 2020-06-29 NOTE — PROGRESS NOTES
Subjective   Emilie Soto is a 59 y.o. female presenting with chief complaint of:   Chief Complaint   Patient presents with   • Results     dexa scan       History of Present Illness :  Alone.   To review recent bone density.     Has multiple chronic problems to consider that might have a bearing on today's issues; not an interval appointment.       Chronic/acute problems reviewed today:   1. Osteopenia, unspecified location Chronic/stable.  Last bone density/if below.   Has no past of much Rx.  Ok when needed further bone density screening when due.   08- LH 43H unless ovulation, FSH high/posmenopausal  JOSEPH+BSO-benign/[facility]/3.28.12    04- [facility] -bone density LS +0.8 hip +0.5...Calcium 600+vit D 400 bid..yearly normal Vit D level..walking/exercise..niru 12-18m.-fax to Dr Pérez//done jr    6.8.20 [facility]-bone d/[facility]/Ts L2-0.9 neck -2.2/6.8.2020     2. HRT-[place]: On and off use since her hysterectomy years ago   3. Surgical menopause see osteopenia    4. Renal insufficiency chronic and looking for pattern to see if she does have chronic kidney disease.  At times she is not as hydrated as she should be   5. Renal insufficiency-ro same   6. Pulmonary fibrosis (CMS/HCC) : occ use inhaler; needs refill.  Chronic/stable.      Has an/another acute issue today: none.    The following portions of the patient's history were reviewed and updated as appropriate: allergies, current medications, past family history, past medical history, past social history, past surgical history and problem list.      Current Outpatient Medications:   •  ALPRAZolam (XANAX) 0.5 MG tablet, Take 1 tablet by mouth 2 (Two) Times a Day As Needed for Anxiety., Disp: 20 tablet, Rfl: 0  •  amLODIPine (NORVASC) 5 MG tablet, Take 1 tablet by mouth Daily., Disp: 90 tablet, Rfl: 2  •  carvedilol (COREG) 25 MG tablet, Take 1 tablet by mouth 2 (Two) Times a Day., Disp: 180 tablet, Rfl: 1  •  cloNIDine (CATAPRES) 0.1 MG tablet, Take  0.1 mg by mouth 2 (Two) Times a Day. One by mouth twice daily as needed if BP greater than 160/100, Disp: , Rfl:   •  estradiol (ESTRACE) 2 MG tablet, Take 1 mg by mouth Daily., Disp: , Rfl:   •  folic acid (FOLVITE) 1 MG tablet, Take 1 tablet by mouth Daily., Disp: , Rfl:   •  glucose blood test strip, Use as instructed, Disp: 100 each, Rfl: 3  •  HYDROcodone-acetaminophen (NORCO) 7.5-325 MG per tablet, Take 1 tablet by mouth 2 (Two) Times a Day As Needed for Moderate Pain ., Disp: 60 tablet, Rfl: 0  •  hydroxychloroquine (PLAQUENIL) 200 MG tablet, Take 1 tablet by mouth 2 (Two) Times a Day., Disp: , Rfl:   •  losartan (COZAAR) 100 MG tablet, TAKE 1 TABLET DAILY, Disp: 90 tablet, Rfl: 4  •  mycophenolate (CELLCEPT) 500 MG tablet, Take 1 tablet by mouth 2 (Two) Times a Day., Disp: , Rfl:   •  omeprazole (priLOSEC) 20 MG capsule, Take 1 capsule by mouth Daily., Disp: 90 capsule, Rfl: 3  •  potassium chloride (K-DUR) 10 MEQ CR tablet, Take 1 tablet by mouth 2 (Two) Times a Day With Meals., Disp: 180 tablet, Rfl: 2  •  PROAIR  (90 Base) MCG/ACT inhaler, USE 2 PUFFS EVERY FOUR TO SIX HOURS AS NEEDED, Disp: 8.5 g, Rfl: 5  •  spironolactone (ALDACTONE) 25 MG tablet, Take 1 tablet by mouth Daily., Disp: 90 tablet, Rfl: 2  •  traZODone (DESYREL) 50 MG tablet, Take 2 tablets by mouth Every Night. 2 TABLETS AT BEDTIME AS NEEDED, Disp: 180 tablet, Rfl: 2  •  venlafaxine 225 MG tablet sustained-release 24 hour 24 hr tablet, Take 1 tablet by mouth Daily With Breakfast., Disp: 90 each, Rfl: 3  •  verapamil SR (CALAN-SR) 240 MG CR tablet, Take 1 tablet by mouth 2 (Two) Times a Day., Disp: 180 tablet, Rfl: 1  •  vitamin D (ERGOCALCIFEROL) 68363 UNITS capsule capsule, Take 1 capsule by mouth 1 (One) Time Per Week., Disp: , Rfl:     No problems with medications.  Refills if needed done    Allergies   Allergen Reactions   • Abilify [Aripiprazole] Other (See Comments)     ? insomnia   • Bactrim [Sulfamethoxazole-Trimethoprim]  Unknown (See Comments)     Unknown   • Biaxin [Clarithromycin] Nausea And Vomiting   • Ciprofloxacin Hcl Unknown (See Comments)     Unknown   • Duricef [Cefadroxil] Unknown (See Comments)     Unknown   • Ketek [Telithromycin] Unknown (See Comments)     Unknown   • Relafen [Nabumetone] Unknown (See Comments)     Unknown   • Wellbutrin [Bupropion] Unknown (See Comments)     Unknown       Review of Systems  GENERAL:  Active/slower with limits, speed, stamina for age and desire. Sleep is ok; occ hard falling/staying with worry. No fever now.  SKIN: No rash/skin lesion of concern:   ENDO:  No syncope, near or diaphoretic sweaty spells..  HEENT: No recent head injury; but same/occ headache.   No vision change.   Decline hearing loss.  Ears without pain/drainage.  No sore throat.  No significant nasal/sinus congestion/drainage. No epistaxis.  CHEST: No chest wall tenderness or mass. No cough, without wheeze.  No SOB; no hemoptysis.  CV: No chest pain, palpitations, ankle edema.  GI: No dysphagia.  No abdominal pain, diarrhea, constipation.  No rectal bleeding, or melena.  Occ heartburn still.   :  Voids without dysuria, or incontinence to completion.  ORTHO: No painful/swollen joints but various on /off sore.  No change some sore neck or back.  No acute neck or back pain without recent injury.  NEURO: No dizziness, weakness of extremities.  No numbness/paresthesias.   PSYCH: No memory loss.  Mood good; often anxious, depressed but/and not suicidal.  Tries to tolerate stress .      Screening:  Gyne: [facility]/confirmed 2.25.20  Mammogram: 5.2019L  Bone density: 4.21.11  Low dose CT chest: Tobacco-smoker/none: NA  GI:   Colonoscopy/DESHAWNP/Jasiel/4.22.16/1y  Colon-polyp/DESHAWN/Jasiel7.28.17/3y  Prostate: NA  Usual lab order  Any lab others want; (we wish to attempt not to duplicate so we need copies of labs done outside the office/out of [facility]); OR can have all labs here (bring the order from all other doctors) and we will forward to all  specialist  6m CBC, CMP, sed rate, CK-total, CR-protein  12m CBC, CMP, LIPID, TSH, fT4, CK-total, Vit D, sed rate, CR-protein    Lab Results:  Results for orders placed or performed in visit on 06/15/20   Comprehensive metabolic panel   Result Value Ref Range    Glucose 86 65 - 99 mg/dL    BUN 14 6 - 20 mg/dL    Creatinine 1.20 (H) 0.57 - 1.00 mg/dL    eGFR Non African Am 46 (L) >60 mL/min/1.73    eGFR African Am 56 (L) >60 mL/min/1.73    BUN/Creatinine Ratio 11.7 7.0 - 25.0    Sodium 139 136 - 145 mmol/L    Potassium 4.0 3.5 - 5.2 mmol/L    Chloride 102 98 - 107 mmol/L    Total CO2 28.1 22.0 - 29.0 mmol/L    Calcium 9.2 8.6 - 10.5 mg/dL    Total Protein 7.3 6.0 - 8.5 g/dL    Albumin 4.20 3.50 - 5.20 g/dL    Globulin 3.1 gm/dL    A/G Ratio 1.4 g/dL    Total Bilirubin 0.2 0.2 - 1.2 mg/dL    Alkaline Phosphatase 66 39 - 117 U/L    AST (SGOT) 16 1 - 32 U/L    ALT (SGPT) 10 1 - 33 U/L   C-reactive protein   Result Value Ref Range    C-Reactive Protein 2.33 (H) 0.00 - 0.50 mg/dL   Sedimentation rate, automated   Result Value Ref Range    Sed Rate 42 (H) 0 - 30 mm/hr   CK   Result Value Ref Range    Creatine Kinase 106 20 - 180 U/L   CBC & Differential   Result Value Ref Range    WBC 4.66 3.40 - 10.80 10*3/mm3    RBC 4.27 3.77 - 5.28 10*6/mm3    Hemoglobin 12.9 12.0 - 15.9 g/dL    Hematocrit 39.4 34.0 - 46.6 %    MCV 92.3 79.0 - 97.0 fL    MCH 30.2 26.6 - 33.0 pg    MCHC 32.7 31.5 - 35.7 g/dL    RDW 13.4 12.3 - 15.4 %    Platelets 233 140 - 450 10*3/mm3    Neutrophil Rel % 49.1 42.7 - 76.0 %    Lymphocyte Rel % 30.0 19.6 - 45.3 %    Monocyte Rel % 15.7 (H) 5.0 - 12.0 %    Eosinophil Rel % 3.9 0.3 - 6.2 %    Basophil Rel % 0.4 0.0 - 1.5 %    Neutrophils Absolute 2.29 1.70 - 7.00 10*3/mm3    Lymphocytes Absolute 1.40 0.70 - 3.10 10*3/mm3    Monocytes Absolute 0.73 0.10 - 0.90 10*3/mm3    Eosinophils Absolute 0.18 0.00 - 0.40 10*3/mm3    Basophils Absolute 0.02 0.00 - 0.20 10*3/mm3    Immature Granulocyte Rel % 0.9 (H) 0.0 -  "0.5 %    Immature Grans Absolute 0.04 0.00 - 0.05 10*3/mm3    nRBC 0.0 0.0 - 0.2 /100 WBC       A1C:  Lab Results - Last 18 Months   Lab Units 04/23/19  0925   HEMOGLOBIN A1C % 5.23     PSA:No results for input(s): PSA in the last 51541 hours.  CBC:  Lab Results - Last 18 Months   Lab Units 06/15/20  1008 01/17/20  1159 11/19/19  0831 08/14/19  1010 04/23/19  0925   WBC 10*3/mm3 4.66 3.95 6.53 4.04 4.14   HEMOGLOBIN g/dL 12.9 12.9 13.0 12.8 12.6   HEMATOCRIT % 39.4 38.9 39.7 42.3 41.6   PLATELETS 10*3/mm3 233 232 267 234 267      BMP/CMP:  Lab Results - Last 18 Months   Lab Units 06/15/20  1008 01/17/20  1159 11/19/19  0831 08/14/19  1010 04/23/19  0925   SODIUM mmol/L 139 141 140 142 139   POTASSIUM mmol/L 4.0 4.6 4.3 4.0 4.0   CHLORIDE mmol/L 102 101 102 102 101   TOTAL CO2 mmol/L 28.1 28.0 27.8 29.4* 28.1   GLUCOSE mg/dL 86 80 88 74 84   BUN mg/dL 14 17 12 13 11   CREATININE mg/dL 1.20* 1.12* 1.10* 1.14* 1.10*   EGFR IF NONAFRICN AM mL/min/1.73 46* 50* 51* 49* 51*   EGFR IF AFRICN AM mL/min/1.73 56* 61 62 60* 62   CALCIUM mg/dL 9.2 9.4 9.1 9.2 9.8     HEPATIC:  Lab Results - Last 18 Months   Lab Units 06/15/20  1008 01/17/20  1159 11/19/19  0831 08/14/19  1010 04/23/19  0925   ALT (SGPT) U/L 10 13 11 10 14   AST (SGOT) U/L 16 16 13 17 15   ALK PHOS U/L 66 77 55 65 62     THYROID:  Lab Results - Last 18 Months   Lab Units 04/23/19  0925   TSH mIU/mL 1.260       Objective   /70   Pulse 58   Temp 97.9 °F (36.6 °C)   Resp 16   Ht 157.5 cm (62\")   Wt 85.4 kg (188 lb 3.2 oz)   SpO2 98%   Breastfeeding No   BMI 34.42 kg/m²   Body mass index is 34.42 kg/m².    Physical Exam  GENERAL:  Well nourished/developed in no acute distress. BMI noted: 33.8  SKIN: Turgor excellent, without wound, rash, lesion  HEENT: Normal cephalic without trauma.  Pupils equal round reactive to light. Extraocular motions full without nystagmus.   External canals nonobstructive nontender without reddness. Tymphatic membranes josiane " with cesar structures intact.   Oral cavity without growths, exudates, and moist.  Posterior pharynx without mass, obstruction, redness.  No thyromegaly, mass, tenderness, lymphadenopathy and supple.  CV: Regular rhythm.  No murmur, gallop,  edema. Posterior pulses intact.  No carotid bruits.  CHEST: No chest wall tenderness or mass.   LUNGS: Symmetric motion with clear to auscultation.   ABD: Soft, nontender without mass.   ORTHO: Symmetric extremities without swelling/point tenderness.  Full gross range of motion; few sore fingers.  NEURO: CN 2-12 grossly intact.  Symmetric facies. 1/4 x bicep equal reflexes.  UE/LE   3/5 strength throughout.  Nonfocal use extremities. Speech clear. Intact light touch with monofilament, vibratory sensation with tuning fork; equal toes/distal feet.    PSYCH: Oriented x 3.  Pleasant calm, well kept.   Purposeful/directed conservation with intact short/long gross memory.     Assessment/Plan     1. Osteopenia, unspecified location    2. HRT-[place]    3. Surgical menopause see osteopenia    4. Renal insufficiency    5. Renal insufficiency-ro    6. Pulmonary fibrosis (CMS/[facility])        Rx: reviewed/changes:  New Medications Ordered This Visit   Medications   • alendronate (Fosamax) 70 MG tablet     Sig: Take 1 tablet by mouth Every 7 (Seven) Days.     Dispense:  4 tablet     Refill:  5   • albuterol sulfate HFA (ProAir HFA) 108 (90 Base) MCG/ACT inhaler     Sig: Inhale 1 puff Every 4 (Four) Hours As Needed for Wheezing or Shortness of Air.     Dispense:  8.5 g     Refill:  5     LAB/Testing/Referrals: reviewed/orders:   Today:   No orders of the defined types were placed in this encounter.    Chronic/recurrent labs above or change to:   same     Discussions:   Rx for osteoprosis; start fosmax  Keep Ca, Vit D normal while tx  Decline in bone density  Anirudh 2y  Refill inhaler  Body mass index is 34.42 kg/m².  Patient's Body mass index is 34.42 kg/m². BMI is above normal parameters.  Recommendations include: exercise counseling, nutrition counseling and as before.    Tobacco use reviewed:    Non-smoker  Emilie Soto  reports that she has never smoked. She has never used smokeless tobacco..There are no Patient Instructions on file for this visit.    Follow up: Return for lab next week; THEN lab/Dr Miles as planned.  Future Appointments   Date Time Provider Department Center   7/14/2020  8:45 AM LAB PC [facility] None   9/10/2020  9:30 AM LAB PC [facility] None   9/15/2020 10:30 AM Alexandru Miles MD [facility] None

## 2020-07-01 ENCOUNTER — LAB REQUISITION (OUTPATIENT)
Dept: LAB | Facility: HOSPITAL | Age: 60
End: 2020-07-01

## 2020-07-01 DIAGNOSIS — Z12.72 ENCOUNTER FOR SCREENING FOR MALIGNANT NEOPLASM OF VAGINA: ICD-10-CM

## 2020-07-06 LAB
GEN CATEG CVX/VAG CYTO-IMP: NORMAL
LAB AP CASE REPORT: NORMAL
LAB AP GYN ADDITIONAL INFORMATION: NORMAL
LAB AP GYN OTHER FINDINGS: NORMAL
PATH INTERP SPEC-IMP: NORMAL
STAT OF ADQ CVX/VAG CYTO-IMP: NORMAL

## 2020-07-14 ENCOUNTER — TELEPHONE (OUTPATIENT)
Dept: FAMILY MEDICINE CLINIC | Facility: CLINIC | Age: 60
End: 2020-07-14

## 2020-07-14 ENCOUNTER — LAB (OUTPATIENT)
Dept: FAMILY MEDICINE CLINIC | Facility: CLINIC | Age: 60
End: 2020-07-14

## 2020-07-14 DIAGNOSIS — N28.9 RENAL INSUFFICIENCY: Primary | ICD-10-CM

## 2020-07-14 NOTE — TELEPHONE ENCOUNTER
Pt has lab appt today, she was seen in office on 6-29-20 and had labs cbc, cmp, crp, sed rate. Was told to return for labs, (faxed to Dr Pérez)  Renal?   BMP? Today wasn't for sure what she was returning to get today

## 2020-07-15 LAB
BUN SERPL-MCNC: 13 MG/DL (ref 6–20)
BUN/CREAT SERPL: 11.1 (ref 7–25)
CALCIUM SERPL-MCNC: 8.8 MG/DL (ref 8.6–10.5)
CHLORIDE SERPL-SCNC: 105 MMOL/L (ref 98–107)
CO2 SERPL-SCNC: 28.1 MMOL/L (ref 22–29)
CREAT SERPL-MCNC: 1.17 MG/DL (ref 0.57–1)
GLUCOSE SERPL-MCNC: 89 MG/DL (ref 65–99)
POTASSIUM SERPL-SCNC: 4.1 MMOL/L (ref 3.5–5.2)
SODIUM SERPL-SCNC: 143 MMOL/L (ref 136–145)

## 2020-08-06 DIAGNOSIS — I50.32 CHRONIC DIASTOLIC CONGESTIVE HEART FAILURE (HCC): Chronic | ICD-10-CM

## 2020-08-06 DIAGNOSIS — I10 HYPERTENSION, UNSPECIFIED TYPE: Chronic | ICD-10-CM

## 2020-08-06 RX ORDER — VERAPAMIL HYDROCHLORIDE 240 MG/1
240 TABLET, FILM COATED, EXTENDED RELEASE ORAL 2 TIMES DAILY
Qty: 180 TABLET | Refills: 1 | Status: SHIPPED | OUTPATIENT
Start: 2020-08-06 | End: 2021-08-05 | Stop reason: SDUPTHER

## 2020-08-06 RX ORDER — CARVEDILOL 25 MG/1
25 TABLET ORAL 2 TIMES DAILY
Qty: 180 TABLET | Refills: 1 | Status: SHIPPED | OUTPATIENT
Start: 2020-08-06 | End: 2021-08-05 | Stop reason: SDUPTHER

## 2020-08-20 DIAGNOSIS — F32.A DEPRESSION, UNSPECIFIED DEPRESSION TYPE: ICD-10-CM

## 2020-08-20 DIAGNOSIS — F41.9 ANXIETY: ICD-10-CM

## 2020-08-20 RX ORDER — ALPRAZOLAM 0.5 MG/1
0.5 TABLET ORAL 2 TIMES DAILY PRN
Qty: 20 TABLET | Refills: 0 | Status: SHIPPED | OUTPATIENT
Start: 2020-08-20 | End: 2020-10-27

## 2020-09-10 ENCOUNTER — LAB (OUTPATIENT)
Dept: FAMILY MEDICINE CLINIC | Facility: CLINIC | Age: 60
End: 2020-09-10

## 2020-09-10 DIAGNOSIS — R53.83 FATIGUE, UNSPECIFIED TYPE: ICD-10-CM

## 2020-09-10 DIAGNOSIS — E55.9 VITAMIN D DEFICIENCY: ICD-10-CM

## 2020-09-10 DIAGNOSIS — J84.10 PULMONARY FIBROSIS (HCC): ICD-10-CM

## 2020-09-10 DIAGNOSIS — I50.32 CHRONIC DIASTOLIC CONGESTIVE HEART FAILURE (HCC): Primary | ICD-10-CM

## 2020-09-10 DIAGNOSIS — M06.9 RHEUMATOID ARTHRITIS, INVOLVING UNSPECIFIED SITE, UNSPECIFIED RHEUMATOID FACTOR PRESENCE: ICD-10-CM

## 2020-09-10 DIAGNOSIS — M33.20 POLYMYOSITIS (HCC): ICD-10-CM

## 2020-09-10 DIAGNOSIS — N28.9 RENAL INSUFFICIENCY: ICD-10-CM

## 2020-09-10 DIAGNOSIS — R73.01 IMPAIRED FASTING GLUCOSE: ICD-10-CM

## 2020-09-10 DIAGNOSIS — Z00.00 WELLNESS EXAMINATION: ICD-10-CM

## 2020-09-10 DIAGNOSIS — M85.80 OSTEOPENIA, UNSPECIFIED LOCATION: ICD-10-CM

## 2020-09-11 LAB
25(OH)D3+25(OH)D2 SERPL-MCNC: 52.9 NG/ML (ref 30–100)
ALBUMIN SERPL-MCNC: 4.3 G/DL (ref 3.5–5.2)
ALBUMIN/GLOB SERPL: 1.6 G/DL
ALP SERPL-CCNC: 70 U/L (ref 39–117)
ALT SERPL-CCNC: 14 U/L (ref 1–33)
AST SERPL-CCNC: 15 U/L (ref 1–32)
BASOPHILS # BLD AUTO: 0.03 10*3/MM3 (ref 0–0.2)
BASOPHILS NFR BLD AUTO: 0.6 % (ref 0–1.5)
BILIRUB SERPL-MCNC: 0.3 MG/DL (ref 0–1.2)
BUN SERPL-MCNC: 12 MG/DL (ref 6–20)
BUN/CREAT SERPL: 11.1 (ref 7–25)
CALCIUM SERPL-MCNC: 9 MG/DL (ref 8.6–10.5)
CHLORIDE SERPL-SCNC: 104 MMOL/L (ref 98–107)
CHOLEST SERPL-MCNC: 121 MG/DL (ref 0–200)
CK SERPL-CCNC: 130 U/L (ref 20–180)
CO2 SERPL-SCNC: 26.2 MMOL/L (ref 22–29)
CREAT SERPL-MCNC: 1.08 MG/DL (ref 0.57–1)
CRP SERPL-MCNC: 2.18 MG/DL (ref 0–0.5)
EOSINOPHIL # BLD AUTO: 0.25 10*3/MM3 (ref 0–0.4)
EOSINOPHIL NFR BLD AUTO: 5.1 % (ref 0.3–6.2)
ERYTHROCYTE [DISTWIDTH] IN BLOOD BY AUTOMATED COUNT: 12.2 % (ref 12.3–15.4)
ERYTHROCYTE [SEDIMENTATION RATE] IN BLOOD BY WESTERGREN METHOD: 31 MM/HR (ref 0–30)
GLOBULIN SER CALC-MCNC: 2.7 GM/DL
GLUCOSE SERPL-MCNC: 84 MG/DL (ref 65–99)
HBA1C MFR BLD: 5.4 % (ref 4.8–5.6)
HCT VFR BLD AUTO: 37.7 % (ref 34–46.6)
HDLC SERPL-MCNC: 55 MG/DL (ref 40–60)
HGB BLD-MCNC: 12.7 G/DL (ref 12–15.9)
IMM GRANULOCYTES # BLD AUTO: 0.03 10*3/MM3 (ref 0–0.05)
IMM GRANULOCYTES NFR BLD AUTO: 0.6 % (ref 0–0.5)
LDLC SERPL CALC-MCNC: 51 MG/DL (ref 0–100)
LYMPHOCYTES # BLD AUTO: 1.17 10*3/MM3 (ref 0.7–3.1)
LYMPHOCYTES NFR BLD AUTO: 23.9 % (ref 19.6–45.3)
MCH RBC QN AUTO: 30 PG (ref 26.6–33)
MCHC RBC AUTO-ENTMCNC: 33.7 G/DL (ref 31.5–35.7)
MCV RBC AUTO: 88.9 FL (ref 79–97)
MONOCYTES # BLD AUTO: 0.68 10*3/MM3 (ref 0.1–0.9)
MONOCYTES NFR BLD AUTO: 13.9 % (ref 5–12)
NEUTROPHILS # BLD AUTO: 2.74 10*3/MM3 (ref 1.7–7)
NEUTROPHILS NFR BLD AUTO: 55.9 % (ref 42.7–76)
NRBC BLD AUTO-RTO: 0 /100 WBC (ref 0–0.2)
PLATELET # BLD AUTO: 244 10*3/MM3 (ref 140–450)
POTASSIUM SERPL-SCNC: 4.7 MMOL/L (ref 3.5–5.2)
PROT SERPL-MCNC: 7 G/DL (ref 6–8.5)
RBC # BLD AUTO: 4.24 10*6/MM3 (ref 3.77–5.28)
SODIUM SERPL-SCNC: 137 MMOL/L (ref 136–145)
T4 FREE SERPL-MCNC: 1.35 NG/DL (ref 0.93–1.7)
TRIGL SERPL-MCNC: 77 MG/DL (ref 0–150)
TSH SERPL DL<=0.005 MIU/L-ACNC: 0.97 UIU/ML (ref 0.27–4.2)
VLDLC SERPL CALC-MCNC: 15.4 MG/DL
WBC # BLD AUTO: 4.9 10*3/MM3 (ref 3.4–10.8)

## 2020-09-15 ENCOUNTER — OFFICE VISIT (OUTPATIENT)
Dept: FAMILY MEDICINE CLINIC | Facility: CLINIC | Age: 60
End: 2020-09-15

## 2020-09-15 VITALS
HEIGHT: 62 IN | TEMPERATURE: 97.5 F | SYSTOLIC BLOOD PRESSURE: 124 MMHG | BODY MASS INDEX: 35.3 KG/M2 | HEART RATE: 72 BPM | RESPIRATION RATE: 16 BRPM | WEIGHT: 191.8 LBS | DIASTOLIC BLOOD PRESSURE: 74 MMHG | OXYGEN SATURATION: 98 %

## 2020-09-15 DIAGNOSIS — M06.9 RHEUMATOID ARTHRITIS, INVOLVING UNSPECIFIED SITE, UNSPECIFIED RHEUMATOID FACTOR PRESENCE: Chronic | ICD-10-CM

## 2020-09-15 DIAGNOSIS — F41.9 ANXIETY: ICD-10-CM

## 2020-09-15 DIAGNOSIS — M85.80 OSTEOPENIA, UNSPECIFIED LOCATION: ICD-10-CM

## 2020-09-15 DIAGNOSIS — K21.9 GASTROESOPHAGEAL REFLUX DISEASE, ESOPHAGITIS PRESENCE NOT SPECIFIED: Primary | ICD-10-CM

## 2020-09-15 DIAGNOSIS — R13.10 DYSPHAGIA, UNSPECIFIED TYPE: ICD-10-CM

## 2020-09-15 DIAGNOSIS — I10 ESSENTIAL HYPERTENSION: Chronic | ICD-10-CM

## 2020-09-15 DIAGNOSIS — Z00.00 WELLNESS EXAMINATION: ICD-10-CM

## 2020-09-15 DIAGNOSIS — F32.A DEPRESSION, UNSPECIFIED DEPRESSION TYPE: ICD-10-CM

## 2020-09-15 DIAGNOSIS — I50.30 DIASTOLIC CONGESTIVE HEART FAILURE, UNSPECIFIED HF CHRONICITY (HCC): Chronic | ICD-10-CM

## 2020-09-15 DIAGNOSIS — N28.9 RENAL INSUFFICIENCY: ICD-10-CM

## 2020-09-15 PROCEDURE — 99214 OFFICE O/P EST MOD 30 MIN: CPT | Performed by: FAMILY MEDICINE

## 2020-09-15 NOTE — PROGRESS NOTES
Subjective   Emilie Soto is a 59 y.o. female presenting with chief complaint of:   Chief Complaint   Patient presents with   • Annual Exam   • Hypertension       History of Present Illness :  Alone.       Has multiple chronic problems to consider that might have a bearing on today's issues;  an interval appointment.       Chronic/acute problems reviewed today:   1. Gastroesophageal reflux disease, esophagitis presence not specified  Chronic/stable.  Some heartburn, reflux without melena but occasional feeling of dysphasia.  Rx used, periods not used proven needed with symptoms -currently doing same.      2. Dysphagia, unspecified type occasional feeling a little difficulty swallowing and now she has insurance to wear if GI wanted to do in the EGD they could.   3. Essential hypertension Chronic/stable. Stable here past/no recent home blood pressures.  No significant chest pain, SOB, LE edema, orthopnea, near syncope, dizziness/light headness.   Recent Vitals       3/9/2020 6/29/2020 9/15/2020       BP:  104/70  120/70  124/74     Pulse:  79  58  72     Temp:  96.5 °F (35.8 °C)  97.9 °F (36.6 °C)  97.5 °F (36.4 °C)     Weight:  82.6 kg (182 lb)  85.4 kg (188 lb 3.2 oz)  87 kg (191 lb 12.8 oz)     BMI (Calculated):  33.3  34.4  35.1            4. Diastolic congestive heart failure, unspecified HF chronicity (CMS/HCC) Chronic/stable.  Denies significant sob, orthopnea, leg edema, weight gain.  Aware of influence diet/salt and watching weight at home.       5. Rheumatoid arthritis, involving unspecified site, unspecified rheumatoid factor presence (CMS/HCC) Chronic/stable.  Various on/off joint pains/soreness/stiffness.  Particular joint problems with various.  No joint swelling.  Treats mainly with reduced activity, Rx listed, Tylenol. A No  NSAIDs, and no injections.      6. Renal insufficiency-ro Chronic/stable.  No yet nephrology.  No dysuria.  Previously warned/reminded:   To avoid further kidney function  decline  A. Treat any time you think you have infection  B. Stay hydrated (dont get dehydrated); drink at least 60 oz fluid every 24 hr (1800 cc or nearly a 2L)  C. Do not allow any xrays with dye WITHOUT the doctor ordering checking your renal function  D. Do not get new medications without the doctor considering your renal condition  E. Do not use motrin/ibuprofen, alleve/naprosyn and these types of medications     7. Anxiety: [facility] Chronic/variable if not worse.  Sees [facility] on/off anxiety tolerated somewhat.  Rx helps and not interested in Rx change. Stress ongoing dealing with the daughter that wants her to babysit all the time.       8. Depression, unspecified depression type chronic/variable occ significant  mood swings, down moods, nervousness, difficulty with concentration to function home/work.  Others close have not been concerned.  No suicide ideation/intent.  Rx helps      9. Wellness examination-done Chronic ongoing over time screening or advice for general health care/wellness.      10. Osteopenia, unspecified location Chronic/stable.  Last bone density/if below.   Has had Rx.  Ok as needed further bone density screening when due.        Has an/another acute issue today: none.    The following portions of the patient's history were reviewed and updated as appropriate: allergies, current medications, past family history, past medical history, past social history, past surgical history and problem list.      Current Outpatient Medications:   •  albuterol sulfate HFA (ProAir HFA) 108 (90 Base) MCG/ACT inhaler, Inhale 1 puff Every 4 (Four) Hours As Needed for Wheezing or Shortness of Air., Disp: 8.5 g, Rfl: 5  •  alendronate (Fosamax) 70 MG tablet, Take 1 tablet by mouth Every 7 (Seven) Days., Disp: 4 tablet, Rfl: 5  •  ALPRAZolam (XANAX) 0.5 MG tablet, Take 1 tablet by mouth 2 (Two) Times a Day As Needed for Anxiety., Disp: 20 tablet, Rfl: 0  •  amLODIPine (NORVASC) 5 MG tablet, Take  1 tablet by mouth Daily., Disp: 90 tablet, Rfl: 2  •  carvedilol (COREG) 25 MG tablet, Take 1 tablet by mouth 2 (Two) Times a Day. (Patient taking differently: Take 25 mg by mouth 2 (Two) Times a Day. Pt is currently only taking once a day), Disp: 180 tablet, Rfl: 1  •  cloNIDine (CATAPRES) 0.1 MG tablet, Take 0.1 mg by mouth 2 (Two) Times a Day. One by mouth twice daily as needed if BP greater than 160/100, Disp: , Rfl:   •  estradiol (ESTRACE) 2 MG tablet, Take 1 mg by mouth Daily., Disp: , Rfl:   •  folic acid (FOLVITE) 1 MG tablet, Take 1 tablet by mouth Daily., Disp: , Rfl:   •  glucose blood test strip, Use as instructed, Disp: 100 each, Rfl: 3  •  HYDROcodone-acetaminophen (NORCO) 7.5-325 MG per tablet, Take 1 tablet by mouth 2 (Two) Times a Day As Needed for Moderate Pain ., Disp: 60 tablet, Rfl: 0  •  hydroxychloroquine (PLAQUENIL) 200 MG tablet, Take 1 tablet by mouth 2 (Two) Times a Day. Pt is only taking it once daily, Disp: , Rfl:   •  losartan (COZAAR) 100 MG tablet, TAKE 1 TABLET DAILY, Disp: 90 tablet, Rfl: 4  •  mycophenolate (CELLCEPT) 500 MG tablet, Take 1 tablet by mouth 2 (Two) Times a Day., Disp: , Rfl:   •  omeprazole (priLOSEC) 20 MG capsule, Take 1 capsule by mouth Daily., Disp: 90 capsule, Rfl: 3  •  potassium chloride (K-DUR) 10 MEQ CR tablet, Take 1 tablet by mouth 2 (Two) Times a Day With Meals. (Patient taking differently: Take 10 mEq by mouth 2 (Two) Times a Day With Meals. Pt is taking it once daily), Disp: 180 tablet, Rfl: 2  •  spironolactone (ALDACTONE) 25 MG tablet, Take 1 tablet by mouth Daily., Disp: 90 tablet, Rfl: 2  •  traZODone (DESYREL) 50 MG tablet, Take 2 tablets by mouth Every Night. 2 TABLETS AT BEDTIME AS NEEDED, Disp: 180 tablet, Rfl: 2  •  venlafaxine 225 MG tablet sustained-release 24 hour 24 hr tablet, Take 1 tablet by mouth Daily With Breakfast., Disp: 90 each, Rfl: 3  •  verapamil SR (CALAN-SR) 240 MG CR tablet, Take 1 tablet by mouth 2 (Two) Times a Day., Disp:  180 tablet, Rfl: 1  •  vitamin D (ERGOCALCIFEROL) 55182 UNITS capsule capsule, Take 1 capsule by mouth 1 (One) Time Per Week., Disp: , Rfl:     No problems with medications.  Refills if needed done    Allergies   Allergen Reactions   • Abilify [Aripiprazole] Other (See Comments)     ? insomnia   • Bactrim [Sulfamethoxazole-Trimethoprim] Unknown (See Comments)     Unknown   • Biaxin [Clarithromycin] Nausea And Vomiting   • Ciprofloxacin Hcl Unknown (See Comments)     Unknown   • Duricef [Cefadroxil] Unknown (See Comments)     Unknown   • Ketek [Telithromycin] Unknown (See Comments)     Unknown   • Relafen [Nabumetone] Unknown (See Comments)     Unknown   • Wellbutrin [Bupropion] Unknown (See Comments)     Unknown       Review of Systems  GENERAL:  Active/slower with limits, speed, stamina for age and desire. Sleep is ok; occ hard falling/staying with worry. No fever now.  SKIN: No rash/skin lesion of concern:   ENDO:  No syncope, near or diaphoretic sweaty spells..  HEENT: No recent head injury; but same/occ headache.   No vision change.   Decline hearing loss.  Ears without pain/drainage.  No sore throat.  No significant nasal/sinus congestion/drainage. No epistaxis.  CHEST: No chest wall tenderness or mass. No cough, without wheeze.  No SOB; no hemoptysis.  CV: No chest pain, palpitations, ankle edema.  GI: Occasional dysphagia.  No abdominal pain, diarrhea, constipation.  No rectal bleeding, or melena.  Occ heartburn still.   :  Voids without dysuria, or incontinence to completion.  ORTHO: No painful/swollen joints but various on /off sore.  No change some sore neck or back.  No acute neck or back pain without recent injury.  NEURO: No dizziness, weakness of extremities.  No numbness/paresthesias.   PSYCH: No memory loss.  Mood variable often anxious, depressed but/and not suicidal.  Tries to tolerate stress .      Screening:  Gyne: Forrest/confirmed 2.25.20  Mammogram: 6.8.20  Bone density: 6.8.20 Deca-bone  d/Gaylord/Ts L2-0.9 neck -2.2/6.8.2020-on Rx above  Low dose CT chest: Tobacco-smoker/none: NA  GI: Colon-p, div/Jasiel/DESHAWN/10.10.19/5y  Prostate: NA  Usual lab order  Any lab others want; (we wish to attempt not to duplicate so we need copies of labs done outside the office/out of [facility]); OR can have all labs here (bring the order from all other doctors) and we will forward to all specialist  6m CBC, CMP, sed rate, CK-total, CR-protein  12m CBC, CMP, LIPID, TSH, fT4, CK-total, Vit D, sed rate, CR-protein    Lab Results:  Results for orders placed or performed in visit on 09/10/20   Comprehensive metabolic panel    Specimen: Blood   Result Value Ref Range    Glucose 84 65 - 99 mg/dL    BUN 12 6 - 20 mg/dL    Creatinine 1.08 (H) 0.57 - 1.00 mg/dL    eGFR Non African Am 52 (L) >60 mL/min/1.73    eGFR African Am 63 >60 mL/min/1.73    BUN/Creatinine Ratio 11.1 7.0 - 25.0    Sodium 137 136 - 145 mmol/L    Potassium 4.7 3.5 - 5.2 mmol/L    Chloride 104 98 - 107 mmol/L    Total CO2 26.2 22.0 - 29.0 mmol/L    Calcium 9.0 8.6 - 10.5 mg/dL    Total Protein 7.0 6.0 - 8.5 g/dL    Albumin 4.30 3.50 - 5.20 g/dL    Globulin 2.7 gm/dL    A/G Ratio 1.6 g/dL    Total Bilirubin 0.3 0.0 - 1.2 mg/dL    Alkaline Phosphatase 70 39 - 117 U/L    AST (SGOT) 15 1 - 32 U/L    ALT (SGPT) 14 1 - 33 U/L   Lipid panel    Specimen: Blood   Result Value Ref Range    Total Cholesterol 121 0 - 200 mg/dL    Triglycerides 77 0 - 150 mg/dL    HDL Cholesterol 55 40 - 60 mg/dL    VLDL Cholesterol Sai 15.4 mg/dL    LDL Chol Calc (NIH) 51 0 - 100 mg/dL   TSH    Specimen: Blood   Result Value Ref Range    TSH 0.966 0.270 - 4.200 uIU/mL   T4, free    Specimen: Blood   Result Value Ref Range    Free T4 1.35 0.93 - 1.70 ng/dL   CK    Specimen: Blood   Result Value Ref Range    Creatine Kinase 130 20 - 180 U/L   Vitamin D 25 Hydroxy    Specimen: Blood   Result Value Ref Range    25 Hydroxy, Vitamin D 52.9 30.0 - 100.0 ng/ml   Hemoglobin A1c    Specimen: Blood   Result  Value Ref Range    Hemoglobin A1C 5.40 4.80 - 5.60 %   Sedimentation rate, automated    Specimen: Blood   Result Value Ref Range    Sed Rate 31 (H) 0 - 30 mm/hr   C-reactive protein    Specimen: Blood   Result Value Ref Range    C-Reactive Protein 2.18 (H) 0.00 - 0.50 mg/dL   CBC & Differential    Specimen: Blood   Result Value Ref Range    WBC 4.90 3.40 - 10.80 10*3/mm3    RBC 4.24 3.77 - 5.28 10*6/mm3    Hemoglobin 12.7 12.0 - 15.9 g/dL    Hematocrit 37.7 34.0 - 46.6 %    MCV 88.9 79.0 - 97.0 fL    MCH 30.0 26.6 - 33.0 pg    MCHC 33.7 31.5 - 35.7 g/dL    RDW 12.2 (L) 12.3 - 15.4 %    Platelets 244 140 - 450 10*3/mm3    Neutrophil Rel % 55.9 42.7 - 76.0 %    Lymphocyte Rel % 23.9 19.6 - 45.3 %    Monocyte Rel % 13.9 (H) 5.0 - 12.0 %    Eosinophil Rel % 5.1 0.3 - 6.2 %    Basophil Rel % 0.6 0.0 - 1.5 %    Neutrophils Absolute 2.74 1.70 - 7.00 10*3/mm3    Lymphocytes Absolute 1.17 0.70 - 3.10 10*3/mm3    Monocytes Absolute 0.68 0.10 - 0.90 10*3/mm3    Eosinophils Absolute 0.25 0.00 - 0.40 10*3/mm3    Basophils Absolute 0.03 0.00 - 0.20 10*3/mm3    Immature Granulocyte Rel % 0.6 (H) 0.0 - 0.5 %    Immature Grans Absolute 0.03 0.00 - 0.05 10*3/mm3    nRBC 0.0 0.0 - 0.2 /100 WBC       A1C:  Lab Results - Last 18 Months   Lab Units 09/10/20  0859 04/23/19  0925   HEMOGLOBIN A1C % 5.40 5.23     PSA:No results for input(s): PSA in the last 05510 hours.  CBC:  Lab Results - Last 18 Months   Lab Units 09/10/20  0859 06/15/20  1008 01/17/20  1159 11/19/19  0831 08/14/19  1010 04/23/19  0925   WBC 10*3/mm3 4.90 4.66 3.95 6.53 4.04 4.14   HEMOGLOBIN g/dL 12.7 12.9 12.9 13.0 12.8 12.6   HEMATOCRIT % 37.7 39.4 38.9 39.7 42.3 41.6   PLATELETS 10*3/mm3 244 233 232 267 234 267      BMP/CMP:  Lab Results - Last 18 Months   Lab Units 09/10/20  0859 07/14/20  0756 06/15/20  1008 01/17/20  1159 11/19/19  0831 08/14/19  1010 04/23/19  0925   SODIUM mmol/L 137 143 139 141 140 142 139   POTASSIUM mmol/L 4.7 4.1 4.0 4.6 4.3 4.0 4.0  "  CHLORIDE mmol/L 104 105 102 101 102 102 101   TOTAL CO2 mmol/L 26.2 28.1 28.1 28.0 27.8 29.4* 28.1   GLUCOSE mg/dL 84 89 86 80 88 74 84   BUN mg/dL 12 13 14 17 12 13 11   CREATININE mg/dL 1.08* 1.17* 1.20* 1.12* 1.10* 1.14* 1.10*   EGFR IF NONAFRICN AM mL/min/1.73 52* 48* 46* 50* 51* 49* 51*   EGFR IF AFRICN AM mL/min/1.73 63 58* 56* 61 62 60* 62   CALCIUM mg/dL 9.0 8.8 9.2 9.4 9.1 9.2 9.8     HEPATIC:  Lab Results - Last 18 Months   Lab Units 09/10/20  0859 06/15/20  1008 01/17/20  1159 11/19/19  0831 08/14/19  1010 04/23/19  0925   ALT (SGPT) U/L 14 10 13 11 10 14   AST (SGOT) U/L 15 16 16 13 17 15   ALK PHOS U/L 70 66 77 55 65 62     THYROID:  Lab Results - Last 18 Months   Lab Units 09/10/20  0859 04/23/19  0925   TSH uIU/mL 0.966 1.260       Objective   /74   Pulse 72   Temp 97.5 °F (36.4 °C)   Resp 16   Ht 157.5 cm (62\")   Wt 87 kg (191 lb 12.8 oz)   SpO2 98%   Breastfeeding No   BMI 35.08 kg/m²   Body mass index is 35.08 kg/m².    Recent Vitals       3/9/2020 6/29/2020 9/15/2020       BP:  104/70  120/70  124/74     Pulse:  79  58  72     Temp:  96.5 °F (35.8 °C)  97.9 °F (36.6 °C)  97.5 °F (36.4 °C)     Weight:  82.6 kg (182 lb)  85.4 kg (188 lb 3.2 oz)  87 kg (191 lb 12.8 oz)     BMI (Calculated):  33.3  34.4  35.1           Physical Exam  GENERAL:  Well nourished/developed in no acute distress. BMI noted: 33.8  SKIN: Turgor excellent, without wound, rash, lesion  HEENT: Normal cephalic without trauma.  Pupils equal round reactive to light. Extraocular motions full without nystagmus.   External canals nonobstructive nontender without reddness. Tymphatic membranes josiane with cesar structures intact.   Oral cavity without growths, exudates, and moist.  Posterior pharynx without mass, obstruction, redness.  No thyromegaly, mass, tenderness, lymphadenopathy and supple.  CV: Regular rhythm.  No murmur, gallop,  edema. Posterior pulses intact.  No carotid bruits.  CHEST: No chest wall tenderness " or mass.   LUNGS: Symmetric motion with clear to auscultation.   ABD: Soft, nontender without mass.   ORTHO: Symmetric extremities without swelling/point tenderness.  Full gross range of motion; few sore fingers.  NEURO: CN 2-12 grossly intact.  Symmetric facies. 1/4 x bicep equal reflexes.  UE/LE   3/5 strength throughout.  Nonfocal use extremities. Speech clear. Intact light touch with monofilament, vibratory sensation with tuning fork; equal toes/distal feet.    PSYCH: Oriented x 3.  Pleasant calm, well kept.   Purposeful/directed conservation with intact short/long gross memory.     Assessment/Plan     1. Gastroesophageal reflux disease, esophagitis presence not specified    2. Dysphagia, unspecified type    3. Essential hypertension    4. Diastolic congestive heart failure, unspecified HF chronicity (CMS/HCC)    5. Rheumatoid arthritis, involving unspecified site, unspecified rheumatoid factor presence (CMS/HCC)    6. Renal insufficiency-ro    7. Anxiety: [facility]    8. Depression, unspecified depression type    9. Wellness examination-done    10. Osteopenia, unspecified location        Discussions:   Suggested she consider compromise in setting a few limits for her daughter; something that might make her a little more content with her current situation  Helped her try to understand that she can be responsible for primarily only her self and she cannot totally control her daughter's decisions  Asking GI if they would consider an upper    Rx: reviewed/changes:  No orders of the defined types were placed in this encounter.    LAB/Testing/Referrals: reviewed/orders:   Today:   Orders Placed This Encounter   Procedures   • Ambulatory Referral to Gastroenterology     Chronic/recurrent labs above or change to:   same    Body mass index is 35.08 kg/m².  Patient's Body mass index is 35.08 kg/m². BMI is above normal parameters. Recommendations include: exercise counseling, nutrition counseling and as before.      Tobacco  use reviewed:    Emilie Soto  reports that she has never smoked. She has never used smokeless tobacco..    Annual/wellness visit: also/today (see separate note) Counseling/Anticipatory Guidance: Patient was counseled on:  Nutrition, physical activity, healthy weight, injury prevention, misuse of tobacco, alcohol and drugs, sexual behavior and STDs, contraception, dental health, mental health, immunizations, screenings as appropriate.       There are no Patient Instructions on file for this visit.    Follow up: Return for lab;, Dr Miles-, 6 m;.  Future Appointments   Date Time Provider Department Center   3/11/2021  8:15 AM LAB [facility] None   3/16/2021 11:15 AM Alexandru Miles MD [facility] None       Procedures

## 2020-09-24 ENCOUNTER — OFFICE VISIT (OUTPATIENT)
Dept: GASTROENTEROLOGY | Facility: CLINIC | Age: 60
End: 2020-09-24

## 2020-09-24 VITALS
BODY MASS INDEX: 35.15 KG/M2 | HEIGHT: 62 IN | HEART RATE: 62 BPM | DIASTOLIC BLOOD PRESSURE: 72 MMHG | TEMPERATURE: 96.6 F | OXYGEN SATURATION: 99 % | SYSTOLIC BLOOD PRESSURE: 106 MMHG | WEIGHT: 191 LBS

## 2020-09-24 DIAGNOSIS — R13.19 OTHER DYSPHAGIA: Primary | ICD-10-CM

## 2020-09-24 DIAGNOSIS — R12 HEARTBURN: ICD-10-CM

## 2020-09-24 DIAGNOSIS — K21.9 GASTROESOPHAGEAL REFLUX DISEASE, ESOPHAGITIS PRESENCE NOT SPECIFIED: ICD-10-CM

## 2020-09-24 PROCEDURE — 99214 OFFICE O/P EST MOD 30 MIN: CPT | Performed by: NURSE PRACTITIONER

## 2020-09-24 RX ORDER — SENNA PLUS 8.6 MG/1
1 TABLET ORAL AS NEEDED
COMMUNITY
End: 2023-03-28

## 2020-09-24 RX ORDER — LORATADINE 10 MG/1
10 TABLET ORAL AS NEEDED
COMMUNITY

## 2020-09-24 NOTE — PROGRESS NOTES
"Chief Complaint:   Chief Complaint   Patient presents with   • Difficulty Swallowing     Pt states for the last 6-12 months she feels like food/medications get hung in her esophagus-also feels like they just \"sit\" in her stomach and \"take a while to go down\"-seems to be getting worse    • Heartburn     Pt also states at night she has to sleep propped up otherwise she feels like \"everything comes up\" and a lot of reflux         Patient ID: Emilie Soto is a 59 y.o. female     History of Present Illness: This is a very pleasant 59-year-old female who is here today with complaints of difficulty swallowing and heartburn.    The patient states for over the past 6 to 12 months she feels as though both food and medication \"get hung in my esophagus and just sit there.\"  She states \"it feels like it takes a while for it to go down.\"  She states it feels as though the problem is worsening as well.  The patient states that she has had associated reflux and heartburn.  She states that her PCP did start her on Prilosec within the past few weeks which has helped some but not resolve the problems.    The patient denies any nausea, vomiting, epigastric pain,  or hematemesis.  The patient denies any fever or chills.  Denies any melena or hematochezia.  Denies any unintentional weight loss or loss of appetite.  She denies any chronic NSAID use.      Past Medical History:   Diagnosis Date   • CHF (congestive heart failure) (CMS/HCC)    • CKD (chronic kidney disease)    • DDD (degenerative disc disease), cervical    • Depression    • Fibromyalgia    • GERD (gastroesophageal reflux disease)    • Heart murmur    • Hepatitis cholestatic    • History of adenomatous polyp of colon    • HOCM (hypertrophic obstructive cardiomyopathy) (CMS/HCC)    • HTN (hypertension)    • RA (rheumatoid arthritis) (CMS/HCC)    • Renal disease    • Sleep apnea        Past Surgical History:   Procedure Laterality Date   • COLONOSCOPY  04/22/2016    One 16mm " tubulovillous adenomatous polyp at the ileocecal valve-Clip was placed-injected; The examination was otherwise normal on direct and retroflexion views; Repeat 1 year   • COLONOSCOPY N/A 7/28/2017    A tattoo was seen at the ileocecal valve-A post-polypectomy scar was found at the tattoo site; One 12mm tubular adenomatous flat polyp in the transverse colon-Clip (MR conditional) was placed; The examination was otherwise normal on direct and retroflexion views; Repeat 2 years   • COLONOSCOPY  10/04/2015    Bleeding at the ileocecal valve secondary to previous polypectomy-Clip was placed; No specimens collected; Repeat 6 months for retreatment   • COLONOSCOPY  09/30/2015    Very large multilobulated tubular adenomatous polyp opposite the ileocecal valve-removed piecemeal-at least 95% removed; One less than 1cm tubular adenomatous polyp in the ascending colon; Repeat 6-12 months to reassess the large polyp   • COLONOSCOPY N/A 10/10/2019    One 5mm tubular adenomatous polyp in the transverse colon; Diverticulosis in the left colon; The entire examined colon is normal on direct and retroflexion views; Repeat 5 years   • EXPLORATORY LAPAROTOMY     • HYSTERECTOMY     • LIVER BIOPSY  06/14/2011    Cholestatic hepatitis-see report         Current Outpatient Medications:   •  albuterol sulfate HFA (ProAir HFA) 108 (90 Base) MCG/ACT inhaler, Inhale 1 puff Every 4 (Four) Hours As Needed for Wheezing or Shortness of Air., Disp: 8.5 g, Rfl: 5  •  alendronate (Fosamax) 70 MG tablet, Take 1 tablet by mouth Every 7 (Seven) Days., Disp: 4 tablet, Rfl: 5  •  ALPRAZolam (XANAX) 0.5 MG tablet, Take 1 tablet by mouth 2 (Two) Times a Day As Needed for Anxiety., Disp: 20 tablet, Rfl: 0  •  amLODIPine (NORVASC) 5 MG tablet, Take 1 tablet by mouth Daily., Disp: 90 tablet, Rfl: 2  •  carvedilol (COREG) 25 MG tablet, Take 1 tablet by mouth 2 (Two) Times a Day. (Patient taking differently: Take 25 mg by mouth 2 (Two) Times a Day. Pt is currently  only taking once a day), Disp: 180 tablet, Rfl: 1  •  cloNIDine (CATAPRES) 0.1 MG tablet, Take 0.1 mg by mouth 2 (Two) Times a Day. One by mouth twice daily as needed if BP greater than 160/100, Disp: , Rfl:   •  estradiol (ESTRACE) 2 MG tablet, Take 1 mg by mouth Daily., Disp: , Rfl:   •  folic acid (FOLVITE) 1 MG tablet, Take 1 tablet by mouth Daily., Disp: , Rfl:   •  glucose blood test strip, Use as instructed, Disp: 100 each, Rfl: 3  •  HYDROcodone-acetaminophen (NORCO) 7.5-325 MG per tablet, Take 1 tablet by mouth 2 (Two) Times a Day As Needed for Moderate Pain ., Disp: 60 tablet, Rfl: 0  •  hydroxychloroquine (PLAQUENIL) 200 MG tablet, Take 1 tablet by mouth 2 (Two) Times a Day. Pt is only taking it once daily, Disp: , Rfl:   •  loratadine (Claritin) 10 MG tablet, Take 10 mg by mouth As Needed for Allergies., Disp: , Rfl:   •  losartan (COZAAR) 100 MG tablet, TAKE 1 TABLET DAILY, Disp: 90 tablet, Rfl: 4  •  mycophenolate (CELLCEPT) 500 MG tablet, Take 1 tablet by mouth 2 (Two) Times a Day., Disp: , Rfl:   •  omeprazole (priLOSEC) 20 MG capsule, Take 1 capsule by mouth Daily., Disp: 90 capsule, Rfl: 3  •  potassium chloride (K-DUR) 10 MEQ CR tablet, Take 1 tablet by mouth 2 (Two) Times a Day With Meals. (Patient taking differently: Take 10 mEq by mouth 2 (Two) Times a Day With Meals. Pt is taking it once daily), Disp: 180 tablet, Rfl: 2  •  senna (SENOKOT) 8.6 MG tablet, Take 1 tablet by mouth As Needed for Constipation., Disp: , Rfl:   •  spironolactone (ALDACTONE) 25 MG tablet, Take 1 tablet by mouth Daily., Disp: 90 tablet, Rfl: 2  •  traZODone (DESYREL) 50 MG tablet, Take 2 tablets by mouth Every Night. 2 TABLETS AT BEDTIME AS NEEDED, Disp: 180 tablet, Rfl: 2  •  venlafaxine 225 MG tablet sustained-release 24 hour 24 hr tablet, Take 1 tablet by mouth Daily With Breakfast., Disp: 90 each, Rfl: 3  •  verapamil SR (CALAN-SR) 240 MG CR tablet, Take 1 tablet by mouth 2 (Two) Times a Day., Disp: 180 tablet, Rfl:  "1  •  vitamin D (ERGOCALCIFEROL) 60821 UNITS capsule capsule, Take 1 capsule by mouth 1 (One) Time Per Week., Disp: , Rfl:     Allergies   Allergen Reactions   • Abilify [Aripiprazole] Other (See Comments)     Insomnia   • Bactrim [Sulfamethoxazole-Trimethoprim] Other (See Comments)     Pt unsure of reaction   • Biaxin [Clarithromycin] Nausea And Vomiting   • Ciprofloxacin Hcl Other (See Comments)     Pt unsure of reaction   • Duricef [Cefadroxil] Other (See Comments)     Pt unsure of reaction   • Ketek [Telithromycin] Other (See Comments)     Pt unsure of reaction   • Relafen [Nabumetone] Itching   • Wellbutrin [Bupropion] Itching       Social History     Socioeconomic History   • Marital status:      Spouse name: Not on file   • Number of children: Not on file   • Years of education: Not on file   • Highest education level: Not on file   Tobacco Use   • Smoking status: Never Smoker   • Smokeless tobacco: Never Used   Substance and Sexual Activity   • Alcohol use: Yes     Alcohol/week: 2.0 standard drinks     Types: 2 Glasses of wine per week     Comment: Occasionally   • Drug use: No   • Sexual activity: Yes     Partners: Male     Birth control/protection: Post-menopausal       Family History   Problem Relation Age of Onset   • Throat cancer Mother    • Diabetes Father    • Cancer Father         Pt reports he had part of his intestines removed   • Breast cancer Sister    • Colon cancer Neg Hx    • Colon polyps Neg Hx    • Esophageal cancer Neg Hx    • Liver cancer Neg Hx    • Liver disease Neg Hx    • Rectal cancer Neg Hx    • Stomach cancer Neg Hx        Vitals:    09/24/20 0912   BP: 106/72   BP Location: Left arm   Patient Position: Sitting   Cuff Size: Adult   Pulse: 62   Temp: 96.6 °F (35.9 °C)   TempSrc: Infrared   SpO2: 99%   Weight: 86.6 kg (191 lb)   Height: 157.5 cm (62\")       Review of Systems:    General:    Present -feeling well   Skin:    Not Present-Rash   HEENT:     Not Present-Acute visual " changes or Acute hearing changes   Neck :    Not Present- swollen glands   Genitourinary:      Not Present- burning, frequency, urgency hematuria, dysuria,   Cardiovascular:   Not Present-chest pain, palpitations, or pressure   Respiratory:   Not Present- shortness of breath or cough   Gastrointestinal:  Musculoskeletal:  Neurological:  Psychiatric:   Present as mentioned in the HP    Not Present. Recent gait disturbances.    Not Present-Seizures and weakness in extremities.    Not Present- Anxiety or Depression.       Physical Exam:    General Appearance:    Alert, cooperative, in no acute distress   Psych:    Mood appropriate    Eyes:          conjunctivae and sclerae normal, no   icterus, no pallor   ENMT:    Ears appear intact with no abnormalities noted oral mucosa moist   Neck:   No adenopathy, supple, trachea midline, no thyromegaly, no   carotid bruit, no JVD    Cardiovascular:    Regular rhythm and normal rate, normal S1 and S2, no            murmur, no gallop, no rub, no click   Gastrointestinal:     Inspection normal.  Normal bowel sounds, no masses, no organomegaly, soft round non-tender, non-distended, no guarding, no rebound or tenderness. No hepatosplenomegaly.   Skin:   No bleeding, bruising or rash   Neurologic:   nonfocal       Lab Results - Last 18 Months   Lab Units 09/10/20  0859 07/14/20  0756 06/15/20  1008 01/17/20  1159 11/19/19  0831 08/14/19  1010 04/23/19  0925   BUN mg/dL 12 13 14 17 12 13 11   CREATININE mg/dL 1.08* 1.17* 1.20* 1.12* 1.10* 1.14* 1.10*   SODIUM mmol/L 137 143 139 141 140 142 139   POTASSIUM mmol/L 4.7 4.1 4.0 4.6 4.3 4.0 4.0   CHLORIDE mmol/L 104 105 102 101 102 102 101   TOTAL CO2 mmol/L 26.2 28.1 28.1 28.0 27.8 29.4* 28.1   ALBUMIN g/dL 4.30  --  4.20 4.20 4.20 4.10 4.20   ALT (SGPT) U/L 14  --  10 13 11 10 14   AST (SGOT) U/L 15  --  16 16 13 17 15   ALK PHOS U/L 70  --  66 77 55 65 62   BILIRUBIN mg/dL 0.3  --  0.2 0.2 <0.2* 0.2 0.2   SED RATE mm/hr 31*  --  42* 35*  31* 38* 33*       Lab Results - Last 18 Months   Lab Units 09/10/20  0859 06/15/20  1008 01/17/20  1159 11/19/19  0831 08/14/19  1010 04/23/19  0925   HEMOGLOBIN g/dL 12.7 12.9 12.9 13.0 12.8 12.6   HEMATOCRIT % 37.7 39.4 38.9 39.7 42.3 41.6   MCV fL 88.9 92.3 89.6 91.7 99.3* 97.4*   WBC 10*3/mm3 4.90 4.66 3.95 6.53 4.04 4.14   RDW % 12.2* 13.4 12.7 12.0* 13.2 13.2   PLATELETS 10*3/mm3 244 233 232 267 234 267       Lab Results - Last 18 Months   Lab Units 09/10/20  0859 04/23/19  0925   TSH uIU/mL 0.966 1.260   VIT D 25 HYDROXY ng/ml 52.9 42.3          Body mass index is 34.93 kg/m². Patient's Body mass index is 34.93 kg/m². BMI is above normal parameters. Recommendations include: nutrition counseling.    Assessment and Plan:  Assessment/Plan   Emilie was seen today for difficulty swallowing and heartburn.    Diagnoses and all orders for this visit:    Other dysphagia  -     Case Request; Standing  -     Case Request    Heartburn  -     Case Request; Standing  -     Case Request    Gastroesophageal reflux disease, esophagitis presence not specified  -     Case Request; Standing  -     Case Request    Other orders  -     Follow Anesthesia Guidelines / Protocol; Future  -     Obtain Informed Consent; Future      Schedule patient for EGD.  Continue on Prilosec.  Patient will undergo covered screening prior to procedure.  Both verbal and written education given.       There are no Patient Instructions on file for this visit.    Next follow-up appointment      The risks, benefits, and alternatives of endoscopy were reviewed with the patient today.  Risks including perforation, with or without dilation, possibly requiring surgery.  Additional risks include risk of bleeding.  There is also the risk of a drug reaction or problems with anesthesia.  This will be discussed with the further by the anesthesia team on the day of the procedure. The benefits include the diagnosis and management of disease of the upper digestive  tract.  Alternatives to endoscopy include upper GI series, laboratory testing, radiographic evaluation, or no intervention.  The patient verbalizes understanding and agrees.      EMR Dragon/Transcription disclaimer:  Much of this encounter note is an electronic transcription/translation of spoken language to printed text. The electronic translation of spoken language may permit erroneous, or at times, nonsensical words or phrases to be inadvertently transcribed; although I have reviewed the note for such errors, some may still exist.

## 2020-10-06 ENCOUNTER — TRANSCRIBE ORDERS (OUTPATIENT)
Dept: ADMINISTRATIVE | Facility: HOSPITAL | Age: 60
End: 2020-10-06

## 2020-10-06 DIAGNOSIS — Z01.818 PRE-OP TESTING: Primary | ICD-10-CM

## 2020-10-10 ENCOUNTER — LAB (OUTPATIENT)
Dept: LAB | Facility: HOSPITAL | Age: 60
End: 2020-10-10

## 2020-10-10 PROCEDURE — C9803 HOPD COVID-19 SPEC COLLECT: HCPCS | Performed by: INTERNAL MEDICINE

## 2020-10-10 PROCEDURE — U0003 INFECTIOUS AGENT DETECTION BY NUCLEIC ACID (DNA OR RNA); SEVERE ACUTE RESPIRATORY SYNDROME CORONAVIRUS 2 (SARS-COV-2) (CORONAVIRUS DISEASE [COVID-19]), AMPLIFIED PROBE TECHNIQUE, MAKING USE OF HIGH THROUGHPUT TECHNOLOGIES AS DESCRIBED BY CMS-2020-01-R: HCPCS | Performed by: INTERNAL MEDICINE

## 2020-10-10 PROCEDURE — C9803 HOPD COVID-19 SPEC COLLECT: HCPCS

## 2020-10-11 LAB
COVID LABCORP PRIORITY: NORMAL
SARS-COV-2 RNA RESP QL NAA+PROBE: NOT DETECTED

## 2020-10-13 ENCOUNTER — ANESTHESIA EVENT (OUTPATIENT)
Dept: GASTROENTEROLOGY | Facility: HOSPITAL | Age: 60
End: 2020-10-13

## 2020-10-13 ENCOUNTER — HOSPITAL ENCOUNTER (OUTPATIENT)
Facility: HOSPITAL | Age: 60
Setting detail: HOSPITAL OUTPATIENT SURGERY
Discharge: HOME | End: 2020-10-13
Attending: INTERNAL MEDICINE | Admitting: INTERNAL MEDICINE

## 2020-10-13 ENCOUNTER — ANESTHESIA (OUTPATIENT)
Dept: GASTROENTEROLOGY | Facility: HOSPITAL | Age: 60
End: 2020-10-13

## 2020-10-13 VITALS
OXYGEN SATURATION: 98 % | HEART RATE: 61 BPM | SYSTOLIC BLOOD PRESSURE: 101 MMHG | WEIGHT: 192 LBS | RESPIRATION RATE: 21 BRPM | TEMPERATURE: 96.2 F | DIASTOLIC BLOOD PRESSURE: 61 MMHG | HEIGHT: 62 IN | BODY MASS INDEX: 35.33 KG/M2

## 2020-10-13 DIAGNOSIS — M85.80 OSTEOPENIA, UNSPECIFIED LOCATION: ICD-10-CM

## 2020-10-13 DIAGNOSIS — J84.10 PULMONARY FIBROSIS (HCC): ICD-10-CM

## 2020-10-13 PROCEDURE — C1726 CATH, BAL DIL, NON-VASCULAR: HCPCS | Performed by: INTERNAL MEDICINE

## 2020-10-13 PROCEDURE — 43249 ESOPH EGD DILATION <30 MM: CPT | Performed by: INTERNAL MEDICINE

## 2020-10-13 RX ORDER — LIDOCAINE HYDROCHLORIDE 10 MG/ML
0.5 INJECTION, SOLUTION EPIDURAL; INFILTRATION; INTRACAUDAL; PERINEURAL ONCE AS NEEDED
Status: DISCONTINUED | OUTPATIENT
Start: 2020-10-13 | End: 2020-10-13 | Stop reason: HOSPADM

## 2020-10-13 RX ORDER — ALENDRONATE SODIUM 70 MG/1
70 TABLET ORAL
Qty: 12 TABLET | Refills: 1 | Status: SHIPPED | OUTPATIENT
Start: 2020-10-13 | End: 2021-07-27

## 2020-10-13 RX ORDER — SODIUM CHLORIDE 0.9 % (FLUSH) 0.9 %
10 SYRINGE (ML) INJECTION AS NEEDED
Status: DISCONTINUED | OUTPATIENT
Start: 2020-10-13 | End: 2020-10-13 | Stop reason: HOSPADM

## 2020-10-13 RX ORDER — SODIUM CHLORIDE 9 MG/ML
500 INJECTION, SOLUTION INTRAVENOUS CONTINUOUS PRN
Status: DISCONTINUED | OUTPATIENT
Start: 2020-10-13 | End: 2020-10-13 | Stop reason: HOSPADM

## 2020-10-13 RX ORDER — ALBUTEROL SULFATE 90 UG/1
1 AEROSOL, METERED RESPIRATORY (INHALATION) EVERY 4 HOURS PRN
Qty: 24 G | Refills: 3 | Status: SHIPPED | OUTPATIENT
Start: 2020-10-13 | End: 2021-08-05 | Stop reason: SDUPTHER

## 2020-10-13 RX ADMIN — SODIUM CHLORIDE 500 ML: 9 INJECTION, SOLUTION INTRAVENOUS at 07:53

## 2020-10-13 NOTE — ANESTHESIA PREPROCEDURE EVALUATION
Anesthesia Evaluation     Patient summary reviewed and Nursing notes reviewed   no history of anesthetic complications:  NPO Solid Status: > 8 hours  NPO Liquid Status: > 8 hours           Airway   Mallampati: II  TM distance: >3 FB  Neck ROM: full  No difficulty expected  Dental          Pulmonary    (+) sleep apnea,   Cardiovascular   Exercise tolerance: poor (<4 METS)    (+) hypertension, valvular problems/murmurs murmur, CHF Diastolic >=55%,     ROS comment: Echo 2017 reviewed- normal ef, mild diastolic dysfunction  Low risk stress test 2017    Neuro/Psych  GI/Hepatic/Renal/Endo    (+)  GERD,  renal disease CRI,   (-) diabetes    Musculoskeletal         ROS comment: Lupus- taking plaquenil and cellcept  Abdominal    Substance History      OB/GYN          Other                        Anesthesia Plan    ASA 3     MAC     intravenous induction     Anesthetic plan, all risks, benefits, and alternatives have been provided, discussed and informed consent has been obtained with: patient.

## 2020-10-13 NOTE — TELEPHONE ENCOUNTER
Faxed request for 90 day supply from [facility]    Requested Prescriptions     Pending Prescriptions Disp Refills   • albuterol sulfate HFA (ProAir HFA) 108 (90 Base) MCG/ACT inhaler 24 g 3     Sig: Inhale 1 puff Every 4 (Four) Hours As Needed for Wheezing or Shortness of Air.   • alendronate (Fosamax) 70 MG tablet 12 tablet 1     Sig: Take 1 tablet by mouth Every 7 (Seven) Days.

## 2020-10-13 NOTE — ANESTHESIA POSTPROCEDURE EVALUATION
"Patient: Emilie Soto    Procedure Summary     Date: 10/13/20 Room / Location: [facility] ENDOSCOPY 4 / [facility] ENDOSCOPY    Anesthesia Start: 0759 Anesthesia Stop: 0817    Procedure: ESOPHAGOGASTRODUODENOSCOPY WITH ANESTHESIA (N/A ) Diagnosis:       Other dysphagia      Heartburn      Gastroesophageal reflux disease, esophagitis presence not specified      (Other dysphagia [R13.19])      (Heartburn [R12])      (Gastroesophageal reflux disease, esophagitis presence not specified [K21.9])    Surgeon: Abigail Weathers MD Provider: Nicolas Aleman CRNA    Anesthesia Type: MAC ASA Status: 3          Anesthesia Type: MAC    Vitals  Vitals Value Taken Time   BP     Temp     Pulse 70 10/13/20 0817   Resp     SpO2 97 % 10/13/20 0817   Vitals shown include unvalidated device data.        Post Anesthesia Care and Evaluation    Patient location during evaluation: PHASE II  Patient participation: complete - patient participated  Level of consciousness: awake and alert  Pain management: adequate  Airway patency: patent  Anesthetic complications: No anesthetic complications    Cardiovascular status: acceptable  Respiratory status: acceptable  Hydration status: acceptable    Comments: Blood pressure 106/67, pulse 63, temperature 96.2 °F (35.7 °C), temperature source Temporal, resp. rate 20, height 157.5 cm (62\"), weight 87.1 kg (192 lb), SpO2 100 %, not currently breastfeeding.    Pt discharged from PACU based on jayjay score >8      "

## 2020-10-15 DIAGNOSIS — I10 ESSENTIAL HYPERTENSION: Primary | ICD-10-CM

## 2020-10-15 RX ORDER — SPIRONOLACTONE 25 MG/1
TABLET ORAL
Qty: 90 TABLET | Refills: 3 | Status: SHIPPED | OUTPATIENT
Start: 2020-10-15 | End: 2021-08-05 | Stop reason: SDUPTHER

## 2020-10-15 RX ORDER — AMLODIPINE BESYLATE 5 MG/1
5 TABLET ORAL DAILY
Qty: 90 TABLET | Refills: 3 | Status: SHIPPED | OUTPATIENT
Start: 2020-10-15 | End: 2021-08-05 | Stop reason: SDUPTHER

## 2020-10-27 DIAGNOSIS — F32.A DEPRESSION, UNSPECIFIED DEPRESSION TYPE: ICD-10-CM

## 2020-10-27 DIAGNOSIS — F41.9 ANXIETY: ICD-10-CM

## 2020-10-27 RX ORDER — ALPRAZOLAM 0.5 MG/1
TABLET ORAL
Qty: 20 TABLET | Refills: 0 | Status: SHIPPED | OUTPATIENT
Start: 2020-10-27 | End: 2020-12-22

## 2020-11-02 DIAGNOSIS — K21.9 GASTROESOPHAGEAL REFLUX DISEASE: ICD-10-CM

## 2020-11-02 RX ORDER — OMEPRAZOLE 20 MG/1
CAPSULE, DELAYED RELEASE ORAL
Qty: 90 CAPSULE | Refills: 3 | Status: SHIPPED | OUTPATIENT
Start: 2020-11-02 | End: 2021-08-05 | Stop reason: SDUPTHER

## 2020-11-12 ENCOUNTER — FLU SHOT (OUTPATIENT)
Dept: FAMILY MEDICINE CLINIC | Facility: CLINIC | Age: 60
End: 2020-11-12

## 2020-11-12 DIAGNOSIS — Z23 NEED FOR INFLUENZA VACCINATION: ICD-10-CM

## 2020-11-12 PROCEDURE — 90471 IMMUNIZATION ADMIN: CPT | Performed by: FAMILY MEDICINE

## 2020-11-12 PROCEDURE — 90686 IIV4 VACC NO PRSV 0.5 ML IM: CPT | Performed by: FAMILY MEDICINE

## 2020-11-20 ENCOUNTER — TELEPHONE (OUTPATIENT)
Dept: FAMILY MEDICINE CLINIC | Facility: CLINIC | Age: 60
End: 2020-11-20

## 2020-11-20 ENCOUNTER — RESULTS ENCOUNTER (OUTPATIENT)
Dept: FAMILY MEDICINE CLINIC | Facility: CLINIC | Age: 60
End: 2020-11-20

## 2020-11-20 DIAGNOSIS — Z20.822 CLOSE EXPOSURE TO COVID-19 VIRUS: ICD-10-CM

## 2020-11-20 DIAGNOSIS — Z20.822 CLOSE EXPOSURE TO COVID-19 VIRUS: Primary | ICD-10-CM

## 2020-11-20 NOTE — TELEPHONE ENCOUNTER
attempted call pt and vm left that orders have been faxed to [facility] per her requeste

## 2020-11-20 NOTE — TELEPHONE ENCOUNTER
PATIENT IS REQUESTING COVID ORDER DUE TO DAUGHTER TESTING POSITIVE.     PLEASE LET PATIENT KNOW WHEN ORDER IS COMPLETE.     CALL BACK [phone]     PLEASE SEND ORDER TO [facility]

## 2020-11-23 ENCOUNTER — TELEPHONE (OUTPATIENT)
Dept: FAMILY MEDICINE CLINIC | Facility: CLINIC | Age: 60
End: 2020-11-23

## 2020-11-23 NOTE — TELEPHONE ENCOUNTER
Caller: Emilie Soto    Relationship: Self    Best call back number: [phone]    What test was performed: COVID19    When was the test performed: Friday     Where was the test performed: [facility]

## 2020-11-24 ENCOUNTER — TELEMEDICINE (OUTPATIENT)
Dept: FAMILY MEDICINE CLINIC | Facility: CLINIC | Age: 60
End: 2020-11-24

## 2020-11-24 DIAGNOSIS — Z20.822 EXPOSURE TO COVID-19 VIRUS: Primary | ICD-10-CM

## 2020-11-24 PROCEDURE — 99442 PR PHYS/QHP TELEPHONE EVALUATION 11-20 MIN: CPT | Performed by: FAMILY MEDICINE

## 2020-11-24 NOTE — PROGRESS NOTES
Subjective   Emilie Soto is a 60 y.o. female presenting with chief complaint of:   covid exposure      History of Present Illness :  With .  Here for primarily an acute issue today; exposure to COVID.       [place]  Exposure date: 11.19.20 (day 5)-daughter marlen has and was in house  Exposure place: home  Exposure without mast  Length of exposure: > 15m    Date of first symptom: none (day NA)  Symptoms positive: none  Symptoms neg: fatigue, temp > 100.4, headache, sore throat, cough, SOB nausea diarrhea loss taste loss smell    Date of test: 11.20.20   Site of test: [facility]       Advice: quarantine (stay in home and distance yourself as much as possible from family; masks, > 6feet, < 15m/24 hr through 12.3.20  Advice: social distancing (masks, 6 ft distance, < 15m/24 hr exposure to others).  Avoid unnecessary outings/gathering consistent with state/health department guidance after over  Work: NA; work note will be written as needed by office  School: NA; video if an option    ER if any worsening especially SOB; as soon as SOB  Health department will be notified if they are positive; they should hear from them at sometime  Contacts should be tested if/when patient test returns positive; due to volume best patient call friends, family, contacts if able (health department may be slow at doing this)    Test back 11.23.20    Test result neg    Return to social distancing dates below  14 days from firm exposure (12.3.20) reschedule labs here for Dr Pérez/Dr Pérez visit  Do not rely on disease/antibodies for protection for infection future  Stay abreast for future recommendations for vaccination (even if had disease)    Has multiple chronic problems to consider that might have a bearing on today's issues; not an interval appointment.       Chronic/acute problems reviewed today:   1. Exposure to COVID-19 virus         Has an/another acute issue today: none.    The following portions of the patient's history were  reviewed and updated as appropriate: allergies, current medications, past family history, past medical history, past social history, past surgical history and problem list.      Current Outpatient Medications:   •  albuterol sulfate HFA (ProAir HFA) 108 (90 Base) MCG/ACT inhaler, Inhale 1 puff Every 4 (Four) Hours As Needed for Wheezing or Shortness of Air., Disp: 24 g, Rfl: 3  •  alendronate (Fosamax) 70 MG tablet, Take 1 tablet by mouth Every 7 (Seven) Days., Disp: 12 tablet, Rfl: 1  •  ALPRAZolam (XANAX) 0.5 MG tablet, TAKE ONE TABLET TWICE DAILY AS NEEDED ANXIETY GENERIC FOR XANAX, Disp: 20 tablet, Rfl: 0  •  amLODIPine (NORVASC) 5 MG tablet, Take 1 tablet by mouth Daily., Disp: 90 tablet, Rfl: 3  •  carvedilol (COREG) 25 MG tablet, Take 1 tablet by mouth 2 (Two) Times a Day. (Patient taking differently: Take 25 mg by mouth 2 (Two) Times a Day. Pt is currently only taking once a day), Disp: 180 tablet, Rfl: 1  •  cloNIDine (CATAPRES) 0.1 MG tablet, Take 0.1 mg by mouth 2 (Two) Times a Day. One by mouth twice daily as needed if BP greater than 160/100, Disp: , Rfl:   •  estradiol (ESTRACE) 2 MG tablet, Take 1 mg by mouth Daily., Disp: , Rfl:   •  folic acid (FOLVITE) 1 MG tablet, Take 1 tablet by mouth Daily., Disp: , Rfl:   •  glucose blood test strip, Use as instructed, Disp: 100 each, Rfl: 3  •  HYDROcodone-acetaminophen (NORCO) 7.5-325 MG per tablet, Take 1 tablet by mouth 2 (Two) Times a Day As Needed for Moderate Pain ., Disp: 60 tablet, Rfl: 0  •  hydroxychloroquine (PLAQUENIL) 200 MG tablet, Take 1 tablet by mouth 2 (Two) Times a Day. Pt is only taking it once daily, Disp: , Rfl:   •  loratadine (Claritin) 10 MG tablet, Take 10 mg by mouth As Needed for Allergies., Disp: , Rfl:   •  losartan (COZAAR) 100 MG tablet, TAKE 1 TABLET DAILY, Disp: 90 tablet, Rfl: 4  •  mycophenolate (CELLCEPT) 500 MG tablet, Take 1 tablet by mouth 2 (Two) Times a Day., Disp: , Rfl:   •  omeprazole (priLOSEC) 20 MG capsule, TAKE  1 CAPSULE DAILY GENERIC FOR PRILOSEC, Disp: 90 capsule, Rfl: 3  •  potassium chloride (K-DUR) 10 MEQ CR tablet, Take 1 tablet by mouth 2 (Two) Times a Day With Meals. (Patient taking differently: Take 10 mEq by mouth 2 (Two) Times a Day With Meals. Pt is taking it once daily), Disp: 180 tablet, Rfl: 2  •  senna (SENOKOT) 8.6 MG tablet, Take 1 tablet by mouth As Needed for Constipation., Disp: , Rfl:   •  spironolactone (ALDACTONE) 25 MG tablet, TAKE 1 TABLET DAILY, Disp: 90 tablet, Rfl: 3  •  traZODone (DESYREL) 50 MG tablet, Take 2 tablets by mouth Every Night. 2 TABLETS AT BEDTIME AS NEEDED, Disp: 180 tablet, Rfl: 2  •  venlafaxine 225 MG tablet sustained-release 24 hour 24 hr tablet, Take 1 tablet by mouth Daily With Breakfast., Disp: 90 each, Rfl: 3  •  verapamil SR (CALAN-SR) 240 MG CR tablet, Take 1 tablet by mouth 2 (Two) Times a Day., Disp: 180 tablet, Rfl: 1  •  vitamin D (ERGOCALCIFEROL) 37796 UNITS capsule capsule, Take 1 capsule by mouth 1 (One) Time Per Week., Disp: , Rfl:     No problems with medications.  Refills if needed done    Allergies   Allergen Reactions   • Abilify [Aripiprazole] Other (See Comments)     Insomnia   • Bactrim [Sulfamethoxazole-Trimethoprim] Other (See Comments)     Pt unsure of reaction   • Biaxin [Clarithromycin] Nausea And Vomiting   • Ciprofloxacin Hcl Other (See Comments)     Pt unsure of reaction   • Duricef [Cefadroxil] Other (See Comments)     Pt unsure of reaction   • Ketek [Telithromycin] Other (See Comments)     Pt unsure of reaction   • Relafen [Nabumetone] Itching   • Wellbutrin [Bupropion] Itching       Review of Systems   Constitutional: Negative for activity change, appetite change, fatigue and fever.   Respiratory: Negative for cough, shortness of breath and wheezing.    Cardiovascular: Negative for chest pain and palpitations.   Gastrointestinal: Negative for abdominal pain, diarrhea and nausea.   Neurological: Negative for weakness and headache.     Lab  Results:  Results for orders placed or performed in visit on 10/06/20   COVID-19,[facility] ROUTINE, NP/OP SWAB IN TRANSPORT MEDIA OR ESWAB 72 HR TAT - Swab, Oropharynx    Specimen: Oropharynx; Swab   Result Value Ref Range    SARS-CoV-2, [facility] Not Detected Not Detected   COVID [facility] Priority - Swab, Oropharynx    Specimen: Oropharynx; Swab   Result Value Ref Range    COVID [facility] PRIORITY Comment        A1C:  Lab Results - Last 18 Months   Lab Units 09/10/20  0859   HEMOGLOBIN A1C % 5.40     PSA:No results for input(s): PSA in the last 85370 hours.  CBC:  Lab Results - Last 18 Months   Lab Units 09/10/20  0859 06/15/20  1008 01/17/20  1159 11/19/19  0831 08/14/19  1010   WBC 10*3/mm3 4.90 4.66 3.95 6.53 4.04   HEMOGLOBIN g/dL 12.7 12.9 12.9 13.0 12.8   HEMATOCRIT % 37.7 39.4 38.9 39.7 42.3   PLATELETS 10*3/mm3 244 233 232 267 234      BMP/CMP:  Lab Results - Last 18 Months   Lab Units 09/10/20  0859 07/14/20  0756 06/15/20  1008 01/17/20  1159 11/19/19  0831 08/14/19  1010   SODIUM mmol/L 137 143 139 141 140 142   POTASSIUM mmol/L 4.7 4.1 4.0 4.6 4.3 4.0   CHLORIDE mmol/L 104 105 102 101 102 102   TOTAL CO2 mmol/L 26.2 28.1 28.1 28.0 27.8 29.4*   GLUCOSE mg/dL 84 89 86 80 88 74   BUN mg/dL 12 13 14 17 12 13   CREATININE mg/dL 1.08* 1.17* 1.20* 1.12* 1.10* 1.14*   EGFR IF NONAFRICN AM mL/min/1.73 52* 48* 46* 50* 51* 49*   EGFR IF AFRICN AM mL/min/1.73 63 58* 56* 61 62 60*   CALCIUM mg/dL 9.0 8.8 9.2 9.4 9.1 9.2     HEPATIC:  Lab Results - Last 18 Months   Lab Units 09/10/20  0859 06/15/20  1008 01/17/20  1159 11/19/19  0831 08/14/19  1010   ALT (SGPT) U/L 14 10 13 11 10   AST (SGOT) U/L 15 16 16 13 17   ALK PHOS U/L 70 66 77 55 65     THYROID:  Lab Results - Last 18 Months   Lab Units 09/10/20  0859   TSH uIU/mL 0.966       Objective   There were no vitals taken for this visit.  There is no height or weight on file to calculate BMI.    Physical Exam  None; due to telephone/COVID19  Was alert, oriented x 3, pleasant.      Assessment/Plan     1. Exposure to COVID-19 virus      Rx: reviewed/changes:  No orders of the defined types were placed in this encounter.    LAB/Testing/Referrals: reviewed/orders:   Today: none  No orders of the defined types were placed in this encounter.    Chronic/recurrent labs above or change to:   same     Discussions:   Above issues COVID  Could have symptoms still; call   Consider niru after Thanksgiving if desired    There are no Patient Instructions on file for this visit.    Follow up: Return for lab;, Dr Miles-, as planned;.  Future Appointments   Date Time Provider Department Center   3/11/2021  8:15 AM LAB [facility]   3/16/2021 11:15 AM Alexandru Miles MD [facility]

## 2020-12-01 DIAGNOSIS — F32.A DEPRESSION, UNSPECIFIED DEPRESSION TYPE: ICD-10-CM

## 2020-12-01 RX ORDER — VENLAFAXINE HYDROCHLORIDE 225 MG/1
TABLET, EXTENDED RELEASE ORAL
Qty: 90 TABLET | Refills: 3 | Status: SHIPPED | OUTPATIENT
Start: 2020-12-01 | End: 2021-08-05 | Stop reason: SDUPTHER

## 2020-12-01 RX ORDER — TRAZODONE HYDROCHLORIDE 50 MG/1
TABLET ORAL
Qty: 180 TABLET | Refills: 3 | Status: SHIPPED | OUTPATIENT
Start: 2020-12-01 | End: 2021-03-16 | Stop reason: SDUPTHER

## 2020-12-01 NOTE — TELEPHONE ENCOUNTER
Requested Prescriptions     Pending Prescriptions Disp Refills   • venlafaxine 225 MG tablet sustained-release 24 hour 24 hr tablet [Pharmacy Med Name: VENLAFAXINE HCL ER TABS 225MG] 90 tablet 3     Sig: TAKE 1 TABLET DAILY WITH BREAKFAST

## 2020-12-22 DIAGNOSIS — F32.A DEPRESSION, UNSPECIFIED DEPRESSION TYPE: ICD-10-CM

## 2020-12-22 DIAGNOSIS — F41.9 ANXIETY: ICD-10-CM

## 2020-12-22 RX ORDER — ALPRAZOLAM 0.5 MG/1
TABLET ORAL
Qty: 30 TABLET | Refills: 0 | Status: SHIPPED | OUTPATIENT
Start: 2020-12-22 | End: 2021-01-28 | Stop reason: SDUPTHER

## 2021-01-28 DIAGNOSIS — F41.9 ANXIETY: ICD-10-CM

## 2021-01-28 DIAGNOSIS — F32.A DEPRESSION, UNSPECIFIED DEPRESSION TYPE: ICD-10-CM

## 2021-01-28 RX ORDER — ALPRAZOLAM 0.5 MG/1
0.5 TABLET ORAL 2 TIMES DAILY PRN
Qty: 30 TABLET | Refills: 0 | Status: SHIPPED | OUTPATIENT
Start: 2021-01-28 | End: 2021-03-16 | Stop reason: DRUGHIGH

## 2021-01-28 NOTE — TELEPHONE ENCOUNTER
Requested Prescriptions     Pending Prescriptions Disp Refills   • ALPRAZolam (XANAX) 0.5 MG tablet 30 tablet 0     Sig: Take 1 tablet by mouth 2 (Two) Times a Day As Needed for Anxiety.

## 2021-01-28 NOTE — TELEPHONE ENCOUNTER
Caller: Emilie Soto    Relationship: Self    Best call back number: [phone] (H)    Medication needed:   ALPRAZolam (XANAX) 0.5 MG tablet            When do you need the refill by: TODAY      What details did the patient provide when requesting the medication: WOULD LIKE TO  THE REFILL TODAY     Does the patient have less than a 3 day supply:  [x] Yes  [] No    What is the patient's preferred pharmacy:    [facility] - [place], [place] - [address] [phone] [place] [phone] [phone]

## 2021-02-08 RX ORDER — LOSARTAN POTASSIUM 100 MG/1
TABLET ORAL
Qty: 90 TABLET | Refills: 2 | Status: SHIPPED | OUTPATIENT
Start: 2021-02-08 | End: 2021-08-05 | Stop reason: SDUPTHER

## 2021-02-08 NOTE — TELEPHONE ENCOUNTER
Last OV 9/15/20    Requested Prescriptions     Pending Prescriptions Disp Refills   • losartan (COZAAR) 100 MG tablet [Pharmacy Med Name: LOSARTAN TABS 100MG] 90 tablet 2     Sig: TAKE 1 TABLET DAILY

## 2021-03-11 ENCOUNTER — LAB (OUTPATIENT)
Dept: FAMILY MEDICINE CLINIC | Facility: CLINIC | Age: 61
End: 2021-03-11

## 2021-03-11 DIAGNOSIS — E66.9 OBESITY, UNSPECIFIED CLASSIFICATION, UNSPECIFIED OBESITY TYPE, UNSPECIFIED WHETHER SERIOUS COMORBIDITY PRESENT: ICD-10-CM

## 2021-03-11 DIAGNOSIS — R73.01 IMPAIRED FASTING GLUCOSE: ICD-10-CM

## 2021-03-11 DIAGNOSIS — N28.9 RENAL INSUFFICIENCY: ICD-10-CM

## 2021-03-11 DIAGNOSIS — I10 ESSENTIAL HYPERTENSION: Primary | ICD-10-CM

## 2021-03-11 DIAGNOSIS — Z01.89 LABORATORY TEST: ICD-10-CM

## 2021-03-12 ENCOUNTER — TELEPHONE (OUTPATIENT)
Dept: FAMILY MEDICINE CLINIC | Facility: CLINIC | Age: 61
End: 2021-03-12

## 2021-03-12 NOTE — TELEPHONE ENCOUNTER
PATIENT CALLING IN IS SCHEDULED TO GET COVID VACCINE ON Monday AND WANTS TO KNOW IF THERE IS ANY REASON THAT HER OR HER  SHOULD NOT GET IT.     PLEASE CONTACT AND ADVISE AS SOON AS POSSIBLE .       Emilie Soto ([facility]) [phone] (H)

## 2021-03-16 ENCOUNTER — OFFICE VISIT (OUTPATIENT)
Dept: FAMILY MEDICINE CLINIC | Facility: CLINIC | Age: 61
End: 2021-03-16

## 2021-03-16 VITALS
RESPIRATION RATE: 16 BRPM | WEIGHT: 192.8 LBS | SYSTOLIC BLOOD PRESSURE: 108 MMHG | TEMPERATURE: 98.1 F | HEART RATE: 75 BPM | DIASTOLIC BLOOD PRESSURE: 70 MMHG | BODY MASS INDEX: 35.48 KG/M2 | HEIGHT: 62 IN | OXYGEN SATURATION: 100 %

## 2021-03-16 DIAGNOSIS — I10 ESSENTIAL HYPERTENSION: Chronic | ICD-10-CM

## 2021-03-16 DIAGNOSIS — I50.30 DIASTOLIC CONGESTIVE HEART FAILURE, UNSPECIFIED HF CHRONICITY (HCC): Chronic | ICD-10-CM

## 2021-03-16 DIAGNOSIS — F32.A DEPRESSION, UNSPECIFIED DEPRESSION TYPE: ICD-10-CM

## 2021-03-16 DIAGNOSIS — F41.9 ANXIETY: ICD-10-CM

## 2021-03-16 DIAGNOSIS — G47.00 INSOMNIA, UNSPECIFIED TYPE: ICD-10-CM

## 2021-03-16 DIAGNOSIS — K21.9 GASTROESOPHAGEAL REFLUX DISEASE, UNSPECIFIED WHETHER ESOPHAGITIS PRESENT: Chronic | ICD-10-CM

## 2021-03-16 DIAGNOSIS — Z12.31 ENCOUNTER FOR SCREENING MAMMOGRAM FOR BREAST CANCER: Primary | ICD-10-CM

## 2021-03-16 DIAGNOSIS — M06.9 RHEUMATOID ARTHRITIS, INVOLVING UNSPECIFIED SITE, UNSPECIFIED WHETHER RHEUMATOID FACTOR PRESENT (HCC): Chronic | ICD-10-CM

## 2021-03-16 DIAGNOSIS — N28.9 RENAL INSUFFICIENCY: ICD-10-CM

## 2021-03-16 PROCEDURE — 99214 OFFICE O/P EST MOD 30 MIN: CPT | Performed by: FAMILY MEDICINE

## 2021-03-16 RX ORDER — TRAZODONE HYDROCHLORIDE 50 MG/1
100 TABLET ORAL NIGHTLY PRN
Qty: 180 TABLET | Refills: 3
Start: 2021-03-16 | End: 2021-08-05 | Stop reason: SDUPTHER

## 2021-03-16 RX ORDER — ALPRAZOLAM 0.25 MG/1
0.12 TABLET ORAL 2 TIMES DAILY PRN
Qty: 30 TABLET | Refills: 0
Start: 2021-03-16 | End: 2021-11-12 | Stop reason: DRUGHIGH

## 2021-03-16 NOTE — PROGRESS NOTES
Subjective   Emilie Soto is a 60 y.o. female presenting with chief complaint of:   Chief Complaint   Patient presents with   • Heartburn     6mo f/u       History of Present Illness : With .       Has multiple chronic problems to consider that might have a bearing on today's issues;  an interval appointment.       Chronic/acute problems reviewed today:   1. Encounter for screening mammogram for breast cancer Chronic/ongoing yearly need to review for breast cancer.  No breast pain, masses, discharge.       2. Essential hypertension Chronic/stable. Stable here past/no recent home blood pressures.  No significant chest pain, SOB, LE edema, orthopnea, near syncope, dizziness/light headness.   Recent Vitals       10/13/2020 10/13/2020 3/16/2021       BP:  98/63  101/61  108/70     Pulse:  63  61  75     Temp:  --  --  98.1 °F (36.7 °C)     Weight:  --  --  87.5 kg (192 lb 12.8 oz)     BMI (Calculated):  --  --  35.3            3. Diastolic congestive heart failure, unspecified HF chronicity (CMS/HCC) Chronic/stable.  Denies significant sob, orthopnea, leg edema, weight gain.  Aware of influence diet/salt and watching weight at home.       4. Gastroesophageal reflux disease, unspecified whether esophagitis present Chronic/stable.  Controlled heartburn, reflux without dysphagia, melena.  Rx used, periods not used proven needed with symptoms -currently doing ok.      5. Renal insufficiency-ro Chronic/stable.  No/yet nephrology.  No dysuria.  Previously warned/reminded:   To avoid further kidney function decline  A. Treat any time you think you have infection  B. Stay hydrated (dont get dehydrated); drink at least 60 oz fluid every 24 hr (1800 cc or nearly a 2L)  C. Do not allow any xrays with dye WITHOUT the doctor ordering checking your renal function  D. Do not get new medications without the doctor considering your renal condition  E. Do not use motrin/ibuprofen, alleve/naprosyn and these types of medications      6. Anxiety: [facility] Chronic/stable:   On/off anxiety tolerated somewhat.  Rx helps and not interested in Rx change. Stress ongoing primarily grandchildren and her daughter.  Additional financial concerns.      7. Depression, unspecified depression type chronic has been significant  mood swings, down moods, nervousness, difficulty with concentration to function home/work.  Others close have not been concerned.  No suicide ideation/intent.  Rx helps     8. Rheumatoid arthritis, involving unspecified site, unspecified whether rheumatoid factor present (CMS/[facility]) chronic stable joint discomfort; sees Dr. Pérez and has her current medications monitored.   9. Insomnia, unspecified type chronic difficulty staying and falling asleep; a quarter of a Xanax helps along with her 100 mg trazodone.  No daytime sleepiness.     Has an/another acute issue today: None.    The following portions of the patient's history were reviewed and updated as appropriate: allergies, current medications, past family history, past medical history, past social history, past surgical history and problem list.      Current Outpatient Medications:   •  albuterol sulfate HFA (ProAir HFA) 108 (90 Base) MCG/ACT inhaler, Inhale 1 puff Every 4 (Four) Hours As Needed for Wheezing or Shortness of Air., Disp: 24 g, Rfl: 3  •  alendronate (Fosamax) 70 MG tablet, Take 1 tablet by mouth Every 7 (Seven) Days., Disp: 12 tablet, Rfl: 1  •  ALPRAZolam (XANAX) 0.5 MG tablet, Take 1 tablet by mouth 2 (Two) Times a Day As Needed for Anxiety., Disp: 30 tablet, Rfl: 0  •  amLODIPine (NORVASC) 5 MG tablet, Take 1 tablet by mouth Daily., Disp: 90 tablet, Rfl: 3  •  carvedilol (COREG) 25 MG tablet, Take 1 tablet by mouth 2 (Two) Times a Day. (Patient taking differently: Take 25 mg by mouth 2 (Two) Times a Day. Pt is currently only taking once a day), Disp: 180 tablet, Rfl: 1  •  cloNIDine (CATAPRES) 0.1 MG tablet, Take 0.1 mg by mouth 2 (Two) Times a Day. One by mouth  twice daily as needed if BP greater than 160/100, Disp: , Rfl:   •  estradiol (ESTRACE) 2 MG tablet, Take 1 mg by mouth Daily., Disp: , Rfl:   •  folic acid (FOLVITE) 1 MG tablet, Take 1 tablet by mouth Daily., Disp: , Rfl:   •  glucose blood test strip, Use as instructed, Disp: 100 each, Rfl: 3  •  HYDROcodone-acetaminophen (NORCO) 7.5-325 MG per tablet, Take 1 tablet by mouth 2 (Two) Times a Day As Needed for Moderate Pain ., Disp: 60 tablet, Rfl: 0  •  hydroxychloroquine (PLAQUENIL) 200 MG tablet, Take 1 tablet by mouth 2 (Two) Times a Day. Pt is only taking it once daily, Disp: , Rfl:   •  loratadine (Claritin) 10 MG tablet, Take 10 mg by mouth As Needed for Allergies., Disp: , Rfl:   •  losartan (COZAAR) 100 MG tablet, TAKE 1 TABLET DAILY, Disp: 90 tablet, Rfl: 2  •  mycophenolate (CELLCEPT) 500 MG tablet, Take 1 tablet by mouth 2 (Two) Times a Day., Disp: , Rfl:   •  omeprazole (priLOSEC) 20 MG capsule, TAKE 1 CAPSULE DAILY GENERIC FOR PRILOSEC, Disp: 90 capsule, Rfl: 3  •  potassium chloride (K-DUR) 10 MEQ CR tablet, Take 1 tablet by mouth 2 (Two) Times a Day With Meals. (Patient taking differently: Take 10 mEq by mouth 2 (Two) Times a Day With Meals. Pt is taking it once daily), Disp: 180 tablet, Rfl: 2  •  senna (SENOKOT) 8.6 MG tablet, Take 1 tablet by mouth As Needed for Constipation., Disp: , Rfl:   •  spironolactone (ALDACTONE) 25 MG tablet, TAKE 1 TABLET DAILY, Disp: 90 tablet, Rfl: 3  •  traZODone (DESYREL) 50 MG tablet, TAKE 2 TABLETS EVERY NIGHT AT BEDTIME AS NEEDED, Disp: 180 tablet, Rfl: 3  •  venlafaxine 225 MG tablet sustained-release 24 hour 24 hr tablet, TAKE 1 TABLET DAILY WITH BREAKFAST, Disp: 90 tablet, Rfl: 3  •  verapamil SR (CALAN-SR) 240 MG CR tablet, Take 1 tablet by mouth 2 (Two) Times a Day., Disp: 180 tablet, Rfl: 1  •  vitamin D (ERGOCALCIFEROL) 81177 UNITS capsule capsule, Take 1 capsule by mouth 1 (One) Time Per Week., Disp: , Rfl:     No problems with medications.    Allergies    Allergen Reactions   • Abilify [Aripiprazole] Other (See Comments)     Insomnia   • Bactrim [Sulfamethoxazole-Trimethoprim] Other (See Comments)     Pt unsure of reaction   • Biaxin [Clarithromycin] Nausea And Vomiting   • Ciprofloxacin Hcl Other (See Comments)     Pt unsure of reaction   • Duricef [Cefadroxil] Other (See Comments)     Pt unsure of reaction   • Ketek [Telithromycin] Other (See Comments)     Pt unsure of reaction   • Relafen [Nabumetone] Itching   • Wellbutrin [Bupropion] Itching       Review of Systems  GENERAL:  Active/slower with limits, speed, stamina for age and desire. Sleep is ok; occ hard falling/staying with worry. No fever now.  SKIN: No rash/skin lesion of concern:   ENDO:  No syncope, near or diaphoretic sweaty spells..  HEENT: No recent head injury; but same/occ headache.   No vision change.   Decline hearing loss.  Ears without pain/drainage.  No sore throat.  No significant nasal/sinus congestion/drainage. No epistaxis.  CHEST: No chest wall tenderness or mass. No cough, without wheeze.  No SOB; no hemoptysis.  CV: No chest pain, palpitations, ankle edema.  GI: No dysphagia.  No abdominal pain, diarrhea, constipation.  No rectal bleeding, or melena.  Occ heartburn still.   :  Voids without dysuria, or incontinence to completion.  ORTHO: No painful/swollen joints but various on /off sore.  No change some sore neck or back.  No acute neck or back pain without recent injury.  NEURO: No dizziness, weakness of extremities.  No numbness/paresthesias.   PSYCH: No memory loss.  Mood good; often anxious, depressed but/and not suicidal.  Tries to tolerate stress .      Screening:  Gyne: Forrest/confirmed 2.25.20-planned with new  Mammogram: 6.8.20  Bone density: 6.8.20 Deca-bone d/Sancho/Ts L2-0.9 neck -2.2/6.8.2020  Low dose CT chest: Tobacco-smoker/none: NA  GI:   Colon-p, div/Jasiel/DESHAWN/10.10.19/5y  [id number]/Jasiel/DESHAWN/10.13.2020/  Prostate: NA  Usual lab order  Any lab others want; (we  wish to attempt not to duplicate so we need copies of labs done outside the office/out of epic); OR can have all labs here (bring the order from all other doctors) and we will forward to all specialist  6m CBC, CMP, sed rate, CK-total, CR-protein  12m CBC, CMP, LIPID, TSH, fT4, CK-total, Vit D, sed rate, CR-protein    Data reviewed:   Recent admit/ER/MD visits: harrison    Last cardiac testing:   Echo:   Results for orders placed during the hospital encounter of 11/13/17    Adult Stress Echo W/ Cont or Stress Agent if Necessary Per Protocol    Interpretation Summary  · Below average functional capacity.  · Clinically negative for ischemia.  · Electrically equivocal due to baseline EKG abnormalities.  · Left ventricular systolic function is normal at rest with appropriate increase in contractility with stress.    Exercise stress echocardiogram is low risk for ischemia.        Lab Results:  Results for orders placed or performed in visit on 03/11/21   Comprehensive Metabolic Panel    Specimen: Blood   Result Value Ref Range    Glucose 94 65 - 99 mg/dL    BUN 11 6 - 20 mg/dL    Creatinine 1.11 (H) 0.57 - 1.00 mg/dL    eGFR Non African Am 50 (L) >60 mL/min/1.73    eGFR African Am 61 >60 mL/min/1.73    BUN/Creatinine Ratio 9.9 7.0 - 25.0    Sodium 143 136 - 145 mmol/L    Potassium 3.9 3.5 - 5.2 mmol/L    Chloride 104 98 - 107 mmol/L    Total CO2 28.3 22.0 - 29.0 mmol/L    Calcium 9.4 8.6 - 10.5 mg/dL    Total Protein 6.9 6.0 - 8.5 g/dL    Albumin 4.00 3.50 - 5.20 g/dL    Globulin 2.9 gm/dL    A/G Ratio 1.4 g/dL    Total Bilirubin <0.2 0.0 - 1.2 mg/dL    Alkaline Phosphatase 68 39 - 117 U/L    AST (SGOT) 17 1 - 32 U/L    ALT (SGPT) 12 1 - 33 U/L   Sedimentation Rate    Specimen: Blood   Result Value Ref Range    Sed Rate 21 0 - 30 mm/hr   C-reactive Protein    Specimen: Blood   Result Value Ref Range    C-Reactive Protein 2.74 (H) 0.00 - 0.50 mg/dL   CK    Specimen: Blood   Result Value Ref Range    Creatine Kinase 101 20  - 180 U/L   CBC & Differential    Specimen: Blood   Result Value Ref Range    WBC 4.46 3.40 - 10.80 10*3/mm3    RBC 4.24 3.77 - 5.28 10*6/mm3    Hemoglobin 12.5 12.0 - 15.9 g/dL    Hematocrit 38.0 34.0 - 46.6 %    MCV 89.6 79.0 - 97.0 fL    MCH 29.5 26.6 - 33.0 pg    MCHC 32.9 31.5 - 35.7 g/dL    RDW 12.6 12.3 - 15.4 %    Platelets 247 140 - 450 10*3/mm3    Neutrophil Rel % 46.2 42.7 - 76.0 %    Lymphocyte Rel % 31.4 19.6 - 45.3 %    Monocyte Rel % 18.6 (H) 5.0 - 12.0 %    Eosinophil Rel % 2.7 0.3 - 6.2 %    Basophil Rel % 0.7 0.0 - 1.5 %    Neutrophils Absolute 2.06 1.70 - 7.00 10*3/mm3    Lymphocytes Absolute 1.40 0.70 - 3.10 10*3/mm3    Monocytes Absolute 0.83 0.10 - 0.90 10*3/mm3    Eosinophils Absolute 0.12 0.00 - 0.40 10*3/mm3    Basophils Absolute 0.03 0.00 - 0.20 10*3/mm3    Immature Granulocyte Rel % 0.4 0.0 - 0.5 %    Immature Grans Absolute 0.02 0.00 - 0.05 10*3/mm3    nRBC 0.0 0.0 - 0.2 /100 WBC       A1C:  Lab Results - Last 18 Months   Lab Units 09/10/20  0859   HEMOGLOBIN A1C % 5.40     LIPID:  Lab Results - Last 18 Months   Lab Units 09/10/20  0859   CHOLESTEROL mg/dL 121   LDL CHOL mg/dL 51   HDL CHOL mg/dL 55   TRIGLYCERIDES mg/dL 77     PSA:No results for input(s): PSA in the last 89685 hours.  CBC:  Lab Results - Last 18 Months   Lab Units 03/11/21  0725 09/10/20  0859 06/15/20  1008 01/17/20  1159 11/19/19  0831   WBC 10*3/mm3 4.46 4.90 4.66 3.95 6.53   HEMOGLOBIN g/dL 12.5 12.7 12.9 12.9 13.0   HEMATOCRIT % 38.0 37.7 39.4 38.9 39.7   PLATELETS 10*3/mm3 247 244 233 232 267      BMP/CMP:  Lab Results - Last 18 Months   Lab Units 03/11/21  0725 09/10/20  0859 07/14/20  0756 06/15/20  1008 01/17/20  1159 11/19/19  0831   SODIUM mmol/L 143 137 143 139 141 140   POTASSIUM mmol/L 3.9 4.7 4.1 4.0 4.6 4.3   CHLORIDE mmol/L 104 104 105 102 101 102   TOTAL CO2 mmol/L 28.3 26.2 28.1 28.1 28.0 27.8   GLUCOSE mg/dL 94 84 89 86 80 88   BUN mg/dL 11 12 13 14 17 12   CREATININE mg/dL 1.11* 1.08* 1.17* 1.20*  "1.12* 1.10*   EGFR IF NONAFRICN AM mL/min/1.73 50* 52* 48* 46* 50* 51*   EGFR IF AFRICN AM mL/min/1.73 61 63 58* 56* 61 62   CALCIUM mg/dL 9.4 9.0 8.8 9.2 9.4 9.1     HEPATIC:  Lab Results - Last 18 Months   Lab Units 03/11/21  0725 09/10/20  0859 06/15/20  1008 01/17/20  1159 11/19/19  0831   ALT (SGPT) U/L 12 14 10 13 11   AST (SGOT) U/L 17 15 16 16 13   ALK PHOS U/L 68 70 66 77 55     THYROID:  Lab Results - Last 18 Months   Lab Units 09/10/20  0859   TSH uIU/mL 0.966   FREE T4 ng/dL 1.35       Objective   /70   Pulse 75   Temp 98.1 °F (36.7 °C) (Infrared)   Resp 16   Ht 157.5 cm (62\")   Wt 87.5 kg (192 lb 12.8 oz)   SpO2 100%   Breastfeeding No   BMI 35.26 kg/m²   Body mass index is 35.26 kg/m².    Recent Vitals       10/13/2020 10/13/2020 3/16/2021       BP:  98/63  101/61  108/70     Pulse:  63  61  75     Temp:  --  --  98.1 °F (36.7 °C)     Weight:  --  --  87.5 kg (192 lb 12.8 oz)     BMI (Calculated):  --  --  35.3           Physical Exam  GENERAL:  Well nourished/developed in no acute distress. BMI noted: 33.8  SKIN: Turgor excellent, without wound, rash, lesion  HEENT: Normal cephalic without trauma.  Pupils equal round reactive to light. Extraocular motions full without nystagmus.   External canals nonobstructive nontender without reddness. Tymphatic membranes josiane with cesar structures intact.   Oral cavity without growths, exudates, and moist.  Posterior pharynx without mass, obstruction, redness.  No thyromegaly, mass, tenderness, lymphadenopathy and supple.  CV: Regular rhythm.  No murmur, gallop,  edema. Posterior pulses intact.  No carotid bruits.  CHEST: No chest wall tenderness or mass.   LUNGS: Symmetric motion with clear to auscultation.   ABD: Soft, nontender without mass.   ORTHO: Symmetric extremities without swelling/point tenderness.  Full gross range of motion; few sore fingers.  NEURO: CN 2-12 grossly intact.  Symmetric facies. 1/4 x bicep equal reflexes.  UE/LE   3/5 " strength throughout.  Nonfocal use extremities. Speech clear. Intact light touch with monofilament, vibratory sensation with tuning fork; equal toes/distal feet.    PSYCH: Oriented x 3.  Pleasant calm, well kept.   Purposeful/directed conservation with intact short/long gross memory.       Assessment/Plan     1. Encounter for screening mammogram for breast cancer    2. Essential hypertension    3. Diastolic congestive heart failure, unspecified HF chronicity (CMS/[facility])    4. Gastroesophageal reflux disease, unspecified whether esophagitis present    5. Renal insufficiency-ro    6. Anxiety: [facility]    7. Depression, unspecified depression type    8. Rheumatoid arthritis, involving unspecified site, unspecified whether rheumatoid factor present (CMS/[facility])    9. Insomnia, unspecified type    10. Anxiety        Discussion:  Yearly mammogram; ordered  Blood pressure looks good; be nice if she could follow at home  Exercise diet proper weight and avoidance of high salt foods  Control heartburn or let us know if it is not  Avoid nonsteroidals; stay adequately hydrated with 60 ounces of fluid  Counselors available if she needs referral  Continue to follow closely with Dr. Pérez as there is medications he is particularly monitoring    Medical decision issues:   Data review above:   Rx: reviewed and decisions:   Minor adjustment in Xanax; given a greenlight for her to use a small dose at bedtime until her anxiety is improved    New Medications Ordered This Visit   Medications   • traZODone (DESYREL) 50 MG tablet     Sig: Take 2 tablets by mouth At Night As Needed for Sleep.     Dispense:  180 tablet     Refill:  3   • ALPRAZolam (Xanax) 0.25 MG tablet     Sig: Take 0.5 tablets by mouth 2 (Two) Times a Day As Needed for Anxiety or Sleep.     Dispense:  30 tablet     Refill:  0       Orders placed:   LAB/Testing/Referrals: reviewed/orders:   Today:   Orders Placed This Encounter   Procedures   • Mammo Screening Digital  Tomosynthesis Bilateral With CAD     Chronic/recurrent labs above or change to:   same     Health maintenance:   Body mass index is 35.26 kg/m².  Patient's Body mass index is 35.26 kg/m². BMI is above normal parameters. Recommendations include: exercise counseling and nutrition counseling.    Tobacco use reviewed: Non-smoker      There are no Patient Instructions on file for this visit.    Follow up: Return for lab;, Dr Miles-, 6 m;.  Future Appointments   Date Time Provider Department Center   9/17/2021  8:15 AM LAB [facility]   9/23/2021 10:45 AM Alexandru Miles MD [facility]

## 2021-03-25 DIAGNOSIS — F41.9 ANXIETY: ICD-10-CM

## 2021-03-25 DIAGNOSIS — F32.A DEPRESSION, UNSPECIFIED DEPRESSION TYPE: ICD-10-CM

## 2021-03-25 RX ORDER — ALPRAZOLAM 0.5 MG/1
TABLET ORAL
Qty: 30 TABLET | Refills: 0 | OUTPATIENT
Start: 2021-03-25

## 2021-04-09 DIAGNOSIS — F41.9 ANXIETY: ICD-10-CM

## 2021-04-09 DIAGNOSIS — F32.A DEPRESSION, UNSPECIFIED DEPRESSION TYPE: ICD-10-CM

## 2021-04-12 RX ORDER — ALPRAZOLAM 0.5 MG/1
0.25 TABLET ORAL 2 TIMES DAILY PRN
Qty: 30 TABLET | Refills: 0 | Status: SHIPPED | OUTPATIENT
Start: 2021-04-12 | End: 2021-04-29

## 2021-04-26 RX ORDER — POTASSIUM CHLORIDE 750 MG/1
10 TABLET, EXTENDED RELEASE ORAL 2 TIMES DAILY WITH MEALS
Qty: 180 TABLET | Refills: 3 | Status: SHIPPED | OUTPATIENT
Start: 2021-04-26 | End: 2021-04-28 | Stop reason: SDUPTHER

## 2021-04-28 RX ORDER — POTASSIUM CHLORIDE 750 MG/1
10 TABLET, EXTENDED RELEASE ORAL 2 TIMES DAILY WITH MEALS
Qty: 90 TABLET | Refills: 3 | Status: SHIPPED | OUTPATIENT
Start: 2021-04-28 | End: 2021-05-03 | Stop reason: SDUPTHER

## 2021-04-29 DIAGNOSIS — F41.9 ANXIETY: ICD-10-CM

## 2021-04-29 DIAGNOSIS — F32.A DEPRESSION, UNSPECIFIED DEPRESSION TYPE: ICD-10-CM

## 2021-04-29 RX ORDER — ALPRAZOLAM 0.5 MG/1
TABLET ORAL
Qty: 30 TABLET | Refills: 0 | Status: SHIPPED | OUTPATIENT
Start: 2021-04-29 | End: 2021-06-11

## 2021-04-29 NOTE — TELEPHONE ENCOUNTER
LAST FILLED 4/12/2021      Requested Prescriptions     Pending Prescriptions Disp Refills   • ALPRAZolam (XANAX) 0.5 MG tablet [Pharmacy Med Name: ALPRAZOLAM 0.5 MG TABLETS] 30 tablet 0     Sig: TAKE ONE TABLET TWICE DAILY AS NEEDED ANXIETY GENERIC FOR XANAX

## 2021-05-03 RX ORDER — POTASSIUM CHLORIDE 750 MG/1
10 TABLET, EXTENDED RELEASE ORAL DAILY
Qty: 90 TABLET | Refills: 3 | Status: SHIPPED | OUTPATIENT
Start: 2021-05-03 | End: 2021-08-05 | Stop reason: SDUPTHER

## 2021-05-03 NOTE — TELEPHONE ENCOUNTER
Rx had two sets of instructions and pharmacy requested corrected rx.        Requested Prescriptions     Pending Prescriptions Disp Refills   • potassium chloride (K-DUR,KLOR-CON) 10 MEQ CR tablet 90 tablet 3     Sig: Take 1 tablet by mouth Daily.

## 2021-06-10 ENCOUNTER — OFFICE VISIT (OUTPATIENT)
Dept: OBGYN CLINIC | Age: 61
End: 2021-06-10
Payer: MEDICARE

## 2021-06-10 VITALS
DIASTOLIC BLOOD PRESSURE: 78 MMHG | WEIGHT: 192 LBS | SYSTOLIC BLOOD PRESSURE: 140 MMHG | BODY MASS INDEX: 37.69 KG/M2 | HEIGHT: 60 IN

## 2021-06-10 DIAGNOSIS — Z12.72 SCREENING FOR MALIGNANT NEOPLASM OF VAGINA AFTER TOTAL HYSTERECTOMY: ICD-10-CM

## 2021-06-10 DIAGNOSIS — Z90.710 HISTORY OF ROBOT-ASSISTED LAPAROSCOPIC HYSTERECTOMY: ICD-10-CM

## 2021-06-10 DIAGNOSIS — Z11.51 SCREENING FOR HPV (HUMAN PAPILLOMAVIRUS): ICD-10-CM

## 2021-06-10 DIAGNOSIS — Z12.31 ENCOUNTER FOR SCREENING MAMMOGRAM FOR BREAST CANCER: ICD-10-CM

## 2021-06-10 DIAGNOSIS — Z01.419 ENCOUNTER FOR ANNUAL ROUTINE GYNECOLOGICAL EXAMINATION: Primary | ICD-10-CM

## 2021-06-10 DIAGNOSIS — Z90.79 HISTORY OF BILATERAL SALPINGO-OOPHORECTOMY (BSO): ICD-10-CM

## 2021-06-10 DIAGNOSIS — Z90.710 SCREENING FOR MALIGNANT NEOPLASM OF VAGINA AFTER TOTAL HYSTERECTOMY: ICD-10-CM

## 2021-06-10 DIAGNOSIS — Z90.722 HISTORY OF BILATERAL SALPINGO-OOPHORECTOMY (BSO): ICD-10-CM

## 2021-06-10 PROCEDURE — G0101 CA SCREEN;PELVIC/BREAST EXAM: HCPCS | Performed by: OBSTETRICS & GYNECOLOGY

## 2021-06-10 RX ORDER — LORATADINE 10 MG/1
10 CAPSULE, LIQUID FILLED ORAL DAILY
COMMUNITY

## 2021-06-10 RX ORDER — TRAZODONE HYDROCHLORIDE 50 MG/1
50 TABLET ORAL NIGHTLY
COMMUNITY

## 2021-06-10 RX ORDER — VENLAFAXINE HYDROCHLORIDE 225 MG/1
225 TABLET, EXTENDED RELEASE ORAL
COMMUNITY

## 2021-06-10 RX ORDER — AMLODIPINE BESYLATE 5 MG/1
5 TABLET ORAL DAILY
COMMUNITY

## 2021-06-10 RX ORDER — CLONIDINE HYDROCHLORIDE 0.1 MG/1
0.1 TABLET ORAL 2 TIMES DAILY
COMMUNITY

## 2021-06-10 RX ORDER — MYCOPHENOLATE MOFETIL 500 MG/1
TABLET ORAL 2 TIMES DAILY
COMMUNITY

## 2021-06-10 RX ORDER — ALPRAZOLAM 0.25 MG/1
0.25 TABLET ORAL NIGHTLY PRN
COMMUNITY

## 2021-06-10 RX ORDER — HYDROXYCHLOROQUINE SULFATE 200 MG/1
TABLET, FILM COATED ORAL DAILY
COMMUNITY

## 2021-06-10 RX ORDER — SENNA PLUS 8.6 MG/1
1 TABLET ORAL DAILY
COMMUNITY

## 2021-06-10 RX ORDER — HYDROCODONE BITARTRATE AND ACETAMINOPHEN 7.5; 325 MG/1; MG/1
1 TABLET ORAL EVERY 6 HOURS PRN
COMMUNITY

## 2021-06-10 RX ORDER — ALENDRONATE SODIUM 70 MG/1
70 TABLET ORAL
COMMUNITY

## 2021-06-10 RX ORDER — CARVEDILOL 25 MG/1
25 TABLET ORAL 2 TIMES DAILY WITH MEALS
COMMUNITY

## 2021-06-10 RX ORDER — FOLIC ACID 1 MG/1
1 TABLET ORAL DAILY
COMMUNITY

## 2021-06-10 RX ORDER — SPIRONOLACTONE 25 MG/1
25 TABLET ORAL DAILY
COMMUNITY

## 2021-06-10 RX ORDER — VERAPAMIL HYDROCHLORIDE 240 MG/1
240 TABLET, FILM COATED, EXTENDED RELEASE ORAL NIGHTLY
COMMUNITY

## 2021-06-10 RX ORDER — GLUCOSAMINE HCL/CHONDROITIN SU 500-400 MG
1 CAPSULE ORAL
COMMUNITY

## 2021-06-10 RX ORDER — ESTRADIOL 2 MG/1
2 TABLET ORAL DAILY
COMMUNITY

## 2021-06-10 RX ORDER — LOSARTAN POTASSIUM 100 MG/1
100 TABLET ORAL DAILY
COMMUNITY

## 2021-06-10 RX ORDER — OMEPRAZOLE 20 MG/1
20 CAPSULE, DELAYED RELEASE ORAL DAILY
COMMUNITY

## 2021-06-10 RX ORDER — ALBUTEROL SULFATE 90 UG/1
2 AEROSOL, METERED RESPIRATORY (INHALATION) EVERY 6 HOURS PRN
COMMUNITY

## 2021-06-10 ASSESSMENT — ENCOUNTER SYMPTOMS
GASTROINTESTINAL NEGATIVE: 1
RESPIRATORY NEGATIVE: 1
ALLERGIC/IMMUNOLOGIC NEGATIVE: 1
EYES NEGATIVE: 1

## 2021-06-10 NOTE — PATIENT INSTRUCTIONS
Patient Education        Breast Self-Exam: Care Instructions  Your Care Instructions     A breast self-exam is when you check your breasts for lumps or changes. This regular exam helps you learn how your breasts normally look and feel. Most breast problems or changes are not because of cancer. Breast self-exam is not a substitute for a mammogram. Having regular breast exams by your doctor and regular mammograms improve your chances of finding any problems with your breasts. Some women set a time each month to do a step-by-step breast self-exam. Other women like a less formal system. They might look at their breasts as they brush their teeth, or feel their breasts once in a while in the shower. If you notice a change in your breast, tell your doctor. Follow-up care is a key part of your treatment and safety. Be sure to make and go to all appointments, and call your doctor if you are having problems. It's also a good idea to know your test results and keep a list of the medicines you take. How do you do a breast self-exam?  · The best time to examine your breasts is usually one week after your menstrual period begins. Your breasts should not be tender then. If you do not have periods, you might do your exam on a day of the month that is easy to remember. · To examine your breasts:  ? Remove all your clothes above the waist and lie down. When you are lying down, your breast tissue spreads evenly over your chest wall, which makes it easier to feel all your breast tissue. ? Use the padsnot the fingertipsof the 3 middle fingers of your left hand to check your right breast. Move your fingers slowly in small coin-sized circles that overlap. ? Use three levels of pressure to feel of all your breast tissue. Use light pressure to feel the tissue close to the skin surface. Use medium pressure to feel a little deeper. Use firm pressure to feel your tissue close to your breastbone and ribs.  Use each pressure level to feel your breast tissue before moving on to the next spot. ? Check your entire breast, moving up and down as if following a strip from the collarbone to the bra line, and from the armpit to the ribs. Repeat until you have covered the entire breast.  ? Repeat this procedure for your left breast, using the pads of the 3 middle fingers of your right hand. · To examine your breasts while in the shower:  ? Place one arm over your head and lightly soap your breast on that side. ? Using the pads of your fingers, gently move your hand over your breast (in the strip pattern described above), feeling carefully for any lumps or changes. ? Repeat for the other breast.  · Have your doctor inspect anything you notice to see if you need further testing. Where can you learn more? Go to [email or web address] and sign in to your [employer] account. Enter P148 in the [employer] box to learn more about \"Breast Self-Exam: Care Instructions. \"     If you do not have an account, please click on the \"Sign Up Now\" link. Current as of: December 17, 2020               Content Version: 12.8  © 1162-9366 [employer]. Care instructions adapted under license by [facility] ([facility]). If you have questions about a medical condition or this instruction, always ask your healthcare professional. [address] any warranty or liability for your use of this information.

## 2021-06-10 NOTE — PROGRESS NOTES
Matthias Perez is a 62 y.o. new patient who presents today for gyn and breast exam.    Pt had a hysterectomy for heavy bleeding and pain. Review of Systems   Constitutional: Negative. HENT: Negative. Eyes: Negative. Respiratory: Negative. Cardiovascular: Negative. Gastrointestinal: Negative. Endocrine: Negative. Genitourinary: Negative. Negative for dysuria, frequency, menstrual problem, pelvic pain, urgency and vaginal discharge. Musculoskeletal: Negative. Skin: Negative. Allergic/Immunologic: Negative. Neurological: Negative. Hematological: Negative. Psychiatric/Behavioral: Negative. Past Medical History:   Diagnosis Date    Anxiety     Gastroesophageal reflux disease     [facility]     Hypertension     Lupus ([facility] 2011    Renal insufficiency     Rheumatoid arthritis ([facility]        Past Surgical History:   Procedure Laterality Date    APPENDECTOMY      PARTIAL HYSTERECTOMY      TONSILLECTOMY         Family History   Problem Relation Age of Onset    Prostate Cancer Father     Breast Cancer Sister        Social History     Socioeconomic History    Marital status:      Spouse name: Not on file    Number of children: Not on file    Years of education: Not on file    Highest education level: Not on file   Occupational History    Not on file   Tobacco Use    Smoking status: Never Smoker    Smokeless tobacco: Never Used   Substance and Sexual Activity    Alcohol use: Not on file    Drug use: Not on file    Sexual activity: Not on file   Other Topics Concern    Not on file   Social History Narrative    Not on file     Social Determinants of Health     Financial Resource Strain:     Difficulty of Paying Living Expenses:    Food Insecurity:     Worried About Running Out of Food in the Last Year:     Herman of Food in the Last Year:    Transportation Needs:     Lack of Transportation (Medical):      Lack of Transportation delayed release capsule, Take 20 mg by mouth daily, Disp: , Rfl:     senna (SENOKOT) 8.6 MG tablet, Take 1 tablet by mouth daily, Disp: , Rfl:     spironolactone (ALDACTONE) 25 MG tablet, Take 25 mg by mouth daily, Disp: , Rfl:     traZODone (DESYREL) 50 MG tablet, Take 50 mg by mouth nightly, Disp: , Rfl:     venlafaxine 225 MG extended release tablet, Take 225 mg by mouth daily (with breakfast), Disp: , Rfl:     verapamil (CALAN SR) 240 MG extended release tablet, Take 240 mg by mouth nightly, Disp: , Rfl:     Cholecalciferol (VITAMIN D3) 1.25 MG (54684 UT) TABS, Take by mouth, Disp: , Rfl:     No Known Allergies    BP (!) 140/78   Ht 5' (1.524 m)   Wt 192 lb (87.1 kg)   BMI 37.50 kg/m²   Physical Exam  Constitutional:       General: She is not in acute distress. Appearance: She is well-developed. HENT:      Head: Normocephalic and atraumatic. Eyes:      General:         Right eye: No discharge. Left eye: No discharge. Conjunctiva/sclera: Conjunctivae normal.   Neck:      Thyroid: No thyromegaly. Trachea: No tracheal deviation. Pulmonary:      Effort: Pulmonary effort is normal. No respiratory distress. Chest:      Breasts: Breasts are symmetrical.         Right: No inverted nipple, mass, nipple discharge, skin change or tenderness. Left: No inverted nipple, mass, nipple discharge, skin change or tenderness. Abdominal:      General: Bowel sounds are normal. There is no distension. Palpations: Abdomen is soft. There is no mass. Tenderness: There is no abdominal tenderness. There is no guarding or rebound. Genitourinary:     Labia:         Right: No rash, tenderness or lesion. Left: No rash, tenderness or lesion. Vagina: Normal.      Uterus: Absent. Adnexa:         Right: No mass, tenderness or fullness. Left: No mass, tenderness or fullness. Rectum: Normal.   Musculoskeletal:         General: No tenderness.  Normal range of motion. Cervical back: Normal range of motion and neck supple. Lymphadenopathy:      Cervical: No cervical adenopathy. Skin:     General: Skin is warm and dry. Findings: No rash. Neurological:      Mental Status: She is alert and oriented to person, place, and time. Cranial Nerves: No cranial nerve deficit. Psychiatric:         Speech: Speech normal.         Behavior: Behavior normal.         Thought Content: Thought content normal.         Judgment: Judgment normal.               Assessment   Diagnosis Orders   1. Encounter for annual routine gynecological examination  Human papillomavirus (HPV) DNA probe thin prep high risk    PAP SMEAR   2. Screening for malignant neoplasm of vagina after total hysterectomy     3. Screening for HPV (human papillomavirus)  Human papillomavirus (HPV) DNA probe thin prep high risk    PAP SMEAR   4. Encounter for screening mammogram for breast cancer  JULIANA Screening Bilateral   5. History of robot-assisted laparoscopic hysterectomy     6. History of bilateral salpingo-oophorectomy (BSO)         Plan     1. Pap smear not indicated  2. Mammogram order  3. RTC one year or prn  4. Pt was unsure of what was done during her hysterectomy, pt told she had a TLH and BSO. Cony Gleason am scribing for and in the presence of Dr. Vamshi العراقي. I, Dr. Vamshi العراقي, personally performed the services described in this documentation as scribed by Dunia Hernandes in my presence, and it is both accurate and complete.

## 2021-06-11 DIAGNOSIS — F41.9 ANXIETY: ICD-10-CM

## 2021-06-11 DIAGNOSIS — F32.A DEPRESSION, UNSPECIFIED DEPRESSION TYPE: ICD-10-CM

## 2021-06-11 RX ORDER — ALPRAZOLAM 0.5 MG/1
TABLET ORAL
Qty: 30 TABLET | Refills: 0 | Status: SHIPPED | OUTPATIENT
Start: 2021-06-11 | End: 2021-07-19

## 2021-06-16 DIAGNOSIS — F41.9 ANXIETY: ICD-10-CM

## 2021-06-16 DIAGNOSIS — S82.899A CLOSED FRACTURE OF ANKLE, UNSPECIFIED LATERALITY, INITIAL ENCOUNTER: Primary | ICD-10-CM

## 2021-06-16 DIAGNOSIS — F32.A DEPRESSION, UNSPECIFIED DEPRESSION TYPE: ICD-10-CM

## 2021-06-16 RX ORDER — HYDROCODONE BITARTRATE AND ACETAMINOPHEN 7.5; 325 MG/1; MG/1
1 TABLET ORAL EVERY 6 HOURS PRN
Qty: 60 TABLET | Refills: 0 | Status: SHIPPED | OUTPATIENT
Start: 2021-06-16 | End: 2021-11-12

## 2021-06-16 NOTE — TELEPHONE ENCOUNTER
Caller: Emilie Soto    Relationship: Self    Best call back number: [phone] (H)    What is the medical concern/diagnosis: BROKEN ANKLE     What specialty or service is being requested: ORTHOPEDICS REFERRAL AND CALL BACK     What is the provider, practice or medical service name: DID NOT STATE     What is the office location: DID NOT STATE    What is the office phone number: DID NOT STATE     Any additional details:   ASKING FOR A CALL BACK TO BE ADVISED ABOUT PAIN WITH HER BROKEN ANKLE SHE STATES SHE WAS SEEN LAST NIGHT AT THE ER     STATES SHE HAS LORTABS 7.5 HAS ONLY TAKEN ONE PILL AND WOULD LIKE TO BE ADVISED ON HOW MANY SHE CAN TAKE AND IF SHE NEEDS TO BE SEEN BY DR TUTTLE

## 2021-07-17 DIAGNOSIS — F32.A DEPRESSION, UNSPECIFIED DEPRESSION TYPE: ICD-10-CM

## 2021-07-17 DIAGNOSIS — F41.9 ANXIETY: ICD-10-CM

## 2021-07-19 RX ORDER — ALPRAZOLAM 0.5 MG/1
TABLET ORAL
Qty: 30 TABLET | Refills: 0 | Status: SHIPPED | OUTPATIENT
Start: 2021-07-19 | End: 2021-09-10

## 2021-07-19 NOTE — TELEPHONE ENCOUNTER
Last filled 6/11/2021 per eileen      Requested Prescriptions     Pending Prescriptions Disp Refills   • ALPRAZolam (XANAX) 0.5 MG tablet [Pharmacy Med Name: ALPRAZOLAM 0.5MG TABLET] 30 tablet 0     Sig: TAKE ONE TABLET TWICE DAILY AS NEEDED ANXIETY GENERIC FOR XANAX

## 2021-07-27 DIAGNOSIS — M85.80 OSTEOPENIA, UNSPECIFIED LOCATION: ICD-10-CM

## 2021-07-27 RX ORDER — ALENDRONATE SODIUM 70 MG/1
TABLET ORAL
Qty: 12 TABLET | Refills: 3 | Status: SHIPPED | OUTPATIENT
Start: 2021-07-27 | End: 2021-08-05 | Stop reason: SDUPTHER

## 2021-08-05 ENCOUNTER — TELEPHONE (OUTPATIENT)
Dept: FAMILY MEDICINE CLINIC | Facility: CLINIC | Age: 61
End: 2021-08-05

## 2021-08-05 DIAGNOSIS — K21.9 GASTROESOPHAGEAL REFLUX DISEASE: ICD-10-CM

## 2021-08-05 DIAGNOSIS — I10 HYPERTENSION, UNSPECIFIED TYPE: Chronic | ICD-10-CM

## 2021-08-05 DIAGNOSIS — F32.A DEPRESSION, UNSPECIFIED DEPRESSION TYPE: ICD-10-CM

## 2021-08-05 DIAGNOSIS — J84.10 PULMONARY FIBROSIS (HCC): ICD-10-CM

## 2021-08-05 DIAGNOSIS — M85.80 OSTEOPENIA, UNSPECIFIED LOCATION: ICD-10-CM

## 2021-08-05 DIAGNOSIS — I10 ESSENTIAL HYPERTENSION: ICD-10-CM

## 2021-08-05 DIAGNOSIS — I50.32 CHRONIC DIASTOLIC CONGESTIVE HEART FAILURE (HCC): Chronic | ICD-10-CM

## 2021-08-05 RX ORDER — VENLAFAXINE HYDROCHLORIDE 225 MG/1
225 TABLET, EXTENDED RELEASE ORAL
Qty: 90 TABLET | Refills: 3 | Status: SHIPPED | OUTPATIENT
Start: 2021-08-05 | End: 2021-08-06

## 2021-08-05 RX ORDER — FOLIC ACID 1 MG/1
1000 TABLET ORAL DAILY
Qty: 90 TABLET | Refills: 3 | Status: SHIPPED | OUTPATIENT
Start: 2021-08-05

## 2021-08-05 RX ORDER — AMLODIPINE BESYLATE 5 MG/1
5 TABLET ORAL DAILY
Qty: 90 TABLET | Refills: 3 | Status: SHIPPED | OUTPATIENT
Start: 2021-08-05 | End: 2022-08-03

## 2021-08-05 RX ORDER — LOSARTAN POTASSIUM 100 MG/1
100 TABLET ORAL DAILY
Qty: 90 TABLET | Refills: 2 | Status: SHIPPED | OUTPATIENT
Start: 2021-08-05 | End: 2022-02-07

## 2021-08-05 RX ORDER — SPIRONOLACTONE 25 MG/1
25 TABLET ORAL DAILY
Qty: 90 TABLET | Refills: 3 | Status: SHIPPED | OUTPATIENT
Start: 2021-08-05 | End: 2022-08-03

## 2021-08-05 RX ORDER — OMEPRAZOLE 20 MG/1
20 CAPSULE, DELAYED RELEASE ORAL DAILY
Qty: 90 CAPSULE | Refills: 3 | Status: SHIPPED | OUTPATIENT
Start: 2021-08-05 | End: 2022-05-06

## 2021-08-05 RX ORDER — ALENDRONATE SODIUM 70 MG/1
70 TABLET ORAL
Qty: 12 TABLET | Refills: 3 | Status: SHIPPED | OUTPATIENT
Start: 2021-08-05 | End: 2021-10-19

## 2021-08-05 RX ORDER — POTASSIUM CHLORIDE 750 MG/1
10 TABLET, EXTENDED RELEASE ORAL DAILY
Qty: 90 TABLET | Refills: 3 | Status: SHIPPED | OUTPATIENT
Start: 2021-08-05 | End: 2022-05-06

## 2021-08-05 RX ORDER — ALBUTEROL SULFATE 90 UG/1
1 AEROSOL, METERED RESPIRATORY (INHALATION) EVERY 4 HOURS PRN
Qty: 24 G | Refills: 3 | Status: SHIPPED | OUTPATIENT
Start: 2021-08-05 | End: 2022-08-03

## 2021-08-05 RX ORDER — TRAZODONE HYDROCHLORIDE 50 MG/1
100 TABLET ORAL NIGHTLY PRN
Qty: 180 TABLET | Refills: 3 | Status: SHIPPED | OUTPATIENT
Start: 2021-08-05 | End: 2022-03-23 | Stop reason: SDUPTHER

## 2021-08-05 RX ORDER — VERAPAMIL HYDROCHLORIDE 240 MG/1
240 TABLET, FILM COATED, EXTENDED RELEASE ORAL 2 TIMES DAILY
Qty: 180 TABLET | Refills: 1 | Status: SHIPPED | OUTPATIENT
Start: 2021-08-05 | End: 2021-11-11

## 2021-08-05 RX ORDER — ESTRADIOL 2 MG/1
1 TABLET ORAL DAILY
Qty: 45 TABLET | Refills: 3 | Status: SHIPPED | OUTPATIENT
Start: 2021-08-05 | End: 2022-08-03

## 2021-08-05 RX ORDER — CARVEDILOL 25 MG/1
25 TABLET ORAL 2 TIMES DAILY
Qty: 180 TABLET | Refills: 1 | Status: SHIPPED | OUTPATIENT
Start: 2021-08-05 | End: 2021-11-11

## 2021-08-05 NOTE — TELEPHONE ENCOUNTER
Caller: Emilie Soto    Relationship to patient: Self    Best call back number: [phone]    Patient is needing all of  her medications sent to [facility].  She recently switched to Medicare and she is needing to switch pharmacies. Patient wanted to let [facility] know that she switched insurance.      [facility] - [place], [place] - [address]  - [phone] [facility] [phone] FX

## 2021-08-06 RX ORDER — VENLAFAXINE HYDROCHLORIDE 75 MG/1
CAPSULE, EXTENDED RELEASE ORAL
Qty: 270 CAPSULE | Refills: 3 | Status: SHIPPED | OUTPATIENT
Start: 2021-08-06 | End: 2022-03-23 | Stop reason: SDUPTHER

## 2021-08-06 NOTE — TELEPHONE ENCOUNTER
Pt called and spoke to neeru and florentino get the effexor 225 mg and suggested to take 75 mg three daily, pt will take two in am and one in pm, E-rx done

## 2021-09-10 DIAGNOSIS — F41.9 ANXIETY: ICD-10-CM

## 2021-09-10 DIAGNOSIS — F32.A DEPRESSION, UNSPECIFIED DEPRESSION TYPE: ICD-10-CM

## 2021-09-10 RX ORDER — ALPRAZOLAM 0.5 MG/1
TABLET ORAL
Qty: 30 TABLET | Refills: 0 | Status: SHIPPED | OUTPATIENT
Start: 2021-09-10 | End: 2021-10-20

## 2021-09-17 ENCOUNTER — LAB (OUTPATIENT)
Dept: FAMILY MEDICINE CLINIC | Facility: CLINIC | Age: 61
End: 2021-09-17

## 2021-09-17 DIAGNOSIS — Z01.89 LABORATORY TEST: ICD-10-CM

## 2021-09-17 DIAGNOSIS — E66.9 OBESITY, UNSPECIFIED CLASSIFICATION, UNSPECIFIED OBESITY TYPE, UNSPECIFIED WHETHER SERIOUS COMORBIDITY PRESENT: ICD-10-CM

## 2021-09-17 DIAGNOSIS — N28.9 RENAL INSUFFICIENCY: ICD-10-CM

## 2021-09-17 DIAGNOSIS — I10 ESSENTIAL HYPERTENSION: Primary | ICD-10-CM

## 2021-09-17 DIAGNOSIS — M06.9 RHEUMATOID ARTHRITIS, INVOLVING UNSPECIFIED SITE, UNSPECIFIED WHETHER RHEUMATOID FACTOR PRESENT (HCC): ICD-10-CM

## 2021-09-17 DIAGNOSIS — I10 HYPERTENSION, UNSPECIFIED TYPE: ICD-10-CM

## 2021-09-17 DIAGNOSIS — Z00.00 WELLNESS EXAMINATION: ICD-10-CM

## 2021-09-17 DIAGNOSIS — R73.01 IMPAIRED FASTING GLUCOSE: ICD-10-CM

## 2021-09-17 DIAGNOSIS — E55.9 VITAMIN D DEFICIENCY: ICD-10-CM

## 2021-09-23 ENCOUNTER — OFFICE VISIT (OUTPATIENT)
Dept: FAMILY MEDICINE CLINIC | Facility: CLINIC | Age: 61
End: 2021-09-23

## 2021-09-23 VITALS
TEMPERATURE: 97.6 F | OXYGEN SATURATION: 98 % | RESPIRATION RATE: 16 BRPM | WEIGHT: 193 LBS | HEART RATE: 88 BPM | HEIGHT: 62 IN | DIASTOLIC BLOOD PRESSURE: 82 MMHG | BODY MASS INDEX: 35.51 KG/M2 | SYSTOLIC BLOOD PRESSURE: 124 MMHG

## 2021-09-23 DIAGNOSIS — M06.9 RHEUMATOID ARTHRITIS, INVOLVING UNSPECIFIED SITE, UNSPECIFIED WHETHER RHEUMATOID FACTOR PRESENT (HCC): Chronic | ICD-10-CM

## 2021-09-23 DIAGNOSIS — I50.30 DIASTOLIC CONGESTIVE HEART FAILURE, UNSPECIFIED HF CHRONICITY (HCC): Chronic | ICD-10-CM

## 2021-09-23 DIAGNOSIS — I10 ESSENTIAL HYPERTENSION: Chronic | ICD-10-CM

## 2021-09-23 DIAGNOSIS — F41.9 ANXIETY: ICD-10-CM

## 2021-09-23 DIAGNOSIS — K21.9 GASTROESOPHAGEAL REFLUX DISEASE, UNSPECIFIED WHETHER ESOPHAGITIS PRESENT: Chronic | ICD-10-CM

## 2021-09-23 DIAGNOSIS — M85.80 OSTEOPENIA, UNSPECIFIED LOCATION: ICD-10-CM

## 2021-09-23 DIAGNOSIS — N28.9 RENAL INSUFFICIENCY: ICD-10-CM

## 2021-09-23 DIAGNOSIS — F32.A DEPRESSION, UNSPECIFIED DEPRESSION TYPE: ICD-10-CM

## 2021-09-23 DIAGNOSIS — J84.10 PULMONARY FIBROSIS (HCC): ICD-10-CM

## 2021-09-23 PROCEDURE — 99214 OFFICE O/P EST MOD 30 MIN: CPT | Performed by: FAMILY MEDICINE

## 2021-09-23 RX ORDER — DESVENLAFAXINE SUCCINATE 50 MG/1
50 TABLET, EXTENDED RELEASE ORAL DAILY
Qty: 30 TABLET | Refills: 5 | Status: SHIPPED | OUTPATIENT
Start: 2021-09-23 | End: 2022-03-23 | Stop reason: SDUPTHER

## 2021-09-23 RX ORDER — OMEPRAZOLE 20 MG/1
TABLET, DELAYED RELEASE ORAL
COMMUNITY
End: 2021-09-23 | Stop reason: SDUPTHER

## 2021-09-23 NOTE — PROGRESS NOTES
Subjective   Emilie Soto is a 61 y.o. female presenting with chief complaint of:   Chief Complaint   Patient presents with   • Heartburn     6mo follow up       History of Present Illness : With .       Has multiple chronic problems to consider that might have a bearing on today's issues;  an interval appointment.       Chronic/acute problems reviewed today:   1. Essential hypertension Chronic/stable. Stable here past/no recent home blood pressures.  No significant chest pain, SOB, LE edema, orthopnea, near syncope, dizziness/light headness.   Recent Vitals       10/13/2020 3/16/2021 9/23/2021       BP:  101/61  108/70  124/82     Pulse:  61  75  88     Temp:  --  98.1 °F (36.7 °C)  97.6 °F (36.4 °C)     Weight:  --  87.5 kg (192 lb 12.8 oz)  87.5 kg (193 lb)     BMI (Calculated):  --  35.3  35.3            2. Diastolic congestive heart failure, unspecified HF chronicity (HCC) Chronic/stable.  Denies significant sob, orthopnea, leg edema, weight gain.  Aware of influence diet/salt and watching weight at home.       3. Rheumatoid arthritis, involving unspecified site, unspecified whether rheumatoid factor present (HCC) Chronic/stable.  Various on/off joint pains/soreness/stiffness.  Particular joint problems with various.  No joint swelling.  Treats mainly with reduced activity, Rx listed, Tylenol. Active occ NSAIDs, and occ injections.  Still follows closely with rheumatology Dr. Pérez     4. Osteopenia, unspecified location Chronic/stable.  Last bone density/if below.   Has had Rx.  Ok when due further bone density screening when due.      5. Pulmonary fibrosis (CMS/HCC) chronic previously discovered pulmonary fibrosis with no significant change in any shortness of breath or cough   6. Depression, unspecified depression type chronic past/now significant  mood swings, down moods, nervousness, difficulty with concentration to function home/work.  Others close have not been concerned.  No suicide  ideation/intent.  Rx helps but counselr advises something to make her more active and engaged     7. Anxiety: [facility] Chronic/variable:  On/off anxiety tolerated somewhat.  Rx helps and interested in Rx change. Stress ongoing day to day but includes a sister with terminal disease.      8. Renal insufficiency-ro Chronic/stable. No nephrology.  No dysuria.  Previously warned/reminded:   To avoid further kidney function decline  A. Treat any time you think you have infection  B. Stay hydrated (dont get dehydrated); drink at least 60 oz fluid every 24 hr (1800 cc or nearly a 2L)  C. Do not allow any xrays with dye WITHOUT the doctor ordering checking your renal function  D. Do not get new medications without the doctor considering your renal condition  E. Do not use motrin/ibuprofen, alleve/naprosyn and these types of medications     9. Gastroesophageal reflux disease, unspecified whether esophagitis present Chronic/stable.  Controlled heartburn, reflux without dysphagia, melena.  Rx used, periods not used proven needed with symptoms -currently doing ok.        Has an/another acute issue today: none.    The following portions of the patient's history were reviewed and updated as appropriate: allergies, current medications, past family history, past medical history, past social history, past surgical history and problem list.      Current Outpatient Medications:   •  albuterol sulfate HFA (ProAir HFA) 108 (90 Base) MCG/ACT inhaler, Inhale 1 puff Every 4 (Four) Hours As Needed for Wheezing or Shortness of Air., Disp: 24 g, Rfl: 3  •  alendronate (FOSAMAX) 70 MG tablet, Take 1 tablet by mouth Every 7 (Seven) Days., Disp: 12 tablet, Rfl: 3  •  ALPRAZolam (Xanax) 0.25 MG tablet, Take 0.5 tablets by mouth 2 (Two) Times a Day As Needed for Anxiety or Sleep., Disp: 30 tablet, Rfl: 0  •  ALPRAZolam (XANAX) 0.5 MG tablet, TAKE ONE TABLET TWICE DAILY AS NEEDED ANXIETY GENERIC FOR XANAX , Disp: 30 tablet, Rfl: 0  •  amLODIPine  (NORVASC) 5 MG tablet, Take 1 tablet by mouth Daily., Disp: 90 tablet, Rfl: 3  •  carvedilol (COREG) 25 MG tablet, Take 1 tablet by mouth 2 (Two) Times a Day., Disp: 180 tablet, Rfl: 1  •  cloNIDine (CATAPRES) 0.1 MG tablet, Take 0.1 mg by mouth 2 (Two) Times a Day. One by mouth twice daily as needed if BP greater than 160/100, Disp: , Rfl:   •  estradiol (ESTRACE) 2 MG tablet, Take 0.5 tablets by mouth Daily for 90 days., Disp: 45 tablet, Rfl: 3  •  folic acid (FOLVITE) 1 MG tablet, Take 1 tablet by mouth Daily., Disp: 90 tablet, Rfl: 3  •  glucose blood test strip, Use as instructed, Disp: 100 each, Rfl: 3  •  HYDROcodone-acetaminophen (NORCO) 7.5-325 MG per tablet, Take 1 tablet by mouth Every 6 (Six) Hours As Needed for Moderate Pain ., Disp: 60 tablet, Rfl: 0  •  hydroxychloroquine (PLAQUENIL) 200 MG tablet, Take 1 tablet by mouth 2 (Two) Times a Day. Pt is only taking it once daily, Disp: , Rfl:   •  loratadine (Claritin) 10 MG tablet, Take 10 mg by mouth As Needed for Allergies., Disp: , Rfl:   •  losartan (COZAAR) 100 MG tablet, Take 1 tablet by mouth Daily., Disp: 90 tablet, Rfl: 2  •  mycophenolate (CELLCEPT) 500 MG tablet, Take 1 tablet by mouth 2 (Two) Times a Day., Disp: , Rfl:   •  omeprazole (priLOSEC) 20 MG capsule, Take 1 capsule by mouth Daily., Disp: 90 capsule, Rfl: 3  •  potassium chloride (K-DUR,KLOR-CON) 10 MEQ CR tablet, Take 1 tablet by mouth Daily., Disp: 90 tablet, Rfl: 3  •  senna (SENOKOT) 8.6 MG tablet, Take 1 tablet by mouth As Needed for Constipation., Disp: , Rfl:   •  spironolactone (ALDACTONE) 25 MG tablet, Take 1 tablet by mouth Daily., Disp: 90 tablet, Rfl: 3  •  traZODone (DESYREL) 50 MG tablet, Take 2 tablets by mouth At Night As Needed for Sleep., Disp: 180 tablet, Rfl: 3  •  venlafaxine XR (Effexor XR) 75 MG 24 hr capsule, Take 2 capsules by mouth Daily AND 1 capsule Every Night., Disp: 270 capsule, Rfl: 3  •  verapamil SR (CALAN-SR) 240 MG CR tablet, Take 1 tablet by mouth 2  (Two) Times a Day., Disp: 180 tablet, Rfl: 1  •  vitamin D (ERGOCALCIFEROL) 54214 UNITS capsule capsule, Take 1 capsule by mouth 1 (One) Time Per Week., Disp: , Rfl:       anxiety  •  ALPRAZolam (Xanax) 0.25 MG tablet, Take 0.5 tablets by mouth 2 (Two) Times a Day As Needed for Anxiety or Sleep., Disp: 30 tablet, Rfl: 0  •  ALPRAZolam (XANAX) 0.5 MG tablet, TAKE ONE TABLET TWICE DAILY AS NEEDED ANXIETY GENERIC FOR XANAX , Disp: 30 tablet, Rfl: 0  •  traZODone (DESYREL) 50 MG tablet, Take 2 tablets by mouth At Night As Needed for Sleep., Disp: 180 tablet, Rfl: 3  •  venlafaxine XR (Effexor XR) 75 MG 24 hr capsule, Take 2 capsules by mouth Daily AND 1 capsule Every Night., Disp: 270 capsule, Rfl: 3    BP  •  amLODIPine (NORVASC) 5 MG tablet, Take 1 tablet by mouth Daily., Disp: 90 tablet, Rfl: 3  •  carvedilol (COREG) 25 MG tablet, Take 1 tablet by mouth 2 (Two) Times a Day., Disp: 180 tablet, Rfl: 1  •  cloNIDine (CATAPRES) 0.1 MG tablet, Take 0.1 mg by mouth 2 (Two) Times a Day. One by mouth twice daily as needed if BP greater than 160/100, Disp: , Rfl  •  losartan (COZAAR) 100 MG tablet, Take 1 tablet by mouth Daily., Disp: 90 tablet, Rfl: 2  •  spironolactone (ALDACTONE) 25 MG tablet, Take 1 tablet by mouth Daily., Disp: 90 tablet, Rfl: 3  •  verapamil SR (CALAN-SR) 240 MG CR tablet, Take 1 tablet by mouth 2 (Two) Times a Day., Disp: 180 tablet, Rfl: 1        No problems with medications.    Allergies   Allergen Reactions   • Abilify [Aripiprazole] Other (See Comments)     Insomnia   • Bactrim [Sulfamethoxazole-Trimethoprim] Other (See Comments)     Pt unsure of reaction   • Biaxin [Clarithromycin] Nausea And Vomiting   • Ciprofloxacin Hcl Other (See Comments)     Pt unsure of reaction   • Duricef [Cefadroxil] Other (See Comments)     Pt unsure of reaction   • Ketek [Telithromycin] Other (See Comments)     Pt unsure of reaction   • Relafen [Nabumetone] Itching   • Wellbutrin [Bupropion] Itching       Review of  Systems  GENERAL:  Active/slower with limits, speed, stamina for age and desire. Sleep is ok; occ hard falling/staying with worry. No fever now.  SKIN: No rash/skin lesion of concern:   ENDO:  No syncope, near or diaphoretic sweaty spells..  HEENT: No recent head injury; but same/occ headache.   No vision change.   Decline hearing loss.  Ears without pain/drainage.  No sore throat.  No significant nasal/sinus congestion/drainage. No epistaxis.  CHEST: No chest wall tenderness or mass. No cough, without wheeze.  No SOB; no hemoptysis.  CV: No chest pain, palpitations, ankle edema.  GI: No dysphagia.  No abdominal pain, diarrhea, constipation.  No rectal bleeding, or melena.  Occ heartburn still.   :  Voids without dysuria, or incontinence to completion.  ORTHO: No painful/swollen joints but various on /off sore.  No change some sore neck or back.  No acute neck or back pain without recent injury.  NEURO: No dizziness, weakness of extremities.  No numbness/paresthesias.   PSYCH: No memory loss.  Mood good; often anxious, depressed but/and not suicidal.  Tries to tolerate stress .      Screening:  Gyne: flatter/mendoza confirmed 9.23.21  Mammogram: 6.8.20-ordered  Bone density: 6.8.20 Deca-bone d/[place]/Ts L2-0.9 neck -2.2/6.8.2020  Low dose CT chest: Tobacco-smoker/none: NA  GI:   Colon-p, div/Jasiel/DESHAWN/10.10.19/5y  [id number]/Jasiel/DESHAWN/10.13.2020/  Prostate: NA  Usual lab order  Any lab others want; (we wish to attempt not to duplicate so we need copies of labs done outside the office/out of [facility]); OR can have all labs here (bring the order from all other doctors) and we will forward to all specialist  6m CBC, CMP, sed rate, CK-total, CR-protein  12m CBC, CMP, LIPID, TSH, fT4, CK-total, Vit D, sed rate, CR-protein    Data reviewed:   Recent admit/ER/MD visits: rheumatology  Last cardiac testing:   Echo:   Results for orders placed during the hospital encounter of 11/13/17    Adult Stress Echo W/ Cont or Stress Agent  if Necessary Per Protocol    Interpretation Summary  · Below average functional capacity.  · Clinically negative for ischemia.  · Electrically equivocal due to baseline EKG abnormalities.  · Left ventricular systolic function is normal at rest with appropriate increase in contractility with stress.    Exercise stress echocardiogram is low risk for ischemia.        Lab Results:  Results for orders placed or performed in visit on 09/17/21   Comprehensive Metabolic Panel    Specimen: Blood   Result Value Ref Range    Glucose 85 65 - 99 mg/dL    BUN 14 6 - 20 mg/dL    Creatinine 1.14 (H) 0.57 - 1.00 mg/dL    eGFR Non African Am 49 (L) >60 mL/min/1.73    eGFR African Am 59 (L) >60 mL/min/1.73    BUN/Creatinine Ratio 12.3 7.0 - 25.0    Sodium 139 136 - 145 mmol/L    Potassium 4.2 3.5 - 5.2 mmol/L    Chloride 103 98 - 107 mmol/L    Total CO2 27.2 22.0 - 29.0 mmol/L    Calcium 8.9 8.6 - 10.5 mg/dL    Total Protein 6.8 6.0 - 8.5 g/dL    Albumin 4.20 3.50 - 5.20 g/dL    Globulin 2.6 gm/dL    A/G Ratio 1.6 g/dL    Total Bilirubin <0.2 0.0 - 1.2 mg/dL    Alkaline Phosphatase 67 39 - 117 U/L    AST (SGOT) 18 1 - 32 U/L    ALT (SGPT) 11 1 - 33 U/L   Lipid Panel    Specimen: Blood   Result Value Ref Range    Total Cholesterol 132 0 - 200 mg/dL    Triglycerides 82 0 - 150 mg/dL    HDL Cholesterol 52 40 - 60 mg/dL    VLDL Cholesterol Sai 16 5 - 40 mg/dL    LDL Chol Calc (NIH) 64 0 - 100 mg/dL   TSH    Specimen: Blood   Result Value Ref Range    TSH 2.820 0.270 - 4.200 uIU/mL   T4, free    Specimen: Blood   Result Value Ref Range    Free T4 1.25 0.93 - 1.70 ng/dL   Vitamin D 25 Hydroxy    Specimen: Blood   Result Value Ref Range    25 Hydroxy, Vitamin D 54.3 30.0 - 100.0 ng/ml   Sedimentation Rate    Specimen: Blood   Result Value Ref Range    Sed Rate 37 (H) 0 - 30 mm/hr   C-reactive Protein    Specimen: Blood   Result Value Ref Range    C-Reactive Protein 2.69 (H) 0.00 - 0.50 mg/dL   CK    Specimen: Blood   Result Value Ref Range     Creatine Kinase 79 20 - 180 U/L   CBC & Differential    Specimen: Blood   Result Value Ref Range    WBC 4.58 3.40 - 10.80 10*3/mm3    RBC 4.31 3.77 - 5.28 10*6/mm3    Hemoglobin 12.4 12.0 - 15.9 g/dL    Hematocrit 38.9 34.0 - 46.6 %    MCV 90.3 79.0 - 97.0 fL    MCH 28.8 26.6 - 33.0 pg    MCHC 31.9 31.5 - 35.7 g/dL    RDW 13.1 12.3 - 15.4 %    Platelets 266 140 - 450 10*3/mm3    Neutrophil Rel % 46.2 42.7 - 76.0 %    Lymphocyte Rel % 33.0 19.6 - 45.3 %    Monocyte Rel % 16.6 (H) 5.0 - 12.0 %    Eosinophil Rel % 2.6 0.3 - 6.2 %    Basophil Rel % 0.9 0.0 - 1.5 %    Neutrophils Absolute 2.12 1.70 - 7.00 10*3/mm3    Lymphocytes Absolute 1.51 0.70 - 3.10 10*3/mm3    Monocytes Absolute 0.76 0.10 - 0.90 10*3/mm3    Eosinophils Absolute 0.12 0.00 - 0.40 10*3/mm3    Basophils Absolute 0.04 0.00 - 0.20 10*3/mm3    Immature Granulocyte Rel % 0.7 (H) 0.0 - 0.5 %    Immature Grans Absolute 0.03 0.00 - 0.05 10*3/mm3    nRBC 0.0 0.0 - 0.2 /100 WBC       A1C:  Lab Results - Last 18 Months   Lab Units 09/10/20  0859   HEMOGLOBIN A1C % 5.40     LIPID:  Lab Results - Last 18 Months   Lab Units 09/17/21  0724 09/10/20  0859   CHOLESTEROL mg/dL 132 121   LDL CHOL mg/dL 64 51   HDL CHOL mg/dL 52 55   TRIGLYCERIDES mg/dL 82 77     PSA:No results for input(s): PSA in the last 96552 hours.  CBC:  Lab Results - Last 18 Months   Lab Units 09/17/21  0724 03/11/21  0725 09/10/20  0859 06/15/20  1008   WBC 10*3/mm3 4.58 4.46 4.90 4.66   HEMOGLOBIN g/dL 12.4 12.5 12.7 12.9   HEMATOCRIT % 38.9 38.0 37.7 39.4   PLATELETS 10*3/mm3 266 247 244 233      BMP/CMP:  Lab Results - Last 18 Months   Lab Units 09/17/21  0724 03/11/21  0725 09/10/20  0859 07/14/20  0756 06/15/20  1008   SODIUM mmol/L 139 143 137 143 139   POTASSIUM mmol/L 4.2 3.9 4.7 4.1 4.0   CHLORIDE mmol/L 103 104 104 105 102   TOTAL CO2 mmol/L 27.2 28.3 26.2 28.1 28.1   GLUCOSE mg/dL 85 94 84 89 86   BUN mg/dL 14 11 12 13 14   CREATININE mg/dL 1.14* 1.11* 1.08* 1.17* 1.20*   EGFR IF  "NONAFRICN AM mL/min/1.73 49* 50* 52* 48* 46*   EGFR IF AFRICN AM mL/min/1.73 59* 61 63 58* 56*   CALCIUM mg/dL 8.9 9.4 9.0 8.8 9.2     HEPATIC:  Lab Results - Last 18 Months   Lab Units 09/17/21  0724 03/11/21  0725 09/10/20  0859 06/15/20  1008   ALT (SGPT) U/L 11 12 14 10   AST (SGOT) U/L 18 17 15 16   ALK PHOS U/L 67 68 70 66     THYROID:  Lab Results - Last 18 Months   Lab Units 09/17/21  0724 09/10/20  0859   TSH uIU/mL 2.820 0.966   FREE T4 ng/dL 1.25 1.35       Objective   /82   Pulse 88   Temp 97.6 °F (36.4 °C) (Infrared)   Resp 16   Ht 157.5 cm (62\")   Wt 87.5 kg (193 lb)   SpO2 98%   BMI 35.30 kg/m²   Body mass index is 35.3 kg/m².    Recent Vitals       10/13/2020 3/16/2021 9/23/2021       BP:  101/61  108/70  124/82     Pulse:  61  75  88     Temp:  --  98.1 °F (36.7 °C)  97.6 °F (36.4 °C)     Weight:  --  87.5 kg (192 lb 12.8 oz)  87.5 kg (193 lb)     BMI (Calculated):  --  35.3  35.3           Physical Exam  GENERAL:  Well nourished/developed in no acute distress. BMI noted: 33.8  SKIN: Turgor excellent, without wound, rash, lesion  HEENT: Normal cephalic without trauma.  Pupils equal round reactive to light. Extraocular motions full without nystagmus.   External canals nonobstructive nontender without reddness. Tymphatic membranes josiane with cesar structures intact.   Oral cavity without growths, exudates, and moist.  Posterior pharynx without mass, obstruction, redness.  No thyromegaly, mass, tenderness, lymphadenopathy and supple.  CV: Regular rhythm.  No murmur, gallop,  edema. Posterior pulses intact.  No carotid bruits.  CHEST: No chest wall tenderness or mass.   LUNGS: Symmetric motion with clear to auscultation.   ABD: Soft, nontender without mass.   ORTHO: Symmetric extremities without swelling/point tenderness.  Full gross range of motion; few sore fingers.  NEURO: CN 2-12 grossly intact.  Symmetric facies. 1/4 x bicep equal reflexes.  UE/LE   3/5 strength throughout.  Nonfocal " use extremities. Speech clear. Intact light touch with monofilament, vibratory sensation with tuning fork; equal toes/distal feet.    PSYCH: Oriented x 3.  Pleasant calm, well kept.   Purposeful/directed conservation with intact short/long gross memory.      Assessment/Plan     1. Essential hypertension    2. Diastolic congestive heart failure, unspecified HF chronicity ([facility])    3. Rheumatoid arthritis, involving unspecified site, unspecified whether rheumatoid factor present ([facility])    4. Osteopenia, unspecified location    5. Pulmonary fibrosis (CMS/HCC)    6. Depression, unspecified depression type    7. Anxiety: [facility]    8. Renal insufficiency-ro    9. Gastroesophageal reflux disease, unspecified whether esophagitis present        Discussion:  Continue with her counselor  Though with similar add Pristiq; may increase that decrease the Effexor or  Mammogram due    Medical decision issues:   Data review above:   Rx: reviewed and decisions:   Same Rx for now     Visit today involved chronic significant medical problems or differentials and/or intensive drug monitoring: ie potential to cause serious morbidity or death:     New Medications Ordered This Visit   Medications   • desvenlafaxine (Pristiq) 50 MG 24 hr tablet     Sig: Take 1 tablet by mouth Daily.     Dispense:  30 tablet     Refill:  5       Orders placed:   LAB/Testing/Referrals: reviewed/orders:   Today:   No orders of the defined types were placed in this encounter.    Chronic/recurrent labs above or change to:   same     Health maintenance:   Body mass index is 35.3 kg/m².  Patient's Body mass index is 35.3 kg/m². indicating that she is obese (BMI >30). Obesity-related health conditions include the following: osteoarthritis. Obesity is unchanged. BMI is is above average; BMI management plan is completed. We discussed portion control and increasing exercise..    Tobacco use reviewed:    Emilie Soto  reports that she has never smoked. She has never used  smokeless tobacco..     There are no Patient Instructions on file for this visit.    Follow up: Return for lab;, Dr Miles-, 6 m;.  Future Appointments   Date Time Provider Department Center   3/23/2022  8:30 AM LAB [facility]   3/23/2022 11:30 AM Alexandru Miles MD [facility]

## 2021-09-24 ENCOUNTER — TELEPHONE (OUTPATIENT)
Dept: FAMILY MEDICINE CLINIC | Facility: CLINIC | Age: 61
End: 2021-09-24

## 2021-09-24 NOTE — TELEPHONE ENCOUNTER
"Yes;   Yes we were wanting to add more of the norephineprine inhibitor      Yoselin wanted to clairify if she is she is to be on effecxor and pristiq? both as same med \"pretty much venlafaxine\"    Pt thinks she is to take both meds at the same time?  "

## 2021-10-06 ENCOUNTER — TELEPHONE (OUTPATIENT)
Dept: FAMILY MEDICINE CLINIC | Facility: CLINIC | Age: 61
End: 2021-10-06

## 2021-10-06 NOTE — TELEPHONE ENCOUNTER
Mammogram B2/normal  THIS does not preclude the possibility for a small breast cancer now/in the future SO she needs to continue to  SBE monthly; report problems  Yearly mammogram

## 2021-10-13 ENCOUNTER — FLU SHOT (OUTPATIENT)
Dept: FAMILY MEDICINE CLINIC | Facility: CLINIC | Age: 61
End: 2021-10-13

## 2021-10-13 ENCOUNTER — TELEPHONE (OUTPATIENT)
Dept: FAMILY MEDICINE CLINIC | Facility: CLINIC | Age: 61
End: 2021-10-13

## 2021-10-13 DIAGNOSIS — Z23 NEED FOR INFLUENZA VACCINATION: Primary | ICD-10-CM

## 2021-10-13 PROCEDURE — 90686 IIV4 VACC NO PRSV 0.5 ML IM: CPT | Performed by: FAMILY MEDICINE

## 2021-10-13 PROCEDURE — G0008 ADMIN INFLUENZA VIRUS VAC: HCPCS | Performed by: FAMILY MEDICINE

## 2021-10-13 NOTE — TELEPHONE ENCOUNTER
Assume she means genital/oral herpes and not shingles    Nothing she can do  Unlikely will spread this way      Pt states someone that is positive for herpes cut themselves on a pair of scissors and then she cut herself on the same pair of scissors. PT would like to be advised on what she should do.

## 2021-10-19 ENCOUNTER — OFFICE VISIT (OUTPATIENT)
Dept: FAMILY MEDICINE CLINIC | Facility: CLINIC | Age: 61
End: 2021-10-19

## 2021-10-19 VITALS
OXYGEN SATURATION: 98 % | WEIGHT: 195 LBS | TEMPERATURE: 97.8 F | HEART RATE: 89 BPM | BODY MASS INDEX: 35.88 KG/M2 | SYSTOLIC BLOOD PRESSURE: 118 MMHG | RESPIRATION RATE: 18 BRPM | DIASTOLIC BLOOD PRESSURE: 7 MMHG | HEIGHT: 62 IN

## 2021-10-19 DIAGNOSIS — K21.9 GASTROESOPHAGEAL REFLUX DISEASE, UNSPECIFIED WHETHER ESOPHAGITIS PRESENT: Chronic | ICD-10-CM

## 2021-10-19 DIAGNOSIS — G89.29 CHRONIC BACK PAIN, UNSPECIFIED BACK LOCATION, UNSPECIFIED BACK PAIN LATERALITY: Chronic | ICD-10-CM

## 2021-10-19 DIAGNOSIS — Z00.00 WELLNESS EXAMINATION: ICD-10-CM

## 2021-10-19 DIAGNOSIS — Z23 IMMUNIZATION DUE: ICD-10-CM

## 2021-10-19 DIAGNOSIS — M54.9 CHRONIC BACK PAIN, UNSPECIFIED BACK LOCATION, UNSPECIFIED BACK PAIN LATERALITY: Chronic | ICD-10-CM

## 2021-10-19 DIAGNOSIS — F32.A DEPRESSION, UNSPECIFIED DEPRESSION TYPE: ICD-10-CM

## 2021-10-19 DIAGNOSIS — F41.9 ANXIETY: ICD-10-CM

## 2021-10-19 DIAGNOSIS — I10 PRIMARY HYPERTENSION: Chronic | ICD-10-CM

## 2021-10-19 DIAGNOSIS — M06.9 RHEUMATOID ARTHRITIS, INVOLVING UNSPECIFIED SITE, UNSPECIFIED WHETHER RHEUMATOID FACTOR PRESENT (HCC): Chronic | ICD-10-CM

## 2021-10-19 DIAGNOSIS — N28.9 RENAL INSUFFICIENCY: ICD-10-CM

## 2021-10-19 PROCEDURE — G0402 INITIAL PREVENTIVE EXAM: HCPCS | Performed by: FAMILY MEDICINE

## 2021-10-19 PROCEDURE — 1125F AMNT PAIN NOTED PAIN PRSNT: CPT | Performed by: FAMILY MEDICINE

## 2021-10-19 PROCEDURE — 1160F RVW MEDS BY RX/DR IN RCRD: CPT | Performed by: FAMILY MEDICINE

## 2021-10-19 PROCEDURE — 1170F FXNL STATUS ASSESSED: CPT | Performed by: FAMILY MEDICINE

## 2021-10-19 PROCEDURE — 90471 IMMUNIZATION ADMIN: CPT | Performed by: FAMILY MEDICINE

## 2021-10-19 PROCEDURE — 99214 OFFICE O/P EST MOD 30 MIN: CPT | Performed by: FAMILY MEDICINE

## 2021-10-19 PROCEDURE — 90715 TDAP VACCINE 7 YRS/> IM: CPT | Performed by: FAMILY MEDICINE

## 2021-10-19 RX ORDER — MELOXICAM 7.5 MG/1
7.5 TABLET ORAL DAILY PRN
Qty: 30 TABLET | Refills: 0 | Status: SHIPPED | OUTPATIENT
Start: 2021-10-19 | End: 2022-03-23

## 2021-10-19 NOTE — PATIENT INSTRUCTIONS
"Medicare/insurances offer certain visits called \"wellness/annual\" that allows for time to deal with and  review the many aspects of \"being well\" that just might not get mentioned during other visits with your doctor through the year.  This includes things like reviews of health screenings (mammograms, various labs),  weight, exercise, vaccines for just a few examples.      In order to help you with this we wish to make you aware of a few things for you to consider:    1. Advanced directives.  These are documents used to help direct your care if your health/situation should reach a point that you cannot make your own decisions.  While it is likely you do not currently have a need for these documents now; it is something that we all might face at any time.   The hand outs you are being given today are simply for you to review and use to learn more about these documents and consider them as you wish.      2. Vaccines: Certain vaccines are important after age 50, 60, and 65 and some health situations (for example COPD), require even boosters beyond age 65.  We are happy to review with you your vaccine status and vaccines that might be needed for you at this point:      a. Tetanus.   Like anyone this needs to given every 10 years; sooner for/with lacerations/wounds.   Likely when getting this booster it needs to be a tetanus called Tdap (tetanus mixed with diptheria and pertussis).   Years ago you had this vaccine.  We now know we can lose our immunity to pertussis (a part of this vaccine) and run a risk of catching this.  Now only would this make us ill; but more importantly we can spread this to very young children (and for them it can be a much more dangerous illness).   We call this the grandparent vaccine for this reason.     b. Pneumonia (strept).   This comes now with two brands.   It is recommended to take pneumovax first; and a year later take the cousin prevnar.  Even if you have had these before; we need to " review when and your current health situation/s as you may need boosters and even recently the CDC has made recent/new recommendations for pneumovax.      c. Shingles.  You do not want to catch shingles.  Though you will recover from this; the pain associated with shingles can be severe.  Even if you have had the now older zostavax, or have had shingles; it is recommended you still get the Shingrix (the new vaccine just available early 2018 shingles vaccine).  A new shingles vaccine (a shot to lower your chance of catching shingles) is now available (shingrix).  This vaccine is the second vaccine created for this purpose; (we have had zostavax for years).  Shingrix provides a much better and longer immunity for shingles than zostavax.  For this and other reasons Shingrix can be started at age 50.  If you have had zostavax in the past; you can still take Shingrix.      This vaccine is not paid for in a doctor's office by medicare, medicaid and probably most insurances.  Like zostavax; this is covered in drug stores.  This is a vaccine that if you chose to get you need to get at a drug store that gives vaccines (like [facility], [facility] and [facility].      d. Yearly flu vaccine given from September through April each year (there is a special vaccine for those over 65).     e. Travel vaccines:  If you are one to do international travel; be sure and ask us for any particular unusual vaccines you may need.     f.  Miscellaneous:  If you have certain health situations/disease you may need specific/particular vaccines not give to the general public.     The vaccines we have on record for you include:   Immunization History   Administered Date(s) Administered   • COVID-19 (MODERNA) 03/15/2021, 04/12/2021   • FluLaval/Fluarix/Fluzone >6 10/27/2018, 11/12/2020, 10/13/2021   • Influenza, Unspecified 11/05/2016       If you have record of other vaccines and want them to show in your chart here; please talk to  our nurses about having your vaccine record updated.     3. Exercise: regular cardio exercise something everyone should consider and try to do; even if health limitations (ie find that exercise UE/LE/cardio that they can tolerate).   Normal weight a goal for everyone (as we discussed)    4. Healthy diet helpful for weight management, illness prevention.     5. If over 50-screening exams include men PSA/rectal exam, women mammograms, and everyone colonoscopy screening for colon cancer.    6. If you use tobacco of any kind or e-products you should stop. We are providing you some information to consider that could make this process easier.      ##################################    Summary vaccine  Tdap today; not needed again 10 yrs  Sign up for covid booster soon  More later; especially when turn age 65    ########################      Shingles when you can; advise 30 days after covid booster

## 2021-10-19 NOTE — PROGRESS NOTES
Pt here to be seen and discussed the benefits and risks of Tdap injection, as pt is due. Copy of VIS given to pt and verbal and written consent given. Given in left deltoid site, tolerated well.

## 2021-10-19 NOTE — PROGRESS NOTES
Subjective   Emilie Soto is a 61 y.o. female presenting with chief complaint of:   Chief Complaint   Patient presents with   • Medicare Wellness-subsequent     pt also has medication questions   • Back Pain       History of Present Illness :  Alone.  Here for primarily desire to discuss back pain. Bothering over a year (was worse with broken ankle).   Mid/lower.  Worse with bending over.  No problem walking, touching area, bed at night.   No care so far.  No radiation.      Has multiple chronic problems to consider that might have a bearing on today's issues; not an interval appointment.       Chronic/acute problems reviewed today:   1. Gastroesophageal reflux disease, unspecified whether esophagitis present Chronic/stable.  Controlled heartburn, reflux without dysphagia, melena.  Rx used, periods not used proven needed with symptoms -currently doing ok; a consideration for occ NSAID      2. Primary hypertension Chronic/stable. Stable here past/no recent home blood pressures.  No significant chest pain, SOB, LE edema, orthopnea, near syncope, dizziness/light headness.   Recent Vitals       3/16/2021 9/23/2021 10/19/2021       BP: 108/70 124/82 118/7     Pulse: 75 88 89     Temp: 98.1 °F (36.7 °C) 97.6 °F (36.4 °C) 97.8 °F (36.6 °C)     Weight: 87.5 kg (192 lb 12.8 oz) 87.5 kg (193 lb) 88.5 kg (195 lb)     BMI (Calculated): 35.3 35.3 35.7            3. Chronic back pain, unspecified back location, unspecified back pain laterality : chronic/variable 0-3/10 lower back pain with infrequent/same radiation to LE.  No change LE numbness.  Has bladder/bowel control. No desire for surgery, pain management to change approach of care.      4. Rheumatoid arthritis, involving unspecified site, unspecified whether rheumatoid factor present (HCC) Chronic/stable.  Various on/off joint pains/soreness/stiffness.  Particular joint problems with many/lower back.  No joint swelling.  Treats mainly with reduced activity, Rx listed,  "Tylenol. No due to renal NSAIDs, and no injections.      5. Renal insufficiency-ro Chronic/stable.  No nephrology.  No dysuria.  Previously warned/reminded:   To avoid further kidney function decline  A. Treat any time you think you have infection  B. Stay hydrated (dont get dehydrated); drink at least 60 oz fluid every 24 hr (1800 cc or nearly a 2L)  C. Do not allow any xrays with dye WITHOUT the doctor ordering checking your renal function  D. Do not get new medications without the doctor considering your renal condition  E. Do not use motrin/ibuprofen, alleve/naprosyn and these types of medications     6. Immunization due Chronic/ongoing need to review pro/cons for various vaccinations based on age, health issues.  Needs vaccination today for: see below.            7. Depression, unspecified depression type chronic/past significant  mood swings, down moods, nervousness, difficulty with concentration to function home/work.  Others close have not been concerned.  No suicide ideation/intent.  Rx helps but adding pristiq made her feel \"weird\"; so stopped.        8. Anxiety: [facility] Chronic/variable:  On/off anxiety tolerated somewhat.  Rx helps and not interested in Rx change. Stress ongoing day to day.      9. Wellness examination-done Chronic ongoing over time screening or advice for general health care/wellness.        Has an/another acute issue today: none.    The following portions of the patient's history were reviewed and updated as appropriate: allergies, current medications, past family history, past medical history, past social history, past surgical history and problem list.      Current Outpatient Medications:   •  albuterol sulfate HFA (ProAir HFA) 108 (90 Base) MCG/ACT inhaler, Inhale 1 puff Every 4 (Four) Hours As Needed for Wheezing or Shortness of Air., Disp: 24 g, Rfl: 3  •  ALPRAZolam (Xanax) 0.25 MG tablet, Take 0.5 tablets by mouth 2 (Two) Times a Day As Needed for Anxiety or Sleep., Disp: 30 " tablet, Rfl: 0  •  ALPRAZolam (XANAX) 0.5 MG tablet, TAKE ONE TABLET TWICE DAILY AS NEEDED ANXIETY GENERIC FOR XANAX , Disp: 30 tablet, Rfl: 0  •  amLODIPine (NORVASC) 5 MG tablet, Take 1 tablet by mouth Daily., Disp: 90 tablet, Rfl: 3  •  carvedilol (COREG) 25 MG tablet, Take 1 tablet by mouth 2 (Two) Times a Day., Disp: 180 tablet, Rfl: 1  •  cloNIDine (CATAPRES) 0.1 MG tablet, Take 0.1 mg by mouth 2 (Two) Times a Day. One by mouth twice daily as needed if BP greater than 160/100, Disp: , Rfl:   •  desvenlafaxine (Pristiq) 50 MG 24 hr tablet, Take 1 tablet by mouth Daily., Disp: 30 tablet, Rfl: 5  •  estradiol (ESTRACE) 2 MG tablet, Take 0.5 tablets by mouth Daily for 90 days., Disp: 45 tablet, Rfl: 3  •  folic acid (FOLVITE) 1 MG tablet, Take 1 tablet by mouth Daily., Disp: 90 tablet, Rfl: 3  •  glucose blood test strip, Use as instructed, Disp: 100 each, Rfl: 3  •  HYDROcodone-acetaminophen (NORCO) 7.5-325 MG per tablet, Take 1 tablet by mouth Every 6 (Six) Hours As Needed for Moderate Pain ., Disp: 60 tablet, Rfl: 0  •  hydroxychloroquine (PLAQUENIL) 200 MG tablet, Take 1 tablet by mouth 2 (Two) Times a Day. Pt is only taking it once daily, Disp: , Rfl:   •  loratadine (Claritin) 10 MG tablet, Take 10 mg by mouth As Needed for Allergies., Disp: , Rfl:   •  losartan (COZAAR) 100 MG tablet, Take 1 tablet by mouth Daily., Disp: 90 tablet, Rfl: 2  •  mycophenolate (CELLCEPT) 500 MG tablet, Take 1 tablet by mouth 2 (Two) Times a Day., Disp: , Rfl:   •  omeprazole (priLOSEC) 20 MG capsule, Take 1 capsule by mouth Daily., Disp: 90 capsule, Rfl: 3  •  potassium chloride (K-DUR,KLOR-CON) 10 MEQ CR tablet, Take 1 tablet by mouth Daily., Disp: 90 tablet, Rfl: 3  •  senna (SENOKOT) 8.6 MG tablet, Take 1 tablet by mouth As Needed for Constipation., Disp: , Rfl:   •  spironolactone (ALDACTONE) 25 MG tablet, Take 1 tablet by mouth Daily., Disp: 90 tablet, Rfl: 3  •  traZODone (DESYREL) 50 MG tablet, Take 2 tablets by mouth At  Night As Needed for Sleep., Disp: 180 tablet, Rfl: 3  •  venlafaxine XR (Effexor XR) 75 MG 24 hr capsule, Take 2 capsules by mouth Daily AND 1 capsule Every Night., Disp: 270 capsule, Rfl: 3  •  verapamil SR (CALAN-SR) 240 MG CR tablet, Take 1 tablet by mouth 2 (Two) Times a Day., Disp: 180 tablet, Rfl: 1  •  vitamin D (ERGOCALCIFEROL) 39826 UNITS capsule capsule, Take 1 capsule by mouth 1 (One) Time Per Week., Disp: , Rfl:     No problems with medications.    Allergies   Allergen Reactions   • Abilify [Aripiprazole] Other (See Comments)     Insomnia   • Bactrim [Sulfamethoxazole-Trimethoprim] Other (See Comments)     Pt unsure of reaction   • Biaxin [Clarithromycin] Nausea And Vomiting   • Ciprofloxacin Hcl Other (See Comments)     Pt unsure of reaction   • Duricef [Cefadroxil] Other (See Comments)     Pt unsure of reaction   • Ketek [Telithromycin] Other (See Comments)     Pt unsure of reaction   • Relafen [Nabumetone] Itching   • Wellbutrin [Bupropion] Itching       Review of Systems  GENERAL:  Active/slower with limits, speed, stamina for age and desire. Sleep is ok; occ hard falling/staying with worry. No fever now.  SKIN: No rash/skin lesion of concern:   ENDO:  No syncope, near or diaphoretic sweaty spells..  HEENT: No recent head injury; but same/occ headache.   No vision change.   Decline hearing loss.  Ears without pain/drainage.  No sore throat.  No significant nasal/sinus congestion/drainage. No epistaxis.  CHEST: No chest wall tenderness or mass. No cough, without wheeze.  No SOB; no hemoptysis.  CV: No chest pain, palpitations, ankle edema.  GI: No dysphagia.  No abdominal pain, diarrhea, constipation.  No rectal bleeding, or melena.  Occ heartburn still.   :  Voids without dysuria, or incontinence to completion.  ORTHO: No painful/swollen joints but various on /off sore.  No change some sore neck or back.  No acute neck or back pain without recent injury.  NEURO: No dizziness, weakness of  extremities.  No numbness/paresthesias.   PSYCH: No memory loss.  Mood good; often anxious, depressed but/and not suicidal.  Tries to tolerate stress .      Screening:  Gyne: flatter/mendoza confirmed 9.23.21  Mammogram: 10.6.21  Bone density: 6.8.20 Deca-bone d/Sancho/Ts L2-0.9 neck -2.2/6.8.2020  Low dose CT chest: Tobacco-smoker/none: NA  GI:   Colon-p, div/Jasiel//10.10.19/5y  [id number]/Jasiel/BH/10.13.2020/  Prostate: NA  Usual lab order  Any lab others want; (we wish to attempt not to duplicate so we need copies of labs done outside the office/out of [facility]); OR can have all labs here (bring the order from all other doctors) and we will forward to all specialist  6m CBC, CMP, sed rate, CK-total, CR-protein  12m CBC, CMP, LIPID, TSH, fT4, CK-total, Vit D, sed rate, CR-protein     Data reviewed:   Recent admit/ER/MD visits: rheumatology  Last cardiac testing:   Echo:   Results for orders placed during the hospital encounter of 11/13/17     Adult Stress Echo W/ Cont or Stress Agent if Necessary Per Protocol     Interpretation Summary  · Below average functional capacity.  · Clinically negative for ischemia.  · Electrically equivocal due to baseline EKG abnormalities.  · Left ventricular systolic function is normal at rest with appropriate increase in contractility with stress.     Exercise stress echocardiogram is low risk for ischemia.    Lab Results:  Results for orders placed or performed in visit on 09/17/21   Comprehensive Metabolic Panel    Specimen: Blood   Result Value Ref Range    Glucose 85 65 - 99 mg/dL    BUN 14 6 - 20 mg/dL    Creatinine 1.14 (H) 0.57 - 1.00 mg/dL    eGFR Non African Am 49 (L) >60 mL/min/1.73    eGFR African Am 59 (L) >60 mL/min/1.73    BUN/Creatinine Ratio 12.3 7.0 - 25.0    Sodium 139 136 - 145 mmol/L    Potassium 4.2 3.5 - 5.2 mmol/L    Chloride 103 98 - 107 mmol/L    Total CO2 27.2 22.0 - 29.0 mmol/L    Calcium 8.9 8.6 - 10.5 mg/dL    Total Protein 6.8 6.0 - 8.5 g/dL    Albumin  4.20 3.50 - 5.20 g/dL    Globulin 2.6 gm/dL    A/G Ratio 1.6 g/dL    Total Bilirubin <0.2 0.0 - 1.2 mg/dL    Alkaline Phosphatase 67 39 - 117 U/L    AST (SGOT) 18 1 - 32 U/L    ALT (SGPT) 11 1 - 33 U/L   Lipid Panel    Specimen: Blood   Result Value Ref Range    Total Cholesterol 132 0 - 200 mg/dL    Triglycerides 82 0 - 150 mg/dL    HDL Cholesterol 52 40 - 60 mg/dL    VLDL Cholesterol Sai 16 5 - 40 mg/dL    LDL Chol Calc (NIH) 64 0 - 100 mg/dL   TSH    Specimen: Blood   Result Value Ref Range    TSH 2.820 0.270 - 4.200 uIU/mL   T4, free    Specimen: Blood   Result Value Ref Range    Free T4 1.25 0.93 - 1.70 ng/dL   Vitamin D 25 Hydroxy    Specimen: Blood   Result Value Ref Range    25 Hydroxy, Vitamin D 54.3 30.0 - 100.0 ng/ml   Sedimentation Rate    Specimen: Blood   Result Value Ref Range    Sed Rate 37 (H) 0 - 30 mm/hr   C-reactive Protein    Specimen: Blood   Result Value Ref Range    C-Reactive Protein 2.69 (H) 0.00 - 0.50 mg/dL   CK    Specimen: Blood   Result Value Ref Range    Creatine Kinase 79 20 - 180 U/L   CBC & Differential    Specimen: Blood   Result Value Ref Range    WBC 4.58 3.40 - 10.80 10*3/mm3    RBC 4.31 3.77 - 5.28 10*6/mm3    Hemoglobin 12.4 12.0 - 15.9 g/dL    Hematocrit 38.9 34.0 - 46.6 %    MCV 90.3 79.0 - 97.0 fL    MCH 28.8 26.6 - 33.0 pg    MCHC 31.9 31.5 - 35.7 g/dL    RDW 13.1 12.3 - 15.4 %    Platelets 266 140 - 450 10*3/mm3    Neutrophil Rel % 46.2 42.7 - 76.0 %    Lymphocyte Rel % 33.0 19.6 - 45.3 %    Monocyte Rel % 16.6 (H) 5.0 - 12.0 %    Eosinophil Rel % 2.6 0.3 - 6.2 %    Basophil Rel % 0.9 0.0 - 1.5 %    Neutrophils Absolute 2.12 1.70 - 7.00 10*3/mm3    Lymphocytes Absolute 1.51 0.70 - 3.10 10*3/mm3    Monocytes Absolute 0.76 0.10 - 0.90 10*3/mm3    Eosinophils Absolute 0.12 0.00 - 0.40 10*3/mm3    Basophils Absolute 0.04 0.00 - 0.20 10*3/mm3    Immature Granulocyte Rel % 0.7 (H) 0.0 - 0.5 %    Immature Grans Absolute 0.03 0.00 - 0.05 10*3/mm3    nRBC 0.0 0.0 - 0.2 /100 WBC  "      A1C:  Lab Results - Last 18 Months   Lab Units 09/10/20  0859   HEMOGLOBIN A1C % 5.40     LIPID:  Lab Results - Last 18 Months   Lab Units 09/17/21  0724 09/10/20  0859   CHOLESTEROL mg/dL 132 121   LDL CHOL mg/dL 64 51   HDL CHOL mg/dL 52 55   TRIGLYCERIDES mg/dL 82 77     PSA:No results for input(s): PSA in the last 82800 hours.  CBC:  Lab Results - Last 18 Months   Lab Units 09/17/21  0724 03/11/21  0725 09/10/20  0859 06/15/20  1008   WBC 10*3/mm3 4.58 4.46 4.90 4.66   HEMOGLOBIN g/dL 12.4 12.5 12.7 12.9   HEMATOCRIT % 38.9 38.0 37.7 39.4   PLATELETS 10*3/mm3 266 247 244 233      BMP/CMP:  Lab Results - Last 18 Months   Lab Units 09/17/21  0724 03/11/21  0725 09/10/20  0859 07/14/20  0756 06/15/20  1008   SODIUM mmol/L 139 143 137 143 139   POTASSIUM mmol/L 4.2 3.9 4.7 4.1 4.0   CHLORIDE mmol/L 103 104 104 105 102   TOTAL CO2 mmol/L 27.2 28.3 26.2 28.1 28.1   GLUCOSE mg/dL 85 94 84 89 86   BUN mg/dL 14 11 12 13 14   CREATININE mg/dL 1.14* 1.11* 1.08* 1.17* 1.20*   EGFR IF NONAFRICN AM mL/min/1.73 49* 50* 52* 48* 46*   EGFR IF AFRICN AM mL/min/1.73 59* 61 63 58* 56*   CALCIUM mg/dL 8.9 9.4 9.0 8.8 9.2     HEPATIC:  Lab Results - Last 18 Months   Lab Units 09/17/21  0724 03/11/21  0725 09/10/20  0859 06/15/20  1008   ALT (SGPT) U/L 11 12 14 10   AST (SGOT) U/L 18 17 15 16   ALK PHOS U/L 67 68 70 66     THYROID:  Lab Results - Last 18 Months   Lab Units 09/17/21  0724 09/10/20  0859   TSH uIU/mL 2.820 0.966   FREE T4 ng/dL 1.25 1.35       Objective   BP (!) 118/7   Pulse 89   Temp 97.8 °F (36.6 °C) (Infrared)   Resp 18   Ht 157.5 cm (62\")   Wt 88.5 kg (195 lb)   SpO2 98%   BMI 35.67 kg/m²   Body mass index is 35.67 kg/m².    Recent Vitals       3/16/2021 9/23/2021 10/19/2021       BP: 108/70 124/82 118/7     Pulse: 75 88 89     Temp: 98.1 °F (36.7 °C) 97.6 °F (36.4 °C) 97.8 °F (36.6 °C)     Weight: 87.5 kg (192 lb 12.8 oz) 87.5 kg (193 lb) 88.5 kg (195 lb)     BMI (Calculated): 35.3 35.3 35.7     "       Physical Exam  GENERAL:  Well nourished/developed in no acute distress. BMI noted: 33.8  SKIN: Turgor excellent, without wound, rash, lesion  HEENT: Normal cephalic without trauma.  Pupils equal round reactive to light. Extraocular motions full without nystagmus.   External canals nonobstructive nontender without reddness. Tymphatic membranes josiane with cesar structures intact.   Oral cavity without growths, exudates, and moist.  Posterior pharynx without mass, obstruction, redness.  No thyromegaly, mass, tenderness, lymphadenopathy and supple.  CV: Regular rhythm.  No murmur, gallop,  edema. Posterior pulses intact.  No carotid bruits.  CHEST: No chest wall tenderness or mass.   LUNGS: Symmetric motion with clear to auscultation.   ABD: Soft, nontender without mass.   ORTHO: Symmetric extremities without swelling/point tenderness.  Full gross range of motion; few sore fingers.  NEURO: CN 2-12 grossly intact.  Symmetric facies. 1/4 x bicep equal reflexes.  UE/LE   3/5 strength throughout.  Nonfocal use extremities. Speech clear. Intact light touch with monofilament, vibratory sensation with tuning fork; equal toes/distal feet.    PSYCH: Oriented x 3.  Pleasant calm, well kept.   Purposeful/directed conservation with intact short/long gross memory.     Assessment/Plan     1. Gastroesophageal reflux disease, unspecified whether esophagitis present    2. Primary hypertension    3. Chronic back pain, unspecified back location, unspecified back pain laterality    4. Rheumatoid arthritis, involving unspecified site, unspecified whether rheumatoid factor present ([facility])    5. Renal insufficiency-ro    6. Immunization due    7. Depression, unspecified depression type    8. Anxiety: [facility]    9. Wellness examination-done        Discussion:  Back pain; though chronic sounds mechanical/not neurologic  Trial of PT  Cautious trial of mobic (2-3/week-worse days)-might have to stop if kidney fx does not tolerate  Next lab  "expected with Pérez (stopping by); see what kidney fx shows  Initially told may cut pristiq 50 1/2; called patient later and told could not do with ER feature (to hold for now)    Wellness done   Vaccine reviewed: today Tdap; later covid booster, shinglex  Screening reviewed/updated    Medical decision issues:   Data review above:   Rx: reviewed and decisions:   Same Rx for now otherwise    Visit today involved chronic significant medical problems or differentials and/or intensive drug monitoring: ie potential to cause serious morbidity or death:   New Medications Ordered This Visit   Medications   • meloxicam (Mobic) 7.5 MG tablet     Sig: Take 1 tablet by mouth Daily As Needed for Moderate Pain .     Dispense:  30 tablet     Refill:  0       Orders placed:   LAB/Testing/Referrals: reviewed/orders:   Today:   Orders Placed This Encounter   Procedures   • XR Spine Lumbar 2 or 3 View   • Tdap Vaccine Greater Than or Equal To 8yo IM   • Ambulatory Referral to Physical Therapy     Chronic/recurrent labs above or change to:   same     Health maintenance:   Body mass index is 35.67 kg/m².  Patient's Body mass index is 35.67 kg/m². indicating that she is obese (BMI >30). Obesity-related health conditions include the following: osteoarthritis. Obesity is unchanged. BMI is is above average; BMI management plan is completed. We discussed portion control and increasing exercise..      Tobacco use reviewed:   Emilie ESCALONA Charles  reports that she has never smoked. She has never used smokeless tobacco..    Annual/wellness visit: also/today (see separate note)-medicare     Patient Instructions     Medicare/insurances offer certain visits called \"wellness/annual\" that allows for time to deal with and  review the many aspects of \"being well\" that just might not get mentioned during other visits with your doctor through the year.  This includes things like reviews of health screenings (mammograms, various labs),  weight, exercise, " vaccines for just a few examples.      In order to help you with this we wish to make you aware of a few things for you to consider:    1. Advanced directives.  These are documents used to help direct your care if your health/situation should reach a point that you cannot make your own decisions.  While it is likely you do not currently have a need for these documents now; it is something that we all might face at any time.   The hand outs you are being given today are simply for you to review and use to learn more about these documents and consider them as you wish.      2. Vaccines: Certain vaccines are important after age 50, 60, and 65 and some health situations (for example COPD), require even boosters beyond age 65.  We are happy to review with you your vaccine status and vaccines that might be needed for you at this point:      a. Tetanus.   Like anyone this needs to given every 10 years; sooner for/with lacerations/wounds.   Likely when getting this booster it needs to be a tetanus called Tdap (tetanus mixed with diptheria and pertussis).   Years ago you had this vaccine.  We now know we can lose our immunity to pertussis (a part of this vaccine) and run a risk of catching this.  Now only would this make us ill; but more importantly we can spread this to very young children (and for them it can be a much more dangerous illness).   We call this the grandparent vaccine for this reason.     b. Pneumonia (strept).   This comes now with two brands.   It is recommended to take pneumovax first; and a year later take the cousin prevnar.  Even if you have had these before; we need to review when and your current health situation/s as you may need boosters and even recently the CDC has made recent/new recommendations for pneumovax.      c. Shingles.  You do not want to catch shingles.  Though you will recover from this; the pain associated with shingles can be severe.  Even if you have had the now older zostavax, or  have had shingles; it is recommended you still get the Shingrix (the new vaccine just available early 2018 shingles vaccine).  A new shingles vaccine (a shot to lower your chance of catching shingles) is now available (shingrix).  This vaccine is the second vaccine created for this purpose; (we have had zostavax for years).  Shingrix provides a much better and longer immunity for shingles than zostavax.  For this and other reasons Shingrix can be started at age 50.  If you have had zostavax in the past; you can still take Shingrix.      This vaccine is not paid for in a doctor's office by medicare, medicaid and probably most insurances.  Like zostavax; this is covered in drug stores.  This is a vaccine that if you chose to get you need to get at a drug store that gives vaccines (like [facility], [facility] and [facility].      d. Yearly flu vaccine given from September through April each year (there is a special vaccine for those over 65).     e. Travel vaccines:  If you are one to do international travel; be sure and ask us for any particular unusual vaccines you may need.     f.  Miscellaneous:  If you have certain health situations/disease you may need specific/particular vaccines not give to the general public.     The vaccines we have on record for you include:   Immunization History   Administered Date(s) Administered   • COVID-19 (MODERNA) 03/15/2021, 04/12/2021   • FluLaval/Fluarix/Fluzone >6 10/27/2018, 11/12/2020, 10/13/2021   • Influenza, Unspecified 11/05/2016       If you have record of other vaccines and want them to show in your chart here; please talk to our nurses about having your vaccine record updated.     3. Exercise: regular cardio exercise something everyone should consider and try to do; even if health limitations (ie find that exercise UE/LE/cardio that they can tolerate).   Normal weight a goal for everyone (as we discussed)    4. Healthy diet helpful for weight management,  illness prevention.     5. If over 50-screening exams include men PSA/rectal exam, women mammograms, and everyone colonoscopy screening for colon cancer.    6. If you use tobacco of any kind or e-products you should stop. We are providing you some information to consider that could make this process easier.      ##################################    Summary vaccine  Tdap today; not needed again 10 yrs  Sign up for covid booster soon  More later; especially when turn age 65    ########################      Shingles when you can; advise 30 days after covid booster            Follow up: No follow-ups on file.  Future Appointments   Date Time Provider Department Center   3/23/2022  8:30 AM [facility]   3/23/2022 11:30 AM Alexandru Miles MD [facility]

## 2021-10-20 RX ORDER — ALPRAZOLAM 0.5 MG/1
TABLET ORAL
Qty: 30 TABLET | Refills: 0 | Status: SHIPPED | OUTPATIENT
Start: 2021-10-20 | End: 2021-12-17

## 2021-10-20 NOTE — PROGRESS NOTES
QUICK REFERENCE INFORMATION:  The ABCs of the Annual Wellness Visit    Subsequent Medicare Wellness Visit     HEALTH RISK ASSESSMENT    : 1960    Recent Hospitalizations:  No hospitalization(s) within the last year..  ccc      Current Medical Providers:  Patient Care Team:  Alexandru Miles MD as PCP - General  Alexandru Miles MD as PCP - Family Medicine  Yue Meza PA (Physician Assistant)  Abigail Weathers MD as Consulting Physician (Gastroenterology)        Smoking Status:  Social History     Tobacco Use   Smoking Status Never Smoker   Smokeless Tobacco Never Used       Alcohol Consumption:  Social History     Substance and Sexual Activity   Alcohol Use Yes   • Alcohol/week: 2.0 standard drinks   • Types: 2 Glasses of wine per week    Comment: Occasionally       Depression Screen:   PHQ-2/PHQ-9 Depression Screening 10/19/2021   Little interest or pleasure in doing things 0   Feeling down, depressed, or hopeless 0   Trouble falling or staying asleep, or sleeping too much 0   Feeling tired or having little energy 0   Poor appetite or overeating 0   Feeling bad about yourself - or that you are a failure or have let yourself or your family down 0   Trouble concentrating on things, such as reading the newspaper or watching television 0   Moving or speaking so slowly that other people could have noticed. Or the opposite - being so fidgety or restless that you have been moving around a lot more than usual 0   Thoughts that you would be better off dead, or of hurting yourself in some way 0   Total Score 0   If you checked off any problems, how difficult have these problems made it for you to do your work, take care of things at home, or get along with other people? Not difficult at all       Health Habits and Functional and Cognitive Screening:  Functional & Cognitive Status 10/19/2021   Do you have difficulty preparing food and eating? No   Do you have difficulty bathing yourself, getting  dressed or grooming yourself? No   Do you have difficulty using the toilet? No   Do you have difficulty moving around from place to place? No   Do you have trouble with steps or getting out of a bed or a chair? No   Current Diet Well Balanced Diet   Dental Exam Up to date   Eye Exam Up to date   Exercise (times per week) 0 times per week   Current Exercises Include No Regular Exercise   Do you need help using the phone?  No   Are you deaf or do you have serious difficulty hearing?  No   Do you need help with transportation? No   Do you need help shopping? No   Do you need help preparing meals?  No   Do you need help with housework?  No   Do you need help with laundry? No   Do you need help taking your medications? No   Do you need help managing money? No   Do you ever drive or ride in a car without wearing a seat belt? No   Have you felt unusual stress, anger or loneliness in the last month? No   Who do you live with? Spouse   If you need help, do you have trouble finding someone available to you? No   Have you been bothered in the last four weeks by sexual problems? No   Do you have difficulty concentrating, remembering or making decisions? No           Does the patient have evidence of cognitive impairment? No    Asiprin use counseling: Does not need ASA (and currently is not on it)      Recent Lab Results:    Lab Results   Component Value Date    GLU 85 09/17/2021     Lab Results   Component Value Date    HGBA1C 5.40 09/10/2020     Lab Results   Component Value Date    TRIG 82 09/17/2021    HDL 52 09/17/2021    VLDL 16 09/17/2021    LDLHDL 1.0 02/05/2016           Age-appropriate Screening Schedule:  Refer to the list below for future screening recommendations based on patient's age, sex and/or medical conditions. Orders for these recommended tests are listed in the plan section. The patient has been provided with a written plan.    Health Maintenance   Topic Date Due   • ZOSTER VACCINE (1 of 2) 10/28/2022  (Originally 9/25/2011)   • DXA SCAN  06/08/2022   • PAP SMEAR  07/01/2023   • MAMMOGRAM  10/06/2023   • TDAP/TD VACCINES (2 - Td or Tdap) 10/19/2031   • INFLUENZA VACCINE  Completed        Subjective   History of Present Illness    Emilie Soto is a 61 y.o. female who presents for an Annual Wellness Visit.    The following portions of the patient's history were reviewed and updated as appropriate: allergies, current medications, past family history, past medical history, past social history, past surgical history and problem list.    Outpatient Medications Prior to Visit   Medication Sig Dispense Refill   • albuterol sulfate HFA (ProAir HFA) 108 (90 Base) MCG/ACT inhaler Inhale 1 puff Every 4 (Four) Hours As Needed for Wheezing or Shortness of Air. 24 g 3   • ALPRAZolam (Xanax) 0.25 MG tablet Take 0.5 tablets by mouth 2 (Two) Times a Day As Needed for Anxiety or Sleep. 30 tablet 0   • ALPRAZolam (XANAX) 0.5 MG tablet TAKE ONE TABLET TWICE DAILY AS NEEDED ANXIETY GENERIC FOR XANAX  30 tablet 0   • amLODIPine (NORVASC) 5 MG tablet Take 1 tablet by mouth Daily. 90 tablet 3   • carvedilol (COREG) 25 MG tablet Take 1 tablet by mouth 2 (Two) Times a Day. 180 tablet 1   • cloNIDine (CATAPRES) 0.1 MG tablet Take 0.1 mg by mouth 2 (Two) Times a Day. One by mouth twice daily as needed if BP greater than 160/100     • desvenlafaxine (Pristiq) 50 MG 24 hr tablet Take 1 tablet by mouth Daily. 30 tablet 5   • estradiol (ESTRACE) 2 MG tablet Take 0.5 tablets by mouth Daily for 90 days. 45 tablet 3   • folic acid (FOLVITE) 1 MG tablet Take 1 tablet by mouth Daily. 90 tablet 3   • glucose blood test strip Use as instructed 100 each 3   • HYDROcodone-acetaminophen (NORCO) 7.5-325 MG per tablet Take 1 tablet by mouth Every 6 (Six) Hours As Needed for Moderate Pain . 60 tablet 0   • hydroxychloroquine (PLAQUENIL) 200 MG tablet Take 1 tablet by mouth 2 (Two) Times a Day. Pt is only taking it once daily     • loratadine (Claritin) 10 MG  tablet Take 10 mg by mouth As Needed for Allergies.     • losartan (COZAAR) 100 MG tablet Take 1 tablet by mouth Daily. 90 tablet 2   • mycophenolate (CELLCEPT) 500 MG tablet Take 1 tablet by mouth 2 (Two) Times a Day.     • omeprazole (priLOSEC) 20 MG capsule Take 1 capsule by mouth Daily. 90 capsule 3   • potassium chloride (K-DUR,KLOR-CON) 10 MEQ CR tablet Take 1 tablet by mouth Daily. 90 tablet 3   • senna (SENOKOT) 8.6 MG tablet Take 1 tablet by mouth As Needed for Constipation.     • spironolactone (ALDACTONE) 25 MG tablet Take 1 tablet by mouth Daily. 90 tablet 3   • traZODone (DESYREL) 50 MG tablet Take 2 tablets by mouth At Night As Needed for Sleep. 180 tablet 3   • venlafaxine XR (Effexor XR) 75 MG 24 hr capsule Take 2 capsules by mouth Daily AND 1 capsule Every Night. 270 capsule 3   • verapamil SR (CALAN-SR) 240 MG CR tablet Take 1 tablet by mouth 2 (Two) Times a Day. 180 tablet 1   • vitamin D (ERGOCALCIFEROL) 36883 UNITS capsule capsule Take 1 capsule by mouth 1 (One) Time Per Week.     • alendronate (FOSAMAX) 70 MG tablet Take 1 tablet by mouth Every 7 (Seven) Days. 12 tablet 3     No facility-administered medications prior to visit.       Patient Active Problem List   Diagnosis   • Hypertension   • Obesity   • Anxiety: [facility]   • Depression: [facility]   • Chronic back pain   • Rheumatoid arthritis-[facility]   • RHIANNON: (cpap)   • Pulmonary fibrosis (HCC)   • Gastroesophageal reflux   • Murmur-echo benign   • Congestive heart failure-diastolic   • Insomnia   • Wellness examination-done   • Laboratory test*   • Chest pain-11.2017 noncardiac   • Surgical menopause see osteopenia   • HRT-[facility]   • Renal insufficiency-ro   • Hx of colonic polyps   • Family history of colon cancer   • Bilateral hearing loss   • Osteopenia: 2020-2y   • Other dysphagia   • Heartburn   • Gastroesophageal reflux disease without esophagitis       Advance Care Planning:  ACP discussion was held with the patient during this visit.  "Patient does not have an advance directive, information provided.    Identification of Risk Factors:  Risk factors include: Advance Directive Discussion  Breast Cancer/Mammogram Screening  Colon Cancer Screening  Immunizations Discussed/Encouraged (specific immunizations; Tdap, Influenza, Pneumococcal 23, Shingrix and COVID19 )  Osteoporosis Risk.    Review of Systems    Compared to one year ago, the patient feels her physical health is worse.  Compared to one year ago, the patient feels her mental health is the same.    Objective     Physical Exam    Vitals:    10/19/21 1041   BP: (!) 118/7   Pulse: 89   Resp: 18   Temp: 97.8 °F (36.6 °C)   TempSrc: Infrared   SpO2: 98%   Weight: 88.5 kg (195 lb)   Height: 157.5 cm (62\")   PainSc:   3   PainLoc: Back       Patient's Body mass index is 35.67 kg/m². indicating that she is obese (BMI >30). Obesity-related health conditions include the following: hypertension. Obesity is unchanged. BMI is is above average; BMI management plan is completed. We discussed portion control and increasing exercise..      Assessment/Plan   Patient Self-Management and Personalized Health Advice  The patient has been provided with information about: diet, exercise and weight management and preventive services including:   · Annual Wellness Visit (AWV).    Visit Diagnoses:    ICD-10-CM ICD-9-CM   1. Gastroesophageal reflux disease, unspecified whether esophagitis present  K21.9 530.81   2. Primary hypertension  I10 401.9   3. Chronic back pain, unspecified back location, unspecified back pain laterality  M54.9 724.5    G89.29 338.29   4. Rheumatoid arthritis, involving unspecified site, unspecified whether rheumatoid factor present ([facility])  M06.9 714.0   5. Renal insufficiency-ro  N28.9 593.9   6. Immunization due  Z23 V05.9   7. Depression, unspecified depression type  F32.A 311   8. Anxiety: [facility]  F41.9 300.00   9. Wellness examination-done  Z00.00 V70.0       Orders Placed This Encounter "   Procedures   • XR Spine Lumbar 2 or 3 View     Standing Status:   Future     Standing Expiration Date:   10/19/2022     Order Specific Question:   Reason for Exam:     Answer:   back pain     Order Specific Question:   Does this patient have a diabetic monitoring/medication delivering device on?     Answer:   No   • Tdap Vaccine Greater Than or Equal To 6yo IM   • Ambulatory Referral to Physical Therapy     Referral Priority:   Routine     Referral Type:   Physical Therapy     Referral Reason:   Specialty Services Required     Requested Specialty:   Physical Therapy     Number of Visits Requested:   1       Outpatient Encounter Medications as of 10/19/2021   Medication Sig Dispense Refill   • albuterol sulfate HFA (ProAir HFA) 108 (90 Base) MCG/ACT inhaler Inhale 1 puff Every 4 (Four) Hours As Needed for Wheezing or Shortness of Air. 24 g 3   • ALPRAZolam (Xanax) 0.25 MG tablet Take 0.5 tablets by mouth 2 (Two) Times a Day As Needed for Anxiety or Sleep. 30 tablet 0   • ALPRAZolam (XANAX) 0.5 MG tablet TAKE ONE TABLET TWICE DAILY AS NEEDED ANXIETY GENERIC FOR XANAX  30 tablet 0   • amLODIPine (NORVASC) 5 MG tablet Take 1 tablet by mouth Daily. 90 tablet 3   • carvedilol (COREG) 25 MG tablet Take 1 tablet by mouth 2 (Two) Times a Day. 180 tablet 1   • cloNIDine (CATAPRES) 0.1 MG tablet Take 0.1 mg by mouth 2 (Two) Times a Day. One by mouth twice daily as needed if BP greater than 160/100     • desvenlafaxine (Pristiq) 50 MG 24 hr tablet Take 1 tablet by mouth Daily. 30 tablet 5   • estradiol (ESTRACE) 2 MG tablet Take 0.5 tablets by mouth Daily for 90 days. 45 tablet 3   • folic acid (FOLVITE) 1 MG tablet Take 1 tablet by mouth Daily. 90 tablet 3   • glucose blood test strip Use as instructed 100 each 3   • HYDROcodone-acetaminophen (NORCO) 7.5-325 MG per tablet Take 1 tablet by mouth Every 6 (Six) Hours As Needed for Moderate Pain . 60 tablet 0   • hydroxychloroquine (PLAQUENIL) 200 MG tablet Take 1 tablet by  mouth 2 (Two) Times a Day. Pt is only taking it once daily     • loratadine (Claritin) 10 MG tablet Take 10 mg by mouth As Needed for Allergies.     • losartan (COZAAR) 100 MG tablet Take 1 tablet by mouth Daily. 90 tablet 2   • mycophenolate (CELLCEPT) 500 MG tablet Take 1 tablet by mouth 2 (Two) Times a Day.     • omeprazole (priLOSEC) 20 MG capsule Take 1 capsule by mouth Daily. 90 capsule 3   • potassium chloride (K-DUR,KLOR-CON) 10 MEQ CR tablet Take 1 tablet by mouth Daily. 90 tablet 3   • senna (SENOKOT) 8.6 MG tablet Take 1 tablet by mouth As Needed for Constipation.     • spironolactone (ALDACTONE) 25 MG tablet Take 1 tablet by mouth Daily. 90 tablet 3   • traZODone (DESYREL) 50 MG tablet Take 2 tablets by mouth At Night As Needed for Sleep. 180 tablet 3   • venlafaxine XR (Effexor XR) 75 MG 24 hr capsule Take 2 capsules by mouth Daily AND 1 capsule Every Night. 270 capsule 3   • verapamil SR (CALAN-SR) 240 MG CR tablet Take 1 tablet by mouth 2 (Two) Times a Day. 180 tablet 1   • vitamin D (ERGOCALCIFEROL) 51362 UNITS capsule capsule Take 1 capsule by mouth 1 (One) Time Per Week.     • meloxicam (Mobic) 7.5 MG tablet Take 1 tablet by mouth Daily As Needed for Moderate Pain . 30 tablet 0   • [DISCONTINUED] alendronate (FOSAMAX) 70 MG tablet Take 1 tablet by mouth Every 7 (Seven) Days. 12 tablet 3     No facility-administered encounter medications on file as of 10/19/2021.       Reviewed use of high risk medication in the elderly: yes  Reviewed for potential of harmful drug interactions in the elderly: yes    Follow Up:  No follow-ups on file.     An After Visit Summary and PPPS with all of these plans were given to the patient.

## 2021-10-20 NOTE — TELEPHONE ENCOUNTER
Rx Refill Note  Requested Prescriptions     Pending Prescriptions Disp Refills   • ALPRAZolam (XANAX) 0.5 MG tablet [Pharmacy Med Name: ALPRAZOLAM 0.5MG TABLET] 30 tablet 0     Sig: TAKE ONE TABLET TWICE DAILY AS NEEDED ANXIETY GENERIC FOR XANAX      Last office visit with prescribing clinician: 10/19/2021      Next office visit with prescribing clinician: 3/23/2022            Hailey Bravo MA  10/20/21, 08:34 CDT      Last filled 9/10/2021

## 2021-11-11 DIAGNOSIS — I10 HYPERTENSION, UNSPECIFIED TYPE: Chronic | ICD-10-CM

## 2021-11-11 DIAGNOSIS — I50.32 CHRONIC DIASTOLIC CONGESTIVE HEART FAILURE (HCC): Chronic | ICD-10-CM

## 2021-11-11 RX ORDER — VERAPAMIL HYDROCHLORIDE 240 MG/1
240 TABLET, FILM COATED, EXTENDED RELEASE ORAL 2 TIMES DAILY
Qty: 180 TABLET | Refills: 1 | Status: SHIPPED | OUTPATIENT
Start: 2021-11-11

## 2021-11-11 RX ORDER — CARVEDILOL 25 MG/1
25 TABLET ORAL 2 TIMES DAILY
Qty: 180 TABLET | Refills: 1 | Status: SHIPPED | OUTPATIENT
Start: 2021-11-11 | End: 2022-05-06

## 2021-11-12 ENCOUNTER — OFFICE VISIT (OUTPATIENT)
Dept: FAMILY MEDICINE CLINIC | Facility: CLINIC | Age: 61
End: 2021-11-12

## 2021-11-12 VITALS
TEMPERATURE: 96.8 F | HEIGHT: 62 IN | WEIGHT: 195 LBS | HEART RATE: 70 BPM | RESPIRATION RATE: 16 BRPM | BODY MASS INDEX: 35.88 KG/M2 | OXYGEN SATURATION: 98 %

## 2021-11-12 DIAGNOSIS — M25.561 ACUTE PAIN OF RIGHT KNEE: Primary | ICD-10-CM

## 2021-11-12 DIAGNOSIS — E66.01 CLASS 2 SEVERE OBESITY DUE TO EXCESS CALORIES WITH SERIOUS COMORBIDITY AND BODY MASS INDEX (BMI) OF 35.0 TO 35.9 IN ADULT (HCC): ICD-10-CM

## 2021-11-12 PROCEDURE — 99213 OFFICE O/P EST LOW 20 MIN: CPT | Performed by: NURSE PRACTITIONER

## 2021-11-15 NOTE — PROGRESS NOTES
Subjective   Chief Complaint:  Right knee pain    History of Present Illness:  This 61 y.o. female was seen in the office today.  Denies any falls or injuries, reports acute flareup of chronic right knee pain.    Allergies   Allergen Reactions   • Abilify [Aripiprazole] Other (See Comments)     Insomnia   • Bactrim [Sulfamethoxazole-Trimethoprim] Other (See Comments)     Pt unsure of reaction   • Biaxin [Clarithromycin] Nausea And Vomiting   • Ciprofloxacin Hcl Other (See Comments)     Pt unsure of reaction   • Duricef [Cefadroxil] Other (See Comments)     Pt unsure of reaction   • Ketek [Telithromycin] Other (See Comments)     Pt unsure of reaction   • Relafen [Nabumetone] Itching   • Wellbutrin [Bupropion] Itching      Current Outpatient Medications on File Prior to Visit   Medication Sig   • albuterol sulfate HFA (ProAir HFA) 108 (90 Base) MCG/ACT inhaler Inhale 1 puff Every 4 (Four) Hours As Needed for Wheezing or Shortness of Air.   • ALPRAZolam (XANAX) 0.5 MG tablet TAKE ONE TABLET TWICE DAILY AS NEEDED ANXIETY GENERIC FOR XANAX   • amLODIPine (NORVASC) 5 MG tablet Take 1 tablet by mouth Daily.   • carvedilol (COREG) 25 MG tablet TAKE 1 TABLET BY MOUTH 2 (TWO) TIMES A DAY.   • cloNIDine (CATAPRES) 0.1 MG tablet Take 0.1 mg by mouth 2 (Two) Times a Day. One by mouth twice daily as needed if BP greater than 160/100   • desvenlafaxine (Pristiq) 50 MG 24 hr tablet Take 1 tablet by mouth Daily.   • folic acid (FOLVITE) 1 MG tablet Take 1 tablet by mouth Daily.   • glucose blood test strip Use as instructed   • hydroxychloroquine (PLAQUENIL) 200 MG tablet Take 1 tablet by mouth 2 (Two) Times a Day. Pt is only taking it once daily   • loratadine (Claritin) 10 MG tablet Take 10 mg by mouth As Needed for Allergies.   • losartan (COZAAR) 100 MG tablet Take 1 tablet by mouth Daily.   • mycophenolate (CELLCEPT) 500 MG tablet Take 1 tablet by mouth 2 (Two) Times a Day.   • omeprazole (priLOSEC) 20 MG capsule Take 1 capsule  by mouth Daily.   • potassium chloride (K-DUR,KLOR-CON) 10 MEQ CR tablet Take 1 tablet by mouth Daily.   • senna (SENOKOT) 8.6 MG tablet Take 1 tablet by mouth As Needed for Constipation.   • spironolactone (ALDACTONE) 25 MG tablet Take 1 tablet by mouth Daily.   • traZODone (DESYREL) 50 MG tablet Take 2 tablets by mouth At Night As Needed for Sleep.   • venlafaxine XR (Effexor XR) 75 MG 24 hr capsule Take 2 capsules by mouth Daily AND 1 capsule Every Night.   • verapamil SR (CALAN-SR) 240 MG CR tablet TAKE 1 TABLET BY MOUTH 2 (TWO) TIMES A DAY.   • vitamin D (ERGOCALCIFEROL) 96746 UNITS capsule capsule Take 1 capsule by mouth 1 (One) Time Per Week.   • estradiol (ESTRACE) 2 MG tablet Take 0.5 tablets by mouth Daily for 90 days.   • meloxicam (Mobic) 7.5 MG tablet Take 1 tablet by mouth Daily As Needed for Moderate Pain .     No current facility-administered medications on file prior to visit.      Past Medical, Surgical, Social, and Family History:  Past Medical History:   Diagnosis Date   • CHF (congestive heart failure) (HCC)    • CKD (chronic kidney disease)    • DDD (degenerative disc disease), cervical    • Depression    • Fibromyalgia    • GERD (gastroesophageal reflux disease)    • Heart murmur    • Hepatitis cholestatic    • History of adenomatous polyp of colon    • HOCM (hypertrophic obstructive cardiomyopathy) (HCC)    • HTN (hypertension)    • RA (rheumatoid arthritis) (HCC)    • Renal disease    • Sleep apnea      Past Surgical History:   Procedure Laterality Date   • COLONOSCOPY  04/22/2016    One 16mm tubulovillous adenomatous polyp at the ileocecal valve-Clip was placed-injected; The examination was otherwise normal on direct and retroflexion views; Repeat 1 year   • COLONOSCOPY N/A 7/28/2017    A tattoo was seen at the ileocecal valve-A post-polypectomy scar was found at the tattoo site; One 12mm tubular adenomatous flat polyp in the transverse colon-Clip (MR conditional) was placed; The  examination was otherwise normal on direct and retroflexion views; Repeat 2 years   • COLONOSCOPY  10/04/2015    Bleeding at the ileocecal valve secondary to previous polypectomy-Clip was placed; No specimens collected; Repeat 6 months for retreatment   • COLONOSCOPY  09/30/2015    Very large multilobulated tubular adenomatous polyp opposite the ileocecal valve-removed piecemeal-at least 95% removed; One less than 1cm tubular adenomatous polyp in the ascending colon; Repeat 6-12 months to reassess the large polyp   • COLONOSCOPY N/A 10/10/2019    One 5mm tubular adenomatous polyp in the transverse colon; Diverticulosis in the left colon; The entire examined colon is normal on direct and retroflexion views; Repeat 5 years   • ENDOSCOPY N/A 10/13/2020    Procedure: ESOPHAGOGASTRODUODENOSCOPY WITH ANESTHESIA;  Surgeon: Abigail Weathers MD;  Location: [facility] ENDOSCOPY;  Service: Gastroenterology;  Laterality: N/A;  pre GERD  post: esophageal dilation with balloon   jocelyne irlanda    • EXPLORATORY LAPAROTOMY     • HYSTERECTOMY     • LIVER BIOPSY  06/14/2011    Cholestatic hepatitis-see report     Social History     Socioeconomic History   • Marital status:    Tobacco Use   • Smoking status: Never Smoker   • Smokeless tobacco: Never Used   Substance and Sexual Activity   • Alcohol use: Yes     Alcohol/week: 2.0 standard drinks     Types: 2 Glasses of wine per week     Comment: Occasionally   • Drug use: No   • Sexual activity: Yes     Partners: Male     Birth control/protection: Post-menopausal     Family History   Problem Relation Age of Onset   • Throat cancer Mother    • Diabetes Father    • Cancer Father         Pt reports he had part of his intestines removed   • Breast cancer Sister    • Colon cancer Neg Hx    • Colon polyps Neg Hx    • Esophageal cancer Neg Hx    • Liver cancer Neg Hx    • Liver disease Neg Hx    • Rectal cancer Neg Hx    • Stomach cancer Neg Hx      Objective   Physical Exam  Vitals reviewed.  "  Constitutional:       General: She is not in acute distress.     Appearance: Normal appearance. She is obese.   Cardiovascular:      Rate and Rhythm: Normal rate and regular rhythm.   Pulmonary:      Effort: Pulmonary effort is normal.      Breath sounds: Normal breath sounds.   Musculoskeletal:         General: Tenderness (*Right knee crepitus and popping with flexion and extension.*) present. No swelling.     Pulse 70   Temp 96.8 °F (36 °C)   Resp 16   Ht 157.5 cm (62\")   Wt 88.5 kg (195 lb)   SpO2 98%   BMI 35.67 kg/m²     Assessment/Plan   Diagnoses and all orders for this visit:    1. Acute pain of right knee (Primary)  -     XR Knee 3 View Right; Future    2. Class 2 severe obesity due to excess calories with serious comorbidity and body mass index (BMI) of 35.0 to 35.9 in adult (HCC)    Other orders  -     Diclofenac Sodium (VOLTAREN) 1 % gel gel; Apply 4 g topically to the appropriate area as directed 4 (Four) Times a Day.  Dispense: 50 g; Refill: 0    Discussion:  Advised and educated plan of care.  We discussed the benefits and risks of steroids versus nonsteroids.  At this point, she is willing to use topical nonsteroids and proceed with an x-ray to check the joint spaces.  The main differential considered here is an arthritic knee condition.?  Degree of degeneration.    Patient's Body mass index is 35.67 kg/m². indicating that she is obese (BMI >30). Obesity-related health conditions include the following: hypertension and osteoarthritis. Obesity is unchanged. BMI is is above average; BMI management plan is completed. We discussed portion control and increasing exercise..    Follow-up:  Return for As Needed - Depending on Test Results - Will Call.    Electronically signed by SUGEY Gillespie, 11/15/21, 8:49 AM CST.  "

## 2021-12-17 DIAGNOSIS — F32.A DEPRESSION, UNSPECIFIED DEPRESSION TYPE: ICD-10-CM

## 2021-12-17 DIAGNOSIS — F41.9 ANXIETY: ICD-10-CM

## 2021-12-17 RX ORDER — ALPRAZOLAM 0.5 MG/1
TABLET ORAL
Qty: 30 TABLET | Refills: 0 | Status: SHIPPED | OUTPATIENT
Start: 2021-12-17 | End: 2022-06-17

## 2021-12-17 NOTE — TELEPHONE ENCOUNTER
Last filled 10/20/21 per eileen      Rx Refill Note  Requested Prescriptions     Pending Prescriptions Disp Refills   • ALPRAZolam (XANAX) 0.5 MG tablet [Pharmacy Med Name: ALPRAZOLAM 0.5MG TABLET] 30 tablet 0     Sig: TAKE ONE TABLET TWICE DAILY AS NEEDED ANXIETY GENERIC FOR XANAX      Last office visit with prescribing clinician: 10/19/2021      Next office visit with prescribing clinician: 3/23/2022            Hailey Bravo MA  12/17/21, 15:01 CST

## 2021-12-17 NOTE — TELEPHONE ENCOUNTER
Rx Refill Note  Requested Prescriptions     Pending Prescriptions Disp Refills   • ALPRAZolam (XANAX) 0.5 MG tablet [Pharmacy Med Name: ALPRAZOLAM 0.5MG TABLET] 30 tablet 0     Sig: TAKE ONE TABLET TWICE DAILY AS NEEDED ANXIETY GENERIC FOR XANAX      Last office visit with prescribing clinician: 11/12/2021      Next office visit with prescribing clinician: Visit date not found            Yuly Garcia LPN  12/17/21, 16:48 CST     [place]  [facility]

## 2021-12-19 RX ORDER — ALPRAZOLAM 0.5 MG/1
TABLET ORAL
Qty: 30 TABLET | Refills: 0 | Status: SHIPPED | OUTPATIENT
Start: 2021-12-19 | End: 2022-03-23 | Stop reason: SDUPTHER

## 2022-02-07 RX ORDER — LOSARTAN POTASSIUM 100 MG/1
100 TABLET ORAL DAILY
Qty: 90 TABLET | Refills: 2 | Status: SHIPPED | OUTPATIENT
Start: 2022-02-07 | End: 2022-10-31

## 2022-03-02 ENCOUNTER — TELEPHONE (OUTPATIENT)
Dept: FAMILY MEDICINE CLINIC | Facility: CLINIC | Age: 62
End: 2022-03-02

## 2022-03-02 NOTE — TELEPHONE ENCOUNTER
Caller: Drew Soto    Relationship: Self    Best call back number: [phone]      Who are you requesting to speak with     CLINICAL STAFF      Do you know the name of the person who called:      DREW    What was the call regarding:     HAS PATIENT EVER TAKEN PROZAC BEFORE? BEHAVIORAL HEALTH PHYSICIAN ASKING FOR THIS INFORMATION.    Do you require a callback:     YES, PLEASE ADVISE

## 2022-03-23 ENCOUNTER — OFFICE VISIT (OUTPATIENT)
Dept: FAMILY MEDICINE CLINIC | Facility: CLINIC | Age: 62
End: 2022-03-23

## 2022-03-23 VITALS
HEIGHT: 62 IN | TEMPERATURE: 97.1 F | RESPIRATION RATE: 18 BRPM | DIASTOLIC BLOOD PRESSURE: 78 MMHG | WEIGHT: 195.2 LBS | OXYGEN SATURATION: 97 % | SYSTOLIC BLOOD PRESSURE: 126 MMHG | BODY MASS INDEX: 35.92 KG/M2 | HEART RATE: 78 BPM

## 2022-03-23 DIAGNOSIS — E87.6 HYPOPOTASSEMIA: ICD-10-CM

## 2022-03-23 DIAGNOSIS — E66.01 CLASS 2 SEVERE OBESITY DUE TO EXCESS CALORIES WITH SERIOUS COMORBIDITY AND BODY MASS INDEX (BMI) OF 35.0 TO 35.9 IN ADULT: Chronic | ICD-10-CM

## 2022-03-23 DIAGNOSIS — N28.9 RENAL INSUFFICIENCY: ICD-10-CM

## 2022-03-23 DIAGNOSIS — K21.9 GASTROESOPHAGEAL REFLUX DISEASE, UNSPECIFIED WHETHER ESOPHAGITIS PRESENT: Chronic | ICD-10-CM

## 2022-03-23 DIAGNOSIS — F32.A DEPRESSION, UNSPECIFIED DEPRESSION TYPE: ICD-10-CM

## 2022-03-23 DIAGNOSIS — I10 PRIMARY HYPERTENSION: Chronic | ICD-10-CM

## 2022-03-23 DIAGNOSIS — I50.30 DIASTOLIC CONGESTIVE HEART FAILURE, UNSPECIFIED HF CHRONICITY: Chronic | ICD-10-CM

## 2022-03-23 DIAGNOSIS — M85.80 OSTEOPENIA, UNSPECIFIED LOCATION: ICD-10-CM

## 2022-03-23 DIAGNOSIS — M06.9 RHEUMATOID ARTHRITIS, INVOLVING UNSPECIFIED SITE, UNSPECIFIED WHETHER RHEUMATOID FACTOR PRESENT: Chronic | ICD-10-CM

## 2022-03-23 PROCEDURE — 99214 OFFICE O/P EST MOD 30 MIN: CPT | Performed by: FAMILY MEDICINE

## 2022-03-23 RX ORDER — TRAZODONE HYDROCHLORIDE 100 MG/1
1 TABLET ORAL NIGHTLY
COMMUNITY
Start: 2022-03-02 | End: 2022-05-31

## 2022-03-23 RX ORDER — FLUOXETINE HYDROCHLORIDE 20 MG/1
1 CAPSULE ORAL DAILY
COMMUNITY
Start: 2022-03-02 | End: 2022-05-31 | Stop reason: DRUGHIGH

## 2022-03-23 RX ORDER — ALENDRONATE SODIUM 70 MG/1
1 TABLET ORAL WEEKLY
COMMUNITY
Start: 2022-02-07 | End: 2022-08-03

## 2022-03-23 RX ORDER — HYDROCODONE BITARTRATE AND ACETAMINOPHEN 7.5; 325 MG/1; MG/1
1 TABLET ORAL 2 TIMES DAILY PRN
COMMUNITY
Start: 2022-02-01

## 2022-03-23 NOTE — PROGRESS NOTES
Subjective   Emilie Soto is a 61 y.o. female presenting with chief complaint of:   Chief Complaint   Patient presents with   • Hypertension     History of Present Illness :  With .  Here for primarily f/u HTN  and other chronic medical problems      Has multiple chronic problems to consider that might have a bearing on today's issues;  an interval appointment.       Chronic/acute problems reviewed today:   1. Diastolic congestive heart failure, unspecified HF chronicity (HCC) Chronic/stable.  Denies significant sob, orthopnea, leg edema, weight gain.  Aware of influence diet/salt and watching weight at home.       2. Primary hypertension Chronic/stable. Stable here past/no recent home blood pressures.  No significant chest pain, SOB, LE edema, orthopnea, near syncope, dizziness/light headness.   Recent Vitals       10/19/2021 11/12/2021 3/23/2022       BP: 118/7 -- 126/78     Pulse: 89 70 78     Temp: 97.8 °F (36.6 °C) 96.8 °F (36 °C) 97.1 °F (36.2 °C)     Weight: 88.5 kg (195 lb) 88.5 kg (195 lb) 88.5 kg (195 lb 3.2 oz)     BMI (Calculated): 35.7 35.7 35.7            3. Class 2 severe obesity due to excess calories with serious comorbidity and body mass index (BMI) of 35.0 to 35.9 in adult (HCC) Chronic/stable.  Aware, advised weight loss before.  On/off some success with weight loss but often with weight gain back.      4. Depression, unspecified depression type chronic/variable and past significant  mood swings, down moods, nervousness, difficulty with concentration to function home/work.  Others close have not been concerned.  No suicide ideation/intent.  Rx helping and seeing Mohit      5. Renal insufficiency-ro Chronic/stable. No/yet nephrology.  No dysuria.  Previously warned/reminded:   To avoid further kidney function decline  A. Treat any time you think you have infection  B. Stay hydrated (dont get dehydrated); drink at least 60 oz fluid every 24 hr (1800 cc or nearly a 2L)  C. Do not allow any  xrays with dye WITHOUT the doctor ordering checking your renal function  D. Do not get new medications without the doctor considering your renal condition  E. Do not use motrin/ibuprofen, alleve/naprosyn and these types of medications     6. Gastroesophageal reflux disease, unspecified whether esophagitis present Chronic/stable.  Controlled heartburn, reflux without dysphagia, melena.  Rx used, periods not used proven needed with symptoms -currently doing ok.      7. Rheumatoid arthritis, involving unspecified site, unspecified whether rheumatoid factor present (HCC) Chronic/stable.  Various on/off joint pains/soreness/stiffness.  Particular joint problems with various.  No joint swelling.  Treats mainly with reduced activity, Rx listed, Tylenol.  No  NSAIDs, and no recent injections.      8. Osteopenia, unspecified location Chronic/stable.  Last bone density/if below.   Has fosmax/ Rx.  Ok further bone density screening when due.      9. Hypopotassemia Chronic/variable high or low K as uses diuretic; has to have lab monitoring.  No significant fatigue or muscle cramps       Has an/another acute issue today: none.    The following portions of the patient's history were reviewed and updated as appropriate: allergies, current medications, past family history, past medical history, past social history, past surgical history and problem list.      Current Outpatient Medications:   •  albuterol sulfate HFA (ProAir HFA) 108 (90 Base) MCG/ACT inhaler, Inhale 1 puff Every 4 (Four) Hours As Needed for Wheezing or Shortness of Air., Disp: 24 g, Rfl: 3  •  ALPRAZolam (XANAX) 0.5 MG tablet, TAKE ONE TABLET TWICE DAILY AS NEEDED ANXIETY GENERIC FOR XANAX, Disp: 30 tablet, Rfl: 0  •  ALPRAZolam (XANAX) 0.5 MG tablet, TAKE ONE TABLET TWICE DAILY AS NEEDED ANXIETY GENERIC FOR XANAX, Disp: 30 tablet, Rfl: 0  •  amLODIPine (NORVASC) 5 MG tablet, Take 1 tablet by mouth Daily., Disp: 90 tablet, Rfl: 3  •  carvedilol (COREG) 25 MG  tablet, TAKE 1 TABLET BY MOUTH 2 (TWO) TIMES A DAY., Disp: 180 tablet, Rfl: 1  •  cloNIDine (CATAPRES) 0.1 MG tablet, Take 0.1 mg by mouth 2 (Two) Times a Day. One by mouth twice daily as needed if BP greater than 160/100, Disp: , Rfl:   •  desvenlafaxine (Pristiq) 50 MG 24 hr tablet, Take 1 tablet by mouth Daily., Disp: 30 tablet, Rfl: 5  •  Diclofenac Sodium (VOLTAREN) 1 % gel gel, Apply 4 g topically to the appropriate area as directed 4 (Four) Times a Day., Disp: 50 g, Rfl: 0  •  estradiol (ESTRACE) 2 MG tablet, Take 0.5 tablets by mouth Daily for 90 days., Disp: 45 tablet, Rfl: 3  •  folic acid (FOLVITE) 1 MG tablet, Take 1 tablet by mouth Daily., Disp: 90 tablet, Rfl: 3  •  glucose blood test strip, Use as instructed, Disp: 100 each, Rfl: 3  •  hydroxychloroquine (PLAQUENIL) 200 MG tablet, Take 1 tablet by mouth 2 (Two) Times a Day. Pt is only taking it once daily, Disp: , Rfl:   •  loratadine (Claritin) 10 MG tablet, Take 10 mg by mouth As Needed for Allergies., Disp: , Rfl:   •  losartan (COZAAR) 100 MG tablet, TAKE 1 TABLET BY MOUTH DAILY., Disp: 90 tablet, Rfl: 2  •  meloxicam (Mobic) 7.5 MG tablet, Take 1 tablet by mouth Daily As Needed for Moderate Pain ., Disp: 30 tablet, Rfl: 0  •  mycophenolate (CELLCEPT) 500 MG tablet, Take 1 tablet by mouth 2 (Two) Times a Day., Disp: , Rfl:   •  omeprazole (priLOSEC) 20 MG capsule, Take 1 capsule by mouth Daily., Disp: 90 capsule, Rfl: 3  •  potassium chloride (K-DUR,KLOR-CON) 10 MEQ CR tablet, Take 1 tablet by mouth Daily., Disp: 90 tablet, Rfl: 3  •  senna (SENOKOT) 8.6 MG tablet, Take 1 tablet by mouth As Needed for Constipation., Disp: , Rfl:   •  spironolactone (ALDACTONE) 25 MG tablet, Take 1 tablet by mouth Daily., Disp: 90 tablet, Rfl: 3  •  traZODone (DESYREL) 50 MG tablet, Take 2 tablets by mouth At Night As Needed for Sleep., Disp: 180 tablet, Rfl: 3  •  venlafaxine XR (Effexor XR) 75 MG 24 hr capsule, Take 2 capsules by mouth Daily AND 1 capsule Every  Night., Disp: 270 capsule, Rfl: 3  •  verapamil SR (CALAN-SR) 240 MG CR tablet, TAKE 1 TABLET BY MOUTH 2 (TWO) TIMES A DAY., Disp: 180 tablet, Rfl: 1  •  vitamin D (ERGOCALCIFEROL) 79734 UNITS capsule capsule, Take 1 capsule by mouth 1 (One) Time Per Week., Disp: , Rfl:     No problems with medications.    Allergies   Allergen Reactions   • Abilify [Aripiprazole] Other (See Comments)     Insomnia   • Bactrim [Sulfamethoxazole-Trimethoprim] Other (See Comments)     Pt unsure of reaction   • Biaxin [Clarithromycin] Nausea And Vomiting   • Ciprofloxacin Hcl Other (See Comments)     Pt unsure of reaction   • Duricef [Cefadroxil] Other (See Comments)     Pt unsure of reaction   • Ketek [Telithromycin] Other (See Comments)     Pt unsure of reaction   • Relafen [Nabumetone] Itching   • Wellbutrin [Bupropion] Itching       Review of Systems  GENERAL:  Active/slower with limits, speed, stamina for age and desire. Sleep is ok; occ hard falling/staying with worry. No fever now.  SKIN: No rash/skin lesion of concern:   ENDO:  No syncope, near or diaphoretic sweaty spells..  HEENT: No recent head injury; but same/occ headache.   No vision change.   Decline hearing loss.  Ears without pain/drainage.  No sore throat.  No significant nasal/sinus congestion/drainage. No epistaxis.  CHEST: No chest wall tenderness or mass. No cough, without wheeze.  No SOB; no hemoptysis.  CV: No chest pain, palpitations, ankle edema.  GI: No dysphagia.  No abdominal pain, diarrhea, constipation.  No rectal bleeding, or melena.  Occ heartburn still.   :  Voids without dysuria, or incontinence to completion.  ORTHO: No painful/swollen joints but various on /off sore.  No change some sore neck or back.  No acute neck or back pain without recent injury.  NEURO: No dizziness, weakness of extremities.  No numbness/paresthesias.   PSYCH: No memory loss.  Mood good; often anxious, depressed but/and not suicidal.  Tries to tolerate stress .       Screening:  Gyne: flatter/mendoza confirmed 9.23.21  Mammogram: 10.6.21  Bone density: 6.8.20 Deca-bone d/[place]/Ts L2-0.9 neck -2.2/6.8.2020  Low dose CT chest: Tobacco-smoker/none: NA  GI:   Colon-p, div/Jasiel/DESHAWN/10.10.19/5y  [id number]/Jasiel/DESHAWN/10.13.2020/  Prostate: NA  Usual lab order  Any lab others want; (we wish to attempt not to duplicate so we need copies of labs done outside the office/out of epic); OR can have all labs here (bring the order from all other doctors) and we will forward to all specialist  6m CBC, CMP, sed rate, CK-total, CR-protein  12m CBC, CMP, LIPID, TSH, fT4, CK-total, Vit D, sed rate, CR-protein     Data reviewed:   Recent admit/ER/MD visits: rheumatology  Last cardiac testing:   Echo:   Results for orders placed during the hospital encounter of 11/13/17     Adult Stress Echo W/ Cont or Stress Agent if Necessary Per Protocol     Interpretation Summary  · Below average functional capacity.  · Clinically negative for ischemia.  · Electrically equivocal due to baseline EKG abnormalities.  · Left ventricular systolic function is normal at rest with appropriate increase in contractility with stress.     Exercise stress echocardiogram is low risk for ischemia.    Copy/paste function used for ROS/exam AND each area of these were reviewed, updated, confirmed and supplemented as needed.   Data reviewed:   Recent admit/ER/MD visits: last visit last visit  Last cardiac testing:   Echo:   Results for orders placed during the hospital encounter of 11/13/17    Adult Stress Echo W/ Cont or Stress Agent if Necessary Per Protocol    Interpretation Summary  · Below average functional capacity.  · Clinically negative for ischemia.  · Electrically equivocal due to baseline EKG abnormalities.  · Left ventricular systolic function is normal at rest with appropriate increase in contractility with stress.    Exercise stress echocardiogram is low risk for ischemia.    Radiology considered:   No radiology  results for the last 90 days.    Lab Results:  Results for orders placed or performed in visit on 09/17/21   Comprehensive Metabolic Panel    Specimen: Blood   Result Value Ref Range    Glucose 85 65 - 99 mg/dL    BUN 14 6 - 20 mg/dL    Creatinine 1.14 (H) 0.57 - 1.00 mg/dL    eGFR Non African Am 49 (L) >60 mL/min/1.73    eGFR African Am 59 (L) >60 mL/min/1.73    BUN/Creatinine Ratio 12.3 7.0 - 25.0    Sodium 139 136 - 145 mmol/L    Potassium 4.2 3.5 - 5.2 mmol/L    Chloride 103 98 - 107 mmol/L    Total CO2 27.2 22.0 - 29.0 mmol/L    Calcium 8.9 8.6 - 10.5 mg/dL    Total Protein 6.8 6.0 - 8.5 g/dL    Albumin 4.20 3.50 - 5.20 g/dL    Globulin 2.6 gm/dL    A/G Ratio 1.6 g/dL    Total Bilirubin <0.2 0.0 - 1.2 mg/dL    Alkaline Phosphatase 67 39 - 117 U/L    AST (SGOT) 18 1 - 32 U/L    ALT (SGPT) 11 1 - 33 U/L   Lipid Panel    Specimen: Blood   Result Value Ref Range    Total Cholesterol 132 0 - 200 mg/dL    Triglycerides 82 0 - 150 mg/dL    HDL Cholesterol 52 40 - 60 mg/dL    VLDL Cholesterol Sai 16 5 - 40 mg/dL    LDL Chol Calc (NIH) 64 0 - 100 mg/dL   TSH    Specimen: Blood   Result Value Ref Range    TSH 2.820 0.270 - 4.200 uIU/mL   T4, free    Specimen: Blood   Result Value Ref Range    Free T4 1.25 0.93 - 1.70 ng/dL   Vitamin D 25 Hydroxy    Specimen: Blood   Result Value Ref Range    25 Hydroxy, Vitamin D 54.3 30.0 - 100.0 ng/ml   Sedimentation Rate    Specimen: Blood   Result Value Ref Range    Sed Rate 37 (H) 0 - 30 mm/hr   C-reactive Protein    Specimen: Blood   Result Value Ref Range    C-Reactive Protein 2.69 (H) 0.00 - 0.50 mg/dL   CK    Specimen: Blood   Result Value Ref Range    Creatine Kinase 79 20 - 180 U/L   CBC & Differential    Specimen: Blood   Result Value Ref Range    WBC 4.58 3.40 - 10.80 10*3/mm3    RBC 4.31 3.77 - 5.28 10*6/mm3    Hemoglobin 12.4 12.0 - 15.9 g/dL    Hematocrit 38.9 34.0 - 46.6 %    MCV 90.3 79.0 - 97.0 fL    MCH 28.8 26.6 - 33.0 pg    MCHC 31.9 31.5 - 35.7 g/dL    RDW 13.1  "12.3 - 15.4 %    Platelets 266 140 - 450 10*3/mm3    Neutrophil Rel % 46.2 42.7 - 76.0 %    Lymphocyte Rel % 33.0 19.6 - 45.3 %    Monocyte Rel % 16.6 (H) 5.0 - 12.0 %    Eosinophil Rel % 2.6 0.3 - 6.2 %    Basophil Rel % 0.9 0.0 - 1.5 %    Neutrophils Absolute 2.12 1.70 - 7.00 10*3/mm3    Lymphocytes Absolute 1.51 0.70 - 3.10 10*3/mm3    Monocytes Absolute 0.76 0.10 - 0.90 10*3/mm3    Eosinophils Absolute 0.12 0.00 - 0.40 10*3/mm3    Basophils Absolute 0.04 0.00 - 0.20 10*3/mm3    Immature Granulocyte Rel % 0.7 (H) 0.0 - 0.5 %    Immature Grans Absolute 0.03 0.00 - 0.05 10*3/mm3    nRBC 0.0 0.0 - 0.2 /100 WBC       A1C:No results for input(s): HGBA1C in the last [id number] hours.  GLUCOSE:  Lab Results - Last 18 Months   Lab Units 09/17/21  0724 03/11/21  0725   GLUCOSE mg/dL 85 94     LIPID:  Lab Results - Last 18 Months   Lab Units 09/17/21  0724   CHOLESTEROL mg/dL 132   LDL CHOL mg/dL 64   HDL CHOL mg/dL 52   TRIGLYCERIDES mg/dL 82     CBC:  Lab Results - Last 18 Months   Lab Units 09/17/21  0724 03/11/21  0725   WBC 10*3/mm3 4.58 4.46   HEMOGLOBIN g/dL 12.4 12.5   HEMATOCRIT % 38.9 38.0   PLATELETS 10*3/mm3 266 247      BMP/CMP:  Lab Results - Last 18 Months   Lab Units 09/17/21  0724 03/11/21  0725   SODIUM mmol/L 139 143   POTASSIUM mmol/L 4.2 3.9   CHLORIDE mmol/L 103 104   TOTAL CO2 mmol/L 27.2 28.3   GLUCOSE mg/dL 85 94   BUN mg/dL 14 11   CREATININE mg/dL 1.14* 1.11*   EGFR IF NONAFRICN AM mL/min/1.73 49* 50*   EGFR IF AFRICN AM mL/min/1.73 59* 61   CALCIUM mg/dL 8.9 9.4     HEPATIC:  Lab Results - Last 18 Months   Lab Units 09/17/21  0724 03/11/21  0725   ALT (SGPT) U/L 11 12   AST (SGOT) U/L 18 17   ALK PHOS U/L 67 68     THYROID:  Lab Results - Last 18 Months   Lab Units 09/17/21  0724   TSH uIU/mL 2.820   FREE T4 ng/dL 1.25       Objective   /78 (BP Location: Right arm, Patient Position: Sitting, Cuff Size: Large Adult)   Pulse 78   Temp 97.1 °F (36.2 °C) (Infrared)   Resp 18   Ht 157.5 cm (62\") "   Wt 88.5 kg (195 lb 3.2 oz)   SpO2 97%   Breastfeeding No   BMI 35.70 kg/m²   Body mass index is 35.7 kg/m².      Recent Vitals       9/23/2021 10/19/2021 11/12/2021       BP: 124/82 118/7 --     Pulse: 88 89 70     Temp: 97.6 °F (36.4 °C) 97.8 °F (36.6 °C) 96.8 °F (36 °C)     Weight: 87.5 kg (193 lb) 88.5 kg (195 lb) 88.5 kg (195 lb)     BMI (Calculated): 35.3 35.7 35.7         3/23/2022 88.542 kg 195 lb 3.2 oz - Report   11/12/2021 88.451 kg 195 lb - Report   10/19/2021 88.451 kg 195 lb - Report   9/23/2021 87.544 kg 193 lb - Report   3/16/2021 87.454 kg 192 lb 12.8 oz - Report   10/13/2020 87.091 kg 192 lb Standing scale -   9/24/2020 86.637 kg 191 lb - Report   9/15/2020 87 kg 191 lb 12.8 oz - Report   6/29/2020 85.367 kg 188 lb 3.2 oz - Report   3/9/2020 82.555 kg 182 lb - Report   2/28/2020 82.555 kg 182 lb - Report   2/25/2020 82.555 kg 182 lb - Report   11/26/2019 79.833 kg 176 lb - Report   10/31/2019 80.74 kg 178 lb - Report   10/10/2019 78.926 kg 174 lb Standing scale          Physical Exam  GENERAL:  Well nourished/developed in no acute distress. BMI noted: 33.8  SKIN: Turgor excellent, without wound, rash, lesion  HEENT: Normal cephalic without trauma.  Pupils equal round reactive to light. Extraocular motions full without nystagmus.   External canals nonobstructive nontender without reddness. Tymphatic membranes josiane with cesar structures intact.   Oral cavity without growths, exudates, and moist.  Posterior pharynx without mass, obstruction, redness.  No thyromegaly, mass, tenderness, lymphadenopathy and supple.  CV: Regular rhythm.  No murmur, gallop,  edema. Posterior pulses intact.  No carotid bruits.  CHEST: No chest wall tenderness or mass.   LUNGS: Symmetric motion with clear to auscultation.   ABD: Soft, nontender without mass.   ORTHO: Symmetric extremities without swelling/point tenderness.  Full gross range of motion; few sore fingers.  NEURO: CN 2-12 grossly intact.  Symmetric facies.  1/4 x bicep equal reflexes.  UE/LE   3/5 strength throughout.  Nonfocal use extremities. Speech clear. Intact light touch with monofilament, vibratory sensation with tuning fork; equal toes/distal feet.    PSYCH: Oriented x 3.  Pleasant calm, well kept.   Purposeful/directed conservation with intact short/long gross memory.     Assessment/Plan     1. Diastolic congestive heart failure, unspecified HF chronicity ([facility])    2. Primary hypertension    3. Class 2 severe obesity due to excess calories with serious comorbidity and body mass index (BMI) of 35.0 to 35.9 in adult ([facility])    4. Depression, unspecified depression type    5. Renal insufficiency-ro    6. Gastroesophageal reflux disease, unspecified whether esophagitis present    7. Rheumatoid arthritis, involving unspecified site, unspecified whether rheumatoid factor present ([facility])    8. Osteopenia, unspecified location    9. Hypopotassemia      Discussion:  BP ok  Other vitals ok  DM/BS ok  Thyroid ok  Liver ok  CBC ok  Renal ok  Lipid ok  PSA NA    Controlled substance form  Fasting lab when able-she will call Pérez and ask him to send order    Immunization History   Administered Date(s) Administered   • COVID-19 (MODERNA) 1st, 2nd, 3rd Dose Only 03/15/2021, 04/12/2021   • FluLaval/Fluarix/Fluzone >6 10/27/2018, 11/12/2020, 10/13/2021   • Influenza, Unspecified 11/05/2016   • Tdap 10/19/2021     Vaccine reviewed: today ; later we advised/reaffirmed our support/suggestion for staying complete with covid- covid booster, seasonal flu/yearly and behind   Screening reviewed/updated    Medical decision issues:   Data review above:   Rx: reviewed and decisions:   Same Rx for now     Visit today involved chronic significant medical problems or differentials and/or intensive drug monitoring: ie potential to cause serious morbidity or death:   No orders of the defined types were placed in this encounter.    Orders placed:   LAB/Testing/Referrals: reviewed/orders:    Today:   No orders of the defined types were placed in this encounter.    Chronic/recurrent labs above or change to:   Any lab others want; (we wish to attempt not to duplicate so we need copies of labs done outside the office/out of [facility]); OR can have all labs here (bring the order from all other doctors) and we will forward to all specialist  6m CBC, CMP, A1c,sed rate, CK-total, CR-protein  12m CBC, CMP, A1c, LIPID, TSH, fT4, CK-total, Vit D, A1C sed rate, CR-protein    Health maintenance:   Body mass index is 35.7 kg/m².  Patient's Body mass index is 35.7 kg/m². indicating that she is obese (BMI >30). Obesity-related health conditions include the following: osteoarthritis. Obesity is unchanged. BMI is is above average; BMI management plan is completed. We discussed portion control and increasing exercise..    Tobacco use reviewed:   Emilie Soto  reports that she has never smoked. She has never used smokeless tobacco..     There are no Patient Instructions on file for this visit.    Follow up: Return for lab;, Dr Miles-, 6 m;.  Future Appointments   Date Time Provider Department Center   3/24/2022  8:40 AM [facility]   9/21/2022  8:25 AM [facility]   9/27/2022 11:30 AM Alexandru Miles MD [facility]     When completing her note I came under the impression that she might still be on her Estrace; it shows that I prescribed this.  It also shows that she has a gynecologist.  Lower will be asked to call her and advise her increasing concern with her using estrogen past 61 years old.  Combined with her autoimmune disease I am very concerned about the possibility of blood clots that could even cause a stroke.  Usually when someone is trying to stop hormones they do not do it abruptly; in her case she did want to go from a 2 mg tablet to 1 and then may be a month later to 0.5 and then after that every other day for a month.  If stopping her hormones  significantly gives her concern; we need to be sure we discussed at her next visit

## 2022-03-24 ENCOUNTER — LAB (OUTPATIENT)
Dept: FAMILY MEDICINE CLINIC | Facility: CLINIC | Age: 62
End: 2022-03-24

## 2022-03-24 DIAGNOSIS — I10 PRIMARY HYPERTENSION: ICD-10-CM

## 2022-03-24 DIAGNOSIS — I50.30 DIASTOLIC CONGESTIVE HEART FAILURE, UNSPECIFIED HF CHRONICITY: ICD-10-CM

## 2022-03-24 DIAGNOSIS — E66.01 CLASS 2 SEVERE OBESITY DUE TO EXCESS CALORIES WITH SERIOUS COMORBIDITY AND BODY MASS INDEX (BMI) OF 35.0 TO 35.9 IN ADULT: ICD-10-CM

## 2022-03-24 DIAGNOSIS — N28.9 RENAL INSUFFICIENCY: Primary | ICD-10-CM

## 2022-03-24 DIAGNOSIS — Z79.899 LONG-TERM USE OF HIGH-RISK MEDICATION: ICD-10-CM

## 2022-03-24 NOTE — PROGRESS NOTES
Called pt and has already been tapering down and is taking one half tablet of a 2 mg tablet = 1 mg. Pt is willing to taper further and get completely off. Advised pt that she cant cut a half in half and will be getting a new prescription to 0.5 mg for one month then to QOD and then stop, pt is agreeable. Advised will check with pharmacy to ensure that a 0.5 mg tablet can be cut? If not will get additional rx,pt is agreeable.      Did call MD[facility] [place] advised the the 1 mg tablet can be cut in half and the 0.5 mg can be cut in half.      Once the patient is down to the 0.5 mg for a month, please clarify she is to go to QOD for month then stop?

## 2022-03-25 LAB
ALBUMIN SERPL-MCNC: 3.9 G/DL (ref 3.5–5.2)
ALBUMIN/GLOB SERPL: 1.2 G/DL
ALP SERPL-CCNC: 65 U/L (ref 39–117)
ALT SERPL-CCNC: 11 U/L (ref 1–33)
AST SERPL-CCNC: 11 U/L (ref 1–32)
BASOPHILS # BLD AUTO: 0.01 10*3/MM3 (ref 0–0.2)
BASOPHILS NFR BLD AUTO: 0.2 % (ref 0–1.5)
BILIRUB SERPL-MCNC: 0.3 MG/DL (ref 0–1.2)
BUN SERPL-MCNC: 10 MG/DL (ref 8–23)
BUN/CREAT SERPL: 8.6 (ref 7–25)
CALCIUM SERPL-MCNC: 9.3 MG/DL (ref 8.6–10.5)
CHLORIDE SERPL-SCNC: 104 MMOL/L (ref 98–107)
CK SERPL-CCNC: 87 U/L (ref 20–180)
CO2 SERPL-SCNC: 26.9 MMOL/L (ref 22–29)
CREAT SERPL-MCNC: 1.16 MG/DL (ref 0.57–1)
CRP SERPL-MCNC: 3.22 MG/DL (ref 0–0.5)
EGFRCR SERPLBLD CKD-EPI 2021: 54.1 ML/MIN/1.73
EOSINOPHIL # BLD AUTO: 0.07 10*3/MM3 (ref 0–0.4)
EOSINOPHIL NFR BLD AUTO: 1.5 % (ref 0.3–6.2)
ERYTHROCYTE [DISTWIDTH] IN BLOOD BY AUTOMATED COUNT: 12.8 % (ref 12.3–15.4)
ERYTHROCYTE [SEDIMENTATION RATE] IN BLOOD BY WESTERGREN METHOD: 22 MM/HR (ref 0–30)
GLOBULIN SER CALC-MCNC: 3.2 GM/DL
GLUCOSE SERPL-MCNC: 83 MG/DL (ref 65–99)
HBA1C MFR BLD: 5.5 % (ref 4.8–5.6)
HCT VFR BLD AUTO: 37.4 % (ref 34–46.6)
HGB BLD-MCNC: 12.3 G/DL (ref 12–15.9)
IMM GRANULOCYTES # BLD AUTO: 0.03 10*3/MM3 (ref 0–0.05)
IMM GRANULOCYTES NFR BLD AUTO: 0.6 % (ref 0–0.5)
LYMPHOCYTES # BLD AUTO: 1.16 10*3/MM3 (ref 0.7–3.1)
LYMPHOCYTES NFR BLD AUTO: 25 % (ref 19.6–45.3)
MCH RBC QN AUTO: 28.8 PG (ref 26.6–33)
MCHC RBC AUTO-ENTMCNC: 32.9 G/DL (ref 31.5–35.7)
MCV RBC AUTO: 87.6 FL (ref 79–97)
MONOCYTES # BLD AUTO: 0.69 10*3/MM3 (ref 0.1–0.9)
MONOCYTES NFR BLD AUTO: 14.9 % (ref 5–12)
NEUTROPHILS # BLD AUTO: 2.68 10*3/MM3 (ref 1.7–7)
NEUTROPHILS NFR BLD AUTO: 57.8 % (ref 42.7–76)
NRBC BLD AUTO-RTO: 0 /100 WBC (ref 0–0.2)
PLATELET # BLD AUTO: 246 10*3/MM3 (ref 140–450)
POTASSIUM SERPL-SCNC: 4.1 MMOL/L (ref 3.5–5.2)
PROT SERPL-MCNC: 7.1 G/DL (ref 6–8.5)
RBC # BLD AUTO: 4.27 10*6/MM3 (ref 3.77–5.28)
SODIUM SERPL-SCNC: 142 MMOL/L (ref 136–145)
WBC # BLD AUTO: 4.64 10*3/MM3 (ref 3.4–10.8)

## 2022-04-15 ENCOUNTER — TELEMEDICINE (OUTPATIENT)
Dept: FAMILY MEDICINE CLINIC | Facility: CLINIC | Age: 62
End: 2022-04-15

## 2022-04-15 DIAGNOSIS — R68.89 FLU-LIKE SYMPTOMS: ICD-10-CM

## 2022-04-15 DIAGNOSIS — J06.9 UPPER RESPIRATORY TRACT INFECTION, UNSPECIFIED TYPE: Primary | ICD-10-CM

## 2022-04-15 DIAGNOSIS — J10.1 INFLUENZA A: ICD-10-CM

## 2022-04-15 LAB
EXPIRATION DATE: ABNORMAL
FLUAV AG UPPER RESP QL IA.RAPID: DETECTED
FLUBV AG UPPER RESP QL IA.RAPID: NOT DETECTED
INTERNAL CONTROL: ABNORMAL
Lab: ABNORMAL
SARS-COV-2 RNA RESP QL NAA+PROBE: NOT DETECTED

## 2022-04-15 PROCEDURE — 87428 SARSCOV & INF VIR A&B AG IA: CPT | Performed by: NURSE PRACTITIONER

## 2022-04-15 PROCEDURE — 99213 OFFICE O/P EST LOW 20 MIN: CPT | Performed by: NURSE PRACTITIONER

## 2022-04-15 RX ORDER — OSELTAMIVIR PHOSPHATE 6 MG/ML
30 FOR SUSPENSION ORAL 2 TIMES DAILY
Qty: 60 ML | Refills: 0 | Status: SHIPPED | OUTPATIENT
Start: 2022-04-15 | End: 2022-04-20

## 2022-04-15 RX ORDER — OSELTAMIVIR PHOSPHATE 30 MG/1
30 CAPSULE ORAL 2 TIMES DAILY
Qty: 10 CAPSULE | Refills: 0 | Status: SHIPPED | OUTPATIENT
Start: 2022-04-15 | End: 2022-04-15 | Stop reason: CLARIF

## 2022-04-15 NOTE — PROGRESS NOTES
Addendum: Patient came by for flu test-positive influenza A.  Tamiflu sent.  Renal dosing considered.    Electronically signed by SUGEY Gillespie, 04/15/22, 3:27 PM CDT.

## 2022-04-15 NOTE — PROGRESS NOTES
Subjective   Chief Complaint:  Cough, body aches    History of Present Illness:  After giving verbal consent to use voice with video technology to receive care through telemedicine, this 61 y.o. female was seen via telemedicine MyChart video conference today for evaluation of cough, fever, cold chills, and sweats.  Reports whole body was aching.  Reports nose is running clear.  Onset x 2 days.      Allergies   Allergen Reactions   • Abilify [Aripiprazole] Other (See Comments)     Insomnia   • Bactrim [Sulfamethoxazole-Trimethoprim] Other (See Comments)     Pt unsure of reaction   • Biaxin [Clarithromycin] Nausea And Vomiting   • Ciprofloxacin Hcl Other (See Comments)     Pt unsure of reaction   • Duricef [Cefadroxil] Other (See Comments)     Pt unsure of reaction   • Ketek [Telithromycin] Other (See Comments)     Pt unsure of reaction   • Relafen [Nabumetone] Itching   • Wellbutrin [Bupropion] Itching      Current Outpatient Medications on File Prior to Visit   Medication Sig   • albuterol sulfate HFA (ProAir HFA) 108 (90 Base) MCG/ACT inhaler Inhale 1 puff Every 4 (Four) Hours As Needed for Wheezing or Shortness of Air.   • alendronate (FOSAMAX) 70 MG tablet Take 1 tablet by mouth 1 (One) Time Per Week.   • ALPRAZolam (XANAX) 0.5 MG tablet TAKE ONE TABLET TWICE DAILY AS NEEDED ANXIETY GENERIC FOR XANAX   • amLODIPine (NORVASC) 5 MG tablet Take 1 tablet by mouth Daily.   • carvedilol (COREG) 25 MG tablet TAKE 1 TABLET BY MOUTH 2 (TWO) TIMES A DAY.   • cloNIDine (CATAPRES) 0.1 MG tablet Take 0.1 mg by mouth 2 (Two) Times a Day. One by mouth twice daily as needed if BP greater than 160/100   • estradiol (ESTRACE) 2 MG tablet Take 0.5 tablets by mouth Daily for 90 days.   • FLUoxetine (PROzac) 20 MG capsule Take 1 capsule by mouth Daily.   • folic acid (FOLVITE) 1 MG tablet Take 1 tablet by mouth Daily.   • glucose blood test strip Use as instructed   • HYDROcodone-acetaminophen (NORCO) 7.5-325 MG per tablet Take 1  tablet by mouth 2 (Two) Times a Day As Needed.   • hydroxychloroquine (PLAQUENIL) 200 MG tablet Take 1 tablet by mouth 2 (Two) Times a Day. Pt is only taking it once daily   • loratadine (CLARITIN) 10 MG tablet Take 10 mg by mouth As Needed for Allergies.   • losartan (COZAAR) 100 MG tablet TAKE 1 TABLET BY MOUTH DAILY.   • mycophenolate (CELLCEPT) 500 MG tablet Take 1 tablet by mouth 2 (Two) Times a Day.   • omeprazole (priLOSEC) 20 MG capsule Take 1 capsule by mouth Daily.   • potassium chloride (K-DUR,KLOR-CON) 10 MEQ CR tablet Take 1 tablet by mouth Daily.   • senna (SENOKOT) 8.6 MG tablet Take 1 tablet by mouth As Needed for Constipation.   • spironolactone (ALDACTONE) 25 MG tablet Take 1 tablet by mouth Daily.   • traZODone (DESYREL) 100 MG tablet Take 1 tablet by mouth Every Night.   • verapamil SR (CALAN-SR) 240 MG CR tablet TAKE 1 TABLET BY MOUTH 2 (TWO) TIMES A DAY.   • vitamin D (ERGOCALCIFEROL) 69955 UNITS capsule capsule Take 1 capsule by mouth 1 (One) Time Per Week.     No current facility-administered medications on file prior to visit.      Past Medical, Surgical, Social, and Family History:  Past Medical History:   Diagnosis Date   • CHF (congestive heart failure) (HCC)    • CKD (chronic kidney disease)    • DDD (degenerative disc disease), cervical    • Depression    • Fibromyalgia    • GERD (gastroesophageal reflux disease)    • Heart murmur    • Hepatitis cholestatic    • History of adenomatous polyp of colon    • HOCM (hypertrophic obstructive cardiomyopathy) (HCC)    • HTN (hypertension)    • RA (rheumatoid arthritis) (HCC)    • Renal disease    • Sleep apnea      Past Surgical History:   Procedure Laterality Date   • COLONOSCOPY  04/22/2016    One 16mm tubulovillous adenomatous polyp at the ileocecal valve-Clip was placed-injected; The examination was otherwise normal on direct and retroflexion views; Repeat 1 year   • COLONOSCOPY N/A 7/28/2017    A tattoo was seen at the ileocecal valve-A  post-polypectomy scar was found at the tattoo site; One 12mm tubular adenomatous flat polyp in the transverse colon-Clip (MR conditional) was placed; The examination was otherwise normal on direct and retroflexion views; Repeat 2 years   • COLONOSCOPY  10/04/2015    Bleeding at the ileocecal valve secondary to previous polypectomy-Clip was placed; No specimens collected; Repeat 6 months for retreatment   • COLONOSCOPY  09/30/2015    Very large multilobulated tubular adenomatous polyp opposite the ileocecal valve-removed piecemeal-at least 95% removed; One less than 1cm tubular adenomatous polyp in the ascending colon; Repeat 6-12 months to reassess the large polyp   • COLONOSCOPY N/A 10/10/2019    One 5mm tubular adenomatous polyp in the transverse colon; Diverticulosis in the left colon; The entire examined colon is normal on direct and retroflexion views; Repeat 5 years   • ENDOSCOPY N/A 10/13/2020    Procedure: ESOPHAGOGASTRODUODENOSCOPY WITH ANESTHESIA;  Surgeon: Abigail Weathers MD;  Location: [facility] ENDOSCOPY;  Service: Gastroenterology;  Laterality: N/A;  pre GERD  post: esophageal dilation with balloon   jocelyne irlanda    • EXPLORATORY LAPAROTOMY     • HYSTERECTOMY     • LIVER BIOPSY  06/14/2011    Cholestatic hepatitis-see report     Social History     Socioeconomic History   • Marital status:    Tobacco Use   • Smoking status: Never Smoker   • Smokeless tobacco: Never Used   Substance and Sexual Activity   • Alcohol use: Yes     Alcohol/week: 2.0 standard drinks     Types: 2 Glasses of wine per week     Comment: Occasionally   • Drug use: No   • Sexual activity: Yes     Partners: Male     Birth control/protection: Post-menopausal     Family History   Problem Relation Age of Onset   • Throat cancer Mother    • Diabetes Father    • Cancer Father         Pt reports he had part of his intestines removed   • Breast cancer Sister    • Colon cancer Neg Hx    • Colon polyps Neg Hx    • Esophageal cancer Neg Hx     • Liver cancer Neg Hx    • Liver disease Neg Hx    • Rectal cancer Neg Hx    • Stomach cancer Neg Hx      Objective   Physical Exam  Constitutional:       General: She is not in acute distress.     Appearance: She is ill-appearing.   Pulmonary:      Effort: Pulmonary effort is normal. No respiratory distress.   Neurological:      Mental Status: She is alert.       Assessment/Plan   Diagnoses and all orders for this visit:    1. Upper respiratory tract infection, unspecified type (Primary)    Discussion:  Advised and educated plan of care.  Advised this is a respiratory viral illness very likely.  Will have her come by for a flu and Covid swab to further direct treatment, if negative, advised supportive care.    Follow-up:  Return for As Needed - Depending on Test Results - Will Call.    This was an audio and video enabled telemedicine encounter.     Electronically signed by SUGEY Gillespie, 04/15/22, 2:49 PM CDT.

## 2022-04-15 NOTE — PROGRESS NOTES
Sidney Torres2 called and advised for renal dosing, would need to change to liquid, 5 ml bid = 30 mg. Spoke to Provider GUANAKITO MAYES and verbal ok given and new E-rx sent. Confirmed bottle is 60 ml and sent

## 2022-05-06 DIAGNOSIS — I50.32 CHRONIC DIASTOLIC CONGESTIVE HEART FAILURE: Chronic | ICD-10-CM

## 2022-05-06 DIAGNOSIS — I10 HYPERTENSION, UNSPECIFIED TYPE: Chronic | ICD-10-CM

## 2022-05-06 DIAGNOSIS — K21.9 GASTROESOPHAGEAL REFLUX DISEASE: ICD-10-CM

## 2022-05-06 RX ORDER — OMEPRAZOLE 20 MG/1
20 CAPSULE, DELAYED RELEASE ORAL DAILY
Qty: 90 CAPSULE | Refills: 3 | Status: SHIPPED | OUTPATIENT
Start: 2022-05-06

## 2022-05-06 RX ORDER — POTASSIUM CHLORIDE 750 MG/1
10 TABLET, EXTENDED RELEASE ORAL DAILY
Qty: 90 TABLET | Refills: 3 | Status: SHIPPED | OUTPATIENT
Start: 2022-05-06

## 2022-05-06 RX ORDER — CARVEDILOL 25 MG/1
25 TABLET ORAL 2 TIMES DAILY
Qty: 180 TABLET | Refills: 1 | Status: SHIPPED | OUTPATIENT
Start: 2022-05-06 | End: 2022-10-20 | Stop reason: HOSPADM

## 2022-05-09 ENCOUNTER — TELEPHONE (OUTPATIENT)
Dept: FAMILY MEDICINE CLINIC | Facility: CLINIC | Age: 62
End: 2022-05-09

## 2022-05-09 DIAGNOSIS — M06.9 RHEUMATOID ARTHRITIS, INVOLVING UNSPECIFIED SITE, UNSPECIFIED WHETHER RHEUMATOID FACTOR PRESENT: ICD-10-CM

## 2022-05-09 DIAGNOSIS — N28.9 RENAL INSUFFICIENCY: ICD-10-CM

## 2022-05-09 DIAGNOSIS — I10 HYPERTENSION, UNSPECIFIED TYPE: ICD-10-CM

## 2022-05-09 DIAGNOSIS — R53.83 FATIGUE, UNSPECIFIED TYPE: Primary | ICD-10-CM

## 2022-05-09 DIAGNOSIS — R73.01 ELEVATED FASTING GLUCOSE: ICD-10-CM

## 2022-05-09 NOTE — TELEPHONE ENCOUNTER
CBC, CMP, TSH, fT4 to start      Caller: Emilie Soto    Relationship: Self    Best call back number: [phone]     What medication are you requesting: B12 TABLETS    What are your current symptoms: NO ENERGY    If a prescription is needed, what is your preferred pharmacy and phone number: [facility] - [place] - [address] [facility] [phone] [facility] [phone] FX     Additional notes: PATIENT STATES SHE HAS ABSOLUTELY NO ENERGY, NO MOTIVATION, AND IS HAVING TROUBLE FINDING THE ENERGY TO DO ANYTHING. SHE WOULD LIKE A CALLBACK WITH ADVICE REGARDING THIS.

## 2022-05-10 ENCOUNTER — LAB (OUTPATIENT)
Dept: FAMILY MEDICINE CLINIC | Facility: CLINIC | Age: 62
End: 2022-05-10

## 2022-05-11 ENCOUNTER — TELEPHONE (OUTPATIENT)
Dept: FAMILY MEDICINE CLINIC | Facility: CLINIC | Age: 62
End: 2022-05-11

## 2022-05-11 NOTE — TELEPHONE ENCOUNTER
B12 bone hurt her but it is probably not going to help because I do not think she is low  100 discussed with psychiatry the medications that she takes from their perspective that are sedating and able to cause fatigue and the pros and cons of any changes that might be made    None I want her to see me sometime after that    ----- Message from Patsy Calabrese PCT sent at 5/11/2022 11:11 AM CDT -----  Called pt informed of results, pt voiced understanding.  - Pt is seeing psyc on 5.25 - she is wanting to take B12 to help with energy is this something you think will help?

## 2022-05-31 ENCOUNTER — OFFICE VISIT (OUTPATIENT)
Dept: FAMILY MEDICINE CLINIC | Facility: CLINIC | Age: 62
End: 2022-05-31

## 2022-05-31 VITALS
OXYGEN SATURATION: 100 % | BODY MASS INDEX: 34.96 KG/M2 | WEIGHT: 190 LBS | RESPIRATION RATE: 16 BRPM | SYSTOLIC BLOOD PRESSURE: 138 MMHG | DIASTOLIC BLOOD PRESSURE: 80 MMHG | HEIGHT: 62 IN | TEMPERATURE: 96.8 F | HEART RATE: 62 BPM

## 2022-05-31 DIAGNOSIS — R53.83 FATIGUE, UNSPECIFIED TYPE: ICD-10-CM

## 2022-05-31 DIAGNOSIS — I50.30 DIASTOLIC CONGESTIVE HEART FAILURE, UNSPECIFIED HF CHRONICITY: Chronic | ICD-10-CM

## 2022-05-31 DIAGNOSIS — G47.10 HYPERSOMNIA: ICD-10-CM

## 2022-05-31 DIAGNOSIS — I10 PRIMARY HYPERTENSION: Chronic | ICD-10-CM

## 2022-05-31 DIAGNOSIS — M06.9 RHEUMATOID ARTHRITIS, INVOLVING UNSPECIFIED SITE, UNSPECIFIED WHETHER RHEUMATOID FACTOR PRESENT: Chronic | ICD-10-CM

## 2022-05-31 DIAGNOSIS — G47.00 INSOMNIA, UNSPECIFIED TYPE: ICD-10-CM

## 2022-05-31 DIAGNOSIS — F41.9 ANXIETY: ICD-10-CM

## 2022-05-31 DIAGNOSIS — Z79.899 POLYPHARMACY: ICD-10-CM

## 2022-05-31 DIAGNOSIS — F32.A DEPRESSION, UNSPECIFIED DEPRESSION TYPE: ICD-10-CM

## 2022-05-31 DIAGNOSIS — G47.33 OBSTRUCTIVE SLEEP APNEA: Chronic | ICD-10-CM

## 2022-05-31 DIAGNOSIS — N28.9 RENAL INSUFFICIENCY: ICD-10-CM

## 2022-05-31 DIAGNOSIS — E87.6 HYPOPOTASSEMIA: ICD-10-CM

## 2022-05-31 PROCEDURE — 99214 OFFICE O/P EST MOD 30 MIN: CPT | Performed by: FAMILY MEDICINE

## 2022-05-31 RX ORDER — FLUOXETINE HYDROCHLORIDE 40 MG/1
1 CAPSULE ORAL DAILY
COMMUNITY
Start: 2022-05-06

## 2022-05-31 RX ORDER — TRAZODONE HYDROCHLORIDE 100 MG/1
100 TABLET ORAL NIGHTLY
Status: SHIPPED
Start: 2022-05-31 | End: 2022-11-03 | Stop reason: DRUGHIGH

## 2022-05-31 RX ORDER — TRAZODONE HYDROCHLORIDE 100 MG/1
50 TABLET ORAL NIGHTLY
Status: SHIPPED
Start: 2022-05-31 | End: 2022-05-31

## 2022-05-31 NOTE — PROGRESS NOTES
"Subjective   Emilie Soto is a 61 y.o. female presenting with chief complaint of:   Chief Complaint   Patient presents with   • Follow-up     \"I seen Dr Rueda, I guess that is what he wants to see me for?\"       History of Present Illness :  Alone.  Here for primarily an acute issue today; called 5.9.22    Additional notes: PATIENT STATES SHE HAS ABSOLUTELY NO ENERGY, NO MOTIVATION, AND IS HAVING TROUBLE FINDING THE ENERGY TO DO ANYTHING. SHE WOULD LIKE A CALLBACK WITH ADVICE REGARDING THIS.    Has multiple chronic problems to consider that might have a bearing on today's issues; not an interval appointment.       Chronic/acute problems reviewed today:   [facility]. [facility]:  On/off anxiety tolerated somewhat.  Rx helps and interested ? in Rx change. Stress ongoing day to day.      2. Depression, unspecified depression type chronic past significant  mood swings, down moods, nervousness, difficulty with concentration to function home/work.  Others close have not been concerned.  No suicide ideation/intent.  Rx helps      3. RHIANNON: (cpap) finds difficulty with CPAP therapy   4. Hypersomnia chronic difficulties being sleepy during the day but does not nap or nor does she fall asleep and have accidents   5. Fatigue, unspecified type variable on and off fatigue; with various etiologies.  Fatigues with activity fatigues when she wakes in the morning and then also has no psychological drive   6. Renal insufficiency-ro Chronic/slowly worsening.  No to this point nephrology.  No dysuria.  Previously warned/reminded:   To avoid further kidney function decline  A. Treat any time you think you have infection  B. Stay hydrated (dont get dehydrated); drink at least 60 oz fluid every 24 hr (1800 cc or nearly a 2L)  C. Do not allow any xrays with dye WITHOUT the doctor ordering checking your renal function  D. Do not get new medications without the doctor considering your renal condition  E. Do not use " "motrin/ibuprofen, alleve/naprosyn and these types of medications     7. Primary hypertension Chronic/stable. Stable here past/no recent home blood pressures.  No significant chest pain, SOB, LE edema, orthopnea, near syncope, dizziness/light headness.   Recent Vitals       11/12/2021 3/23/2022 5/31/2022       BP: -- 126/78 138/80     Pulse: 70 78 62     Temp: 96.8 °F (36 °C) 97.1 °F (36.2 °C) 96.8 °F (36 °C)     Weight: 88.5 kg (195 lb) 88.5 kg (195 lb 3.2 oz) 86.2 kg (190 lb)     BMI (Calculated): 35.7 35.7 34.7            8. Diastolic congestive heart failure, unspecified HF chronicity (HCC) Chronic/stable.  Denies significant sob, orthopnea, leg edema, weight gain.  Aware of influence diet/salt and watching weight at home.       9. Hypopotassemia Chronic/variable high or low K as uses diuretic; has to have lab monitoring.  No significant fatigue or muscle cramps     10. Rheumatoid arthritis, involving unspecified site, unspecified whether rheumatoid factor present (HCC) chronic history of rheumatoid with various degrees of joint discomfort but no significant swelling.  Requires several medications with even immune compromising potential.  Potential renal issues   11. Insomnia, unspecified type chronic difficulty found staying asleep with proven obstructive sleep apnea and noncompliance using CPAP.  Has medications to help make her drowsy to fall asleep and stay asleep.  Awakes in the morning and has difficulty awaking; same time has no \"drive\" to want to get up and do anything   12. Polypharmacy: Chronically has to use several medications with multiple side effects and potentials for side effects     Has an/another acute issue today: none.    The following portions of the patient's history were reviewed and updated as appropriate: allergies, current medications, past family history, past medical history, past social history, past surgical history and problem list.      Current Outpatient Medications:   •  " albuterol sulfate HFA (ProAir HFA) 108 (90 Base) MCG/ACT inhaler, Inhale 1 puff Every 4 (Four) Hours As Needed for Wheezing or Shortness of Air., Disp: 24 g, Rfl: 3  •  alendronate (FOSAMAX) 70 MG tablet, Take 1 tablet by mouth 1 (One) Time Per Week., Disp: , Rfl:   •  ALPRAZolam (XANAX) 0.5 MG tablet, TAKE ONE TABLET TWICE DAILY AS NEEDED ANXIETY GENERIC FOR XANAX, Disp: 30 tablet, Rfl: 0  •  amLODIPine (NORVASC) 5 MG tablet, Take 1 tablet by mouth Daily., Disp: 90 tablet, Rfl: 3  •  carvedilol (COREG) 25 MG tablet, TAKE 1 TABLET BY MOUTH 2 (TWO) TIMES A DAY., Disp: 180 tablet, Rfl: 1  •  FLUoxetine (PROzac) 40 MG capsule, Take 1 capsule by mouth Daily., Disp: , Rfl:   •  folic acid (FOLVITE) 1 MG tablet, Take 1 tablet by mouth Daily., Disp: 90 tablet, Rfl: 3  •  glucose blood test strip, Use as instructed, Disp: 100 each, Rfl: 3  •  HYDROcodone-acetaminophen (NORCO) 7.5-325 MG per tablet, Take 1 tablet by mouth 2 (Two) Times a Day As Needed., Disp: , Rfl:   •  hydroxychloroquine (PLAQUENIL) 200 MG tablet, Take 1 tablet by mouth 2 (Two) Times a Day. Pt is only taking it once daily, Disp: , Rfl:   •  loratadine (CLARITIN) 10 MG tablet, Take 10 mg by mouth As Needed for Allergies., Disp: , Rfl:   •  losartan (COZAAR) 100 MG tablet, TAKE 1 TABLET BY MOUTH DAILY., Disp: 90 tablet, Rfl: 2  •  mycophenolate (CELLCEPT) 500 MG tablet, Take 1 tablet by mouth 2 (Two) Times a Day., Disp: , Rfl:   •  omeprazole (priLOSEC) 20 MG capsule, TAKE 1 CAPSULE BY MOUTH DAILY., Disp: 90 capsule, Rfl: 3  •  potassium chloride (K-DUR,KLOR-CON) 10 MEQ CR tablet, TAKE 1 TABLET BY MOUTH DAILY., Disp: 90 tablet, Rfl: 3  •  spironolactone (ALDACTONE) 25 MG tablet, Take 1 tablet by mouth Daily., Disp: 90 tablet, Rfl: 3  •  traZODone (DESYREL) 100 MG tablet, Take 1 tablet by mouth Every Night., Disp: , Rfl:   •  verapamil SR (CALAN-SR) 240 MG CR tablet, TAKE 1 TABLET BY MOUTH 2 (TWO) TIMES A DAY., Disp: 180 tablet, Rfl: 1  •  vitamin D  (ERGOCALCIFEROL) 11901 UNITS capsule capsule, Take 1 capsule by mouth 1 (One) Time Per Week., Disp: , Rfl:   •  cloNIDine (CATAPRES) 0.1 MG tablet, Take 0.1 mg by mouth 2 (Two) Times a Day. One by mouth twice daily as needed if BP greater than 160/100, Disp: , Rfl:   •  estradiol (ESTRACE) 2 MG tablet, Take 0.5 tablets by mouth Daily for 90 days., Disp: 45 tablet, Rfl: 3  •  senna (SENOKOT) 8.6 MG tablet, Take 1 tablet by mouth As Needed for Constipation., Disp: , Rfl:     No problems with medications.    Allergies   Allergen Reactions   • Abilify [Aripiprazole] Other (See Comments)     Insomnia   • Bactrim [Sulfamethoxazole-Trimethoprim] Other (See Comments)     Pt unsure of reaction   • Biaxin [Clarithromycin] Nausea And Vomiting   • Ciprofloxacin Hcl Other (See Comments)     Pt unsure of reaction   • Duricef [Cefadroxil] Other (See Comments)     Pt unsure of reaction   • Ketek [Telithromycin] Other (See Comments)     Pt unsure of reaction   • Relafen [Nabumetone] Itching   • Wellbutrin [Bupropion] Itching       Review of Systems  GENERAL:  Active/slower with limits, speed, stamina for age and desire. Sleep is ok; occ hard falling/staying with worry and awakens slowly/tired. No fever now/recent.  SKIN: No rash/skin lesion of concern:   ENDO:  No syncope, near or diaphoretic sweaty spells..  HEENT: No recent head injury; but same/occ headache.   No vision change.   Decline hearing loss.  Ears without pain/drainage.  No sore throat.  No significant nasal/sinus congestion/drainage. No epistaxis.  CHEST: No chest wall tenderness or mass. No cough, without wheeze.  No SOB; no hemoptysis.  CV: No chest pain, palpitations, ankle edema.  GI: No dysphagia.  No abdominal pain, diarrhea, constipation.  No rectal bleeding, or melena.  Occ heartburn still.   :  Voids without dysuria, or incontinence to completion.  ORTHO: No painful/swollen joints but various on /off sore.  No change some sore neck or back.  No acute neck or  back pain without recent injury.  NEURO: No dizziness, weakness of extremities.  No numbness/paresthesias.   PSYCH: No memory loss.  Mood variable; often anxious, depressed but/and not suicidal.  Tries to tolerate stress .      Screening:  Gyne: flatter/mendoza confirmed 9.23.21  Mammogram: 10.6.21  Bone density: 6.8.20 Deca-bone d/Steele/Ts L2-0.9 neck -2.2/6.8.2020  Low dose CT chest: Tobacco-smoker/none: NA  GI:   Colon-p, div/Jasiel/[facility]/10.10.19/5y  [id number]/Jasiel/[facility]/10.13.2020/  Prostate: NA  Usual lab order  Any lab others want; (we wish to attempt not to duplicate so we need copies of labs done outside the office/out of epic); OR can have all labs here (bring the order from all other doctors) and we will forward to all specialist  6m CBC, CMP, sed rate, CK-total, CR-protein  12m CBC, CMP, LIPID, TSH, fT4, CK-total, Vit D, sed rate, CR-protein     Copy/paste function used for ROS/exam AND each area of these were reviewed, updated, confirmed and supplemented as needed.   Data reviewed:   Recent admit/ER/MD visits: last visit  Last cardiac testing:   Echo:   Results for orders placed during the hospital encounter of 11/13/17    Adult Stress Echo W/ Cont or Stress Agent if Necessary Per Protocol    Interpretation Summary  · Below average functional capacity.  · Clinically negative for ischemia.  · Electrically equivocal due to baseline EKG abnormalities.  · Left ventricular systolic function is normal at rest with appropriate increase in contractility with stress.    Exercise stress echocardiogram is low risk for ischemia.    Echo 11.7.17  MODERATE LVH - CONSIDER HYPERTENSIVE VS HYPERTROPHIC  NORMAL LV AND RV SYSTOLIC  FUNCTION  RVH NOTED  LV DIASTOLIC DYSFUNCTION GRADE 1A  MILD TO MODERATE LA ENLARGEMENT  NO SIGNIFICANT VALVULAR DYSFUNCTION      Radiology considered:   No radiology results for the last 90 days.  No Images in the past 120 days found..      Lab Results:  Results for orders placed or performed in  visit on 05/10/22   Comprehensive Metabolic Panel    Specimen: Blood   Result Value Ref Range    Glucose 89 65 - 99 mg/dL    BUN 13 8 - 23 mg/dL    Creatinine 1.35 (H) 0.57 - 1.00 mg/dL    EGFR Result 45.1 (L) >60.0 mL/min/1.73    BUN/Creatinine Ratio 9.6 7.0 - 25.0    Sodium 142 136 - 145 mmol/L    Potassium 4.2 3.5 - 5.2 mmol/L    Chloride 104 98 - 107 mmol/L    Total CO2 26.5 22.0 - 29.0 mmol/L    Calcium 9.9 8.6 - 10.5 mg/dL    Total Protein 7.2 6.0 - 8.5 g/dL    Albumin 3.90 3.50 - 5.20 g/dL    Globulin 3.3 gm/dL    A/G Ratio 1.2 g/dL    Total Bilirubin 0.4 0.0 - 1.2 mg/dL    Alkaline Phosphatase 57 39 - 117 U/L    AST (SGOT) 13 1 - 32 U/L    ALT (SGPT) 9 1 - 33 U/L   T4, free    Specimen: Blood   Result Value Ref Range    Free T4 1.29 0.93 - 1.70 ng/dL   TSH    Specimen: Blood   Result Value Ref Range    TSH 1.770 0.270 - 4.200 uIU/mL   Hemoglobin A1c    Specimen: Blood   Result Value Ref Range    Hemoglobin A1C 5.50 4.80 - 5.60 %   Sedimentation Rate    Specimen: Blood   Result Value Ref Range    Sed Rate 33 (H) 0 - 30 mm/hr   C-reactive Protein    Specimen: Blood   Result Value Ref Range    C-Reactive Protein 2.64 (H) 0.00 - 0.50 mg/dL   CK    Specimen: Blood   Result Value Ref Range    Creatine Kinase 64 20 - 180 U/L   CBC & Differential    Specimen: Blood   Result Value Ref Range    WBC 5.18 3.40 - 10.80 10*3/mm3    RBC 4.14 3.77 - 5.28 10*6/mm3    Hemoglobin 12.2 12.0 - 15.9 g/dL    Hematocrit 37.0 34.0 - 46.6 %    MCV 89.4 79.0 - 97.0 fL    MCH 29.5 26.6 - 33.0 pg    MCHC 33.0 31.5 - 35.7 g/dL    RDW 13.4 12.3 - 15.4 %    Platelets 249 140 - 450 10*3/mm3    Neutrophil Rel % 49.8 42.7 - 76.0 %    Lymphocyte Rel % 30.7 19.6 - 45.3 %    Monocyte Rel % 16.4 (H) 5.0 - 12.0 %    Eosinophil Rel % 2.1 0.3 - 6.2 %    Basophil Rel % 0.6 0.0 - 1.5 %    Neutrophils Absolute 2.58 1.70 - 7.00 10*3/mm3    Lymphocytes Absolute 1.59 0.70 - 3.10 10*3/mm3    Monocytes Absolute 0.85 0.10 - 0.90 10*3/mm3    Eosinophils  Absolute 0.11 0.00 - 0.40 10*3/mm3    Basophils Absolute 0.03 0.00 - 0.20 10*3/mm3    Immature Granulocyte Rel % 0.4 0.0 - 0.5 %    Immature Grans Absolute 0.02 0.00 - 0.05 10*3/mm3    nRBC 0.0 0.0 - 0.2 /100 WBC       A1C:  Lab Results - Last 18 Months   Lab Units 05/10/22  0749 03/24/22  0830   HEMOGLOBIN A1C % 5.50 5.50     GLUCOSE:  Lab Results - Last 18 Months   Lab Units 05/10/22  0749 03/24/22  0830 09/17/21  0724 03/11/21  0725   GLUCOSE mg/dL 89 83 85 94     LIPID:  Lab Results - Last 18 Months   Lab Units 09/17/21  0724   CHOLESTEROL mg/dL 132   LDL CHOL mg/dL 64   HDL CHOL mg/dL 52   TRIGLYCERIDES mg/dL 82     PSA:No results for input(s): PSA in the last 91446 hours.    CBC:  Lab Results - Last 18 Months   Lab Units 05/10/22  0749 03/24/22  0830 09/17/21  0724 03/11/21  0725   WBC 10*3/mm3 5.18 4.64 4.58 4.46   HEMOGLOBIN g/dL 12.2 12.3 12.4 12.5   HEMATOCRIT % 37.0 37.4 38.9 38.0   PLATELETS 10*3/mm3 249 246 266 247      BMP/CMP:  Lab Results - Last 18 Months   Lab Units 05/10/22  0749 03/24/22  0830 09/17/21  0724 03/11/21  0725   SODIUM mmol/L 142 142 139 143   POTASSIUM mmol/L 4.2 4.1 4.2 3.9   CHLORIDE mmol/L 104 104 103 104   TOTAL CO2 mmol/L 26.5 26.9 27.2 28.3   GLUCOSE mg/dL 89 83 85 94   BUN mg/dL 13 10 14 11   CREATININE mg/dL 1.35* 1.16* 1.14* 1.11*   EGFR IF NONAFRICN AM mL/min/1.73  --   --  49* 50*   EGFR IF AFRICN AM mL/min/1.73  --   --  59* 61   EGFR RESULT mL/min/1.73 45.1* 54.1*  --   --    CALCIUM mg/dL 9.9 9.3 8.9 9.4     HEPATIC:  Lab Results - Last 18 Months   Lab Units 05/10/22  0749 03/24/22  0830 09/17/21  0724 03/11/21  0725   ALT (SGPT) U/L 9 11 11 12   AST (SGOT) U/L 13 11 18 17   ALK PHOS U/L 57 65 67 68     Vit D:  Lab Results - Last 18 Months   Lab Units 09/17/21  0724   VIT D 25 HYDROXY ng/ml 54.3     THYROID:  Lab Results - Last 18 Months   Lab Units 05/10/22  0749 09/17/21  0724   TSH uIU/mL 1.770 2.820   FREE T4 ng/dL 1.29 1.25       Objective   /80 (BP Location:  "Right arm, Patient Position: Sitting, Cuff Size: Large Adult)   Pulse 62   Temp 96.8 °F (36 °C) (Infrared)   Resp 16   Ht 157.5 cm (62\")   Wt 86.2 kg (190 lb)   SpO2 100%   Breastfeeding No   BMI 34.75 kg/m²   Body mass index is 34.75 kg/m².    Recent Vitals       11/12/2021 3/23/2022 5/31/2022       BP: -- 126/78 138/80     Pulse: 70 78 62     Temp: 96.8 °F (36 °C) 97.1 °F (36.2 °C) 96.8 °F (36 °C)     Weight: 88.5 kg (195 lb) 88.5 kg (195 lb 3.2 oz) 86.2 kg (190 lb)     BMI (Calculated): 35.7 35.7 34.7         Physical Exam   GENERAL:  Well nourished/developed in no acute distress.   SKIN: Turgor excellent, without wound, rash, lesion  HEENT: Normal cephalic without trauma.  Pupils equal round reactive to light. Extraocular motions full without nystagmus.   No thyromegaly, mass, tenderness, lymphadenopathy and supple.  CV: Regular rhythm.  No murmur, gallop,  edema. Posterior pulses intact.  No carotid bruits.  CHEST: No chest wall tenderness or mass.   LUNGS: Symmetric motion with clear to auscultation.   ABD: Soft, nontender without mass.   ORTHO: Symmetric extremities without swelling/point tenderness.  Full gross range of motion; few sore fingers.  NEURO: CN 2-12 grossly intact.  Symmetric facies. 1/4 x bicep equal reflexes.  UE/LE   3/5 strength throughout.  Nonfocal use extremities. Speech clear.     PSYCH: Oriented x 3.  Pleasant calm, well kept.   Purposeful/directed conservation with intact short/long gross memory.     Assessment & Plan     [facility]. [facility]    2. Depression, unspecified depression type    3. RHIANNON: (cpap)    4. Hypersomnia    5. Fatigue, unspecified type    6. Renal insufficiency-ro    7. Primary hypertension    8. Diastolic congestive heart failure, unspecified HF chronicity (HCC)    9. Hypopotassemia    10. Rheumatoid arthritis, involving unspecified site, unspecified whether rheumatoid factor present (HCC)    11. Insomnia, unspecified type    12. Polypharmacy  "       Discussions/medical decisions/reviews:  BP ok  Other vitals ok  DM/BS ok  Lipid ok last  PSA NA  CBC ok  Renal gone down  Liver ok  Vit D ok  Thyroid ok    Many of medications can cause fatigue.    Trial reduced desyrel 150 to 100 and then even 100 to 50; seeing if still sleeps ok AND less AM fatigue  Encouraged AM walk in the park; as brisk as able  Maybe necessary for her to talk to lisa about any reductions in meds that cause fatigue  Time to review declining renal with nephrology; and see if they think too much bp Rx (though admit bp ok today/last)  Wearing cpap would be nice    Medical decision issues:   Data review above:   Rx: reviewed and decisions:   Visit today involved chronic significant medical problems or differentials and/or intensive drug monitoring: ie potential to cause serious morbidity or death:   Rx changes:   New Medications Ordered This Visit   Medications   • traZODone (DESYREL) 100 MG tablet     Sig: Take 1 tablet by mouth Every Night.     Orders placed:   LAB/Testing/Referrals: reviewed/orders:   Today:   Orders Placed This Encounter   Procedures   • Ambulatory Referral to Nephrology     Chronic/recurrent labs above or change to:   same     Screening reviewed/updated in process    Immunization History   Administered Date(s) Administered   • COVID-19 (MODERNA) 1st, 2nd, 3rd Dose Only 03/15/2021, 04/12/2021   • COVID-19 (MODERNA) BOOSTER 12/15/2021   • FluLaval/Fluarix/Fluzone >6 10/27/2018, 11/12/2020, 10/13/2021   • Influenza, Unspecified 11/05/2016   • Tdap 10/19/2021     Vaccine reviewed: today none; later we advised/reaffirmed our support/suggestion for staying complete with covid- covid boosters, seasonal flu/yearly and any missing vaccine from list we supplied; we suggest contact with local health department office to review missing/needed vaccines and then bring nursing documentation for these vaccines to this office.     Health maintenance:   Body mass index is 34.75  kg/m².  BMI is >= 30 and <= 34.9 (Class 1 obesity). The following options were offered after discussion: exercise counseling/recommendations and nutrition counseling/recommendations    Tobacco use reviewed:   Emilie Soto  reports that she has never smoked. She has never used smokeless tobacco..      Patient Instructions     Lab Results - Last 18 Months   Lab Units 05/10/22  0749 03/24/22  0830 09/17/21  0724 03/11/21  0725   SODIUM mmol/L 142 142 139 143   POTASSIUM mmol/L 4.2 4.1 4.2 3.9   CHLORIDE mmol/L 104 104 103 104   TOTAL CO2 mmol/L 26.5 26.9 27.2 28.3   GLUCOSE mg/dL 89 83 85 94   BUN mg/dL 13 10 14 11   CREATININE mg/dL 1.35* 1.16* 1.14* 1.11*   EGFR IF NONAFRICN AM mL/min/1.73  --   --  49* 50*   EGFR IF AFRICN AM mL/min/1.73  --   --  59* 61   EGFR RESULT mL/min/1.73 45.1* 54.1*  --   --    CALCIUM mg/dL 9.9 9.3 8.9 9.4       Follow up: Return for Dr Miles-3 to 4 months.  Future Appointments   Date Time Provider Department Center   9/21/2022  8:25 AM [facility]   9/27/2022 11:30 AM Alexandru Miles MD [facility]

## 2022-05-31 NOTE — PATIENT INSTRUCTIONS
Lab Results - Last 18 Months   Lab Units 05/10/22  0749 03/24/22  0830 09/17/21  0724 03/11/21  0725   SODIUM mmol/L 142 142 139 143   POTASSIUM mmol/L 4.2 4.1 4.2 3.9   CHLORIDE mmol/L 104 104 103 104   TOTAL CO2 mmol/L 26.5 26.9 27.2 28.3   GLUCOSE mg/dL 89 83 85 94   BUN mg/dL 13 10 14 11   CREATININE mg/dL 1.35* 1.16* 1.14* 1.11*   EGFR IF NONAFRICN AM mL/min/1.73  --   --  49* 50*   EGFR IF AFRICN AM mL/min/1.73  --   --  59* 61   EGFR RESULT mL/min/1.73 45.1* 54.1*  --   --    CALCIUM mg/dL 9.9 9.3 8.9 9.4

## 2022-06-07 LAB
ALBUMIN SERPL-MCNC: 4.2 G/DL (ref 3.5–5.2)
ALP BLD-CCNC: 68 U/L (ref 35–104)
ALT SERPL-CCNC: 12 U/L (ref 5–33)
ANION GAP SERPL CALCULATED.3IONS-SCNC: 11 MMOL/L (ref 7–19)
AST SERPL-CCNC: 18 U/L (ref 5–32)
BASOPHILS ABSOLUTE: 0 K/UL (ref 0–0.2)
BASOPHILS RELATIVE PERCENT: 0.4 % (ref 0–1)
BILIRUB SERPL-MCNC: 0.3 MG/DL (ref 0.2–1.2)
BILIRUBIN URINE: NEGATIVE
BLOOD, URINE: NEGATIVE
BUN BLDV-MCNC: 17 MG/DL (ref 8–23)
CALCIUM SERPL-MCNC: 10 MG/DL (ref 8.8–10.2)
CHLORIDE BLD-SCNC: 102 MMOL/L (ref 98–111)
CLARITY: CLEAR
CO2: 28 MMOL/L (ref 22–29)
COLOR: YELLOW
CREAT SERPL-MCNC: 1.3 MG/DL (ref 0.5–0.9)
CREATININE URINE: 229.4 MG/DL (ref 4.2–622)
EOSINOPHILS ABSOLUTE: 0.1 K/UL (ref 0–0.6)
EOSINOPHILS RELATIVE PERCENT: 1.2 % (ref 0–5)
GFR AFRICAN AMERICAN: 50
GFR NON-AFRICAN AMERICAN: 42
GLUCOSE BLD-MCNC: 80 MG/DL (ref 74–109)
GLUCOSE URINE: NEGATIVE MG/DL
HCT VFR BLD CALC: 41.5 % (ref 37–47)
HEMOGLOBIN: 12.7 G/DL (ref 12–16)
IMMATURE GRANULOCYTES #: 0 K/UL
KETONES, URINE: ABNORMAL MG/DL
LEUKOCYTE ESTERASE, URINE: NEGATIVE
LYMPHOCYTES ABSOLUTE: 1.4 K/UL (ref 1.1–4.5)
LYMPHOCYTES RELATIVE PERCENT: 26.7 % (ref 20–40)
MAGNESIUM: 2 MG/DL (ref 1.6–2.4)
MCH RBC QN AUTO: 28.8 PG (ref 27–31)
MCHC RBC AUTO-ENTMCNC: 30.6 G/DL (ref 33–37)
MCV RBC AUTO: 94.1 FL (ref 81–99)
MONOCYTES ABSOLUTE: 0.7 K/UL (ref 0–0.9)
MONOCYTES RELATIVE PERCENT: 14 % (ref 0–10)
NEUTROPHILS ABSOLUTE: 2.9 K/UL (ref 1.5–7.5)
NEUTROPHILS RELATIVE PERCENT: 57.1 % (ref 50–65)
NITRITE, URINE: NEGATIVE
PARATHYROID HORMONE INTACT: 46.4 PG/ML (ref 15–65)
PDW BLD-RTO: 13.1 % (ref 11.5–14.5)
PH UA: 5 (ref 5–8)
PHOSPHORUS: 3.5 MG/DL (ref 2.5–4.5)
PLATELET # BLD: 267 K/UL (ref 130–400)
PMV BLD AUTO: 9.8 FL (ref 9.4–12.3)
POTASSIUM SERPL-SCNC: 3.9 MMOL/L (ref 3.5–5)
PROTEIN PROTEIN: 16 MG/DL (ref 15–45)
PROTEIN UA: NEGATIVE MG/DL
RBC # BLD: 4.41 M/UL (ref 4.2–5.4)
SODIUM BLD-SCNC: 141 MMOL/L (ref 136–145)
SPECIFIC GRAVITY UA: 1.02 (ref 1–1.03)
TOTAL PROTEIN: 8 G/DL (ref 6.6–8.7)
URIC ACID, SERUM: 5.4 MG/DL (ref 2.4–5.7)
UROBILINOGEN, URINE: 1 E.U./DL
VITAMIN D 25-HYDROXY: 74.1 NG/ML
WBC # BLD: 5.1 K/UL (ref 4.8–10.8)

## 2022-06-09 ENCOUNTER — TRANSCRIBE ORDERS (OUTPATIENT)
Dept: ADMINISTRATIVE | Facility: HOSPITAL | Age: 62
End: 2022-06-09

## 2022-06-09 DIAGNOSIS — N18.31 STAGE 3A CHRONIC KIDNEY DISEASE: Primary | ICD-10-CM

## 2022-06-17 DIAGNOSIS — F32.A DEPRESSION, UNSPECIFIED DEPRESSION TYPE: ICD-10-CM

## 2022-06-17 DIAGNOSIS — F41.9 ANXIETY: ICD-10-CM

## 2022-06-17 RX ORDER — ALPRAZOLAM 0.5 MG/1
TABLET ORAL
Qty: 30 TABLET | Refills: 0 | Status: SHIPPED | OUTPATIENT
Start: 2022-06-17 | End: 2022-07-29

## 2022-06-17 NOTE — TELEPHONE ENCOUNTER
Protocol last refill 12-17-21    [place]  [facility]    Rx Refill Note  Requested Prescriptions     Pending Prescriptions Disp Refills   • ALPRAZolam (XANAX) 0.5 MG tablet [Pharmacy Med Name: ALPRAZOLAM 0.5MG TABLET] 30 tablet 0     Sig: TAKE ONE TABLET TWICE DAILY AS NEEDED ANXIETY GENERIC FOR XANAX      Last office visit with prescribing clinician: 5/31/2022      Next office visit with prescribing clinician: 9/27/2022       {TIP  Please add Last Relevant Lab Date if appropriate:23}     Yuly Garcia LPN  06/17/22, 13:43 CDT

## 2022-06-20 ENCOUNTER — OFFICE VISIT (OUTPATIENT)
Dept: OBGYN CLINIC | Age: 62
End: 2022-06-20
Payer: MEDICARE

## 2022-06-20 VITALS
WEIGHT: 186 LBS | SYSTOLIC BLOOD PRESSURE: 124 MMHG | DIASTOLIC BLOOD PRESSURE: 64 MMHG | HEIGHT: 60 IN | BODY MASS INDEX: 36.52 KG/M2

## 2022-06-20 DIAGNOSIS — Z11.51 SCREENING FOR HPV (HUMAN PAPILLOMAVIRUS): ICD-10-CM

## 2022-06-20 DIAGNOSIS — F32.A DEPRESSION, UNSPECIFIED DEPRESSION TYPE: ICD-10-CM

## 2022-06-20 DIAGNOSIS — Z90.710 HISTORY OF ROBOT-ASSISTED LAPAROSCOPIC HYSTERECTOMY: ICD-10-CM

## 2022-06-20 DIAGNOSIS — Z12.31 ENCOUNTER FOR SCREENING MAMMOGRAM FOR BREAST CANCER: ICD-10-CM

## 2022-06-20 DIAGNOSIS — K46.9 ENTEROCELE: ICD-10-CM

## 2022-06-20 DIAGNOSIS — N39.46 MIXED STRESS AND URGE URINARY INCONTINENCE: ICD-10-CM

## 2022-06-20 DIAGNOSIS — R15.1 FECAL SMEARING: ICD-10-CM

## 2022-06-20 DIAGNOSIS — Z12.72 SCREENING FOR MALIGNANT NEOPLASM OF VAGINA AFTER TOTAL HYSTERECTOMY: Primary | ICD-10-CM

## 2022-06-20 DIAGNOSIS — Z90.79 HISTORY OF BILATERAL SALPINGO-OOPHORECTOMY (BSO): ICD-10-CM

## 2022-06-20 DIAGNOSIS — Z90.722 HISTORY OF BILATERAL SALPINGO-OOPHORECTOMY (BSO): ICD-10-CM

## 2022-06-20 DIAGNOSIS — Z90.710 SCREENING FOR MALIGNANT NEOPLASM OF VAGINA AFTER TOTAL HYSTERECTOMY: Primary | ICD-10-CM

## 2022-06-20 PROCEDURE — G8427 DOCREV CUR MEDS BY ELIG CLIN: HCPCS | Performed by: OBSTETRICS & GYNECOLOGY

## 2022-06-20 PROCEDURE — 1036F TOBACCO NON-USER: CPT | Performed by: OBSTETRICS & GYNECOLOGY

## 2022-06-20 PROCEDURE — G0101 CA SCREEN;PELVIC/BREAST EXAM: HCPCS | Performed by: OBSTETRICS & GYNECOLOGY

## 2022-06-20 PROCEDURE — 99214 OFFICE O/P EST MOD 30 MIN: CPT | Performed by: OBSTETRICS & GYNECOLOGY

## 2022-06-20 PROCEDURE — 3017F COLORECTAL CA SCREEN DOC REV: CPT | Performed by: OBSTETRICS & GYNECOLOGY

## 2022-06-20 PROCEDURE — G8417 CALC BMI ABV UP PARAM F/U: HCPCS | Performed by: OBSTETRICS & GYNECOLOGY

## 2022-06-20 RX ORDER — FLUOXETINE HYDROCHLORIDE 40 MG/1
40 CAPSULE ORAL DAILY
COMMUNITY

## 2022-06-20 ASSESSMENT — ENCOUNTER SYMPTOMS
GASTROINTESTINAL NEGATIVE: 1
RESPIRATORY NEGATIVE: 1
EYES NEGATIVE: 1

## 2022-06-20 NOTE — PATIENT INSTRUCTIONS
Patient Education        Breast Self-Exam: Care Instructions  Your Care Instructions     A breast self-exam is when you check your breasts for lumps or changes. This regular exam helps you learn how your breasts normally look and feel. Mostbreast problems or changes are not because of cancer. Breast self-exam is not a substitute for a mammogram. Having regular breast exams by your doctor and regular mammograms improve your chances of finding anyproblems with your breasts. Some women set a time each month to do a step-by-step breast self-exam. Other women like a less formal system. They might look at their breasts as they brushtheir teeth, or feel their breasts once in a while in the shower. If you notice a change in your breast, tell your doctor. Follow-up care is a key part of your treatment and safety. Be sure to make and go to all appointments, and call your doctor if you are having problems. It's also a good idea to know your test results and keep alist of the medicines you take. How do you do a breast self-exam?   The best time to examine your breasts is usually one week after your menstrual period begins. Your breasts should not be tender then. If you do not have periods, you might do your exam on a day of the month that is easy to remember.  To examine your breasts:  ? Remove all your clothes above the waist and lie down. When you are lying down, your breast tissue spreads evenly over your chest wall, which makes it easier to feel all your breast tissue. ? Use the pads--not the fingertips--of the 3 middle fingers of your left hand to check your right breast. Move your fingers slowly in small coin-sized circles that overlap. ? Use three levels of pressure to feel of all your breast tissue. Use light pressure to feel the tissue close to the skin surface. Use medium pressure to feel a little deeper. Use firm pressure to feel your tissue close to your breastbone and ribs.  Use each pressure level to feel your breast tissue before moving on to the next spot. ? Check your entire breast, moving up and down as if following a strip from the collarbone to the bra line, and from the armpit to the ribs. Repeat until you have covered the entire breast.  ? Repeat this procedure for your left breast, using the pads of the 3 middle fingers of your right hand.  To examine your breasts while in the shower:  ? Place one arm over your head and lightly soap your breast on that side. ? Using the pads of your fingers, gently move your hand over your breast (in the strip pattern described above), feeling carefully for any lumps or changes. ? Repeat for the other breast.   Have your doctor inspect anything you notice to see if you need further testing. Where can you learn more? Go to https://TwentyFour6.Rentamus. org and sign in to your tagga account. Enter P148 in the [facility] box to learn more about \"Breast Self-Exam: Care Instructions. \"     If you do not have an account, please click on the \"Sign Up Now\" link. Current as of: September 8, 2021               Content Version: 13.2  © 2006-2022 [email or web address]. Care instructions adapted under license by [facility] ([facility]). If you have questions about a medical condition or this instruction, always ask your healthcare professional. [address] any warranty or liability for your use of this information. Patient Education        Kegel Exercises: Care Instructions  Overview     Kegel exercises strengthen muscles around the bladder. These muscles control the flow of urine. Kegel exercises are sometime called \"pelvic floor\" exercises. They can help prevent urine leakage and keep the pelvic organs inplace. Kegel exercises can strengthen pelvic muscles that have been weakened by age, pregnancy, childbirth, and surgery. They may help prevent or treat urineleakage.   You do Kegel exercises by tightening the muscles you use when you urinate. Panchito Shankar likely need to do these exercises for several weeks to get better. Follow-up care is a key part of your treatment and safety. Be sure to make and go to all appointments, and call your doctor if you are having problems. It's also a good idea to know your test results and keep alist of the medicines you take. How can you care for yourself at home?  Do Kegel exercises. ? Find the muscles you need to strengthen. To do this, tighten the muscles that stop your urine while you are going to the bathroom. These are the same muscles you squeeze during Kegel exercises. ? Squeeze the muscles as hard as you can. Your belly and thighs should not move. ? Hold the squeeze for 3 seconds. Then relax for 3 seconds. ? Start with 3 seconds, and then add 1 second each week until you are able to squeeze for 10 seconds. ? Repeat the exercise 10 to 15 times for each session. Do three or more sessions each day.  You can check to see if you are using the right muscles. ? Tighten the muscles that help you stop passing gas or keep you from urinating. Make sure you aren't using your stomach, leg, or buttock muscles. ? Place a finger in your vagina and squeeze around it. You are doing them right when you feel pressure around your finger. Your doctor may also suggest that you put special weights in your vagina while you do the exercises.  Check with your doctor if you don't notice a difference after trying these exercises for several weeks. Your doctor may suggest getting help from a physical therapist or recommend other treatment. Where can you learn more? Go to https://Startup Villagemaritza.[email or web address]. org and sign in to your ClickMagic account. Enter Y947 in the [facility] box to learn more about \"Kegel Exercises: Care Instructions. \"     If you do not have an account, please click on the \"Sign Up Now\" link.   Current as of: October 18, 2021               Content Version: 13.2  © 0225-3617 [employer]wise,

## 2022-06-20 NOTE — PROGRESS NOTES
Pt is here for breast exam and pap smear. Pt states she has a \"raw spot\" by her rectum. Pt states she wasn't sure if Dr. Kourtney Dawn could look at it while she is here. Pt states she has been dealing with it for 1 month now. Pt states she has to wear a pad everyday. She will have times where she will \"leak\". Pt states she also has been to see a kidney specialist and she goes for an u/s on 22. Pt states she was in stage 3.      Last mammogram:  10/06/2021  Last pap smear:    Contraception:  TLH  :  2  Para:  2  AB:  0  Last bone density:  2020  Last colonoscopy:   10/10/2019

## 2022-06-20 NOTE — PROGRESS NOTES
Asberry Gitelman is a 62 y.o.  who presents today for gyn and breast exam.    Pt states she has urinary leakage, has to wear a pad at all time. Pt also having some bowel incontinence, soils her pad sometimes. Pt having some anxiety and depression due to life circumstances, thinks about death. Pt taking Prozac. Pt sees Behavioral Health who manages her medications. Review of Systems   Constitutional: Negative. HENT: Negative. Eyes: Negative. Respiratory: Negative. Cardiovascular: Negative. Gastrointestinal: Negative. Genitourinary: Negative. Negative for dysuria, frequency, menstrual problem, pelvic pain, urgency and vaginal discharge. Skin: Negative. Neurological: Negative. Psychiatric/Behavioral: Negative.         Past Medical History:   Diagnosis Date    Anxiety     Gastroesophageal reflux disease     Heart failure ([facility]     Hypertension     Lupus ([facility] 2011    Renal insufficiency     Rheumatoid arthritis ([facility]        Past Surgical History:   Procedure Laterality Date    APPENDECTOMY      PARTIAL HYSTERECTOMY (CERVIX NOT REMOVED)      TONSILLECTOMY         Family History   Problem Relation Age of Onset    Prostate Cancer Father     Breast Cancer Sister        Social History     Socioeconomic History    Marital status:      Spouse name: Not on file    Number of children: Not on file    Years of education: Not on file    Highest education level: Not on file   Occupational History    Not on file   Tobacco Use    Smoking status: Never Smoker    Smokeless tobacco: Never Used   Substance and Sexual Activity    Alcohol use: Not Currently    Drug use: Never    Sexual activity: Not Currently   Other Topics Concern    Not on file   Social History Narrative    Not on file     Social Determinants of Health     Financial Resource Strain:     Difficulty of Paying Living Expenses: Not on file   Food Insecurity:     Worried About Running Out of Food in the Last Year: Not on file    Ran Out of Food in the Last Year: Not on file   Transportation Needs:     Lack of Transportation (Medical): Not on file    Lack of Transportation (Non-Medical):  Not on file   Physical Activity:     Days of Exercise per Week: Not on file    Minutes of Exercise per Session: Not on file   Stress:     Feeling of Stress : Not on file   Social Connections:     Frequency of Communication with Friends and Family: Not on file    Frequency of Social Gatherings with Friends and Family: Not on file    Attends [employer] Services: Not on file    Active Member of [address] [place] [address] [employer] or Organizations: Not on file    Attends Club or Organization Meetings: Not on file    Marital Status: Not on file   Intimate Partner Violence:     Fear of Current or Ex-Partner: Not on file    Emotionally Abused: Not on file    Physically Abused: Not on file    Sexually Abused: Not on file   Housing Stability:     Unable to Pay for Housing in the Last Year: Not on file    Number of [place] in the Last Year: Not on file    Unstable Housing in the Last Year: Not on file         Current Outpatient Medications:     FLUoxetine (PROZAC) 40 MG capsule, Take 40 mg by mouth daily, Disp: , Rfl:     albuterol sulfate HFA (VENTOLIN HFA) 108 (90 Base) MCG/ACT inhaler, Inhale 2 puffs into the lungs every 6 hours as needed for Wheezing, Disp: , Rfl:     alendronate (FOSAMAX) 70 MG tablet, Take 70 mg by mouth every 7 days, Disp: , Rfl:     ALPRAZolam (XANAX) 0.25 MG tablet, Take 0.25 mg by mouth nightly as needed for Sleep., Disp: , Rfl:     amLODIPine (NORVASC) 5 MG tablet, Take 5 mg by mouth daily, Disp: , Rfl:     carvedilol (COREG) 25 MG tablet, Take 25 mg by mouth 2 times daily (with meals), Disp: , Rfl:     loratadine (CLARITIN) 10 MG capsule, Take 10 mg by mouth daily, Disp: , Rfl:     cloNIDine (CATAPRES) 0.1 MG tablet, Take 0.1 mg by mouth 2 times daily, Disp: , Rfl:     estradiol (ESTRACE) 2 MG tablet, Take 2 mg by mouth daily, Disp: , Rfl:     folic acid (FOLVITE) 1 MG tablet, Take 1 mg by mouth daily, Disp: , Rfl:     blood glucose monitor strips, 1 strip by Other route, Disp: , Rfl:     HYDROcodone-acetaminophen (NORCO) 7.5-325 MG per tablet, Take 1 tablet by mouth every 6 hours as needed for Pain., Disp: , Rfl:     hydroxychloroquine (PLAQUENIL) 200 MG tablet, Take by mouth daily, Disp: , Rfl:     losartan (COZAAR) 100 MG tablet, Take 100 mg by mouth daily, Disp: , Rfl:     mycophenolate (CELLCEPT) 500 MG tablet, Take by mouth 2 times daily, Disp: , Rfl:     omeprazole (PRILOSEC) 20 MG delayed release capsule, Take 20 mg by mouth daily, Disp: , Rfl:     senna (SENOKOT) 8.6 MG tablet, Take 1 tablet by mouth daily, Disp: , Rfl:     spironolactone (ALDACTONE) 25 MG tablet, Take 25 mg by mouth daily, Disp: , Rfl:     traZODone (DESYREL) 50 MG tablet, Take 50 mg by mouth nightly, Disp: , Rfl:     venlafaxine 225 MG extended release tablet, Take 225 mg by mouth daily (with breakfast), Disp: , Rfl:     verapamil (CALAN SR) 240 MG extended release tablet, Take 240 mg by mouth nightly, Disp: , Rfl:     Cholecalciferol (VITAMIN D3) 1.25 MG (11843 UT) TABS, Take by mouth, Disp: , Rfl:     No Known Allergies    /64   Ht 5' (1.524 m)   Wt 186 lb (84.4 kg)   BMI 36.33 kg/m²   Physical Exam  Constitutional:       General: She is not in acute distress. Appearance: She is well-developed. HENT:      Head: Normocephalic and atraumatic. Eyes:      Conjunctiva/sclera: Conjunctivae normal.      Pupils: Pupils are equal, round, and reactive to light. Neck:      Thyroid: No thyromegaly. Trachea: No tracheal deviation. Cardiovascular:      Rate and Rhythm: Normal rate and regular rhythm. Pulmonary:      Effort: Pulmonary effort is normal. No respiratory distress. Breath sounds: Normal breath sounds.    Chest:   Breasts: Breasts are symmetrical.      Right: No inverted nipple, mass, nipple discharge, skin change or tenderness. Left: No inverted nipple, mass, nipple discharge, skin change or tenderness. Abdominal:      General: Bowel sounds are normal. There is no distension. Palpations: Abdomen is soft. There is no mass. Tenderness: There is no abdominal tenderness. There is no guarding or rebound. Genitourinary:     Labia:         Right: No rash, tenderness or lesion. Left: No rash, tenderness or lesion. Vagina: Normal.      Cervix: No cervical motion tenderness. Adnexa:         Right: No mass, tenderness or fullness. Left: No mass, tenderness or fullness. Rectum: Normal.      Comments: Uterus and cervix surgically absent  No urethral hypermobility noted  Enterocele noted  Musculoskeletal:         General: No tenderness. Normal range of motion. Cervical back: Normal range of motion and neck supple. Lymphadenopathy:      Cervical: No cervical adenopathy. Skin:     General: Skin is warm and dry. Findings: No rash. Neurological:      Mental Status: She is alert and oriented to person, place, and time. Cranial Nerves: No cranial nerve deficit. Psychiatric:         Attention and Perception: Attention and perception normal.         Mood and Affect: Mood is depressed. Affect is tearful. Speech: Speech normal.         Behavior: Behavior normal.         Thought Content: Thought content normal.         Cognition and Memory: Cognition and memory normal.         Judgment: Judgment normal.               Assessment   Diagnosis Orders   1. Screening for malignant neoplasm of vagina after total hysterectomy     2. Screening for HPV (human papillomavirus)     3. History of robot-assisted laparoscopic hysterectomy     4. History of bilateral salpingo-oophorectomy (BSO)     5. Encounter for screening mammogram for breast cancer  JULIANA Screening Bilateral   6. Mixed stress and urge urinary incontinence  External Referral To Urogynecology   7. Fecal smearing  External Referral To Urogynecology   8. Enterocele  External Referral To Urogynecology   9. Depression, unspecified depression type         Plan     1. Pap smear not indicated  2. Mammogram order  3. RTC one year or prn  4. Recommend Kegel exercises, information given. 5. Pt told she has an enterocele. Recommend referral to [address] [place] [address] for urodynamics. will notify pt of appt with Dr. Dionna Jimenez.   6. Encouraged to also report fecal incontinence to GI  7. Discussed with patient at length the importance of f/u with Behavioral Health and communication about medications and symptoms  I Kemi Cotto, am scribing for and in the presence of Dr. Patrick Thompson, Dr. Shamar Rojas, personally performed the services described in this documentation as scribed by Kemi Cotto in my presence, and it is both accurate and complete.      Greater than 50% of this 40 minute visit was spent face-to-face reviewing findings, counseling and coordinating care

## 2022-06-21 ENCOUNTER — TELEPHONE (OUTPATIENT)
Dept: OBGYN CLINIC | Age: 62
End: 2022-06-21

## 2022-06-21 NOTE — TELEPHONE ENCOUNTER
I called patient and left a vm to let her know I made her referral to [facility] in [facility]. It is for this Friday 6/24 at 2:00 pm. If she can not make that appointment she can give them a call at [phone] and rescheduled that to work with her schedule. Address: [address] Londa Hamman [address] [address].

## 2022-06-30 ENCOUNTER — HOSPITAL ENCOUNTER (OUTPATIENT)
Dept: ULTRASOUND IMAGING | Facility: HOSPITAL | Age: 62
Discharge: HOME | End: 2022-06-30
Admitting: INTERNAL MEDICINE

## 2022-06-30 DIAGNOSIS — N18.31 STAGE 3A CHRONIC KIDNEY DISEASE: ICD-10-CM

## 2022-06-30 PROCEDURE — 76775 US EXAM ABDO BACK WALL LIM: CPT

## 2022-07-28 DIAGNOSIS — F41.9 ANXIETY: ICD-10-CM

## 2022-07-28 DIAGNOSIS — F32.A DEPRESSION, UNSPECIFIED DEPRESSION TYPE: ICD-10-CM

## 2022-07-29 RX ORDER — ALPRAZOLAM 0.5 MG/1
TABLET ORAL
Qty: 30 TABLET | Refills: 0 | Status: SHIPPED | OUTPATIENT
Start: 2022-07-29 | End: 2022-10-19

## 2022-08-03 DIAGNOSIS — I10 ESSENTIAL HYPERTENSION: ICD-10-CM

## 2022-08-03 DIAGNOSIS — J84.10 PULMONARY FIBROSIS: ICD-10-CM

## 2022-08-03 RX ORDER — ALBUTEROL SULFATE 90 UG/1
1 AEROSOL, METERED RESPIRATORY (INHALATION) EVERY 4 HOURS PRN
Qty: 54 G | Refills: 3 | Status: SHIPPED | OUTPATIENT
Start: 2022-08-03

## 2022-08-03 RX ORDER — ALENDRONATE SODIUM 70 MG/1
TABLET ORAL
Qty: 12 TABLET | Refills: 3 | Status: SHIPPED | OUTPATIENT
Start: 2022-08-03 | End: 2022-10-19

## 2022-08-03 RX ORDER — ESTRADIOL 2 MG/1
1 TABLET ORAL DAILY
Qty: 45 TABLET | Refills: 3 | Status: SHIPPED | OUTPATIENT
Start: 2022-08-03 | End: 2023-01-05

## 2022-08-03 RX ORDER — AMLODIPINE BESYLATE 5 MG/1
5 TABLET ORAL DAILY
Qty: 90 TABLET | Refills: 3 | Status: SHIPPED | OUTPATIENT
Start: 2022-08-03

## 2022-08-03 RX ORDER — SPIRONOLACTONE 25 MG/1
25 TABLET ORAL DAILY
Qty: 90 TABLET | Refills: 3 | Status: SHIPPED | OUTPATIENT
Start: 2022-08-03

## 2022-08-03 NOTE — TELEPHONE ENCOUNTER
Rx Refill Note  Requested Prescriptions     Pending Prescriptions Disp Refills   • amLODIPine (NORVASC) 5 MG tablet [Pharmacy Med Name: AMLODIPINE BESYLATE 5MG TABLET] 90 tablet 3     Sig: TAKE 1 TABLET BY MOUTH DAILY.   • spironolactone (ALDACTONE) 25 MG tablet [Pharmacy Med Name: SPIRONOLACTONE 25MG TABLET] 90 tablet 3     Sig: TAKE 1 TABLET BY MOUTH DAILY.   • alendronate (FOSAMAX) 70 MG tablet [Pharmacy Med Name: ALENDRONATE SODIUM 70MG TABLET] 12 tablet 3     Sig: TAKE 1 TABLET BY MOUTH EVERY 7 (SEVEN) DAYS.   • estradiol (ESTRACE) 2 MG tablet [Pharmacy Med Name: ESTRADIOL 2MG TABLET] 45 tablet 3     Sig: TAKE 0.5 TABLETS BY MOUTH DAILY FOR 90 DAYS.   • albuterol sulfate  (90 Base) MCG/ACT inhaler [Pharmacy Med Name: ALBUTEROL SULFATE HFA HFA AEROSOL SOLN]  3     Sig: INHALE 1 PUFF EVERY 4 (FOUR) HOURS AS NEEDED FOR WHEEZING OR SHORTNESS OF AIR.      Last office visit with prescribing clinician: 5/31/2022      Next office visit with prescribing clinician: 9/27/2022            Yuly Garcia LPN  08/03/22, 16:48 CDT

## 2022-08-16 ENCOUNTER — TELEPHONE (OUTPATIENT)
Dept: FAMILY MEDICINE CLINIC | Facility: CLINIC | Age: 62
End: 2022-08-16

## 2022-08-16 ENCOUNTER — OFFICE VISIT (OUTPATIENT)
Dept: FAMILY MEDICINE CLINIC | Facility: CLINIC | Age: 62
End: 2022-08-16

## 2022-08-16 VITALS — OXYGEN SATURATION: 98 % | TEMPERATURE: 97.3 F | HEART RATE: 64 BPM

## 2022-08-16 DIAGNOSIS — J40 BRONCHITIS: ICD-10-CM

## 2022-08-16 DIAGNOSIS — R05.9 COUGH: ICD-10-CM

## 2022-08-16 DIAGNOSIS — J06.9 URI WITH COUGH AND CONGESTION: ICD-10-CM

## 2022-08-16 DIAGNOSIS — J32.9 SINUSITIS, UNSPECIFIED CHRONICITY, UNSPECIFIED LOCATION: ICD-10-CM

## 2022-08-16 DIAGNOSIS — D84.9 IMMUNOSUPPRESSED STATUS: ICD-10-CM

## 2022-08-16 LAB
EXPIRATION DATE: NORMAL
EXPIRATION DATE: NORMAL
FLUAV AG NPH QL: NEGATIVE
FLUBV AG NPH QL: NEGATIVE
INTERNAL CONTROL: NORMAL
INTERNAL CONTROL: NORMAL
Lab: NORMAL
Lab: NORMAL
SARS-COV-2 AG UPPER RESP QL IA.RAPID: NOT DETECTED

## 2022-08-16 PROCEDURE — 87804 INFLUENZA ASSAY W/OPTIC: CPT | Performed by: FAMILY MEDICINE

## 2022-08-16 PROCEDURE — 87426 SARSCOV CORONAVIRUS AG IA: CPT | Performed by: FAMILY MEDICINE

## 2022-08-16 PROCEDURE — 99213 OFFICE O/P EST LOW 20 MIN: CPT | Performed by: FAMILY MEDICINE

## 2022-08-16 RX ORDER — AMOXICILLIN AND CLAVULANATE POTASSIUM 875; 125 MG/1; MG/1
1 TABLET, FILM COATED ORAL 2 TIMES DAILY
Qty: 20 TABLET | Refills: 0 | Status: SHIPPED | OUTPATIENT
Start: 2022-08-16 | End: 2022-09-27

## 2022-08-16 RX ORDER — METHYLPREDNISOLONE 4 MG/1
TABLET ORAL
Qty: 1 EACH | Refills: 0 | Status: SHIPPED | OUTPATIENT
Start: 2022-08-16 | End: 2022-09-27

## 2022-08-16 NOTE — PROGRESS NOTES
Subjective   Emilie Soto is a 61 y.o. female presenting with chief complaint of:   Chief Complaint   Patient presents with   • Cough     Exposed to covid 3 weeks ago   • URI   • Wheezing     Started 2 weeks ago   • Headache       History of Present Illness :  Alone; back door/car.  Here for primarily an acute issue today; above.      Has multiple chronic problems to consider that might have a bearing on today's issues; not an interval appointment.       Chronic/acute problems reviewed today: Acute issue started a week ago.  She has cough chest congestion runny nose headache and mild shortness of breath with wheezing off and on.  Her as needed albuterol inhaler has helped.  1. Cough    2. URI with cough and congestion    3. Sinusitis, unspecified chronicity, unspecified location    4. Bronchitis    5. Immunosuppressed status (HCC) chronic remains on medicine such as CellCept     Has an/another acute issue today: none.    The following portions of the patient's history were reviewed and updated as appropriate: allergies, current medications, past family history, past medical history, past social history, past surgical history and problem list.      Current Outpatient Medications:   •  albuterol sulfate  (90 Base) MCG/ACT inhaler, INHALE 1 PUFF EVERY 4 (FOUR) HOURS AS NEEDED FOR WHEEZING OR SHORTNESS OF AIR., Disp: 54 g, Rfl: 3  •  alendronate (FOSAMAX) 70 MG tablet, TAKE 1 TABLET BY MOUTH EVERY 7 (SEVEN) DAYS., Disp: 12 tablet, Rfl: 3  •  ALPRAZolam (XANAX) 0.5 MG tablet, TAKE ONE TABLET TWICE DAILY AS NEEDED ANXIETY GENERIC FOR XANAX, Disp: 30 tablet, Rfl: 0  •  amLODIPine (NORVASC) 5 MG tablet, TAKE 1 TABLET BY MOUTH DAILY., Disp: 90 tablet, Rfl: 3  •  carvedilol (COREG) 25 MG tablet, TAKE 1 TABLET BY MOUTH 2 (TWO) TIMES A DAY., Disp: 180 tablet, Rfl: 1  •  cloNIDine (CATAPRES) 0.1 MG tablet, Take 0.1 mg by mouth 2 (Two) Times a Day. One by mouth twice daily as needed if BP greater than 160/100, Disp: , Rfl:    •  estradiol (ESTRACE) 2 MG tablet, TAKE 0.5 TABLETS BY MOUTH DAILY FOR 90 DAYS., Disp: 45 tablet, Rfl: 3  •  FLUoxetine (PROzac) 40 MG capsule, Take 1 capsule by mouth Daily., Disp: , Rfl:   •  folic acid (FOLVITE) 1 MG tablet, Take 1 tablet by mouth Daily., Disp: 90 tablet, Rfl: 3  •  glucose blood test strip, Use as instructed, Disp: 100 each, Rfl: 3  •  HYDROcodone-acetaminophen (NORCO) 7.5-325 MG per tablet, Take 1 tablet by mouth 2 (Two) Times a Day As Needed., Disp: , Rfl:   •  hydroxychloroquine (PLAQUENIL) 200 MG tablet, Take 1 tablet by mouth 2 (Two) Times a Day. Pt is only taking it once daily, Disp: , Rfl:   •  loratadine (CLARITIN) 10 MG tablet, Take 10 mg by mouth As Needed for Allergies., Disp: , Rfl:   •  losartan (COZAAR) 100 MG tablet, TAKE 1 TABLET BY MOUTH DAILY., Disp: 90 tablet, Rfl: 2  •  mycophenolate (CELLCEPT) 500 MG tablet, Take 1 tablet by mouth 2 (Two) Times a Day., Disp: , Rfl:   •  omeprazole (priLOSEC) 20 MG capsule, TAKE 1 CAPSULE BY MOUTH DAILY., Disp: 90 capsule, Rfl: 3  •  potassium chloride (K-DUR,KLOR-CON) 10 MEQ CR tablet, TAKE 1 TABLET BY MOUTH DAILY., Disp: 90 tablet, Rfl: 3  •  senna (SENOKOT) 8.6 MG tablet, Take 1 tablet by mouth As Needed for Constipation., Disp: , Rfl:   •  spironolactone (ALDACTONE) 25 MG tablet, TAKE 1 TABLET BY MOUTH DAILY., Disp: 90 tablet, Rfl: 3  •  traZODone (DESYREL) 100 MG tablet, Take 1 tablet by mouth Every Night., Disp: , Rfl:   •  verapamil SR (CALAN-SR) 240 MG CR tablet, TAKE 1 TABLET BY MOUTH 2 (TWO) TIMES A DAY., Disp: 180 tablet, Rfl: 1  •  vitamin D (ERGOCALCIFEROL) 06301 UNITS capsule capsule, Take 1 capsule by mouth 1 (One) Time Per Week., Disp: , Rfl:     No problems with medications.    Allergies   Allergen Reactions   • Abilify [Aripiprazole] Other (See Comments)     Insomnia   • Bactrim [Sulfamethoxazole-Trimethoprim] Other (See Comments)     Pt unsure of reaction   • Biaxin [Clarithromycin] Nausea And Vomiting   • Ciprofloxacin  Hcl Other (See Comments)     Pt unsure of reaction   • Duricef [Cefadroxil] Other (See Comments)     Pt unsure of reaction   • Ketek [Telithromycin] Other (See Comments)     Pt unsure of reaction   • Relafen [Nabumetone] Itching   • Wellbutrin [Bupropion] Itching       Review of Systems   Constitutional: Negative for activity change, appetite change, chills and fever.   HENT: Positive for congestion and rhinorrhea.    Respiratory: Positive for cough, shortness of breath (mild/on off) and wheezing.    Neurological: Positive for headaches.     Copy/paste function used for ROS/exam AND each area of these were reviewed, updated, confirmed and supplemented as needed.   Data reviewed:   Recent admit/ER/MD visits: none  Last cardiac testing:   Echo: none  Radiology considered: none    Lab Results:  Results for orders placed or performed in visit on 08/16/22   POCT VERITOR SARS-CoV-2 Antigen    Specimen: Nasopharynx; Swab   Result Value Ref Range    SARS Antigen Not Detected Not Detected, Presumptive Negative    Internal Control Passed Passed    Lot Number [id number]     Expiration Date 12/20/2022    POC Influenza A / B    Specimen: Swab   Result Value Ref Range    Rapid Influenza A Ag Negative Negative    Rapid Influenza B Ag Negative Negative    Internal Control Passed Passed    Lot Number [id number]     Expiration Date 10/20/2023        A1C:  Lab Results - Last 18 Months   Lab Units 05/10/22  0749 03/24/22  0830   HEMOGLOBIN A1C % 5.50 5.50     GLUCOSE:  Lab Results - Last 18 Months   Lab Units 06/07/22  1603 05/10/22  0749 03/24/22  0830 09/17/21  0724 03/11/21  0725   GLUCOSE mg/dL 80 89 83 85 94     LIPID:  Lab Results - Last 18 Months   Lab Units 09/17/21  0724   CHOLESTEROL mg/dL 132   LDL CHOL mg/dL 64   HDL CHOL mg/dL 52   TRIGLYCERIDES mg/dL 82     PSA:No results for input(s): PSA in the last 56318 hours.    CBC:  Lab Results - Last 18 Months   Lab Units 06/07/22  1603 05/10/22  0749 03/24/22  0830 09/17/21  0724  03/11/21  0725   WBC K/uL 5.1 5.18 4.64 4.58 4.46   HEMOGLOBIN g/dL 12.7 12.2 12.3 12.4 12.5   HEMATOCRIT % 41.5 37.0 37.4 38.9 38.0   PLATELETS K/uL 267 249 246 266 247      BMP/CMP:  Lab Results - Last 18 Months   Lab Units 06/07/22  1603 05/10/22  0749 03/24/22  0830 09/17/21  0724 03/11/21  0725   SODIUM mmol/L 141 142 142 139 143   POTASSIUM mmol/L 3.9 4.2 4.1 4.2 3.9   CHLORIDE mmol/L 102 104 104 103 104   TOTAL CO2 mmol/L 28 26.5 26.9 27.2 28.3   GLUCOSE mg/dL 80 89 83 85 94   BUN mg/dL 17 13 10 14 11   CREATININE mg/dL 1.3* 1.35* 1.16* 1.14* 1.11*   EGFR IF NONAFRICN AM  42*  --   --  49* 50*   EGFR IF AFRICN AM  50*  --   --  59* 61   EGFR RESULT mL/min/1.73  --  45.1* 54.1*  --   --    CALCIUM mg/dL 10.0 9.9 9.3 8.9 9.4     HEPATIC:  Lab Results - Last 18 Months   Lab Units 06/07/22  1603 05/10/22  0749 03/24/22  0830 09/17/21  0724 03/11/21  0725   ALT (SGPT) U/L 12 9 11 11 12   AST (SGOT) U/L 18 13 11 18 17   ALK PHOS U/L 68 57 65 67 68     Vit D:  Lab Results - Last 18 Months   Lab Units 06/07/22  1603 09/17/21  0724   VIT D 25 HYDROXY ng/mL 74.1 54.3     THYROID:  Lab Results - Last 18 Months   Lab Units 05/10/22  0749 09/17/21  0724   TSH uIU/mL 1.770 2.820   FREE T4 ng/dL 1.29 1.25       Objective   Pulse 64   Temp 97.3 °F (36.3 °C) (Infrared)   SpO2 98%   There is no height or weight on file to calculate BMI.    Recent Vitals       3/23/2022 5/31/2022 8/16/2022       BP: 126/78 138/80 --     Pulse: 78 62 64     Temp: 97.1 °F (36.2 °C) 96.8 °F (36 °C) 97.3 °F (36.3 °C)     Weight: 88.5 kg (195 lb 3.2 oz) 86.2 kg (190 lb) --     BMI (Calculated): 35.7 34.7 --           Physical Exam  Vitals and nursing note reviewed.   Constitutional:       General: She is not in acute distress.     Appearance: She is obese.   HENT:      Mouth/Throat:      Mouth: Mucous membranes are moist.   Eyes:      Extraocular Movements: Extraocular movements intact.      Conjunctiva/sclera: Conjunctivae normal.      Pupils:  Pupils are equal, round, and reactive to light.   Pulmonary:      Effort: Pulmonary effort is normal.   Musculoskeletal:      Cervical back: Neck supple.      Right lower leg: No edema.      Left lower leg: No edema.   Neurological:      Mental Status: She is alert and oriented to person, place, and time. Mental status is at baseline.           Assessment & Plan     1. Cough    2. URI with cough and congestion    3. Sinusitis, unspecified chronicity, unspecified location    4. Bronchitis    5. Immunosuppressed status (HCC)        Discussions/medical decisions/reviews:  Other vitals ok  DM/BS 5.5 last  Lipid LDL 64 last  PSA NA  CBC ok last  Renal 50 GFR last  Liver ok last  Vit D ok last  Thyroid ok last    With COVID test negative today were going to proceed is this may be being a viral or bacterial related sinusitis bronchitis  She is still advised to isolate for 5 days and social distance for the next 5 just in case this rapid test today is wrong  She is beyond the timeframe for Paxlovid to usually be given; the current COVID virus seems to be a milder illness    Medical decision issues:   Data review above:   Rx: reviewed and decisions:   Visit today involved chronic significant medical problems or differentials and/or intensive drug monitoring: ie potential to cause serious morbidity or death:   Rx changes:   New Medications Ordered This Visit   Medications   • amoxicillin-clavulanate (Augmentin) 875-125 MG per tablet     Sig: Take 1 tablet by mouth 2 (Two) Times a Day.     Dispense:  20 tablet     Refill:  0   • methylPREDNISolone (MEDROL) 4 MG dose pack     Sig: Take as directed on package instructions.     Dispense:  1 each     Refill:  0     Pharmacist-tell patient When using steroids Do not use anti-inflammatories such as Motrin/ibuprofen, Alleve/naprosyn, Mobic and like medications     Orders placed:   LAB/Testing/Referrals: reviewed/orders:   Today:   Orders Placed This Encounter   Procedures   • POCT  VERITOR SARS-CoV-2 Antigen   • POC Influenza A / B     Chronic/recurrent labs above or change to:   same     Screening reviewed/updated     Immunization History   Administered Date(s) Administered   • COVID-19 (MODERNA) 1st, 2nd, 3rd Dose Only 03/15/2021, 04/12/2021   • COVID-19 (MODERNA) BOOSTER 12/15/2021   • FluLaval/Fluarix/Fluzone >6 10/27/2018, 11/12/2020, 10/13/2021   • Influenza, Unspecified 11/05/2016   • Tdap 10/19/2021     Vaccine reviewed: today none; later we advised/reaffirmed our support/suggestion for staying complete with covid- covid boosters, seasonal flu/yearly and any missing vaccine from list we supplied; we suggest contact with local health department office to review missing/needed vaccines and then bring nursing documentation for these vaccines to this office.         There are no Patient Instructions on file for this visit.    Follow up: No follow-ups on file.  Future Appointments   Date Time Provider Department Center   9/21/2022  8:25 AM [facility]   9/27/2022 11:30 AM Alexandru Miles MD [facility]

## 2022-08-16 NOTE — TELEPHONE ENCOUNTER
Caller: Emilie Soto    Relationship: Self    Best call back number: [phone]    What medication are you requesting: SOMETHING FOR PRODUCTIVE COUGH. COUGHING UP GREEN    What are your current symptoms: COUGH AND CONGESTION AND RUNNY NOSE AND USING INHALER DUE TO SHORT OF BREATH    How long have you been experiencing symptoms: ABOUT A WEEK    Have you had these symptoms before:    [x] Yes  [] No    Have you been treated for these symptoms before:   [x] Yes  [] No    If a prescription is needed, what is your preferred pharmacy and phone number:      [facility] - [place], [place] - [address]  - [phone]  - [phone]   [phone]    Additional notes: OFFERED PATIENT APPOINTMENT BUT PREFERRED SENDING MESSAGE FIRST

## 2022-08-16 NOTE — TELEPHONE ENCOUNTER
[facility] called to advised they have a PCN and amoxillin allergy and pt did not know what it did? Does Dr Miles want to change the Augmentin it to something else?

## 2022-09-21 ENCOUNTER — LAB (OUTPATIENT)
Dept: FAMILY MEDICINE CLINIC | Facility: CLINIC | Age: 62
End: 2022-09-21

## 2022-09-21 DIAGNOSIS — E87.6 HYPOPOTASSEMIA: ICD-10-CM

## 2022-09-21 DIAGNOSIS — R73.01 ELEVATED FASTING GLUCOSE: ICD-10-CM

## 2022-09-21 DIAGNOSIS — I10 ESSENTIAL HYPERTENSION: ICD-10-CM

## 2022-09-21 DIAGNOSIS — E55.9 VITAMIN D DEFICIENCY, UNSPECIFIED: ICD-10-CM

## 2022-09-21 DIAGNOSIS — D84.9 IMMUNOSUPPRESSED STATUS: Primary | ICD-10-CM

## 2022-09-21 DIAGNOSIS — R53.83 FATIGUE, UNSPECIFIED TYPE: ICD-10-CM

## 2022-09-21 DIAGNOSIS — F41.9 ANXIETY: ICD-10-CM

## 2022-09-21 DIAGNOSIS — M06.9 RHEUMATOID ARTHRITIS, INVOLVING UNSPECIFIED SITE, UNSPECIFIED WHETHER RHEUMATOID FACTOR PRESENT: ICD-10-CM

## 2022-09-21 DIAGNOSIS — N28.9 RENAL INSUFFICIENCY: ICD-10-CM

## 2022-09-22 LAB
ALBUMIN SERPL-MCNC: 3.9 G/DL (ref 3.5–5.2)
ALBUMIN/GLOB SERPL: 1.4 G/DL
ALP SERPL-CCNC: 59 U/L (ref 39–117)
ALT SERPL-CCNC: 10 U/L (ref 1–33)
AST SERPL-CCNC: 13 U/L (ref 1–32)
BASOPHILS # BLD AUTO: 0.03 10*3/MM3 (ref 0–0.2)
BASOPHILS NFR BLD AUTO: 0.7 % (ref 0–1.5)
BILIRUB SERPL-MCNC: <0.2 MG/DL (ref 0–1.2)
BUN SERPL-MCNC: 11 MG/DL (ref 8–23)
BUN/CREAT SERPL: 8.8 (ref 7–25)
CALCIUM SERPL-MCNC: 9 MG/DL (ref 8.6–10.5)
CHLORIDE SERPL-SCNC: 107 MMOL/L (ref 98–107)
CHOLEST SERPL-MCNC: 128 MG/DL (ref 0–200)
CK SERPL-CCNC: 86 U/L (ref 20–180)
CO2 SERPL-SCNC: 27.7 MMOL/L (ref 22–29)
CREAT SERPL-MCNC: 1.25 MG/DL (ref 0.57–1)
CRP SERPL-MCNC: 2.45 MG/DL (ref 0–0.5)
EGFRCR SERPLBLD CKD-EPI 2021: 49.4 ML/MIN/1.73
EOSINOPHIL # BLD AUTO: 0.08 10*3/MM3 (ref 0–0.4)
EOSINOPHIL NFR BLD AUTO: 1.8 % (ref 0.3–6.2)
ERYTHROCYTE [DISTWIDTH] IN BLOOD BY AUTOMATED COUNT: 13.1 % (ref 12.3–15.4)
ERYTHROCYTE [SEDIMENTATION RATE] IN BLOOD BY WESTERGREN METHOD: 32 MM/HR (ref 0–30)
GLOBULIN SER CALC-MCNC: 2.8 GM/DL
GLUCOSE SERPL-MCNC: 83 MG/DL (ref 65–99)
HBA1C MFR BLD: 5.5 % (ref 4.8–5.6)
HCT VFR BLD AUTO: 38.2 % (ref 34–46.6)
HDLC SERPL-MCNC: 50 MG/DL (ref 40–60)
HGB BLD-MCNC: 12.4 G/DL (ref 12–15.9)
IMM GRANULOCYTES # BLD AUTO: 0.03 10*3/MM3 (ref 0–0.05)
IMM GRANULOCYTES NFR BLD AUTO: 0.7 % (ref 0–0.5)
LDLC SERPL CALC-MCNC: 65 MG/DL (ref 0–100)
LDLC/HDLC SERPL: 1.3 {RATIO}
LYMPHOCYTES # BLD AUTO: 1.34 10*3/MM3 (ref 0.7–3.1)
LYMPHOCYTES NFR BLD AUTO: 30.9 % (ref 19.6–45.3)
MCH RBC QN AUTO: 29.4 PG (ref 26.6–33)
MCHC RBC AUTO-ENTMCNC: 32.5 G/DL (ref 31.5–35.7)
MCV RBC AUTO: 90.5 FL (ref 79–97)
MONOCYTES # BLD AUTO: 0.64 10*3/MM3 (ref 0.1–0.9)
MONOCYTES NFR BLD AUTO: 14.8 % (ref 5–12)
NEUTROPHILS # BLD AUTO: 2.21 10*3/MM3 (ref 1.7–7)
NEUTROPHILS NFR BLD AUTO: 51.1 % (ref 42.7–76)
NRBC BLD AUTO-RTO: 0 /100 WBC (ref 0–0.2)
PLATELET # BLD AUTO: 244 10*3/MM3 (ref 140–450)
POTASSIUM SERPL-SCNC: 4.5 MMOL/L (ref 3.5–5.2)
PROT SERPL-MCNC: 6.7 G/DL (ref 6–8.5)
RBC # BLD AUTO: 4.22 10*6/MM3 (ref 3.77–5.28)
SODIUM SERPL-SCNC: 145 MMOL/L (ref 136–145)
T4 FREE SERPL-MCNC: 1.16 NG/DL (ref 0.93–1.7)
TRIGL SERPL-MCNC: 64 MG/DL (ref 0–150)
TSH SERPL DL<=0.005 MIU/L-ACNC: 1.12 UIU/ML (ref 0.27–4.2)
VLDLC SERPL CALC-MCNC: 13 MG/DL (ref 5–40)
WBC # BLD AUTO: 4.33 10*3/MM3 (ref 3.4–10.8)

## 2022-09-27 ENCOUNTER — OFFICE VISIT (OUTPATIENT)
Dept: FAMILY MEDICINE CLINIC | Facility: CLINIC | Age: 62
End: 2022-09-27

## 2022-09-27 VITALS
HEIGHT: 62 IN | WEIGHT: 187.4 LBS | TEMPERATURE: 97.1 F | SYSTOLIC BLOOD PRESSURE: 128 MMHG | RESPIRATION RATE: 18 BRPM | BODY MASS INDEX: 34.48 KG/M2 | HEART RATE: 60 BPM | DIASTOLIC BLOOD PRESSURE: 78 MMHG | OXYGEN SATURATION: 98 %

## 2022-09-27 DIAGNOSIS — Z78.0 MENOPAUSE: ICD-10-CM

## 2022-09-27 DIAGNOSIS — F32.A DEPRESSION, UNSPECIFIED DEPRESSION TYPE: ICD-10-CM

## 2022-09-27 DIAGNOSIS — M85.80 OSTEOPENIA, UNSPECIFIED LOCATION: ICD-10-CM

## 2022-09-27 DIAGNOSIS — N28.9 RENAL INSUFFICIENCY: ICD-10-CM

## 2022-09-27 DIAGNOSIS — Z12.31 ENCOUNTER FOR SCREENING MAMMOGRAM FOR BREAST CANCER: ICD-10-CM

## 2022-09-27 DIAGNOSIS — E87.6 HYPOPOTASSEMIA: ICD-10-CM

## 2022-09-27 DIAGNOSIS — I50.30 DIASTOLIC CONGESTIVE HEART FAILURE, UNSPECIFIED HF CHRONICITY: Chronic | ICD-10-CM

## 2022-09-27 DIAGNOSIS — G47.00 INSOMNIA, UNSPECIFIED TYPE: ICD-10-CM

## 2022-09-27 DIAGNOSIS — I49.9 CARDIAC ARRHYTHMIA, UNSPECIFIED CARDIAC ARRHYTHMIA TYPE: ICD-10-CM

## 2022-09-27 DIAGNOSIS — M06.9 RHEUMATOID ARTHRITIS, INVOLVING UNSPECIFIED SITE, UNSPECIFIED WHETHER RHEUMATOID FACTOR PRESENT: Chronic | ICD-10-CM

## 2022-09-27 DIAGNOSIS — R48.8: ICD-10-CM

## 2022-09-27 DIAGNOSIS — I10 PRIMARY HYPERTENSION: Chronic | ICD-10-CM

## 2022-09-27 DIAGNOSIS — F11.90 NARCOTIC DRUG USE: ICD-10-CM

## 2022-09-27 PROCEDURE — 99214 OFFICE O/P EST MOD 30 MIN: CPT | Performed by: FAMILY MEDICINE

## 2022-09-27 NOTE — PROGRESS NOTES
Subjective   Emilie Soto is a 62 y.o. female presenting with chief complaint of:   Chief Complaint   Patient presents with   • Hypertension   • Follow-up       History of Present Illness :  With .    Here for review of chronic problems that includes HTN and others    Has multiple chronic problems to consider that might have a bearing on today's issues;  an interval appointment.       Chronic/acute problems reviewed today:   1. Encounter for screening mammogram for breast cancer Chronic/ongoing yearly need to review for breast cancer.  No breast pain, masses, discharge.       2. Diastolic congestive heart failure, unspecified HF chronicity (HCC) Chronic/stable.  Denies significant sob, orthopnea, leg edema, weight gain.  Aware of influence diet/salt and watching weight at home.       3. Primary hypertension Chronic/stable. Stable here past/no recent home blood pressures.  No significant chest pain, SOB, LE edema, orthopnea, near syncope, dizziness/light headness.   Recent Vitals       5/31/2022 8/16/2022 9/27/2022       BP: 138/80 -- 128/78     Pulse: 62 64 60     Temp: 96.8 °F (36 °C) 97.3 °F (36.3 °C) 97.1 °F (36.2 °C)     Weight: 86.2 kg (190 lb) -- 85 kg (187 lb 6.4 oz)     BMI (Calculated): 34.7 -- 34.3            4. Cardiac arrhythmia, unspecified cardiac arrhythmia type Chronic/stable.   History of palpitations.  Rhythm currently seems controlled.  Medications seem tolerated.  No syncope near syncope.     5. Osteopenia, unspecified location Chronic/stable.  Last bone density/if below.   Has 2y/fosmax Rx.  Ok further bone density screening when due.      6. Menopause see osteoporosis   7. Renal insufficiency-(everett barajas Chronic/stable.  Sees nephrology.  No dysuria.  Previously warned/reminded:   To avoid further kidney function decline  A. Treat any time you think you have infection  B. Stay hydrated (dont get dehydrated); drink at least 60 oz fluid every 24 hr (1800 cc or nearly a 2L)  C. Do not  allow any xrays with dye WITHOUT the doctor ordering checking your renal function  D. Do not get new medications without the doctor considering your renal condition  E. Do not use motrin/ibuprofen, alleve/naprosyn and these types of medications     8. Echo speech she has been feeling an echo sometimes in her left ear   9. Insomnia, unspecified type chronic difficulty falling and staying asleep.  Medication helps often affected by stress or worry   10. Rheumatoid arthritis, involving unspecified site, unspecified whether rheumatoid factor present (HCC) Chronic/stable.  Various on/off joint pains/soreness/stiffness.  Particular joint problems with various.  No joint swelling.  Treats mainly with reduced activity, Rx listed, Tylenol. No NSAIDs, and no injections.      11. Depression, unspecified depression type chronic past significant  mood swings, down moods, nervousness, difficulty with concentration to function home/work.  Others close have not been concerned.  No suicide ideation/intent.  Rx helps      12. Hypopotassemia Chronic/variable high or low K as uses diuretic; has to have lab monitoring.  No significant fatigue or muscle cramps     13. Narcotic drug use Long term/current use of controlled substance.  The patient has improved activities of daily living, increased function with the substance in question.  There are currently no side effects with this medication.    Walker OK with refills we are responsible.       Has an/another acute issue today: none.    The following portions of the patient's history were reviewed and updated as appropriate: allergies, current medications, past family history, past medical history, past social history, past surgical history and problem list.      Current Outpatient Medications:   •  albuterol sulfate  (90 Base) MCG/ACT inhaler, INHALE 1 PUFF EVERY 4 (FOUR) HOURS AS NEEDED FOR WHEEZING OR SHORTNESS OF AIR., Disp: 54 g, Rfl: 3  •  alendronate (FOSAMAX) 70 MG tablet,  TAKE 1 TABLET BY MOUTH EVERY 7 (SEVEN) DAYS., Disp: 12 tablet, Rfl: 3  •  ALPRAZolam (XANAX) 0.5 MG tablet, TAKE ONE TABLET TWICE DAILY AS NEEDED ANXIETY GENERIC FOR XANAX, Disp: 30 tablet, Rfl: 0  •  amLODIPine (NORVASC) 5 MG tablet, TAKE 1 TABLET BY MOUTH DAILY., Disp: 90 tablet, Rfl: 3  •  carvedilol (COREG) 25 MG tablet, TAKE 1 TABLET BY MOUTH 2 (TWO) TIMES A DAY., Disp: 180 tablet, Rfl: 1  •  cloNIDine (CATAPRES) 0.1 MG tablet, Take 0.1 mg by mouth 2 (Two) Times a Day. One by mouth twice daily as needed if BP greater than 160/100, Disp: , Rfl:   •  estradiol (ESTRACE) 2 MG tablet, TAKE 0.5 TABLETS BY MOUTH DAILY FOR 90 DAYS. (Patient taking differently: Take 1 mg by mouth Daily. Taking 1/4 tablet daily), Disp: 45 tablet, Rfl: 3  •  FLUoxetine (PROzac) 40 MG capsule, Take 1 capsule by mouth Daily., Disp: , Rfl:   •  folic acid (FOLVITE) 1 MG tablet, Take 1 tablet by mouth Daily., Disp: 90 tablet, Rfl: 3  •  glucose blood test strip, Use as instructed, Disp: 100 each, Rfl: 3  •  HYDROcodone-acetaminophen (NORCO) 7.5-325 MG per tablet, Take 1 tablet by mouth 2 (Two) Times a Day As Needed., Disp: , Rfl:   •  hydroxychloroquine (PLAQUENIL) 200 MG tablet, Take 1 tablet by mouth 2 (Two) Times a Day. Pt is only taking it once daily, Disp: , Rfl:   •  loratadine (CLARITIN) 10 MG tablet, Take 10 mg by mouth As Needed for Allergies., Disp: , Rfl:   •  losartan (COZAAR) 100 MG tablet, TAKE 1 TABLET BY MOUTH DAILY., Disp: 90 tablet, Rfl: 2  •  mycophenolate (CELLCEPT) 500 MG tablet, Take 1 tablet by mouth 2 (Two) Times a Day., Disp: , Rfl:   •  omeprazole (priLOSEC) 20 MG capsule, TAKE 1 CAPSULE BY MOUTH DAILY., Disp: 90 capsule, Rfl: 3  •  potassium chloride (K-DUR,KLOR-CON) 10 MEQ CR tablet, TAKE 1 TABLET BY MOUTH DAILY., Disp: 90 tablet, Rfl: 3  •  senna (SENOKOT) 8.6 MG tablet, Take 1 tablet by mouth As Needed for Constipation., Disp: , Rfl:   •  spironolactone (ALDACTONE) 25 MG tablet, TAKE 1 TABLET BY MOUTH DAILY.,  Disp: 90 tablet, Rfl: 3  •  traZODone (DESYREL) 100 MG tablet, Take 1 tablet by mouth Every Night., Disp: , Rfl:   •  verapamil SR (CALAN-SR) 240 MG CR tablet, TAKE 1 TABLET BY MOUTH 2 (TWO) TIMES A DAY., Disp: 180 tablet, Rfl: 1  •  vitamin D (ERGOCALCIFEROL) 01293 UNITS capsule capsule, Take 1 capsule by mouth 1 (One) Time Per Week., Disp: , Rfl:     No problems with medications.    Allergies   Allergen Reactions   • Abilify [Aripiprazole] Other (See Comments)     Insomnia   • Bactrim [Sulfamethoxazole-Trimethoprim] Other (See Comments)     Pt unsure of reaction   • Biaxin [Clarithromycin] Nausea And Vomiting   • Ciprofloxacin Hcl Other (See Comments)     Pt unsure of reaction   • Duricef [Cefadroxil] Other (See Comments)     Pt unsure of reaction   • Ketek [Telithromycin] Other (See Comments)     Pt unsure of reaction   • Relafen [Nabumetone] Itching   • Wellbutrin [Bupropion] Itching       Review of Systems  GENERAL:  Active/slower with limits, speed, stamina for age and desire. Sleep is ok; occ hard falling/staying with worry. No fever now.  SKIN: No rash/skin lesion of concern:   ENDO:  No syncope, near or diaphoretic sweaty spells..  HEENT: No recent head injury; but same/occ headache.   No vision change.   Decline in hearing; echo affect on/off L.  Ears without pain/drainage.  No sore throat.  No significant nasal/sinus congestion/drainage. No epistaxis.  CHEST: No chest wall tenderness or mass. No cough, without wheeze.  No SOB; no hemoptysis.  CV: No chest pain, palpitations, ankle edema.  GI: No dysphagia.  No abdominal pain, diarrhea, constipation.  No rectal bleeding, or melena.  Occ heartburn still.   :  Voids without dysuria, or incontinence to completion.  ORTHO: No painful/swollen joints but various on /off sore.  No change some sore neck or back.  No acute neck or back pain without recent injury.  NEURO: No dizziness, weakness of extremities.  No numbness/paresthesias.   PSYCH: No memory loss.   Mood good; often anxious, depressed but/and not suicidal.  Tries to tolerate stress .      Screening:  Gyne: flatter/mendoza confirmed 9.23.21  Mammogram: 10.6.21  Bone density: 6.8.20 Deca-bone d/Sancho/Ts L2-0.9 neck -2.2/6.8.2020  Low dose CT chest: Tobacco-smoker/none: NA  GI:   Colon-p, div/Jasiel/DESHAWN/10.10.19/5y  [id number]/Jasiel/DESHAWN/10.13.2020/  Prostate: NA  Usual lab order  Any lab others want; (we wish to attempt not to duplicate so we need copies of labs done outside the office/out of epic); OR can have all labs here (bring the order from all other doctors) and we will forward to all specialist  6m CBC, CMP, sed rate, CK-total, CR-protein  12m CBC, CMP, LIPID, TSH, fT4, CK-total, Vit D, sed rate, CR-protein     Copy/paste function used for ROS/exam AND each area of these were reviewed, updated, confirmed and supplemented as needed.   Data reviewed:   Recent admit/ER/MD visits:   Last visit  Last cardiac testing:   Echo:   Results for orders placed during the hospital encounter of 11/13/17    Adult Stress Echo W/ Cont or Stress Agent if Necessary Per Protocol    Interpretation Summary  · Below average functional capacity.  · Clinically negative for ischemia.  · Electrically equivocal due to baseline EKG abnormalities.  · Left ventricular systolic function is normal at rest with appropriate increase in contractility with stress.    Exercise stress echocardiogram is low risk for ischemia.      Radiology considered:   US Renal Bilateral    Result Date: 6/30/2022   Negative for hydronephrosis or renal cortical thinning. This report was finalized on 06/30/2022 10:26 by Dr. Joaquin Park MD.    Lab Results:  Results for orders placed or performed in visit on 09/21/22   Comprehensive Metabolic Panel    Specimen: Blood   Result Value Ref Range    Glucose 83 65 - 99 mg/dL    BUN 11 8 - 23 mg/dL    Creatinine 1.25 (H) 0.57 - 1.00 mg/dL    EGFR Result 49.4 (L) >60.0 mL/min/1.73    BUN/Creatinine Ratio 8.8 7.0 - 25.0     Sodium 145 136 - 145 mmol/L    Potassium 4.5 3.5 - 5.2 mmol/L    Chloride 107 98 - 107 mmol/L    Total CO2 27.7 22.0 - 29.0 mmol/L    Calcium 9.0 8.6 - 10.5 mg/dL    Total Protein 6.7 6.0 - 8.5 g/dL    Albumin 3.90 3.50 - 5.20 g/dL    Globulin 2.8 gm/dL    A/G Ratio 1.4 g/dL    Total Bilirubin <0.2 0.0 - 1.2 mg/dL    Alkaline Phosphatase 59 39 - 117 U/L    AST (SGOT) 13 1 - 32 U/L    ALT (SGPT) 10 1 - 33 U/L   Hemoglobin A1c    Specimen: Blood   Result Value Ref Range    Hemoglobin A1C 5.50 4.80 - 5.60 %   Lipid Panel With LDL / HDL Ratio    Specimen: Blood   Result Value Ref Range    Total Cholesterol 128 0 - 200 mg/dL    Triglycerides 64 0 - 150 mg/dL    HDL Cholesterol 50 40 - 60 mg/dL    VLDL Cholesterol Sai 13 5 - 40 mg/dL    LDL Chol Calc (NIH) 65 0 - 100 mg/dL    LDL/HDL RATIO 1.30    TSH    Specimen: Blood   Result Value Ref Range    TSH 1.120 0.270 - 4.200 uIU/mL   T4, free    Specimen: Blood   Result Value Ref Range    Free T4 1.16 0.93 - 1.70 ng/dL   Sedimentation Rate    Specimen: Blood   Result Value Ref Range    Sed Rate 32 (H) 0 - 30 mm/hr   CK    Specimen: Blood   Result Value Ref Range    Creatine Kinase 86 20 - 180 U/L   C-reactive Protein    Specimen: Blood   Result Value Ref Range    C-Reactive Protein 2.45 (H) 0.00 - 0.50 mg/dL   CBC & Differential    Specimen: Blood   Result Value Ref Range    WBC 4.33 3.40 - 10.80 10*3/mm3    RBC 4.22 3.77 - 5.28 10*6/mm3    Hemoglobin 12.4 12.0 - 15.9 g/dL    Hematocrit 38.2 34.0 - 46.6 %    MCV 90.5 79.0 - 97.0 fL    MCH 29.4 26.6 - 33.0 pg    MCHC 32.5 31.5 - 35.7 g/dL    RDW 13.1 12.3 - 15.4 %    Platelets 244 140 - 450 10*3/mm3    Neutrophil Rel % 51.1 42.7 - 76.0 %    Lymphocyte Rel % 30.9 19.6 - 45.3 %    Monocyte Rel % 14.8 (H) 5.0 - 12.0 %    Eosinophil Rel % 1.8 0.3 - 6.2 %    Basophil Rel % 0.7 0.0 - 1.5 %    Neutrophils Absolute 2.21 1.70 - 7.00 10*3/mm3    Lymphocytes Absolute 1.34 0.70 - 3.10 10*3/mm3    Monocytes Absolute 0.64 0.10 - 0.90  10*3/mm3    Eosinophils Absolute 0.08 0.00 - 0.40 10*3/mm3    Basophils Absolute 0.03 0.00 - 0.20 10*3/mm3    Immature Granulocyte Rel % 0.7 (H) 0.0 - 0.5 %    Immature Grans Absolute 0.03 0.00 - 0.05 10*3/mm3    nRBC 0.0 0.0 - 0.2 /100 WBC       A1C:  Lab Results - Last 18 Months   Lab Units 09/21/22  0759 05/10/22  0749 03/24/22  0830   HEMOGLOBIN A1C % 5.50 5.50 5.50     GLUCOSE:  Lab Results - Last 18 Months   Lab Units 09/21/22  0759 06/07/22  1603 05/10/22  0749 03/24/22  0830 09/17/21  0724   GLUCOSE mg/dL 83 80 89 83 85     LIPID:  Lab Results - Last 18 Months   Lab Units 09/21/22  0759 09/17/21  0724   CHOLESTEROL mg/dL 128 132   LDL CHOL mg/dL 65 64   HDL CHOL mg/dL 50 52   TRIGLYCERIDES mg/dL 64 82     PSA:No results for input(s): PSA in the last 71778 hours.    CBC:  Lab Results - Last 18 Months   Lab Units 09/21/22  0759 06/07/22  1603 05/10/22  0749 03/24/22  0830 09/17/21  0724   WBC 10*3/mm3 4.33 5.1 5.18 4.64 4.58   HEMOGLOBIN g/dL 12.4 12.7 12.2 12.3 12.4   HEMATOCRIT % 38.2 41.5 37.0 37.4 38.9   PLATELETS 10*3/mm3 244 267 249 246 266      BMP/CMP:  Lab Results - Last 18 Months   Lab Units 09/21/22  0759 06/07/22  1603 05/10/22  0749 03/24/22  0830 09/17/21  0724   SODIUM mmol/L 145 141 142 142 139   POTASSIUM mmol/L 4.5 3.9 4.2 4.1 4.2   CHLORIDE mmol/L 107 102 104 104 103   TOTAL CO2 mmol/L 27.7 28 26.5 26.9 27.2   GLUCOSE mg/dL 83 80 89 83 85   BUN mg/dL 11 17 13 10 14   CREATININE mg/dL 1.25* 1.3* 1.35* 1.16* 1.14*   EGFR IF NONAFRICN AM   --  42*  --   --  49*   EGFR IF AFRICN AM   --  50*  --   --  59*   EGFR RESULT mL/min/1.73 49.4*  --  45.1* 54.1*  --    CALCIUM mg/dL 9.0 10.0 9.9 9.3 8.9     HEPATIC:  Lab Results - Last 18 Months   Lab Units 09/21/22  0759 06/07/22  1603 05/10/22  0749 03/24/22  0830 09/17/21  0724   ALT (SGPT) U/L 10 12 9 11 11   AST (SGOT) U/L 13 18 13 11 18   ALK PHOS U/L 59 68 57 65 67     Vit D:  Lab Results - Last 18 Months   Lab Units 06/07/22  1603 09/17/21  0724  "  VIT D 25 HYDROXY ng/mL 74.1 54.3     THYROID:  Lab Results - Last 18 Months   Lab Units 09/21/22  0759 05/10/22  0749 09/17/21  0724   TSH uIU/mL 1.120 1.770 2.820   FREE T4 ng/dL 1.16 1.29 1.25         Objective   /78 (BP Location: Left arm, Patient Position: Sitting, Cuff Size: Adult)   Pulse 60   Temp 97.1 °F (36.2 °C) (Infrared)   Resp 18   Ht 157.5 cm (62\")   Wt 85 kg (187 lb 6.4 oz)   SpO2 98%   Breastfeeding No   BMI 34.28 kg/m²   Body mass index is 34.28 kg/m².    Recent Vitals       5/31/2022 8/16/2022 9/27/2022       BP: 138/80 -- 128/78     Pulse: 62 64 60     Temp: 96.8 °F (36 °C) 97.3 °F (36.3 °C) 97.1 °F (36.2 °C)     Weight: 86.2 kg (190 lb) -- 85 kg (187 lb 6.4 oz)     BMI (Calculated): 34.7 -- 34.3         Wt Readings from Last 15 Encounters:   09/27/22 1129 85 kg (187 lb 6.4 oz)   05/31/22 1435 86.2 kg (190 lb)   03/23/22 1134 88.5 kg (195 lb 3.2 oz)   11/12/21 1459 88.5 kg (195 lb)   10/19/21 1041 88.5 kg (195 lb)   09/23/21 1041 87.5 kg (193 lb)   03/16/21 1120 87.5 kg (192 lb 12.8 oz)   10/13/20 0730 87.1 kg (192 lb)   09/24/20 0912 86.6 kg (191 lb)   09/15/20 1531 87 kg (191 lb 12.8 oz)   06/29/20 1614 85.4 kg (188 lb 3.2 oz)   03/09/20 1159 82.6 kg (182 lb)   02/28/20 1342 82.6 kg (182 lb)   02/25/20 1004 82.6 kg (182 lb)   11/26/19 1500 79.8 kg (176 lb)       Physical Exam  GENERAL:  Well nourished/developed in no acute distress. BMI noted: 33.8  SKIN: Turgor excellent, without wound, rash, lesion  HEENT: Normal cephalic without trauma.  Pupils equal round reactive to light. Extraocular motions full without nystagmus.   External canals nonobstructive nontender without reddness. Tymphatic membranes josiane with cesar structures intact.   Oral cavity without growths, exudates, and moist.  Posterior pharynx without mass, obstruction, redness.  No thyromegaly, mass, tenderness, lymphadenopathy and supple.  CV: Regular rhythm.  No murmur, gallop,  edema. Posterior pulses intact.  No " carotid bruits.  CHEST: No chest wall tenderness or mass.   LUNGS: Symmetric motion with clear to auscultation.   ABD: Soft, nontender without mass.   ORTHO: Symmetric extremities without swelling/point tenderness.  Full gross range of motion; few sore fingers.  NEURO: CN 2-12 grossly intact.  Symmetric facies. 1/4 x bicep equal reflexes.  UE/LE   3/5 strength throughout.  Nonfocal use extremities. Speech clear. Intact light touch with monofilament, vibratory sensation with tuning fork; equal toes/distal feet.    PSYCH: Oriented x 3.  Pleasant calm, well kept.   Purposeful/directed conservation with intact short/long gross memory.       Assessment & Plan     1. Encounter for screening mammogram for breast cancer    2. Diastolic congestive heart failure, unspecified HF chronicity (HCC)    3. Primary hypertension    4. Cardiac arrhythmia, unspecified cardiac arrhythmia type    5. Osteopenia, unspecified location    6. Menopause    7. Renal insufficiency-(elina) karl    8. Echo speech    9. Insomnia, unspecified type    10. Rheumatoid arthritis, involving unspecified site, unspecified whether rheumatoid factor present (HCC)    11. Depression, unspecified depression type    12. Hypopotassemia    13. Narcotic drug use        Discussions/medical decisions/reviews:  BP ok  Other vitals ok  DM/BS 5.5 9.21.22  Lipid LDL 65 9.21.22  PSA NA  CBC ok 9.21.22  Renal stable 49 9.21.22  Liver ok 9.21.22  Vit D 74 6.7.22  Thyroid TSH, fT4 ok 9.21.22    Data review above:   Rx: reviewed and decisions:   Rx new/changes: none  No orders of the defined types were placed in this encounter.    Controlled substance form discussed/agreed to  If echo continues; refer to ENT for hearing/ear testing.  Echo often go away related to ET dysfunction/like    Orders placed:   LAB/Testing/Referrals: reviewed/orders:   Today:   Orders Placed This Encounter   Procedures   • Mammo Screening Digital Tomosynthesis Bilateral With CAD   • DEXA Bone  Density Axial     Chronic/recurrent labs above or change to:   same     Screening reviewed/updated     Immunization History   Administered Date(s) Administered   • COVID-19 (MODERNA) 1st, 2nd, 3rd Dose Only 03/15/2021, 04/12/2021   • COVID-19 (MODERNA) BOOSTER 12/15/2021   • FluLaval/Fluzone >6mos 10/27/2018, 11/12/2020, 10/13/2021   • Influenza, Unspecified 11/05/2016   • Tdap 10/19/2021     Vaccine reviewed: today none; later we advised/reaffirmed our support/suggestion for staying complete with covid- covid boosters, seasonal flu/yearly and any missing vaccine from list we supplied; we suggest contact with local health department office to review missing/needed vaccines and then bring nursing documentation for these vaccines to this office.     Health maintenance:   Body mass index is 34.28 kg/m².  BMI is >= 30 and <35. (Class 1 Obesity). The following options were offered after discussion;: exercise counseling/recommendations and nutrition counseling/recommendations      Tobacco use reviewed:   Emilie Soto  reports that she has never smoked. She has never used smokeless tobacco..       There are no Patient Instructions on file for this visit.    Visit today involved chronic significant medical problems or differentials and/or intensive drug monitoring: ie potential to cause serious morbidity or death:     Follow up: Return for controlled substance form; lab/Dr Miles 6m.  Future Appointments   Date Time Provider Department Center   3/21/2023  9:00 AM [facility]   3/28/2023 11:45 AM Alexandru Miles MD [facility]

## 2022-10-18 ENCOUNTER — APPOINTMENT (OUTPATIENT)
Dept: GENERAL RADIOLOGY | Facility: HOSPITAL | Age: 62
End: 2022-10-18

## 2022-10-18 ENCOUNTER — APPOINTMENT (OUTPATIENT)
Dept: CT IMAGING | Facility: HOSPITAL | Age: 62
End: 2022-10-18

## 2022-10-18 ENCOUNTER — HOSPITAL ENCOUNTER (OUTPATIENT)
Facility: HOSPITAL | Age: 62
Setting detail: OBSERVATION
Discharge: HOME | End: 2022-10-20
Attending: STUDENT IN AN ORGANIZED HEALTH CARE EDUCATION/TRAINING PROGRAM | Admitting: FAMILY MEDICINE

## 2022-10-18 DIAGNOSIS — R94.31 T WAVE INVERSION IN EKG: ICD-10-CM

## 2022-10-18 DIAGNOSIS — R07.9 CHEST PAIN, UNSPECIFIED TYPE: Primary | ICD-10-CM

## 2022-10-18 DIAGNOSIS — R00.1 BRADYCARDIA: ICD-10-CM

## 2022-10-18 LAB
ALBUMIN SERPL-MCNC: 4.2 G/DL (ref 3.5–5.2)
ALBUMIN/GLOB SERPL: 1.2 G/DL
ALP SERPL-CCNC: 62 U/L (ref 39–117)
ALT SERPL W P-5'-P-CCNC: 10 U/L (ref 1–33)
ANION GAP SERPL CALCULATED.3IONS-SCNC: 7 MMOL/L (ref 5–15)
AST SERPL-CCNC: 15 U/L (ref 1–32)
BASOPHILS # BLD AUTO: 0.03 10*3/MM3 (ref 0–0.2)
BASOPHILS NFR BLD AUTO: 0.6 % (ref 0–1.5)
BILIRUB SERPL-MCNC: 0.2 MG/DL (ref 0–1.2)
BUN SERPL-MCNC: 16 MG/DL (ref 8–23)
BUN/CREAT SERPL: 11.6 (ref 7–25)
CALCIUM SPEC-SCNC: 9.6 MG/DL (ref 8.6–10.5)
CHLORIDE SERPL-SCNC: 101 MMOL/L (ref 98–107)
CO2 SERPL-SCNC: 28 MMOL/L (ref 22–29)
CREAT SERPL-MCNC: 1.38 MG/DL (ref 0.57–1)
DEPRECATED RDW RBC AUTO: 45 FL (ref 37–54)
EGFRCR SERPLBLD CKD-EPI 2021: 43.6 ML/MIN/1.73
EOSINOPHIL # BLD AUTO: 0.11 10*3/MM3 (ref 0–0.4)
EOSINOPHIL NFR BLD AUTO: 2.2 % (ref 0.3–6.2)
ERYTHROCYTE [DISTWIDTH] IN BLOOD BY AUTOMATED COUNT: 13.2 % (ref 12.3–15.4)
GLOBULIN UR ELPH-MCNC: 3.5 GM/DL
GLUCOSE SERPL-MCNC: 81 MG/DL (ref 65–99)
HCT VFR BLD AUTO: 40.3 % (ref 34–46.6)
HGB BLD-MCNC: 12.8 G/DL (ref 12–15.9)
HOLD SPECIMEN: NORMAL
HOLD SPECIMEN: NORMAL
IMM GRANULOCYTES # BLD AUTO: 0.02 10*3/MM3 (ref 0–0.05)
IMM GRANULOCYTES NFR BLD AUTO: 0.4 % (ref 0–0.5)
LYMPHOCYTES # BLD AUTO: 1.51 10*3/MM3 (ref 0.7–3.1)
LYMPHOCYTES NFR BLD AUTO: 30 % (ref 19.6–45.3)
MCH RBC QN AUTO: 29.8 PG (ref 26.6–33)
MCHC RBC AUTO-ENTMCNC: 31.8 G/DL (ref 31.5–35.7)
MCV RBC AUTO: 93.9 FL (ref 79–97)
MONOCYTES # BLD AUTO: 0.57 10*3/MM3 (ref 0.1–0.9)
MONOCYTES NFR BLD AUTO: 11.3 % (ref 5–12)
NEUTROPHILS NFR BLD AUTO: 2.79 10*3/MM3 (ref 1.7–7)
NEUTROPHILS NFR BLD AUTO: 55.5 % (ref 42.7–76)
NRBC BLD AUTO-RTO: 0 /100 WBC (ref 0–0.2)
PLATELET # BLD AUTO: 244 10*3/MM3 (ref 140–450)
PMV BLD AUTO: 9.2 FL (ref 6–12)
POTASSIUM SERPL-SCNC: 4.5 MMOL/L (ref 3.5–5.2)
PROT SERPL-MCNC: 7.7 G/DL (ref 6–8.5)
RBC # BLD AUTO: 4.29 10*6/MM3 (ref 3.77–5.28)
SODIUM SERPL-SCNC: 136 MMOL/L (ref 136–145)
TROPONIN T SERPL-MCNC: <0.01 NG/ML (ref 0–0.03)
WBC NRBC COR # BLD: 5.03 10*3/MM3 (ref 3.4–10.8)
WHOLE BLOOD HOLD COAG: NORMAL
WHOLE BLOOD HOLD SPECIMEN: NORMAL

## 2022-10-18 PROCEDURE — G0378 HOSPITAL OBSERVATION PER HR: HCPCS

## 2022-10-18 PROCEDURE — 80061 LIPID PANEL: CPT | Performed by: FAMILY MEDICINE

## 2022-10-18 PROCEDURE — 70498 CT ANGIOGRAPHY NECK: CPT

## 2022-10-18 PROCEDURE — 85025 COMPLETE CBC W/AUTO DIFF WBC: CPT

## 2022-10-18 PROCEDURE — 0 IOPAMIDOL PER 1 ML: Performed by: STUDENT IN AN ORGANIZED HEALTH CARE EDUCATION/TRAINING PROGRAM

## 2022-10-18 PROCEDURE — 99285 EMERGENCY DEPT VISIT HI MDM: CPT

## 2022-10-18 PROCEDURE — 93005 ELECTROCARDIOGRAM TRACING: CPT | Performed by: STUDENT IN AN ORGANIZED HEALTH CARE EDUCATION/TRAINING PROGRAM

## 2022-10-18 PROCEDURE — 71045 X-RAY EXAM CHEST 1 VIEW: CPT

## 2022-10-18 PROCEDURE — 80053 COMPREHEN METABOLIC PANEL: CPT | Performed by: STUDENT IN AN ORGANIZED HEALTH CARE EDUCATION/TRAINING PROGRAM

## 2022-10-18 PROCEDURE — 36415 COLL VENOUS BLD VENIPUNCTURE: CPT

## 2022-10-18 PROCEDURE — 93010 ELECTROCARDIOGRAM REPORT: CPT | Performed by: INTERNAL MEDICINE

## 2022-10-18 PROCEDURE — 84484 ASSAY OF TROPONIN QUANT: CPT | Performed by: STUDENT IN AN ORGANIZED HEALTH CARE EDUCATION/TRAINING PROGRAM

## 2022-10-18 PROCEDURE — 70450 CT HEAD/BRAIN W/O DYE: CPT

## 2022-10-18 PROCEDURE — 70496 CT ANGIOGRAPHY HEAD: CPT

## 2022-10-18 RX ORDER — NITROGLYCERIN 0.4 MG/1
0.4 TABLET SUBLINGUAL
Status: DISCONTINUED | OUTPATIENT
Start: 2022-10-18 | End: 2022-10-20 | Stop reason: HOSPADM

## 2022-10-18 RX ORDER — ASPIRIN 325 MG
325 TABLET ORAL ONCE
Status: COMPLETED | OUTPATIENT
Start: 2022-10-18 | End: 2022-10-18

## 2022-10-18 RX ADMIN — IOPAMIDOL 100 ML: 755 INJECTION, SOLUTION INTRAVENOUS at 21:42

## 2022-10-18 RX ADMIN — ASPIRIN 325 MG: 325 TABLET ORAL at 20:22

## 2022-10-19 ENCOUNTER — APPOINTMENT (OUTPATIENT)
Dept: CARDIOLOGY | Facility: HOSPITAL | Age: 62
End: 2022-10-19

## 2022-10-19 ENCOUNTER — APPOINTMENT (OUTPATIENT)
Dept: CT IMAGING | Facility: HOSPITAL | Age: 62
End: 2022-10-19

## 2022-10-19 ENCOUNTER — APPOINTMENT (OUTPATIENT)
Dept: MRI IMAGING | Facility: HOSPITAL | Age: 62
End: 2022-10-19

## 2022-10-19 LAB
ALBUMIN SERPL-MCNC: 3.9 G/DL (ref 3.5–5.2)
ALBUMIN/GLOB SERPL: 1.3 G/DL
ALP SERPL-CCNC: 55 U/L (ref 39–117)
ALT SERPL W P-5'-P-CCNC: 9 U/L (ref 1–33)
ANION GAP SERPL CALCULATED.3IONS-SCNC: 6 MMOL/L (ref 5–15)
AST SERPL-CCNC: 14 U/L (ref 1–32)
BASOPHILS # BLD AUTO: 0.03 10*3/MM3 (ref 0–0.2)
BASOPHILS NFR BLD AUTO: 0.5 % (ref 0–1.5)
BH CV ECHO MEAS - AO MAX PG: 5.7 MMHG
BH CV ECHO MEAS - AO MEAN PG: 3 MMHG
BH CV ECHO MEAS - AO ROOT DIAM: 2.6 CM
BH CV ECHO MEAS - AO V2 MAX: 119 CM/SEC
BH CV ECHO MEAS - AO V2 VTI: 26.5 CM
BH CV ECHO MEAS - AVA(I,D): 2.6 CM2
BH CV ECHO MEAS - EDV(CUBED): 103.8 ML
BH CV ECHO MEAS - EDV(MOD-SP2): 42 ML
BH CV ECHO MEAS - EDV(MOD-SP4): 50 ML
BH CV ECHO MEAS - EF(MOD-SP2): 76.2 %
BH CV ECHO MEAS - EF(MOD-SP4): 70 %
BH CV ECHO MEAS - ESV(CUBED): 17.6 ML
BH CV ECHO MEAS - ESV(MOD-SP2): 10 ML
BH CV ECHO MEAS - ESV(MOD-SP4): 15 ML
BH CV ECHO MEAS - FS: 44.7 %
BH CV ECHO MEAS - IVS/LVPW: 1.11 CM
BH CV ECHO MEAS - IVSD: 1 CM
BH CV ECHO MEAS - LA DIMENSION: 3.9 CM
BH CV ECHO MEAS - LAT PEAK E' VEL: 5.9 CM/SEC
BH CV ECHO MEAS - LV DIASTOLIC VOL/BSA (35-75): 26.7 CM2
BH CV ECHO MEAS - LV MASS(C)D: 153.4 GRAMS
BH CV ECHO MEAS - LV MAX PG: 5.8 MMHG
BH CV ECHO MEAS - LV MEAN PG: 3 MMHG
BH CV ECHO MEAS - LV SYSTOLIC VOL/BSA (12-30): 8 CM2
BH CV ECHO MEAS - LV V1 MAX: 120 CM/SEC
BH CV ECHO MEAS - LV V1 VTI: 26.7 CM
BH CV ECHO MEAS - LVIDD: 4.7 CM
BH CV ECHO MEAS - LVIDS: 2.6 CM
BH CV ECHO MEAS - LVOT AREA: 2.5 CM2
BH CV ECHO MEAS - LVOT DIAM: 1.8 CM
BH CV ECHO MEAS - LVPWD: 0.9 CM
BH CV ECHO MEAS - MED PEAK E' VEL: 3.8 CM/SEC
BH CV ECHO MEAS - MV A MAX VEL: 119 CM/SEC
BH CV ECHO MEAS - MV DEC TIME: 0.19 MSEC
BH CV ECHO MEAS - MV E MAX VEL: 94.8 CM/SEC
BH CV ECHO MEAS - MV E/A: 0.8
BH CV ECHO MEAS - MV MAX PG: 8 MMHG
BH CV ECHO MEAS - MV MEAN PG: 3 MMHG
BH CV ECHO MEAS - MV V2 VTI: 35.5 CM
BH CV ECHO MEAS - MVA(VTI): 1.91 CM2
BH CV ECHO MEAS - PA V2 MAX: 105 CM/SEC
BH CV ECHO MEAS - RV MAX PG: 2.21 MMHG
BH CV ECHO MEAS - RV V1 MAX: 74.4 CM/SEC
BH CV ECHO MEAS - RV V1 VTI: 20.8 CM
BH CV ECHO MEAS - RVDD: 2.8 CM
BH CV ECHO MEAS - SI(MOD-SP2): 17.1 ML/M2
BH CV ECHO MEAS - SI(MOD-SP4): 18.7 ML/M2
BH CV ECHO MEAS - SV(LVOT): 67.9 ML
BH CV ECHO MEAS - SV(MOD-SP2): 32 ML
BH CV ECHO MEAS - SV(MOD-SP4): 35 ML
BH CV ECHO MEAS - TR MAX PG: 24.6 MMHG
BH CV ECHO MEAS - TR MAX VEL: 248 CM/SEC
BH CV ECHO MEASUREMENTS AVERAGE E/E' RATIO: 19.55
BH CV STRESS BP STAGE 1: NORMAL
BH CV STRESS BP STAGE 2: NORMAL
BH CV STRESS DURATION MIN STAGE 1: 3
BH CV STRESS DURATION MIN STAGE 2: 1
BH CV STRESS DURATION SEC STAGE 1: 0
BH CV STRESS DURATION SEC STAGE 2: 9
BH CV STRESS GRADE STAGE 1: 10
BH CV STRESS GRADE STAGE 2: 12
BH CV STRESS HR STAGE 1: 107
BH CV STRESS HR STAGE 2: 116
BH CV STRESS METS STAGE 1: 5
BH CV STRESS METS STAGE 2: 7.5
BH CV STRESS PROTOCOL 1: NORMAL
BH CV STRESS PROTOCOL 2 BP STAGE 1: NORMAL
BH CV STRESS PROTOCOL 2 BP STAGE 2: NORMAL
BH CV STRESS PROTOCOL 2 BP STAGE 3: 137
BH CV STRESS PROTOCOL 2 DOSE DOBUTAMINE STAGE 1: 10
BH CV STRESS PROTOCOL 2 DOSE DOBUTAMINE STAGE 2: 20
BH CV STRESS PROTOCOL 2 DOSE DOBUTAMINE STAGE 3: 30
BH CV STRESS PROTOCOL 2 DURATION MIN STAGE 1: 3
BH CV STRESS PROTOCOL 2 DURATION MIN STAGE 2: 3
BH CV STRESS PROTOCOL 2 DURATION MIN STAGE 3: 3
BH CV STRESS PROTOCOL 2 DURATION SEC STAGE 1: 0
BH CV STRESS PROTOCOL 2 DURATION SEC STAGE 2: 0
BH CV STRESS PROTOCOL 2 DURATION SEC STAGE 3: 19
BH CV STRESS PROTOCOL 2 HR STAGE 1: 73
BH CV STRESS PROTOCOL 2 HR STAGE 2: 90
BH CV STRESS PROTOCOL 2 HR STAGE 3: NORMAL
BH CV STRESS PROTOCOL 2 STAGE 1: 1
BH CV STRESS PROTOCOL 2 STAGE 2: 2
BH CV STRESS PROTOCOL 2 STAGE 3: 3
BH CV STRESS PROTOCOL 2: NORMAL
BH CV STRESS RECOVERY BP: NORMAL MMHG
BH CV STRESS RECOVERY HR: 100 BPM
BH CV STRESS SPEED STAGE 1: 1.7
BH CV STRESS SPEED STAGE 2: 2.5
BH CV STRESS STAGE 1: 1
BH CV STRESS STAGE 2: 2
BILIRUB SERPL-MCNC: 0.2 MG/DL (ref 0–1.2)
BUN SERPL-MCNC: 16 MG/DL (ref 8–23)
BUN/CREAT SERPL: 11.9 (ref 7–25)
CALCIUM SPEC-SCNC: 9.5 MG/DL (ref 8.6–10.5)
CHLORIDE SERPL-SCNC: 104 MMOL/L (ref 98–107)
CHOLEST SERPL-MCNC: 120 MG/DL (ref 0–200)
CHOLEST SERPL-MCNC: 138 MG/DL (ref 0–200)
CO2 SERPL-SCNC: 29 MMOL/L (ref 22–29)
CREAT SERPL-MCNC: 1.35 MG/DL (ref 0.57–1)
D DIMER PPP FEU-MCNC: 0.72 MCGFEU/ML (ref 0–0.5)
DEPRECATED RDW RBC AUTO: 45.2 FL (ref 37–54)
EGFRCR SERPLBLD CKD-EPI 2021: 44.8 ML/MIN/1.73
EOSINOPHIL # BLD AUTO: 0.14 10*3/MM3 (ref 0–0.4)
EOSINOPHIL NFR BLD AUTO: 2.5 % (ref 0.3–6.2)
ERYTHROCYTE [DISTWIDTH] IN BLOOD BY AUTOMATED COUNT: 13.2 % (ref 12.3–15.4)
GLOBULIN UR ELPH-MCNC: 3 GM/DL
GLUCOSE SERPL-MCNC: 89 MG/DL (ref 65–99)
HBA1C MFR BLD: 5.4 % (ref 4.8–5.6)
HBA1C MFR BLD: 5.5 % (ref 4.8–5.6)
HCT VFR BLD AUTO: 38.7 % (ref 34–46.6)
HDLC SERPL-MCNC: 46 MG/DL (ref 40–60)
HDLC SERPL-MCNC: 49 MG/DL (ref 40–60)
HGB BLD-MCNC: 12.2 G/DL (ref 12–15.9)
IMM GRANULOCYTES # BLD AUTO: 0.02 10*3/MM3 (ref 0–0.05)
IMM GRANULOCYTES NFR BLD AUTO: 0.4 % (ref 0–0.5)
LDLC SERPL CALC-MCNC: 59 MG/DL (ref 0–100)
LDLC SERPL CALC-MCNC: 73 MG/DL (ref 0–100)
LDLC/HDLC SERPL: 1.3 {RATIO}
LDLC/HDLC SERPL: 1.47 {RATIO}
LEFT ATRIUM VOLUME INDEX: 27.3 ML/M2
LEFT ATRIUM VOLUME: 51 ML
LYMPHOCYTES # BLD AUTO: 1.71 10*3/MM3 (ref 0.7–3.1)
LYMPHOCYTES NFR BLD AUTO: 30 % (ref 19.6–45.3)
MAXIMAL PREDICTED HEART RATE: 159 BPM
MAXIMAL PREDICTED HEART RATE: 159 BPM
MCH RBC QN AUTO: 29.5 PG (ref 26.6–33)
MCHC RBC AUTO-ENTMCNC: 31.5 G/DL (ref 31.5–35.7)
MCV RBC AUTO: 93.5 FL (ref 79–97)
MONOCYTES # BLD AUTO: 0.9 10*3/MM3 (ref 0.1–0.9)
MONOCYTES NFR BLD AUTO: 15.8 % (ref 5–12)
NEUTROPHILS NFR BLD AUTO: 2.9 10*3/MM3 (ref 1.7–7)
NEUTROPHILS NFR BLD AUTO: 50.8 % (ref 42.7–76)
NRBC BLD AUTO-RTO: 0 /100 WBC (ref 0–0.2)
PERCENT MAX PREDICTED HR: 86.16 %
PLATELET # BLD AUTO: 230 10*3/MM3 (ref 140–450)
PMV BLD AUTO: 9.3 FL (ref 6–12)
POTASSIUM SERPL-SCNC: 4.2 MMOL/L (ref 3.5–5.2)
PROT SERPL-MCNC: 6.9 G/DL (ref 6–8.5)
RBC # BLD AUTO: 4.14 10*6/MM3 (ref 3.77–5.28)
SODIUM SERPL-SCNC: 139 MMOL/L (ref 136–145)
STRESS BASELINE BP: NORMAL MMHG
STRESS BASELINE HR: 60 BPM
STRESS PERCENT HR: 101 %
STRESS POST EXERCISE DUR MIN: 9 MIN
STRESS POST EXERCISE DUR SEC: 19 SEC
STRESS POST PEAK BP: NORMAL MMHG
STRESS POST PEAK HR: 137 BPM
STRESS TARGET HR: 135 BPM
STRESS TARGET HR: 135 BPM
TRIGL SERPL-MCNC: 70 MG/DL (ref 0–150)
TRIGL SERPL-MCNC: 85 MG/DL (ref 0–150)
TROPONIN T SERPL-MCNC: <0.01 NG/ML (ref 0–0.03)
TSH SERPL DL<=0.05 MIU/L-ACNC: 1.45 UIU/ML (ref 0.27–4.2)
VLDLC SERPL-MCNC: 15 MG/DL (ref 5–40)
VLDLC SERPL-MCNC: 16 MG/DL (ref 5–40)
WBC NRBC COR # BLD: 5.7 10*3/MM3 (ref 3.4–10.8)

## 2022-10-19 PROCEDURE — 71275 CT ANGIOGRAPHY CHEST: CPT

## 2022-10-19 PROCEDURE — G0378 HOSPITAL OBSERVATION PER HR: HCPCS

## 2022-10-19 PROCEDURE — 93352 ADMIN ECG CONTRAST AGENT: CPT | Performed by: INTERNAL MEDICINE

## 2022-10-19 PROCEDURE — 70551 MRI BRAIN STEM W/O DYE: CPT

## 2022-10-19 PROCEDURE — 93350 STRESS TTE ONLY: CPT

## 2022-10-19 PROCEDURE — 93306 TTE W/DOPPLER COMPLETE: CPT

## 2022-10-19 PROCEDURE — 25010000002 PERFLUTREN 6.52 MG/ML SUSPENSION: Performed by: INTERNAL MEDICINE

## 2022-10-19 PROCEDURE — 25010000002 ATROPINE SULFATE: Performed by: INTERNAL MEDICINE

## 2022-10-19 PROCEDURE — 0 IOPAMIDOL PER 1 ML: Performed by: FAMILY MEDICINE

## 2022-10-19 PROCEDURE — 83036 HEMOGLOBIN GLYCOSYLATED A1C: CPT | Performed by: FAMILY MEDICINE

## 2022-10-19 PROCEDURE — 93018 CV STRESS TEST I&R ONLY: CPT | Performed by: INTERNAL MEDICINE

## 2022-10-19 PROCEDURE — 93350 STRESS TTE ONLY: CPT | Performed by: INTERNAL MEDICINE

## 2022-10-19 PROCEDURE — 80053 COMPREHEN METABOLIC PANEL: CPT | Performed by: FAMILY MEDICINE

## 2022-10-19 PROCEDURE — 85379 FIBRIN DEGRADATION QUANT: CPT | Performed by: FAMILY MEDICINE

## 2022-10-19 PROCEDURE — 0 DOBUTAMINE PER 250 MG: Performed by: INTERNAL MEDICINE

## 2022-10-19 PROCEDURE — 96375 TX/PRO/DX INJ NEW DRUG ADDON: CPT

## 2022-10-19 PROCEDURE — 84443 ASSAY THYROID STIM HORMONE: CPT | Performed by: FAMILY MEDICINE

## 2022-10-19 PROCEDURE — 63710000001 MYCOPHENOLATE MOFETIL PER 250 MG: Performed by: FAMILY MEDICINE

## 2022-10-19 PROCEDURE — 96374 THER/PROPH/DIAG INJ IV PUSH: CPT

## 2022-10-19 PROCEDURE — 93017 CV STRESS TEST TRACING ONLY: CPT

## 2022-10-19 PROCEDURE — 85025 COMPLETE CBC W/AUTO DIFF WBC: CPT | Performed by: FAMILY MEDICINE

## 2022-10-19 PROCEDURE — 93306 TTE W/DOPPLER COMPLETE: CPT | Performed by: INTERNAL MEDICINE

## 2022-10-19 PROCEDURE — 80061 LIPID PANEL: CPT | Performed by: FAMILY MEDICINE

## 2022-10-19 PROCEDURE — 84484 ASSAY OF TROPONIN QUANT: CPT | Performed by: FAMILY MEDICINE

## 2022-10-19 RX ORDER — ALPRAZOLAM 0.5 MG/1
0.5 TABLET ORAL 2 TIMES DAILY PRN
COMMUNITY
End: 2023-03-17

## 2022-10-19 RX ORDER — ASPIRIN 81 MG/1
81 TABLET ORAL DAILY
Status: DISCONTINUED | OUTPATIENT
Start: 2022-10-19 | End: 2022-10-20 | Stop reason: HOSPADM

## 2022-10-19 RX ORDER — CARVEDILOL 6.25 MG/1
12.5 TABLET ORAL 2 TIMES DAILY WITH MEALS
Status: DISCONTINUED | OUTPATIENT
Start: 2022-10-19 | End: 2022-10-20 | Stop reason: HOSPADM

## 2022-10-19 RX ORDER — AMLODIPINE BESYLATE 5 MG/1
5 TABLET ORAL DAILY
Status: DISCONTINUED | OUTPATIENT
Start: 2022-10-19 | End: 2022-10-20 | Stop reason: HOSPADM

## 2022-10-19 RX ORDER — ONDANSETRON 2 MG/ML
4 INJECTION INTRAMUSCULAR; INTRAVENOUS EVERY 6 HOURS PRN
Status: DISCONTINUED | OUTPATIENT
Start: 2022-10-19 | End: 2022-10-20 | Stop reason: HOSPADM

## 2022-10-19 RX ORDER — TRAZODONE HYDROCHLORIDE 100 MG/1
100 TABLET ORAL NIGHTLY
Status: DISCONTINUED | OUTPATIENT
Start: 2022-10-19 | End: 2022-10-20 | Stop reason: HOSPADM

## 2022-10-19 RX ORDER — HYDROXYCHLOROQUINE SULFATE 200 MG/1
200 TABLET, FILM COATED ORAL EVERY 12 HOURS SCHEDULED
Status: DISCONTINUED | OUTPATIENT
Start: 2022-10-19 | End: 2022-10-20 | Stop reason: HOSPADM

## 2022-10-19 RX ORDER — SODIUM CHLORIDE 0.9 % (FLUSH) 0.9 %
10 SYRINGE (ML) INJECTION AS NEEDED
Status: DISCONTINUED | OUTPATIENT
Start: 2022-10-19 | End: 2022-10-20 | Stop reason: HOSPADM

## 2022-10-19 RX ORDER — DOBUTAMINE HYDROCHLORIDE 100 MG/100ML
5-50 INJECTION INTRAVENOUS CONTINUOUS
Status: DISCONTINUED | OUTPATIENT
Start: 2022-10-19 | End: 2022-10-20 | Stop reason: HOSPADM

## 2022-10-19 RX ORDER — ALPRAZOLAM 0.5 MG/1
0.5 TABLET ORAL 2 TIMES DAILY PRN
Status: DISCONTINUED | OUTPATIENT
Start: 2022-10-19 | End: 2022-10-20 | Stop reason: HOSPADM

## 2022-10-19 RX ORDER — FLUOXETINE HYDROCHLORIDE 20 MG/1
20 CAPSULE ORAL DAILY
Status: DISCONTINUED | OUTPATIENT
Start: 2022-10-19 | End: 2022-10-20 | Stop reason: HOSPADM

## 2022-10-19 RX ORDER — HYDROCODONE BITARTRATE AND ACETAMINOPHEN 7.5; 325 MG/1; MG/1
1 TABLET ORAL 2 TIMES DAILY PRN
Status: DISCONTINUED | OUTPATIENT
Start: 2022-10-19 | End: 2022-10-20 | Stop reason: HOSPADM

## 2022-10-19 RX ORDER — ALENDRONATE SODIUM 70 MG/1
70 TABLET ORAL
COMMUNITY

## 2022-10-19 RX ORDER — FLUOXETINE HYDROCHLORIDE 20 MG/1
20 CAPSULE ORAL DAILY
COMMUNITY

## 2022-10-19 RX ORDER — CARVEDILOL 25 MG/1
25 TABLET ORAL 2 TIMES DAILY WITH MEALS
Status: DISCONTINUED | OUTPATIENT
Start: 2022-10-19 | End: 2022-10-19

## 2022-10-19 RX ORDER — SODIUM CHLORIDE 0.9 % (FLUSH) 0.9 %
10 SYRINGE (ML) INJECTION EVERY 12 HOURS SCHEDULED
Status: DISCONTINUED | OUTPATIENT
Start: 2022-10-19 | End: 2022-10-20 | Stop reason: HOSPADM

## 2022-10-19 RX ORDER — FAMOTIDINE 20 MG/1
20 TABLET, FILM COATED ORAL
Status: DISCONTINUED | OUTPATIENT
Start: 2022-10-19 | End: 2022-10-20 | Stop reason: HOSPADM

## 2022-10-19 RX ORDER — MYCOPHENOLATE MOFETIL 500 MG/1
500 TABLET ORAL EVERY 12 HOURS SCHEDULED
Status: DISCONTINUED | OUTPATIENT
Start: 2022-10-19 | End: 2022-10-20 | Stop reason: HOSPADM

## 2022-10-19 RX ORDER — LOSARTAN POTASSIUM 50 MG/1
100 TABLET ORAL DAILY
Status: DISCONTINUED | OUTPATIENT
Start: 2022-10-19 | End: 2022-10-20 | Stop reason: HOSPADM

## 2022-10-19 RX ORDER — SPIRONOLACTONE 25 MG/1
25 TABLET ORAL DAILY
Status: DISCONTINUED | OUTPATIENT
Start: 2022-10-19 | End: 2022-10-20 | Stop reason: HOSPADM

## 2022-10-19 RX ORDER — CLONIDINE HYDROCHLORIDE 0.1 MG/1
0.1 TABLET ORAL 2 TIMES DAILY PRN
Status: DISCONTINUED | OUTPATIENT
Start: 2022-10-19 | End: 2022-10-20 | Stop reason: HOSPADM

## 2022-10-19 RX ORDER — ATORVASTATIN CALCIUM 40 MG/1
80 TABLET, FILM COATED ORAL NIGHTLY
Status: DISCONTINUED | OUTPATIENT
Start: 2022-10-19 | End: 2022-10-20 | Stop reason: HOSPADM

## 2022-10-19 RX ORDER — VERAPAMIL HYDROCHLORIDE 240 MG/1
240 TABLET, FILM COATED, EXTENDED RELEASE ORAL 2 TIMES DAILY
Status: DISCONTINUED | OUTPATIENT
Start: 2022-10-19 | End: 2022-10-20 | Stop reason: HOSPADM

## 2022-10-19 RX ADMIN — VERAPAMIL HYDROCHLORIDE 240 MG: 240 TABLET, FILM COATED, EXTENDED RELEASE ORAL at 10:49

## 2022-10-19 RX ADMIN — Medication 10 ML: at 21:12

## 2022-10-19 RX ADMIN — FAMOTIDINE 20 MG: 20 TABLET, FILM COATED ORAL at 05:53

## 2022-10-19 RX ADMIN — PERFLUTREN 8.48 MG: 6.52 INJECTION, SUSPENSION INTRAVENOUS at 11:30

## 2022-10-19 RX ADMIN — HYDROXYCHLOROQUINE SULFATE 200 MG: 200 TABLET ORAL at 10:50

## 2022-10-19 RX ADMIN — MYCOPHENOLATE MOFETIL 500 MG: 500 TABLET ORAL at 21:12

## 2022-10-19 RX ADMIN — Medication 10 ML: at 10:51

## 2022-10-19 RX ADMIN — DOBUTAMINE HYDROCHLORIDE 10 MCG/KG/MIN: 100 INJECTION INTRAVENOUS at 12:08

## 2022-10-19 RX ADMIN — FLUOXETINE HYDROCHLORIDE 20 MG: 20 CAPSULE ORAL at 10:50

## 2022-10-19 RX ADMIN — PERFLUTREN 8.48 MG: 6.52 INJECTION, SUSPENSION INTRAVENOUS at 11:25

## 2022-10-19 RX ADMIN — ATORVASTATIN CALCIUM 80 MG: 40 TABLET, FILM COATED ORAL at 21:11

## 2022-10-19 RX ADMIN — MYCOPHENOLATE MOFETIL 500 MG: 500 TABLET ORAL at 03:03

## 2022-10-19 RX ADMIN — ASPIRIN 81 MG: 81 TABLET, COATED ORAL at 19:08

## 2022-10-19 RX ADMIN — HYDROXYCHLOROQUINE SULFATE 200 MG: 200 TABLET ORAL at 03:03

## 2022-10-19 RX ADMIN — LOSARTAN POTASSIUM 100 MG: 50 TABLET, FILM COATED ORAL at 10:50

## 2022-10-19 RX ADMIN — MYCOPHENOLATE MOFETIL 500 MG: 500 TABLET ORAL at 10:49

## 2022-10-19 RX ADMIN — ATROPINE SULFATE 0.5 MG: 0.1 INJECTION INTRAVENOUS at 12:26

## 2022-10-19 RX ADMIN — TRAZODONE HYDROCHLORIDE 100 MG: 100 TABLET ORAL at 21:11

## 2022-10-19 RX ADMIN — SPIRONOLACTONE 25 MG: 25 TABLET ORAL at 10:50

## 2022-10-19 RX ADMIN — FAMOTIDINE 20 MG: 20 TABLET, FILM COATED ORAL at 16:57

## 2022-10-19 RX ADMIN — VERAPAMIL HYDROCHLORIDE 240 MG: 240 TABLET, FILM COATED, EXTENDED RELEASE ORAL at 21:11

## 2022-10-19 RX ADMIN — AMLODIPINE BESYLATE 5 MG: 5 TABLET ORAL at 10:50

## 2022-10-19 RX ADMIN — IOPAMIDOL 78 ML: 755 INJECTION, SOLUTION INTRAVENOUS at 17:22

## 2022-10-19 RX ADMIN — CARVEDILOL 12.5 MG: 6.25 TABLET, FILM COATED ORAL at 16:57

## 2022-10-19 NOTE — PROGRESS NOTES
[facility]  INPATIENT PROGRESS NOTE    Patient Name: Emilie Soto  Date of Admission: 10/18/2022  Today's Date: 10/19/22  Length of Stay: 0  Primary Care Physician: Alexandru Miles MD    Subjective   Chief Complaint: follow up chest pain and left sided numbness  HPI   Doing ok.  No CP or SOA.    No numbness presently.          Review of Systems   Constitutional: Negative for fatigue and fever.   HENT: Negative for congestion and ear pain.    Eyes: Negative for pain and visual disturbance.   Respiratory: Negative for cough, shortness of breath and wheezing.    Cardiovascular: Negative for chest pain and palpitations.   Gastrointestinal: Negative for diarrhea, nausea and vomiting.   Endocrine: Negative for heat intolerance.   Genitourinary: Negative for dysuria and frequency.   Musculoskeletal: Negative for arthralgias and back pain.   Skin: Negative for rash and wound.   Neurological: Negative for dizziness and light-headedness.   Psychiatric/Behavioral: Negative for confusion. The patient is not nervous/anxious.    All other systems reviewed and are negative.         All pertinent negatives and positives are as above. All other systems have been reviewed and are negative unless otherwise stated.     Objective    Temp:  [97.9 °F (36.6 °C)-98.3 °F (36.8 °C)] 97.9 °F (36.6 °C)  Heart Rate:  [46-65] 64  Resp:  [16-19] 18  BP: ()/(59-86) 119/66  Physical Exam  Constitutional:       Appearance: Normal appearance. She is well-developed.   HENT:      Head: Normocephalic and atraumatic.      Right Ear: Tympanic membrane, ear canal and external ear normal.      Left Ear: Tympanic membrane, ear canal and external ear normal.      Nose: Nose normal.      Mouth/Throat:      Mouth: Mucous membranes are moist.      Pharynx: Oropharynx is clear.   Eyes:      Extraocular Movements: Extraocular movements intact.      Conjunctiva/sclera: Conjunctivae normal.       Pupils: Pupils are equal, round, and reactive to light.   Cardiovascular:      Rate and Rhythm: Normal rate and regular rhythm.      Heart sounds: Normal heart sounds.   Pulmonary:      Effort: Pulmonary effort is normal.      Breath sounds: Normal breath sounds.   Abdominal:      General: Bowel sounds are normal. There is no distension.      Palpations: Abdomen is soft.      Tenderness: There is no abdominal tenderness.   Musculoskeletal:         General: Normal range of motion.      Cervical back: Normal range of motion and neck supple.   Skin:     General: Skin is warm and dry.   Neurological:      Mental Status: She is alert and oriented to person, place, and time.   Psychiatric:         Mood and Affect: Mood normal.         Speech: Speech normal.         Behavior: Behavior normal.         Thought Content: Thought content normal.         Judgment: Judgment normal.             Results Review:  I have reviewed the labs, radiology results, and diagnostic studies.    Laboratory Data:   Results from last 7 days   Lab Units 10/19/22  0552 10/18/22  1936   WBC 10*3/mm3 5.70 5.03   HEMOGLOBIN g/dL 12.2 12.8   HEMATOCRIT % 38.7 40.3   PLATELETS 10*3/mm3 230 244        Results from last 7 days   Lab Units 10/19/22  0552 10/18/22  1936   SODIUM mmol/L 139 136   POTASSIUM mmol/L 4.2 4.5   CHLORIDE mmol/L 104 101   CO2 mmol/L 29.0 28.0   BUN mg/dL 16 16   CREATININE mg/dL 1.35* 1.38*   CALCIUM mg/dL 9.5 9.6   BILIRUBIN mg/dL 0.2 0.2   ALK PHOS U/L 55 62   ALT (SGPT) U/L 9 10   AST (SGOT) U/L 14 15   GLUCOSE mg/dL 89 81       Culture Data:   No results found for: BLOODCX, URINECX, WOUNDCX, MRSACX, RESPCX, STOOLCX    Radiology Data:   Imaging Results (Last 24 Hours)     Procedure Component Value Units Date/Time    CT Angiogram Head [[id number]] Collected: 10/18/22 2144     Updated: 10/18/22 2155    Narrative:      EXAM: CT ANGIOGRAM NECK-, CT ANGIOGRAM HEAD- - 10/18/2022 9:18 PM CDT     HISTORY: left arm and leg numbness w/  chest pain       COMPARISON: None.      DOSE LENGTH PRODUCT: 389 mGy cm. Automated exposure control was also  utilized to decrease patient radiation dose.     TECHNIQUE: CTA head and neck performed with intravenous contrast. 3D  postprocessing, including MIPs, performed and images saved to PACS.   Grading of carotid stenosis performed with NASCET criteria.     FINDINGS:      There is a typical three-vessel branching pattern off the aortic arch  without significant ostial narrowing. Common carotid arteries are patent  and without flow-limiting stenosis. There is a normal course and caliber  of the internal and external carotid arteries without evidence of a  flow-limiting stenosis. The vertebral arteries originate from the  subclavian arteries without significant ostial narrowing.  No evidence  of vertebral artery dissection or pseudoaneurysm. Left vertebral artery  is dominant.     Distal ICAs are patent and without flow-limiting stenosis. No  intracranial large vessel occlusion. Anterior and middle cerebral  arteries are patent and without flow-limiting stenosis. Intracranial  vertebral arteries and basilar artery are narrow in caliber. This is an  anatomic variant in the setting of bilateral fetal origin posterior  cerebral arteries. No visualized aneurysm or vascular malformation.      No intracranial hemorrhage or mass effect. Orbital contents are  unremarkable. No cervical adenopathy. Thyroid gland is homogeneous. Lung  apices are clear. Loss of disc height with posterior endplate spurring  at C5-C6. Bilateral neuroforaminal narrowing at C5-C6 due to  uncovertebral spurring.       Impression:         1. No arterial occlusion or flow-limiting stenosis in the neck.  2. No intracranial large vessel occlusion or flow-limiting stenosis.   3. Posterior circulation anatomic variation with bilateral fetal-origin  posterior cerebral arteries resulting in relatively small caliber  intracranial vertebral arteries and  basilar artery.  4. C5-C6 level degenerative change with endplate spurring and bilateral  neuroforaminal narrowing.  This report was finalized on 10/18/2022 21:52 by Dr Contreras Frias, .    CT Angiogram Neck [[id number]] Collected: 10/18/22 2144     Updated: 10/18/22 2155    Narrative:      EXAM: CT ANGIOGRAM NECK-, CT ANGIOGRAM HEAD- - 10/18/2022 9:18 PM CDT     HISTORY: left arm and leg numbness w/ chest pain       COMPARISON: None.      DOSE LENGTH PRODUCT: 389 mGy cm. Automated exposure control was also  utilized to decrease patient radiation dose.     TECHNIQUE: CTA head and neck performed with intravenous contrast. 3D  postprocessing, including MIPs, performed and images saved to PACS.   Grading of carotid stenosis performed with NASCET criteria.     FINDINGS:      There is a typical three-vessel branching pattern off the aortic arch  without significant ostial narrowing. Common carotid arteries are patent  and without flow-limiting stenosis. There is a normal course and caliber  of the internal and external carotid arteries without evidence of a  flow-limiting stenosis. The vertebral arteries originate from the  subclavian arteries without significant ostial narrowing.  No evidence  of vertebral artery dissection or pseudoaneurysm. Left vertebral artery  is dominant.     Distal ICAs are patent and without flow-limiting stenosis. No  intracranial large vessel occlusion. Anterior and middle cerebral  arteries are patent and without flow-limiting stenosis. Intracranial  vertebral arteries and basilar artery are narrow in caliber. This is an  anatomic variant in the setting of bilateral fetal origin posterior  cerebral arteries. No visualized aneurysm or vascular malformation.      No intracranial hemorrhage or mass effect. Orbital contents are  unremarkable. No cervical adenopathy. Thyroid gland is homogeneous. Lung  apices are clear. Loss of disc height with posterior endplate spurring  at C5-C6. Bilateral  neuroforaminal narrowing at C5-C6 due to  uncovertebral spurring.       Impression:         1. No arterial occlusion or flow-limiting stenosis in the neck.  2. No intracranial large vessel occlusion or flow-limiting stenosis.   3. Posterior circulation anatomic variation with bilateral fetal-origin  posterior cerebral arteries resulting in relatively small caliber  intracranial vertebral arteries and basilar artery.  4. C5-C6 level degenerative change with endplate spurring and bilateral  neuroforaminal narrowing.  This report was finalized on 10/18/2022 21:52 by Dr Contreras Frias, .    CT Head Without Contrast [[id number]] Collected: 10/18/22 2137     Updated: 10/18/22 2144    Narrative:      CT HEAD WO CONTRAST- 10/18/2022 9:18 PM CDT     HISTORY: left arm and leg numbness w/ chest pain       DOSE LENGTH PRODUCT: 961 mGy cm. Automated exposure control was also  utilized to decrease patient radiation dose.     Technique:   Axial CT of the brain without IV contrast. Sagittal and coronal  reformations are also provided for review. Soft tissue and bone kernels  are available for interpretation.     Comparison: None.     Findings:      There is no evidence of acute large vascular territory infarct. No  intra-axial or extra-axial hemorrhage. No visualized mass lesion or mass  effect. The ventricles, cortical sulci and basal cisterns are symmetric  and age appropriate.  Posterior fossa structures are unremarkable. The  scalp and calvarium are intact. Visualized paranasal sinuses and  mastoids are clear.        Impression:      Impression:    1. No acute intracranial process.  This report was finalized on 10/18/2022 21:39 by Dr Contreras Frias, .    XR Chest 1 View [[id number]] Collected: 10/18/22 2033     Updated: 10/18/22 2036    Narrative:      XR CHEST 1 VW- 10/18/2022 8:20 PM CDT     HISTORY: chest pain     COMPARISON: 11/13/2017.     FINDINGS:      No lung consolidation. No pleural effusion or pneumothorax.  The  cardiomediastinal silhouette and pulmonary vascularity are within normal  limits. The osseous structures and surrounding soft tissues demonstrate  no acute abnormality.       Impression:      1. No radiographic evidence of acute cardiopulmonary process.        This report was finalized on 10/18/2022 20:33 by Dr Contreras Frias, .          I have reviewed the patient's current medications.     Assessment/Plan     Active Hospital Problems    Diagnosis    • **Chest pain, unspecified type      1.  Chest pain  -non-cardiac  -Stress test low risk for ischemia  -TTE unremarkable  -Will get CTA chest given elevated D-dimer    2.  Left sided numbness  -concern for TIA/Stroke  -ASA  -Lipitor  -A1C  -Lipid panel  -MRI in AM  -Already had echo and carotid scans    3.  HTN  -Coreg  -Norvasc  -Losartan    4.  GERD  -Protonix    5.  Bradycardia  -reduce Coreg dose            Discharge Planning: I expect the patient to be discharged to home in 1 day    Electronically signed by Austin Hou MD, 10/19/22, 16:55 CDT.

## 2022-10-19 NOTE — PLAN OF CARE
Problem: Adult Inpatient Plan of Care  Goal: Absence of Hospital-Acquired Illness or Injury  Intervention: Identify and Manage Fall Risk  Recent Flowsheet Documentation  Taken 10/19/2022 1644 by Laurel Brewer RN  Safety Promotion/Fall Prevention: safety round/check completed  Taken 10/19/2022 1607 by Laurel Brewer RN  Safety Promotion/Fall Prevention: safety round/check completed  Taken 10/19/2022 1541 by Laurel Brewer RN  Safety Promotion/Fall Prevention: safety round/check completed  Taken 10/19/2022 1500 by Lauerl Brewer RN  Safety Promotion/Fall Prevention: safety round/check completed  Taken 10/19/2022 1357 by Laurel Brewer RN  Safety Promotion/Fall Prevention: safety round/check completed  Taken 10/19/2022 1300 by Laurel Brewer RN  Safety Promotion/Fall Prevention: safety round/check completed  Taken 10/19/2022 1200 by Laurel Brewer RN  Safety Promotion/Fall Prevention: patient off unit  Taken 10/19/2022 1119 by Laurel Brewer RN  Safety Promotion/Fall Prevention: patient off unit  Taken 10/19/2022 0900 by Laurel Brewer RN  Safety Promotion/Fall Prevention: safety round/check completed  Taken 10/19/2022 0752 by Laurel Brewer RN  Safety Promotion/Fall Prevention: safety round/check completed  Goal: Readiness for Transition of Care  Intervention: Mutually Develop Transition Plan  Recent Flowsheet Documentation  Taken 10/19/2022 1650 by Laurel Brewer RN  Transportation Anticipated: family or friend will provide  Patient/Family Anticipated Services at Transition: none  Patient/Family Anticipates Transition to: home  Taken 10/19/2022 1649 by Laurel Brewer RN  Equipment Currently Used at Home: none   Goal Outcome Evaluation:

## 2022-10-19 NOTE — ED PROVIDER NOTES
Subjective   History of Present Illness  Patient states that she started having chest pain while at [employer] on Sunday.  She states that she started having some left arm tingling and left leg numbness and so she came in for further evaluation today.  States that she has not had any difficulty walking.  States that the pain does not radiate anywhere but is substernal and is a tightness kind of pain.  States that she has had history of hypertrophic obstructive cardiomyopathy and other heart problems but has not had any heart attacks in the past 2 to 3 years.  States that she saw Dr. Miles recently and told her that she has some intermittent chest pain however he told her to watch the symptoms and follow-up if needed.  States that she denies any fevers or chills.  Denies any shortness of breath, abdominal pain, dysuria, hematuria or any vision changes any recent falls.  Denies any neck stiffness or rashes        Review of Systems   All other systems reviewed and are negative.      Past Medical History:   Diagnosis Date   • CHF (congestive heart failure) (HCC)    • CKD (chronic kidney disease)    • DDD (degenerative disc disease), cervical    • Depression    • Fibromyalgia    • GERD (gastroesophageal reflux disease)    • Heart murmur    • Hepatitis cholestatic    • History of adenomatous polyp of colon    • HOCM (hypertrophic obstructive cardiomyopathy) (HCC)    • HTN (hypertension)    • RA (rheumatoid arthritis) (HCC)    • Renal disease    • Sleep apnea        Allergies   Allergen Reactions   • Abilify [Aripiprazole] Other (See Comments)     Insomnia   • Bactrim [Sulfamethoxazole-Trimethoprim] Other (See Comments)     Pt unsure of reaction   • Biaxin [Clarithromycin] Nausea And Vomiting   • Ciprofloxacin Hcl Other (See Comments)     Pt unsure of reaction   • Duricef [Cefadroxil] Other (See Comments)     Pt unsure of reaction   • Ketek [Telithromycin] Other (See Comments)     Pt unsure of reaction   • Relafen  [Nabumetone] Itching   • Wellbutrin [Bupropion] Itching       Past Surgical History:   Procedure Laterality Date   • COLONOSCOPY  04/22/2016    One 16mm tubulovillous adenomatous polyp at the ileocecal valve-Clip was placed-injected; The examination was otherwise normal on direct and retroflexion views; Repeat 1 year   • COLONOSCOPY N/A 7/28/2017    A tattoo was seen at the ileocecal valve-A post-polypectomy scar was found at the tattoo site; One 12mm tubular adenomatous flat polyp in the transverse colon-Clip (MR conditional) was placed; The examination was otherwise normal on direct and retroflexion views; Repeat 2 years   • COLONOSCOPY  10/04/2015    Bleeding at the ileocecal valve secondary to previous polypectomy-Clip was placed; No specimens collected; Repeat 6 months for retreatment   • COLONOSCOPY  09/30/2015    Very large multilobulated tubular adenomatous polyp opposite the ileocecal valve-removed piecemeal-at least 95% removed; One less than 1cm tubular adenomatous polyp in the ascending colon; Repeat 6-12 months to reassess the large polyp   • COLONOSCOPY N/A 10/10/2019    One 5mm tubular adenomatous polyp in the transverse colon; Diverticulosis in the left colon; The entire examined colon is normal on direct and retroflexion views; Repeat 5 years   • ENDOSCOPY N/A 10/13/2020    Procedure: ESOPHAGOGASTRODUODENOSCOPY WITH ANESTHESIA;  Surgeon: Abigail Weathers MD;  Location: [facility] ENDOSCOPY;  Service: Gastroenterology;  Laterality: N/A;  pre GERD  post: esophageal dilation with balloon   jocelyne irlanda    • EXPLORATORY LAPAROTOMY     • HYSTERECTOMY     • LIVER BIOPSY  06/14/2011    Cholestatic hepatitis-see report       Family History   Problem Relation Age of Onset   • Throat cancer Mother    • Diabetes Father    • Cancer Father         Pt reports he had part of his intestines removed   • Breast cancer Sister    • Colon cancer Neg Hx    • Colon polyps Neg Hx    • Esophageal cancer Neg Hx    • Liver cancer  Neg Hx    • Liver disease Neg Hx    • Rectal cancer Neg Hx    • Stomach cancer Neg Hx        Social History     Socioeconomic History   • Marital status:    Tobacco Use   • Smoking status: Never   • Smokeless tobacco: Never   Substance and Sexual Activity   • Alcohol use: Yes     Alcohol/week: 2.0 standard drinks     Types: 2 Glasses of wine per week     Comment: Occasionally   • Drug use: No   • Sexual activity: Yes     Partners: Male     Birth control/protection: Post-menopausal           Objective   Physical Exam  Vitals and nursing note reviewed.   Constitutional:       General: She is not in acute distress.     Appearance: Normal appearance. She is not toxic-appearing or diaphoretic.   HENT:      Head: Normocephalic and atraumatic.      Nose: Nose normal.   Eyes:      General:         Right eye: No discharge.         Left eye: No discharge.      Extraocular Movements: Extraocular movements intact.      Conjunctiva/sclera: Conjunctivae normal.      Pupils: Pupils are equal, round, and reactive to light.   Cardiovascular:      Rate and Rhythm: Normal rate.      Pulses: Normal pulses.   Pulmonary:      Effort: Pulmonary effort is normal. No respiratory distress.   Abdominal:      General: Abdomen is flat.   Musculoskeletal:         General: Normal range of motion.      Cervical back: Normal range of motion.   Skin:     General: Skin is warm.   Neurological:      General: No focal deficit present.      Mental Status: She is alert and oriented to person, place, and time. Mental status is at baseline.      Sensory: Sensory deficit (mild light touch deficit to left foot/distal tibfib and left hand) present.      Motor: No weakness.   Psychiatric:         Mood and Affect: Mood normal.         Behavior: Behavior normal.         Thought Content: Thought content normal.         Judgment: Judgment normal.         Procedures           ED Course                                           MDM  Number of Diagnoses or  Management Options  Bradycardia  Chest pain, unspecified type  T wave inversion in EKG  Diagnosis management comments: This is a 62yoF presenting with chest pain. Patient arrived hemodynamically stable and was afebrile. Patient was placed on the monitor and IV access was established.     Patient has received a total 324mg of aspirin since the onset of chest pain.     Differentials include, but are not limited to ACS, PE, musculoskeletal pain, infection. HEART score is 5. Plan to obtain cbc, cmp, ekg, troponin x 2, CT head, CTA head, CTA neck, control pain, and reassess. EKG was obtained and did not reveal any malignant/unstable dysrhythmia or any acute ST elevations.     Presentation not consistent with other acute, emergent causes of chest pain at this time. Low suspicion for pneumothorax as the patient is saturating well and has no radiographic evidence of a pneumothorax. Low suspicion for dissection there is no widened mediastinum, hypotension, pulse deficits, and no tearing back/abdominal pain. Low suspicion for tamponade as there is no JVD, muffled heart sounds, electrical alternans on EKG, and no hypotension. Low suspicion for pericarditis as there is no diffuse ST elevation or MO depression and the patient is afebrile. Low suspicion for myocarditis as there is no persistent tachycardia, recent viral illness, hypotension, or evidence of LVH. Low suspicion for acute PE as low risk per WELLS criteria and no evidence of DVT such as calf swelling, tenderness, palpable tortuous lower extremity vein, or sandra's sign. Low suspicion for CHF exacerbation as there is no evidence of volume overload and no evidence of severely elevated BNP.    The workup was reviewed and found to have evidence of chest pain and improvement in her neurological symptoms.  Given her heart score and T wave inversions on the EKG, will admit for further treatment and likely stress test and echocardiogram.  Patient was admitted in stable  condition         Amount and/or Complexity of Data Reviewed  Clinical lab tests: reviewed and ordered  Tests in the radiology section of CPT®: reviewed and ordered  Decide to obtain previous medical records or to obtain history from someone other than the patient: yes        Final diagnoses:   Chest pain, unspecified type   Bradycardia   T wave inversion in EKG       ED Disposition  ED Disposition     ED Disposition   Decision to Admit    Condition   --    Comment   Level of Care: Telemetry [5]   Diagnosis: Chest pain, unspecified type [[id number]]   Admitting Physician: JAJA GRISSOM [[id number]]   Attending Physician: JAJA GRISSOM [[id number]]               No follow-up provider specified.       Medication List      No changes were made to your prescriptions during this visit.          Mckenzie Pulido MD  10/19/22 0553

## 2022-10-19 NOTE — ED NOTES
Pt moved to rm 42 and given a hospital bed. Call light in reach. Recliner at bedside for her  when he returns.

## 2022-10-19 NOTE — H&P
[facility] Medicine Services  HISTORY AND PHYSICAL    Date of Admission: 10/18/2022  Primary Care Physician: Alexandru Miles MD    Subjective     Chief Complaint: Chest pain    History of Present Illness  Patient is a 62-year-old presented ER with chest pain symptoms started on Sunday while he is at [employer].  Patient described the pain rating to the left arm tingling and numbness in the hand.  Patient denies any radiation.  Patient does have a history of hypertrophic obstructive cardiomyopathy.  Patient primary care doctor is Dr. Miles.  Patient also has history of symptoms of intermittent chest pain in the past.  Chest pain has been going for 2 months off-and-on per patient what makes it worse distress anxiety or [employer] get-together.  Salty food also makes it worse per patient.  Patient also have a history of reflux.  Reflux is also worse at night.  Patient denies any fever night sweats chills.    Patient has a past medical history of CHF/chronic kidney disease, degenerative disc disease, depression, fibromyalgia, reflux, heart murmur, hepatitis, hypertrophic obstructive cardiomyopathy, hypertension, rheumatoid arthritis, renal disease, sleep apnea.    Echocardiogram 11/7/2017.  • Left ventricular wall thickness is consistent with moderate concentric hypertrophy.  • Left atrial cavity size is mild-to-moderately dilated.  • Left ventricular diastolic dysfunction (grade I a) consistent with impaired relaxation.     MODERATE LVH - CONSIDER HYPERTENSIVE VS HYPERTROPHIC  NORMAL LV AND RV SYSTOLIC  FUNCTION  RVH NOTED  LV DIASTOLIC DYSFUNCTION GRADE 1A  MILD TO MODERATE LA ENLARGEMENT  NO SIGNIFICANT VALVULAR DYSFUNCTION    Stress echo 11/14/2017  Stress Echo Findings    Ventricle Size / Description Segment augmentation had a normal response to stress. Cavity size behaved normally in response to stress. Left ventricular function is normal. Septal wall motion is normal.   Stress  Echo - Peak Stress Findings Post stress images with adequate visualization of myocardium to assess for ischemia. Normal stress echo with no significant echocardiographic evidence for myocardial ischemia.           Review of Systems   Constitutional: Positive for activity change. Negative for appetite change, chills and fever.   HENT: Negative for hearing loss, nosebleeds, tinnitus and trouble swallowing.    Eyes: Negative for visual disturbance.   Respiratory: Positive for chest tightness. Negative for cough, shortness of breath and wheezing.    Cardiovascular: Negative for chest pain, palpitations and leg swelling.   Gastrointestinal: Negative for abdominal distention, abdominal pain, blood in stool, constipation, diarrhea, nausea and vomiting.   Endocrine: Negative for cold intolerance, heat intolerance, polydipsia, polyphagia and polyuria.   Genitourinary: Negative for decreased urine volume, difficulty urinating, dysuria, flank pain, frequency and hematuria.   Musculoskeletal: Negative for arthralgias, joint swelling and myalgias.   Skin: Negative for rash.   Allergic/Immunologic: Negative for immunocompromised state.   Neurological: Negative for dizziness, syncope, weakness, light-headedness and headaches.   Hematological: Negative for adenopathy. Does not bruise/bleed easily.   Psychiatric/Behavioral: Negative for confusion and sleep disturbance. The patient is not nervous/anxious.         Otherwise complete ROS reviewed and negative except as mentioned in the HPI.      Past Medical History:   Past Medical History:   Diagnosis Date   • CHF (congestive heart failure) (HCC)    • CKD (chronic kidney disease)    • DDD (degenerative disc disease), cervical    • Depression    • Fibromyalgia    • GERD (gastroesophageal reflux disease)    • Heart murmur    • Hepatitis cholestatic    • History of adenomatous polyp of colon    • HOCM (hypertrophic obstructive cardiomyopathy) (HCC)    • HTN (hypertension)    • RA  (rheumatoid arthritis) (HCC)    • Renal disease    • Sleep apnea        Past Surgical History:  Past Surgical History:   Procedure Laterality Date   • COLONOSCOPY  04/22/2016    One 16mm tubulovillous adenomatous polyp at the ileocecal valve-Clip was placed-injected; The examination was otherwise normal on direct and retroflexion views; Repeat 1 year   • COLONOSCOPY N/A 7/28/2017    A tattoo was seen at the ileocecal valve-A post-polypectomy scar was found at the tattoo site; One 12mm tubular adenomatous flat polyp in the transverse colon-Clip (MR conditional) was placed; The examination was otherwise normal on direct and retroflexion views; Repeat 2 years   • COLONOSCOPY  10/04/2015    Bleeding at the ileocecal valve secondary to previous polypectomy-Clip was placed; No specimens collected; Repeat 6 months for retreatment   • COLONOSCOPY  09/30/2015    Very large multilobulated tubular adenomatous polyp opposite the ileocecal valve-removed piecemeal-at least 95% removed; One less than 1cm tubular adenomatous polyp in the ascending colon; Repeat 6-12 months to reassess the large polyp   • COLONOSCOPY N/A 10/10/2019    One 5mm tubular adenomatous polyp in the transverse colon; Diverticulosis in the left colon; The entire examined colon is normal on direct and retroflexion views; Repeat 5 years   • ENDOSCOPY N/A 10/13/2020    Procedure: ESOPHAGOGASTRODUODENOSCOPY WITH ANESTHESIA;  Surgeon: Abigail Weathers MD;  Location: [facility] ENDOSCOPY;  Service: Gastroenterology;  Laterality: N/A;  pre GERD  post: esophageal dilation with balloon   jocelyne irlanda    • EXPLORATORY LAPAROTOMY     • HYSTERECTOMY     • LIVER BIOPSY  06/14/2011    Cholestatic hepatitis-see report       Family History: family history includes Breast cancer in her sister; Cancer in her father; Diabetes in her father; Throat cancer in her mother.    Social History:  reports that she has never smoked. She has never used smokeless tobacco. She reports current  alcohol use of about 2.0 standard drinks per week. She reports that she does not use drugs.    Allergies:  Allergies   Allergen Reactions   • Abilify [Aripiprazole] Other (See Comments)     Insomnia   • Bactrim [Sulfamethoxazole-Trimethoprim] Other (See Comments)     Pt unsure of reaction   • Biaxin [Clarithromycin] Nausea And Vomiting   • Ciprofloxacin Hcl Other (See Comments)     Pt unsure of reaction   • Duricef [Cefadroxil] Other (See Comments)     Pt unsure of reaction   • Ketek [Telithromycin] Other (See Comments)     Pt unsure of reaction   • Relafen [Nabumetone] Itching   • Wellbutrin [Bupropion] Itching     Medications:  Prior to Admission medications    Medication Sig Start Date End Date Taking? Authorizing Provider   albuterol sulfate  (90 Base) MCG/ACT inhaler INHALE 1 PUFF EVERY 4 (FOUR) HOURS AS NEEDED FOR WHEEZING OR SHORTNESS OF AIR. 8/3/22  Yes Alexandru Miles MD   alendronate (FOSAMAX) 70 MG tablet TAKE 1 TABLET BY MOUTH EVERY 7 (SEVEN) DAYS. 8/3/22  Yes Alexandru Miles MD   ALPRAZolam (XANAX) 0.5 MG tablet TAKE ONE TABLET TWICE DAILY AS NEEDED ANXIETY GENERIC FOR XANAX 7/29/22  Yes Micha Brower APRN   amLODIPine (NORVASC) 5 MG tablet TAKE 1 TABLET BY MOUTH DAILY. 8/3/22  Yes Alexandru Miles MD   carvedilol (COREG) 25 MG tablet TAKE 1 TABLET BY MOUTH 2 (TWO) TIMES A DAY. 5/6/22  Yes Alexandru Miles MD   estradiol (ESTRACE) 2 MG tablet TAKE 0.5 TABLETS BY MOUTH DAILY FOR 90 DAYS.  Patient taking differently: Take 1 mg by mouth Daily. Taking 1/4 tablet daily 8/3/22 11/1/22 Yes Alexandru Miles MD   FLUoxetine (PROzac) 40 MG capsule Take 1 capsule by mouth Daily. 5/6/22  Yes Kaylie Chan MD   folic acid (FOLVITE) 1 MG tablet Take 1 tablet by mouth Daily. 8/5/21  Yes Alexandru Miles MD   HYDROcodone-acetaminophen (NORCO) 7.5-325 MG per tablet Take 1 tablet by mouth 2 (Two) Times a Day As Needed. 2/1/22  Yes Provider, MD Kaylie  "  hydroxychloroquine (PLAQUENIL) 200 MG tablet Take 1 tablet by mouth 2 (Two) Times a Day. Pt is only taking it once daily 10/12/16  Yes Kaylie Chan MD   loratadine (CLARITIN) 10 MG tablet Take 10 mg by mouth As Needed for Allergies.   Yes Kaylie Chan MD   losartan (COZAAR) 100 MG tablet TAKE 1 TABLET BY MOUTH DAILY. 2/7/22  Yes Alexandru Miles MD   mycophenolate (CELLCEPT) 500 MG tablet Take 1 tablet by mouth 2 (Two) Times a Day. 10/12/16  Yes Kaylie Chan MD   omeprazole (priLOSEC) 20 MG capsule TAKE 1 CAPSULE BY MOUTH DAILY. 5/6/22  Yes Alexandru Miles MD   potassium chloride (K-DUR,KLOR-CON) 10 MEQ CR tablet TAKE 1 TABLET BY MOUTH DAILY. 5/6/22  Yes Alexandru Miles MD   spironolactone (ALDACTONE) 25 MG tablet TAKE 1 TABLET BY MOUTH DAILY. 8/3/22  Yes Alexandru Miles MD   traZODone (DESYREL) 100 MG tablet Take 1 tablet by mouth Every Night. 5/31/22  Yes Alexandru Miles MD   verapamil SR (CALAN-SR) 240 MG CR tablet TAKE 1 TABLET BY MOUTH 2 (TWO) TIMES A DAY. 11/11/21  Yes Alexandru Miles MD   vitamin D (ERGOCALCIFEROL) 14952 UNITS capsule capsule Take 1 capsule by mouth 1 (One) Time Per Week. 10/12/16  Yes Kaylie Chan MD   cloNIDine (CATAPRES) 0.1 MG tablet Take 0.1 mg by mouth 2 (Two) Times a Day. One by mouth twice daily as needed if BP greater than 160/100    Kaylie Chan MD   glucose blood test strip Use as instructed 12/19/19   Alexandru Miles MD   senna (SENOKOT) 8.6 MG tablet Take 1 tablet by mouth As Needed for Constipation.    Kaylie Chan MD       I have utilized all available immediate resources to obtain, update, and review the patient's current medications.    Objective     Vital Signs: /71   Pulse (!) 47   Temp 98.3 °F (36.8 °C) (Oral)   Resp 19   Ht 157.5 cm (62\")   Wt 86.2 kg (190 lb)   SpO2 96%   BMI 34.75 kg/m²   Physical Exam  Vitals and nursing note reviewed.   HENT:      Head: " Normocephalic and atraumatic.   Eyes:      Conjunctiva/sclera: Conjunctivae normal.      Pupils: Pupils are equal, round, and reactive to light.   Neck:      Vascular: No JVD.   Cardiovascular:      Rate and Rhythm: Normal rate and regular rhythm.      Heart sounds: Normal heart sounds.   Pulmonary:      Effort: Pulmonary effort is normal. No respiratory distress.      Breath sounds: Normal breath sounds. No wheezing or rales.   Chest:      Chest wall: No tenderness.   Abdominal:      General: Bowel sounds are normal. There is no distension.      Palpations: Abdomen is soft.      Tenderness: There is no abdominal tenderness.   Musculoskeletal:         General: No tenderness or deformity. Normal range of motion.      Cervical back: Neck supple.   Skin:     General: Skin is warm and dry.      Findings: No rash.   Neurological:      Mental Status: She is alert and oriented to person, place, and time.      Cranial Nerves: No cranial nerve deficit.      Motor: No abnormal muscle tone.      Deep Tendon Reflexes: Reflexes normal.   Psychiatric:         Mood and Affect: Mood normal.         Behavior: Behavior normal.         Thought Content: Thought content normal.         Judgment: Judgment normal.             Results Reviewed:    Lab Results (last 24 hours)     Procedure Component Value Units Date/Time    [place] Draw [[id number]] Collected: 10/18/22 1936    Specimen: Blood Updated: 10/18/22 2046    Narrative:      The following orders were created for panel order [place] Draw.  Procedure                               Abnormality         Status                     ---------                               -----------         ------                     Green Top (Gel)[[id number]]                                  Final result               Lavender Top[[id number]]                                     Final result               Red Top[[id number]]                                          Final result               Light Blue  Top[[id number]]                                   Final result                 Please view results for these tests on the individual orders.    Green Top (Gel) [[id number]] Collected: 10/18/22 1936    Specimen: Blood Updated: 10/18/22 2046     Extra Tube Hold for add-ons.     Comment: Auto resulted.       Lavender Top [[id number]] Collected: 10/18/22 1936    Specimen: Blood Updated: 10/18/22 2046     Extra Tube hold for add-on     Comment: Auto resulted       Red Top [[id number]] Collected: 10/18/22 1936    Specimen: Blood Updated: 10/18/22 2046     Extra Tube Hold for add-ons.     Comment: Auto resulted.       Light Blue Top [[id number]] Collected: 10/18/22 1936    Specimen: Blood from Arm, Right Updated: 10/18/22 2046     Extra Tube Hold for add-ons.     Comment: Auto resulted       Comprehensive Metabolic Panel [[id number]]  (Abnormal) Collected: 10/18/22 1936    Specimen: Blood Updated: 10/18/22 2002     Glucose 81 mg/dL      BUN 16 mg/dL      Creatinine 1.38 mg/dL      Sodium 136 mmol/L      Potassium 4.5 mmol/L      Chloride 101 mmol/L      CO2 28.0 mmol/L      Calcium 9.6 mg/dL      Total Protein 7.7 g/dL      Albumin 4.20 g/dL      ALT (SGPT) 10 U/L      AST (SGOT) 15 U/L      Alkaline Phosphatase 62 U/L      Total Bilirubin 0.2 mg/dL      Globulin 3.5 gm/dL      A/G Ratio 1.2 g/dL      BUN/Creatinine Ratio 11.6     Anion Gap 7.0 mmol/L      eGFR 43.6 mL/min/1.73      Comment: National Kidney Foundation and American Society of Nephrology (ASN) Task Force recommended calculation based on the Chronic Kidney Disease Epidemiology Collaboration (CKD-EPI) equation refit without adjustment for race.       Narrative:      GFR Normal >60  Chronic Kidney Disease <60  Kidney Failure <15      Troponin [[id number]]  (Normal) Collected: 10/18/22 1936    Specimen: Blood Updated: 10/18/22 2000     Troponin T <0.010 ng/mL     Narrative:      Troponin T Reference Range:  <= 0.03 ng/mL-   Negative for AMI  >0.03 ng/mL-     Abnormal  for myocardial necrosis.  Clinicians would have to utilize clinical acumen, EKG, Troponin and serial changes to determine if it is an Acute Myocardial Infarction or myocardial injury due to an underlying chronic condition.       Results may be falsely decreased if patient taking Biotin.      CBC & Differential [[id number]]  (Normal) Collected: 10/18/22 1936    Specimen: Blood Updated: 10/18/22 1943    Narrative:      The following orders were created for panel order CBC & Differential.  Procedure                               Abnormality         Status                     ---------                               -----------         ------                     CBC Auto Differential[[id number]]        Normal              Final result                 Please view results for these tests on the individual orders.    CBC Auto Differential [[id number]]  (Normal) Collected: 10/18/22 1936    Specimen: Blood Updated: 10/18/22 1943     WBC 5.03 10*3/mm3      RBC 4.29 10*6/mm3      Hemoglobin 12.8 g/dL      Hematocrit 40.3 %      MCV 93.9 fL      MCH 29.8 pg      MCHC 31.8 g/dL      RDW 13.2 %      RDW-SD 45.0 fl      MPV 9.2 fL      Platelets 244 10*3/mm3      Neutrophil % 55.5 %      Lymphocyte % 30.0 %      Monocyte % 11.3 %      Eosinophil % 2.2 %      Basophil % 0.6 %      Immature Grans % 0.4 %      Neutrophils, Absolute 2.79 10*3/mm3      Lymphocytes, Absolute 1.51 10*3/mm3      Monocytes, Absolute 0.57 10*3/mm3      Eosinophils, Absolute 0.11 10*3/mm3      Basophils, Absolute 0.03 10*3/mm3      Immature Grans, Absolute 0.02 10*3/mm3      nRBC 0.0 /100 WBC            Radiology Data:    Imaging Results (Last 24 Hours)     Procedure Component Value Units Date/Time    CT Angiogram Head [[id number]] Collected: 10/18/22 2144     Updated: 10/18/22 2155    Narrative:      EXAM: CT ANGIOGRAM NECK-, CT ANGIOGRAM HEAD- - 10/18/2022 9:18 PM CDT     HISTORY: left arm and leg numbness w/ chest pain       COMPARISON: None.      DOSE LENGTH  PRODUCT: 389 mGy cm. Automated exposure control was also  utilized to decrease patient radiation dose.     TECHNIQUE: CTA head and neck performed with intravenous contrast. 3D  postprocessing, including MIPs, performed and images saved to PACS.   Grading of carotid stenosis performed with NASCET criteria.     FINDINGS:      There is a typical three-vessel branching pattern off the aortic arch  without significant ostial narrowing. Common carotid arteries are patent  and without flow-limiting stenosis. There is a normal course and caliber  of the internal and external carotid arteries without evidence of a  flow-limiting stenosis. The vertebral arteries originate from the  subclavian arteries without significant ostial narrowing.  No evidence  of vertebral artery dissection or pseudoaneurysm. Left vertebral artery  is dominant.     Distal ICAs are patent and without flow-limiting stenosis. No  intracranial large vessel occlusion. Anterior and middle cerebral  arteries are patent and without flow-limiting stenosis. Intracranial  vertebral arteries and basilar artery are narrow in caliber. This is an  anatomic variant in the setting of bilateral fetal origin posterior  cerebral arteries. No visualized aneurysm or vascular malformation.      No intracranial hemorrhage or mass effect. Orbital contents are  unremarkable. No cervical adenopathy. Thyroid gland is homogeneous. Lung  apices are clear. Loss of disc height with posterior endplate spurring  at C5-C6. Bilateral neuroforaminal narrowing at C5-C6 due to  uncovertebral spurring.       Impression:         1. No arterial occlusion or flow-limiting stenosis in the neck.  2. No intracranial large vessel occlusion or flow-limiting stenosis.   3. Posterior circulation anatomic variation with bilateral fetal-origin  posterior cerebral arteries resulting in relatively small caliber  intracranial vertebral arteries and basilar artery.  4. C5-C6 level degenerative change with  endplate spurring and bilateral  neuroforaminal narrowing.  This report was finalized on 10/18/2022 21:52 by Dr Contreras Frias, .    CT Angiogram Neck [[id number]] Collected: 10/18/22 2144     Updated: 10/18/22 2155    Narrative:      EXAM: CT ANGIOGRAM NECK-, CT ANGIOGRAM HEAD- - 10/18/2022 9:18 PM CDT     HISTORY: left arm and leg numbness w/ chest pain       COMPARISON: None.      DOSE LENGTH PRODUCT: 389 mGy cm. Automated exposure control was also  utilized to decrease patient radiation dose.     TECHNIQUE: CTA head and neck performed with intravenous contrast. 3D  postprocessing, including MIPs, performed and images saved to PACS.   Grading of carotid stenosis performed with NASCET criteria.     FINDINGS:      There is a typical three-vessel branching pattern off the aortic arch  without significant ostial narrowing. Common carotid arteries are patent  and without flow-limiting stenosis. There is a normal course and caliber  of the internal and external carotid arteries without evidence of a  flow-limiting stenosis. The vertebral arteries originate from the  subclavian arteries without significant ostial narrowing.  No evidence  of vertebral artery dissection or pseudoaneurysm. Left vertebral artery  is dominant.     Distal ICAs are patent and without flow-limiting stenosis. No  intracranial large vessel occlusion. Anterior and middle cerebral  arteries are patent and without flow-limiting stenosis. Intracranial  vertebral arteries and basilar artery are narrow in caliber. This is an  anatomic variant in the setting of bilateral fetal origin posterior  cerebral arteries. No visualized aneurysm or vascular malformation.      No intracranial hemorrhage or mass effect. Orbital contents are  unremarkable. No cervical adenopathy. Thyroid gland is homogeneous. Lung  apices are clear. Loss of disc height with posterior endplate spurring  at C5-C6. Bilateral neuroforaminal narrowing at C5-C6 due to  uncovertebral  spurring.       Impression:         1. No arterial occlusion or flow-limiting stenosis in the neck.  2. No intracranial large vessel occlusion or flow-limiting stenosis.   3. Posterior circulation anatomic variation with bilateral fetal-origin  posterior cerebral arteries resulting in relatively small caliber  intracranial vertebral arteries and basilar artery.  4. C5-C6 level degenerative change with endplate spurring and bilateral  neuroforaminal narrowing.  This report was finalized on 10/18/2022 21:52 by Dr Contreras Frias, .    CT Head Without Contrast [[id number]] Collected: 10/18/22 2137     Updated: 10/18/22 2144    Narrative:      CT HEAD WO CONTRAST- 10/18/2022 9:18 PM CDT     HISTORY: left arm and leg numbness w/ chest pain       DOSE LENGTH PRODUCT: 961 mGy cm. Automated exposure control was also  utilized to decrease patient radiation dose.     Technique:   Axial CT of the brain without IV contrast. Sagittal and coronal  reformations are also provided for review. Soft tissue and bone kernels  are available for interpretation.     Comparison: None.     Findings:      There is no evidence of acute large vascular territory infarct. No  intra-axial or extra-axial hemorrhage. No visualized mass lesion or mass  effect. The ventricles, cortical sulci and basal cisterns are symmetric  and age appropriate.  Posterior fossa structures are unremarkable. The  scalp and calvarium are intact. Visualized paranasal sinuses and  mastoids are clear.        Impression:      Impression:    1. No acute intracranial process.  This report was finalized on 10/18/2022 21:39 by Dr Contreras Frias, .    XR Chest 1 View [[id number]] Collected: 10/18/22 2033     Updated: 10/18/22 2036    Narrative:      XR CHEST 1 VW- 10/18/2022 8:20 PM CDT     HISTORY: chest pain     COMPARISON: 11/13/2017.     FINDINGS:      No lung consolidation. No pleural effusion or pneumothorax. The  cardiomediastinal silhouette and pulmonary vascularity are  within normal  limits. The osseous structures and surrounding soft tissues demonstrate  no acute abnormality.       Impression:      1. No radiographic evidence of acute cardiopulmonary process.        This report was finalized on 10/18/2022 20:33 by Dr Contreras Frias, .          I have personally reviewed and interpreted the radiology studies and ECG obtained at time of admission.     Assessment / Plan      Assessment & Plan  Active Hospital Problems    Diagnosis    • **Chest pain, unspecified type      Plans    Chest pain/history hypertrophic obstructive cardiomyopathy/hypertension/hyperlipidemia.  Troponin negative.  Patient currently denies any chest pain.  Norvasc . Coreg.  Catapres as needed.  Cozaar.  Aldactone.  Verapamil.  Chest x-ray-No radiographic evidence of acute cardiopulmonary process.  Echocardiogram .  Stress echo.    Left arm weakness/C6 C5 degenerative changes with spurring and bilateral neuroforaminal narrowing.  CTA of the head/neck-No arterial occlusion or flow-limiting stenosis in the neck, No intracranial large vessel occlusion or flow-limiting stenosis, Posterior circulation anatomic variation with bilateral fetal-origin posterior cerebral arteries resulting in relatively small caliber  intracranial vertebral arteries and basilar artery, C5-C6 level degenerative change with endplate spurring and bilateral neuroforaminal narrowing.  CT scan head-No acute intracranial process.    Postmenopausal.  Hold estradiol for now.    Anxiety/depression . Xanax as needed.  Prozac.  Trazodone at night.    Chronic renal failure.  We will follow-up    Reflux.  Pepcid.  Zofran as needed    Chronic pain/rheumatoid arthritis.  Norco as needed.  Plaquenil.  Cefzil.    Nutrition.  N.p.o. after midnight.  Cardiac diet    Code Status/Advanced Care Plan: Full code.    The patient's surrogate decision maker is , Charles.      I discussed the patient's findings and my recommendations with: Patient and      Estimated length of stay: 1 to 3 days.    Electronically signed by Messi Roche MD, 10/18/22, 11:05 PM CDT.

## 2022-10-20 ENCOUNTER — READMISSION MANAGEMENT (OUTPATIENT)
Dept: CALL CENTER | Facility: HOSPITAL | Age: 62
End: 2022-10-20

## 2022-10-20 VITALS
TEMPERATURE: 97.7 F | HEART RATE: 64 BPM | DIASTOLIC BLOOD PRESSURE: 62 MMHG | RESPIRATION RATE: 16 BRPM | OXYGEN SATURATION: 100 % | SYSTOLIC BLOOD PRESSURE: 129 MMHG | WEIGHT: 189.25 LBS | HEIGHT: 62 IN | BODY MASS INDEX: 34.82 KG/M2

## 2022-10-20 PROBLEM — R00.1 SYMPTOMATIC BRADYCARDIA: Status: ACTIVE | Noted: 2022-10-20

## 2022-10-20 PROBLEM — N18.9 CKD (CHRONIC KIDNEY DISEASE): Status: ACTIVE | Noted: 2022-10-20

## 2022-10-20 PROBLEM — I50.32 CHRONIC DIASTOLIC CHF (CONGESTIVE HEART FAILURE): Status: ACTIVE | Noted: 2022-10-20

## 2022-10-20 LAB
QT INTERVAL: 500 MS
QTC INTERVAL: 451 MS

## 2022-10-20 PROCEDURE — G0378 HOSPITAL OBSERVATION PER HR: HCPCS

## 2022-10-20 PROCEDURE — 63710000001 MYCOPHENOLATE MOFETIL PER 250 MG: Performed by: FAMILY MEDICINE

## 2022-10-20 RX ORDER — CARVEDILOL 12.5 MG/1
12.5 TABLET ORAL 2 TIMES DAILY WITH MEALS
Qty: 60 TABLET | Refills: 0 | Status: SHIPPED | OUTPATIENT
Start: 2022-10-20 | End: 2022-10-31 | Stop reason: SDUPTHER

## 2022-10-20 RX ORDER — ASPIRIN 81 MG/1
81 TABLET ORAL DAILY
Qty: 30 TABLET | Refills: 0 | Status: SHIPPED | OUTPATIENT
Start: 2022-10-21 | End: 2022-12-05

## 2022-10-20 RX ADMIN — LOSARTAN POTASSIUM 100 MG: 50 TABLET, FILM COATED ORAL at 08:15

## 2022-10-20 RX ADMIN — SPIRONOLACTONE 25 MG: 25 TABLET ORAL at 08:21

## 2022-10-20 RX ADMIN — FAMOTIDINE 20 MG: 20 TABLET, FILM COATED ORAL at 06:18

## 2022-10-20 RX ADMIN — Medication 10 ML: at 08:16

## 2022-10-20 RX ADMIN — HYDROXYCHLOROQUINE SULFATE 200 MG: 200 TABLET ORAL at 08:15

## 2022-10-20 RX ADMIN — AMLODIPINE BESYLATE 5 MG: 5 TABLET ORAL at 08:15

## 2022-10-20 RX ADMIN — MYCOPHENOLATE MOFETIL 500 MG: 500 TABLET ORAL at 08:16

## 2022-10-20 RX ADMIN — ASPIRIN 81 MG: 81 TABLET, COATED ORAL at 08:15

## 2022-10-20 RX ADMIN — VERAPAMIL HYDROCHLORIDE 240 MG: 240 TABLET, FILM COATED, EXTENDED RELEASE ORAL at 08:15

## 2022-10-20 RX ADMIN — CARVEDILOL 12.5 MG: 6.25 TABLET, FILM COATED ORAL at 08:15

## 2022-10-20 RX ADMIN — FLUOXETINE HYDROCHLORIDE 20 MG: 20 CAPSULE ORAL at 08:15

## 2022-10-20 NOTE — DISCHARGE SUMMARY
[facility] Medicine Services  DISCHARGE SUMMARY       Date of Admission: 10/18/2022  Date of Discharge:  10/20/2022  Primary Care Physician: Alexandru Miles MD    Presenting Problem/Chief Complaint:  Chest pain, left sided numbness    Final Discharge Diagnoses:  Active Hospital Problems    Diagnosis    • **Chest pain, unspecified type    • CKD (chronic kidney disease)    • Chronic diastolic CHF (congestive heart failure) (HCC)    • Symptomatic bradycardia    • Gastroesophageal reflux disease without esophagitis      Added automatically from request for surgery [id number]     • Hypertension    • RHIANNON: (cpap)    • Depression: [facility]        Consults:   n/a    Procedures Performed: n/a    Pertinent Test Results:   1.  TTE:  •  Left ventricular systolic function is normal. Left ventricular ejection fraction appears to be 61 - 65%.  •  Normal right ventricular cavity size and systolic function noted.  •  No hemodynamically significant valvular abnormalities identified on this study.           Results for orders placed during the hospital encounter of 10/18/22    Adult Stress Echo W/ Cont or Stress Agent if Necessary Per Protocol    Interpretation Summary  •  Baseline ECG of normal sinus rhythm noted at rest. Diffuse T wave inversions noted, consistent with prior ECGs.  •  The patient reported no symptoms during the stress test.  •  There were no clinically significant ST segment changes noted during stress.  •  Post stress wall motion appears to be normal.  •  Overall, this is a low risk stress test with no significant clinical, ECG or echocardiographic evidence for myocardial ischemia.      Imaging Results (All)     Procedure Component Value Units Date/Time    MRI Brain Without Contrast [[id number]] Collected: 10/19/22 1931     Updated: 10/19/22 1936    Narrative:      Indication: TIA        Technique: Multisequence, multiplanar MRI of the brain without contrast.      Comparison: CT scan dated 10/18/2022     Findings:      No diffusion signal abnormality. No intra-axial or extra-axial  hemorrhage. No intracranial mass lesion or mass effect. The ventricles,  cortical sulci and basal cisterns are symmetric and age appropriate.  Posterior fossa structures are unremarkable. Pituitary gland and sella  are unremarkable. The major intracranial flow-voids are preserved.  Orbital contents are unremarkable. The paranasal sinuses are clear.  Mastoid air cells are clear. Normal bone marrow signal.        Impression:      Impression:     1. No acute intracranial process.     This report was finalized on 10/19/2022 19:33 by Dr Contreras Frias, .    CT Angiogram Chest With & Without Contrast [[id number]] Collected: 10/19/22 1728     Updated: 10/19/22 1734    Narrative:      CT ANGIOGRAM CHEST W WO CONTRAST- 10/19/2022 5:13 PM CDT     HISTORY: Pulmonary embolism (PE) suspected, unknown D-dimer; R07.9-Chest  pain, unspecified; R00.1-Bradycardia, unspecified; R94.31-Abnormal  electrocardiogram (ECG) (EKG)      COMPARISON: None     DOSE LENGTH PRODUCT: 162 mGy cm. Automated exposure control was also  utilized to decrease patient radiation dose.     TECHNIQUE: Axial images the chest are obtained both pre- and post-IV  contrast. 2-D and maximal intensity projection images are reconstructed  and reviewed     FINDINGS:  There are no pulmonary emboli. Thoracic aorta normal in  caliber with no aneurysm or dissection. Heart appears upper limits of  normal in size. No pericardial or pleural effusion. Small hiatal hernia.  No pathologic intrathoracic thoracic or axillary lymphadenopathy.      Images of the upper abdomen demonstrate no adrenal nodules. Excreted  contrast within the gallbladder from vicarious excretion related to the  CTA head and neck exam performed 10/18/2022. No acute upper abdominal  pathology identified.     Minimal dependent atelectasis. No consolidation or suspicious  nodularity. No  pneumothorax. No endobronchial lesion.     No focal destructive osseous lesions.       Impression:      1. No pulmonary emboli.  2. No thoracic aortic aneurysm or dissection.  3. Small hiatal hernia.  4. Minimal dependent atelectasis with no consolidation or suspicious  nodularity.  This report was finalized on 10/19/2022 17:31 by Dr. Marleen Ivory MD.    CT Angiogram Head [[id number]] Collected: 10/18/22 2144     Updated: 10/18/22 2155    Narrative:      EXAM: CT ANGIOGRAM NECK-, CT ANGIOGRAM HEAD- - 10/18/2022 9:18 PM CDT     HISTORY: left arm and leg numbness w/ chest pain       COMPARISON: None.      DOSE LENGTH PRODUCT: 389 mGy cm. Automated exposure control was also  utilized to decrease patient radiation dose.     TECHNIQUE: CTA head and neck performed with intravenous contrast. 3D  postprocessing, including MIPs, performed and images saved to PACS.   Grading of carotid stenosis performed with NASCET criteria.     FINDINGS:      There is a typical three-vessel branching pattern off the aortic arch  without significant ostial narrowing. Common carotid arteries are patent  and without flow-limiting stenosis. There is a normal course and caliber  of the internal and external carotid arteries without evidence of a  flow-limiting stenosis. The vertebral arteries originate from the  subclavian arteries without significant ostial narrowing.  No evidence  of vertebral artery dissection or pseudoaneurysm. Left vertebral artery  is dominant.     Distal ICAs are patent and without flow-limiting stenosis. No  intracranial large vessel occlusion. Anterior and middle cerebral  arteries are patent and without flow-limiting stenosis. Intracranial  vertebral arteries and basilar artery are narrow in caliber. This is an  anatomic variant in the setting of bilateral fetal origin posterior  cerebral arteries. No visualized aneurysm or vascular malformation.      No intracranial hemorrhage or mass effect. Orbital contents  are  unremarkable. No cervical adenopathy. Thyroid gland is homogeneous. Lung  apices are clear. Loss of disc height with posterior endplate spurring  at C5-C6. Bilateral neuroforaminal narrowing at C5-C6 due to  uncovertebral spurring.       Impression:         1. No arterial occlusion or flow-limiting stenosis in the neck.  2. No intracranial large vessel occlusion or flow-limiting stenosis.   3. Posterior circulation anatomic variation with bilateral fetal-origin  posterior cerebral arteries resulting in relatively small caliber  intracranial vertebral arteries and basilar artery.  4. C5-C6 level degenerative change with endplate spurring and bilateral  neuroforaminal narrowing.  This report was finalized on 10/18/2022 21:52 by Dr Contreras Frias, .    CT Angiogram Neck [[id number]] Collected: 10/18/22 2144     Updated: 10/18/22 2155    Narrative:      EXAM: CT ANGIOGRAM NECK-, CT ANGIOGRAM HEAD- - 10/18/2022 9:18 PM CDT     HISTORY: left arm and leg numbness w/ chest pain       COMPARISON: None.      DOSE LENGTH PRODUCT: 389 mGy cm. Automated exposure control was also  utilized to decrease patient radiation dose.     TECHNIQUE: CTA head and neck performed with intravenous contrast. 3D  postprocessing, including MIPs, performed and images saved to PACS.   Grading of carotid stenosis performed with NASCET criteria.     FINDINGS:      There is a typical three-vessel branching pattern off the aortic arch  without significant ostial narrowing. Common carotid arteries are patent  and without flow-limiting stenosis. There is a normal course and caliber  of the internal and external carotid arteries without evidence of a  flow-limiting stenosis. The vertebral arteries originate from the  subclavian arteries without significant ostial narrowing.  No evidence  of vertebral artery dissection or pseudoaneurysm. Left vertebral artery  is dominant.     Distal ICAs are patent and without flow-limiting stenosis. No  intracranial  large vessel occlusion. Anterior and middle cerebral  arteries are patent and without flow-limiting stenosis. Intracranial  vertebral arteries and basilar artery are narrow in caliber. This is an  anatomic variant in the setting of bilateral fetal origin posterior  cerebral arteries. No visualized aneurysm or vascular malformation.      No intracranial hemorrhage or mass effect. Orbital contents are  unremarkable. No cervical adenopathy. Thyroid gland is homogeneous. Lung  apices are clear. Loss of disc height with posterior endplate spurring  at C5-C6. Bilateral neuroforaminal narrowing at C5-C6 due to  uncovertebral spurring.       Impression:         1. No arterial occlusion or flow-limiting stenosis in the neck.  2. No intracranial large vessel occlusion or flow-limiting stenosis.   3. Posterior circulation anatomic variation with bilateral fetal-origin  posterior cerebral arteries resulting in relatively small caliber  intracranial vertebral arteries and basilar artery.  4. C5-C6 level degenerative change with endplate spurring and bilateral  neuroforaminal narrowing.  This report was finalized on 10/18/2022 21:52 by Dr Contreras Frias, .    CT Head Without Contrast [[id number]] Collected: 10/18/22 2137     Updated: 10/18/22 2144    Narrative:      CT HEAD WO CONTRAST- 10/18/2022 9:18 PM CDT     HISTORY: left arm and leg numbness w/ chest pain       DOSE LENGTH PRODUCT: 961 mGy cm. Automated exposure control was also  utilized to decrease patient radiation dose.     Technique:   Axial CT of the brain without IV contrast. Sagittal and coronal  reformations are also provided for review. Soft tissue and bone kernels  are available for interpretation.     Comparison: None.     Findings:      There is no evidence of acute large vascular territory infarct. No  intra-axial or extra-axial hemorrhage. No visualized mass lesion or mass  effect. The ventricles, cortical sulci and basal cisterns are symmetric  and age  appropriate.  Posterior fossa structures are unremarkable. The  scalp and calvarium are intact. Visualized paranasal sinuses and  mastoids are clear.        Impression:      Impression:    1. No acute intracranial process.  This report was finalized on 10/18/2022 21:39 by Dr Contreras Frias, .    XR Chest 1 View [[id number]] Collected: 10/18/22 2033     Updated: 10/18/22 2036    Narrative:      XR CHEST 1 VW- 10/18/2022 8:20 PM CDT     HISTORY: chest pain     COMPARISON: 11/13/2017.     FINDINGS:      No lung consolidation. No pleural effusion or pneumothorax. The  cardiomediastinal silhouette and pulmonary vascularity are within normal  limits. The osseous structures and surrounding soft tissues demonstrate  no acute abnormality.       Impression:      1. No radiographic evidence of acute cardiopulmonary process.        This report was finalized on 10/18/2022 20:33 by Dr Contreras Frias, .          LAB RESULTS:      Lab 10/19/22  1542 10/19/22  0552 10/18/22  1936   WBC  --  5.70 5.03   HEMOGLOBIN  --  12.2 12.8   HEMATOCRIT  --  38.7 40.3   PLATELETS  --  230 244   NEUTROS ABS  --  2.90 2.79   IMMATURE GRANS (ABS)  --  0.02 0.02   LYMPHS ABS  --  1.71 1.51   MONOS ABS  --  0.90 0.57   EOS ABS  --  0.14 0.11   MCV  --  93.5 93.9   D DIMER QUANT 0.72*  --   --          Lab 10/19/22  0552 10/18/22  1936   SODIUM 139 136   POTASSIUM 4.2 4.5   CHLORIDE 104 101   CO2 29.0 28.0   ANION GAP 6.0 7.0   BUN 16 16   CREATININE 1.35* 1.38*   EGFR 44.8* 43.6*   GLUCOSE 89 81   CALCIUM 9.5 9.6   HEMOGLOBIN A1C 5.50  5.40  --    TSH 1.450  --          Lab 10/19/22  0552 10/18/22  1936   TOTAL PROTEIN 6.9 7.7   ALBUMIN 3.90 4.20   GLOBULIN 3.0 3.5   ALT (SGPT) 9 10   AST (SGOT) 14 15   BILIRUBIN 0.2 0.2   ALK PHOS 55 62         Lab 10/19/22  0552 10/18/22  1936   TROPONIN T <0.010 <0.010         Lab 10/19/22  0552 10/18/22  1936   CHOLESTEROL 120 138   LDL CHOL 59 73   HDL CHOL 46 49   TRIGLYCERIDES 70 85             Brief Urine Lab  "Results  (Last result in the past 365 days)      Color   Clarity   Blood   Leuk Est   Nitrite   Protein   CREAT   Urine HCG        06/07/22 1603 YELLOW   Clear   Negative   Negative  Comment: Culture Urine under CMS guidelines and criteria will auto reflex on a sole  criteria that is based on manual microscopic count of more than 10/hpf for  WBC. If Culture Urine is warranted aside from this criteria, place a  requisition or order within 24 hours of collection.   Negative                 Microbiology Results (last 10 days)     ** No results found for the last 240 hours. **          Chief Complaint on Day of Discharge: Feeling better    History of Present Illness on Day of Discharge:   Doing ok.  No CP or SOA.  No numbness or weakness.       Hospital Course:  The patient is a 62 y.o. female who presented to  with chest pain and left sided numbness.  She was placed in observation.      Serial cardiac enzymes were obtained and negative.  She underwent stress testing which was low risk for ischemia.  She underwent TTE which was unremarkable.      She had an elevated D-dimer.  CTA chest was performed.  This showed no PE or aortic pathology.      Due to the left sided numbness, she underwent an MRI of the brain.  This was negative.  I don't feel this represents a TIA especially with the chest pain ongoing at the same time.  She has had intermittent  Bradycardia and hypotension.  I suspect this is all related to that.  She did undergo CTA neck in the ER.  This showed no flow limiting stenosis.  Echocardiogram shows no PFO.  Lipid panel checked, LDL <70.  Will add a baby aspirin.      Condition on Discharge:  stable    Physical Exam on Discharge:  /62 (BP Location: Right arm, Patient Position: Lying)   Pulse 64   Temp 97.7 °F (36.5 °C) (Oral)   Resp 16   Ht 157.5 cm (62.01\")   Wt 85.8 kg (189 lb 4 oz)   SpO2 100%   BMI 34.61 kg/m²   Physical Exam  Constitutional:       Appearance: Normal " appearance. She is well-developed.   HENT:      Head: Normocephalic and atraumatic.      Right Ear: Tympanic membrane, ear canal and external ear normal.      Left Ear: Tympanic membrane, ear canal and external ear normal.      Nose: Nose normal.      Mouth/Throat:      Mouth: Mucous membranes are moist.      Pharynx: Oropharynx is clear.   Eyes:      Extraocular Movements: Extraocular movements intact.      Conjunctiva/sclera: Conjunctivae normal.      Pupils: Pupils are equal, round, and reactive to light.   Cardiovascular:      Rate and Rhythm: Normal rate and regular rhythm.      Heart sounds: Normal heart sounds.   Pulmonary:      Effort: Pulmonary effort is normal.      Breath sounds: Normal breath sounds.   Abdominal:      General: Bowel sounds are normal. There is no distension.      Palpations: Abdomen is soft.      Tenderness: There is no abdominal tenderness.   Musculoskeletal:         General: Normal range of motion.      Cervical back: Normal range of motion and neck supple.   Skin:     General: Skin is warm and dry.   Neurological:      Mental Status: She is alert and oriented to person, place, and time.   Psychiatric:         Mood and Affect: Mood normal.         Speech: Speech normal.         Behavior: Behavior normal.         Thought Content: Thought content normal.         Judgment: Judgment normal.           Discharge Disposition:  Home or Self Care    Discharge Medications:     Discharge Medications      New Medications      Instructions Start Date   aspirin 81 MG EC tablet   81 mg, Oral, Daily   Start Date: October 21, 2022        Changes to Medications      Instructions Start Date   carvedilol 12.5 MG tablet  Commonly known as: COREG  What changed:   · medication strength  · how much to take  · when to take this   12.5 mg, Oral, 2 Times Daily With Meals      estradiol 2 MG tablet  Commonly known as: ESTRACE  What changed: additional instructions   1 mg, Oral, Daily         Continue These  Medications      Instructions Start Date   albuterol sulfate  (90 Base) MCG/ACT inhaler  Commonly known as: PROVENTIL HFA;VENTOLIN HFA;PROAIR HFA   1 puff, Inhalation, Every 4 Hours PRN      alendronate 70 MG tablet  Commonly known as: FOSAMAX   70 mg, Oral, Every 7 Days, Friday      ALPRAZolam 0.5 MG tablet  Commonly known as: XANAX   0.5 mg, Oral, 2 Times Daily PRN      amLODIPine 5 MG tablet  Commonly known as: NORVASC   5 mg, Oral, Daily      cloNIDine 0.1 MG tablet  Commonly known as: CATAPRES   0.1 mg, Oral, 2 Times Daily PRN, One by mouth twice daily as needed if BP greater than 160/100       FLUoxetine 20 MG capsule  Commonly known as: PROzac   20 mg, Oral, Daily, Along with a 40 mg total = 60 mg daily      FLUoxetine 40 MG capsule  Commonly known as: PROzac   1 capsule, Oral, Daily, Along with a 20 mg total 60 mg daily      folic acid 1 MG tablet  Commonly known as: FOLVITE   1,000 mcg, Oral, Daily      HYDROcodone-acetaminophen 7.5-325 MG per tablet  Commonly known as: NORCO   1 tablet, Oral, 2 Times Daily PRN      hydroxychloroquine 200 MG tablet  Commonly known as: PLAQUENIL   1 tablet, Oral, 2 Times Daily      loratadine 10 MG tablet  Commonly known as: CLARITIN   10 mg, Oral, As Needed      losartan 100 MG tablet  Commonly known as: COZAAR   100 mg, Oral, Daily      mycophenolate 500 MG tablet  Commonly known as: CELLCEPT   1 tablet, Oral, 2 Times Daily      omeprazole 20 MG capsule  Commonly known as: priLOSEC   20 mg, Oral, Daily      potassium chloride 10 MEQ CR tablet  Commonly known as: K-DUR,KLOR-CON   10 mEq, Oral, Daily      senna 8.6 MG tablet  Commonly known as: SENOKOT   1 tablet, Oral, As Needed      spironolactone 25 MG tablet  Commonly known as: ALDACTONE   25 mg, Oral, Daily      traZODone 100 MG tablet  Commonly known as: DESYREL   100 mg, Oral, Nightly      verapamil  MG CR tablet  Commonly known as: CALAN-SR   240 mg, Oral, 2 Times Daily      vitamin D 1.25 MG (21791 UT)  capsule capsule  Commonly known as: ERGOCALCIFEROL   1 capsule, Oral, Weekly             Discharge Diet:   Diet Instructions    Diet: regular           Activity at Discharge:   Activity Instructions    Resume normal activity           Discharge Care Plan/Instructions:   Follow up with PCP  Go to ER for chest pain or SOA    Follow-up Appointments:   Future Appointments   Date Time Provider Department Center   3/21/2023  9:00 AM [facility] [place] [facility]   3/28/2023 11:45 AM Alexandru Miles MD [facility]       Test Results Pending at Discharge: n/a    Electronically signed by Austin Hou MD, 10/20/22, 08:32 CDT.    Time: 25 minutes

## 2022-10-20 NOTE — PLAN OF CARE
Goal Outcome Evaluation:              Outcome Evaluation: VSS, HR SB 56, on RA, denies pain, states has had no numbness or tingling feeling to left side since last night, no c/o of pain, CTA done prior to arrival to floor, down for MRI of head, pt hopeful to go home tomorrow   Simple: Patient demonstrates quick and easy understanding

## 2022-10-20 NOTE — PLAN OF CARE
Goal Outcome Evaluation:  Plan of Care Reviewed With: patient           Outcome Evaluation: VSS. Pt SR/SB 46-70 on tele. No complaints of pain. Safety was maintained.

## 2022-10-21 ENCOUNTER — TRANSITIONAL CARE MANAGEMENT TELEPHONE ENCOUNTER (OUTPATIENT)
Dept: CALL CENTER | Facility: HOSPITAL | Age: 62
End: 2022-10-21

## 2022-10-21 NOTE — OUTREACH NOTE
Prep Survey    Flowsheet Row Responses   [facility] discharged from? [place]   Is LACE score < 7 ? No   Emergency Room discharge w/ pulse ox? No   Eligibility [facility]   Date of Admission 10/18/22   Date of Discharge 10/20/22   Discharge Disposition Home or Self Care   Discharge diagnosis Chest pain, unspecified type  CKD    Does the patient have one of the following disease processes/diagnoses(primary or secondary)? Other   Does the patient have Home health ordered? No   Is there a DME ordered? No   Prep survey completed? Yes          MARIAH LANGFORD - Registered Nurse

## 2022-10-21 NOTE — OUTREACH NOTE
Call Center TCM Note    Flowsheet Row Responses   [facility] patient discharged from? [place]   Does the patient have one of the following disease processes/diagnoses(primary or secondary)? Other   TCM attempt successful? Yes   Call start time 1519   Call end time 1524   Discharge diagnosis Chest pain/ unspecified type, CKD, chronic diastolic CHF, symptomatic bradycardia   Person spoke with today (if not patient) and relationship patient   Meds reviewed with patient/caregiver? Yes   Does the patient have all medications ordered at discharge? Yes   Is the patient taking all medications as directed (includes completed medication regime)? Yes   Comments Hospital Follow Up with Alexandru Miles MD Thursday Nov 3, 2022 10:45 AM   Does the patient have an appointment with their PCP within 7 days of discharge? Yes   Has home health visited the patient within 72 hours of discharge? N/A   Psychosocial issues? No   Did the patient receive a copy of their discharge instructions? Yes   Nursing interventions Reviewed instructions with patient   What is the patient's perception of their health status since discharge? Improving   Is the patient/caregiver able to teach back signs and symptoms related to disease process for when to call PCP? Yes   Is the patient/caregiver able to teach back signs and symptoms related to disease process for when to call 911? Yes   Is the patient/caregiver able to teach back the hierarchy of who to call/visit for symptoms/problems? PCP, Specialist, Home health nurse, Urgent Care, ED, 911 Yes   If the patient is a current smoker, are they able to teach back resources for cessation? Not a smoker   TCM call completed? Yes   Call end time 1524   Would this patient benefit from a Referral to Amb Social Work? No   Is the patient interested in additional calls from an ambulatory ?  NOTE:  applies to high risk patients requiring additional follow-up. No          Heidi Valero,  RN    10/21/2022, 15:24 CDT

## 2022-10-21 NOTE — OUTREACH NOTE
Call Center TCM Note    Flowsheet Row Responses   [facility] patient discharged from? [facility]   Does the patient have one of the following disease processes/diagnoses(primary or secondary)? Other   TCM attempt successful? No   Unsuccessful attempts Attempt 1  [Attempted to reach patient and spouse. ]   Call Status Left message          Heidi Valero RN    10/21/2022, 14:52 CDT

## 2022-10-31 DIAGNOSIS — I50.32 CHRONIC DIASTOLIC CONGESTIVE HEART FAILURE: Chronic | ICD-10-CM

## 2022-10-31 DIAGNOSIS — I10 HYPERTENSION, UNSPECIFIED TYPE: Chronic | ICD-10-CM

## 2022-10-31 RX ORDER — LOSARTAN POTASSIUM 100 MG/1
100 TABLET ORAL DAILY
Qty: 90 TABLET | Refills: 2 | Status: SHIPPED | OUTPATIENT
Start: 2022-10-31

## 2022-10-31 RX ORDER — CARVEDILOL 12.5 MG/1
12.5 TABLET ORAL 2 TIMES DAILY WITH MEALS
Qty: 60 TABLET | Refills: 5 | Status: SHIPPED | OUTPATIENT
Start: 2022-10-31 | End: 2023-03-28

## 2022-10-31 NOTE — TELEPHONE ENCOUNTER
Rx Refill Note  Requested Prescriptions     Pending Prescriptions Disp Refills   • losartan (COZAAR) 100 MG tablet [Pharmacy Med Name: LOSARTAN POTASSIUM 100MG TABLET] 90 tablet 2     Sig: TAKE 1 TABLET BY MOUTH DAILY.   • carvedilol (Coreg) 12.5 MG tablet [Pharmacy Med Name: CARVEDILOL 25MG TABLET] 60 tablet 5     Sig: Take 1 tablet by mouth 2 (Two) Times a Day With Meals for 30 days.      Last office visit with prescribing clinician: 9/27/2022      Next office visit with prescribing clinician: 11/3/2022            Yuly Garcia LPN  10/31/22, 13:54 CDT

## 2022-11-03 ENCOUNTER — OFFICE VISIT (OUTPATIENT)
Dept: FAMILY MEDICINE CLINIC | Facility: CLINIC | Age: 62
End: 2022-11-03

## 2022-11-03 VITALS
RESPIRATION RATE: 16 BRPM | HEART RATE: 68 BPM | HEIGHT: 62 IN | OXYGEN SATURATION: 99 % | BODY MASS INDEX: 34.12 KG/M2 | WEIGHT: 185.4 LBS | TEMPERATURE: 96.4 F | SYSTOLIC BLOOD PRESSURE: 120 MMHG | DIASTOLIC BLOOD PRESSURE: 74 MMHG

## 2022-11-03 DIAGNOSIS — K21.9 GASTROESOPHAGEAL REFLUX DISEASE, UNSPECIFIED WHETHER ESOPHAGITIS PRESENT: Chronic | ICD-10-CM

## 2022-11-03 DIAGNOSIS — Z79.01 ANTICOAGULATED: ICD-10-CM

## 2022-11-03 DIAGNOSIS — I10 PRIMARY HYPERTENSION: Chronic | ICD-10-CM

## 2022-11-03 DIAGNOSIS — I49.9 CARDIAC ARRHYTHMIA, UNSPECIFIED CARDIAC ARRHYTHMIA TYPE: ICD-10-CM

## 2022-11-03 DIAGNOSIS — R53.1 LEFT-SIDED WEAKNESS: ICD-10-CM

## 2022-11-03 DIAGNOSIS — R07.9 CHEST PAIN, UNSPECIFIED TYPE: ICD-10-CM

## 2022-11-03 PROCEDURE — 99495 TRANSJ CARE MGMT MOD F2F 14D: CPT | Performed by: FAMILY MEDICINE

## 2022-11-03 PROCEDURE — 1111F DSCHRG MED/CURRENT MED MERGE: CPT | Performed by: FAMILY MEDICINE

## 2022-11-03 RX ORDER — TRAZODONE HYDROCHLORIDE 50 MG/1
75 TABLET ORAL NIGHTLY
COMMUNITY
Start: 2022-10-05

## 2022-11-03 NOTE — PROGRESS NOTES
Transitional Care Follow Up Visit  Subjective     Emilie Soto is a 62 y.o. female who presents for a transitional care management visit.  Alone.    Within 48 business hours after discharge our office contacted her via telephone to coordinate her care and needs.      I reviewed and discussed the details of that call along with the discharge summary, hospital problems, inpatient lab results, inpatient diagnostic studies, and consultation reports with Emilie.     Current outpatient and discharge medications have been reconciled for the patient.    Date of TCM Phone Call 10/20/2022   [facility]   Date of Admission 10/18/2022   Date of Discharge 10/20/2022   Discharge Disposition Home or Self Care     Final Discharge Diagnoses:        Active Hospital Problems     Diagnosis     • **Chest pain, unspecified type     • CKD (chronic kidney disease)     • Chronic diastolic CHF (congestive heart failure) (Formerly [facility])     • Symptomatic bradycardia     • Gastroesophageal reflux disease without esophagitis         Added automatically from request for surgery [id number]      • Hypertension     • RHIANNON: (cpap)     • Depression: [facility]          Risk for Readmission (LACE) Score: 7 (10/20/2022  5:00 AM)      History of Present Illness she developed onset of chest pain and left-sided weakness.  In the ER she was nonacute for either acute MI or stroke.    Course During Hospital Stay: Further testing was low risk for ischemic disease and nothing to suggest the cause for TIA or CVA.  Decision at the same time was made to start her on 81 mg aspirin.     The following portions of the patient's history were reviewed and updated as appropriate: allergies, current medications, past family history, past medical history, past social history, past surgical history and problem list.       Current Outpatient Medications:   •  albuterol sulfate  (90 Base) MCG/ACT inhaler, INHALE 1 PUFF EVERY 4 (FOUR) HOURS AS NEEDED FOR WHEEZING OR  SHORTNESS OF AIR., Disp: 54 g, Rfl: 3  •  alendronate (FOSAMAX) 70 MG tablet, Take 1 tablet by mouth Every 7 (Seven) Days. Friday, Disp: , Rfl:   •  ALPRAZolam (XANAX) 0.5 MG tablet, Take 1 tablet by mouth 2 (Two) Times a Day As Needed for Anxiety., Disp: , Rfl:   •  amLODIPine (NORVASC) 5 MG tablet, TAKE 1 TABLET BY MOUTH DAILY., Disp: 90 tablet, Rfl: 3  •  aspirin 81 MG EC tablet, Take 1 tablet by mouth Daily., Disp: 30 tablet, Rfl: 0  •  carvedilol (Coreg) 12.5 MG tablet, Take 1 tablet by mouth 2 (Two) Times a Day With Meals for 30 days., Disp: 60 tablet, Rfl: 5  •  estradiol (ESTRACE) 2 MG tablet, TAKE 0.5 TABLETS BY MOUTH DAILY FOR 90 DAYS. (Patient taking differently: Take 0.5 mg by mouth Daily.), Disp: 45 tablet, Rfl: 3  •  FLUoxetine (PROzac) 20 MG capsule, Take 1 capsule by mouth Daily. Along with a 40 mg total = 60 mg daily, Disp: , Rfl:   •  FLUoxetine (PROzac) 40 MG capsule, Take 1 capsule by mouth Daily. Along with a 20 mg total 60 mg daily, Disp: , Rfl:   •  folic acid (FOLVITE) 1 MG tablet, Take 1 tablet by mouth Daily., Disp: 90 tablet, Rfl: 3  •  hydroxychloroquine (PLAQUENIL) 200 MG tablet, Take 1 tablet by mouth 2 (Two) Times a Day., Disp: , Rfl:   •  loratadine (CLARITIN) 10 MG tablet, Take 10 mg by mouth As Needed for Allergies., Disp: , Rfl:   •  losartan (COZAAR) 100 MG tablet, TAKE 1 TABLET BY MOUTH DAILY., Disp: 90 tablet, Rfl: 2  •  mycophenolate (CELLCEPT) 500 MG tablet, Take 1 tablet by mouth 2 (Two) Times a Day., Disp: , Rfl:   •  omeprazole (priLOSEC) 20 MG capsule, TAKE 1 CAPSULE BY MOUTH DAILY., Disp: 90 capsule, Rfl: 3  •  potassium chloride (K-DUR,KLOR-CON) 10 MEQ CR tablet, TAKE 1 TABLET BY MOUTH DAILY., Disp: 90 tablet, Rfl: 3  •  spironolactone (ALDACTONE) 25 MG tablet, TAKE 1 TABLET BY MOUTH DAILY., Disp: 90 tablet, Rfl: 3  •  traZODone (DESYREL) 50 MG tablet, Take 1.5 tablets by mouth Every Night., Disp: , Rfl:   •  verapamil SR (CALAN-SR) 240 MG CR tablet, TAKE 1 TABLET BY  MOUTH 2 (TWO) TIMES A DAY., Disp: 180 tablet, Rfl: 1  •  vitamin D (ERGOCALCIFEROL) 30132 UNITS capsule capsule, Take 1 capsule by mouth 1 (One) Time Per Week., Disp: , Rfl:   •  cloNIDine (CATAPRES) 0.1 MG tablet, Take 1 tablet by mouth 2 (Two) Times a Day As Needed. One by mouth twice daily as needed if BP greater than 160/100, Disp: , Rfl:   •  HYDROcodone-acetaminophen (NORCO) 7.5-325 MG per tablet, Take 1 tablet by mouth 2 (Two) Times a Day As Needed., Disp: , Rfl:   •  senna (SENOKOT) 8.6 MG tablet, Take 1 tablet by mouth As Needed for Constipation., Disp: , Rfl:       Review of Systems  GENERAL:  Active/slower with limits, speed, stamina for age and desire. Sleep is ok; occ hard falling/staying with worry. No fever now.  SKIN: No rash/skin lesion of concern:   ENDO:  No syncope, near or diaphoretic sweaty spells..  HEENT: No recent head injury; but same/occ headache.   No vision change.   Decline in hearing; echo affect on/off L.  Ears without pain/drainage.  No sore throat.  No significant nasal/sinus congestion/drainage. No epistaxis.  CHEST: No chest wall tenderness or mass. No cough, without wheeze.  No SOB; no hemoptysis.  CV: No chest pain, palpitations, ankle edema.  GI: No dysphagia.  No abdominal pain, diarrhea, constipation.  No rectal bleeding, or melena.  Occ heartburn still.   :  Voids without dysuria, or incontinence to completion.  ORTHO: No painful/swollen joints but various on /off sore.  No change some sore neck or back.  No acute neck or back pain without recent injury.  NEURO: No dizziness, weakness of extremities.  No numbness/paresthesias.   PSYCH: No memory loss.  Mood good; often anxious, depressed but/and not suicidal.  Tries to tolerate stress .      Screening:  Gyne: flatter/mendoza confirmed 9.23.21  Mammogram: 10.6.21-ordered  Bone density: 6.8.20 Deca-bone d/Sancho/Ts L2-0.9 neck -2.2/6.8.2020-ordered  Low dose CT chest: Tobacco-smoker/none: NA  GI:   Colon-p,  div/Jasiel//10.10.19/5y  IUV-eulxy-aux-/Jasiel//10.13.2020/  Prostate: NA  Usual lab order  Any lab others want; (we wish to attempt not to duplicate so we need copies of labs done outside the office/out of epic); OR can have all labs here (bring the order from all other doctors) and we will forward to all specialist  6m CBC, CMP, sed rate, CK-total, CR-protein  12m CBC, CMP, LIPID, TSH, fT4, CK-total, Vit D, sed rate, CR-protein    Results for orders placed or performed during the hospital encounter of 10/18/22   Comprehensive Metabolic Panel    Specimen: Blood   Result Value Ref Range    Glucose 81 65 - 99 mg/dL    BUN 16 8 - 23 mg/dL    Creatinine 1.38 (H) 0.57 - 1.00 mg/dL    Sodium 136 136 - 145 mmol/L    Potassium 4.5 3.5 - 5.2 mmol/L    Chloride 101 98 - 107 mmol/L    CO2 28.0 22.0 - 29.0 mmol/L    Calcium 9.6 8.6 - 10.5 mg/dL    Total Protein 7.7 6.0 - 8.5 g/dL    Albumin 4.20 3.50 - 5.20 g/dL    ALT (SGPT) 10 1 - 33 U/L    AST (SGOT) 15 1 - 32 U/L    Alkaline Phosphatase 62 39 - 117 U/L    Total Bilirubin 0.2 0.0 - 1.2 mg/dL    Globulin 3.5 gm/dL    A/G Ratio 1.2 g/dL    BUN/Creatinine Ratio 11.6 7.0 - 25.0    Anion Gap 7.0 5.0 - 15.0 mmol/L    eGFR 43.6 (L) >60.0 mL/min/1.73   Troponin    Specimen: Blood   Result Value Ref Range    Troponin T <0.010 0.000 - 0.030 ng/mL   CBC Auto Differential    Specimen: Blood   Result Value Ref Range    WBC 5.03 3.40 - 10.80 10*3/mm3    RBC 4.29 3.77 - 5.28 10*6/mm3    Hemoglobin 12.8 12.0 - 15.9 g/dL    Hematocrit 40.3 34.0 - 46.6 %    MCV 93.9 79.0 - 97.0 fL    MCH 29.8 26.6 - 33.0 pg    MCHC 31.8 31.5 - 35.7 g/dL    RDW 13.2 12.3 - 15.4 %    RDW-SD 45.0 37.0 - 54.0 fl    MPV 9.2 6.0 - 12.0 fL    Platelets 244 140 - 450 10*3/mm3    Neutrophil % 55.5 42.7 - 76.0 %    Lymphocyte % 30.0 19.6 - 45.3 %    Monocyte % 11.3 5.0 - 12.0 %    Eosinophil % 2.2 0.3 - 6.2 %    Basophil % 0.6 0.0 - 1.5 %    Immature Grans % 0.4 0.0 - 0.5 %    Neutrophils, Absolute 2.79 1.70 - 7.00  10*3/mm3    Lymphocytes, Absolute 1.51 0.70 - 3.10 10*3/mm3    Monocytes, Absolute 0.57 0.10 - 0.90 10*3/mm3    Eosinophils, Absolute 0.11 0.00 - 0.40 10*3/mm3    Basophils, Absolute 0.03 0.00 - 0.20 10*3/mm3    Immature Grans, Absolute 0.02 0.00 - 0.05 10*3/mm3    nRBC 0.0 0.0 - 0.2 /100 WBC   Comprehensive Metabolic Panel    Specimen: Blood   Result Value Ref Range    Glucose 89 65 - 99 mg/dL    BUN 16 8 - 23 mg/dL    Creatinine 1.35 (H) 0.57 - 1.00 mg/dL    Sodium 139 136 - 145 mmol/L    Potassium 4.2 3.5 - 5.2 mmol/L    Chloride 104 98 - 107 mmol/L    CO2 29.0 22.0 - 29.0 mmol/L    Calcium 9.5 8.6 - 10.5 mg/dL    Total Protein 6.9 6.0 - 8.5 g/dL    Albumin 3.90 3.50 - 5.20 g/dL    ALT (SGPT) 9 1 - 33 U/L    AST (SGOT) 14 1 - 32 U/L    Alkaline Phosphatase 55 39 - 117 U/L    Total Bilirubin 0.2 0.0 - 1.2 mg/dL    Globulin 3.0 gm/dL    A/G Ratio 1.3 g/dL    BUN/Creatinine Ratio 11.9 7.0 - 25.0    Anion Gap 6.0 5.0 - 15.0 mmol/L    eGFR 44.8 (L) >60.0 mL/min/1.73   CBC Auto Differential    Specimen: Blood   Result Value Ref Range    WBC 5.70 3.40 - 10.80 10*3/mm3    RBC 4.14 3.77 - 5.28 10*6/mm3    Hemoglobin 12.2 12.0 - 15.9 g/dL    Hematocrit 38.7 34.0 - 46.6 %    MCV 93.5 79.0 - 97.0 fL    MCH 29.5 26.6 - 33.0 pg    MCHC 31.5 31.5 - 35.7 g/dL    RDW 13.2 12.3 - 15.4 %    RDW-SD 45.2 37.0 - 54.0 fl    MPV 9.3 6.0 - 12.0 fL    Platelets 230 140 - 450 10*3/mm3    Neutrophil % 50.8 42.7 - 76.0 %    Lymphocyte % 30.0 19.6 - 45.3 %    Monocyte % 15.8 (H) 5.0 - 12.0 %    Eosinophil % 2.5 0.3 - 6.2 %    Basophil % 0.5 0.0 - 1.5 %    Immature Grans % 0.4 0.0 - 0.5 %    Neutrophils, Absolute 2.90 1.70 - 7.00 10*3/mm3    Lymphocytes, Absolute 1.71 0.70 - 3.10 10*3/mm3    Monocytes, Absolute 0.90 0.10 - 0.90 10*3/mm3    Eosinophils, Absolute 0.14 0.00 - 0.40 10*3/mm3    Basophils, Absolute 0.03 0.00 - 0.20 10*3/mm3    Immature Grans, Absolute 0.02 0.00 - 0.05 10*3/mm3    nRBC 0.0 0.0 - 0.2 /100 WBC   Hemoglobin A1c     Specimen: Blood   Result Value Ref Range    Hemoglobin A1C 5.40 4.80 - 5.60 %   Lipid Panel    Specimen: Blood   Result Value Ref Range    Total Cholesterol 120 0 - 200 mg/dL    Triglycerides 70 0 - 150 mg/dL    HDL Cholesterol 46 40 - 60 mg/dL    LDL Cholesterol  59 0 - 100 mg/dL    VLDL Cholesterol 15 5 - 40 mg/dL    LDL/HDL Ratio 1.30    TSH    Specimen: Blood   Result Value Ref Range    TSH 1.450 0.270 - 4.200 uIU/mL   Troponin    Specimen: Blood   Result Value Ref Range    Troponin T <0.010 0.000 - 0.030 ng/mL   D-dimer, Quantitative    Specimen: Blood   Result Value Ref Range    D-Dimer, Quantitative 0.72 (H) 0.00 - 0.50 MCGFEU/mL   Lipid Panel    Specimen: Blood   Result Value Ref Range    Total Cholesterol 138 0 - 200 mg/dL    Triglycerides 85 0 - 150 mg/dL    HDL Cholesterol 49 40 - 60 mg/dL    LDL Cholesterol  73 0 - 100 mg/dL    VLDL Cholesterol 16 5 - 40 mg/dL    LDL/HDL Ratio 1.47    Hemoglobin A1c    Specimen: Blood   Result Value Ref Range    Hemoglobin A1C 5.50 4.80 - 5.60 %   ECG 12 Lead   Result Value Ref Range    QT Interval 500 ms    QTC Interval 451 ms   Green Top (Gel)   Result Value Ref Range    Extra Tube Hold for add-ons.    Lavender Top   Result Value Ref Range    Extra Tube hold for add-on    Red Top   Result Value Ref Range    Extra Tube Hold for add-ons.    Light Blue Top   Result Value Ref Range    Extra Tube Hold for add-ons.      [id number] arteries/Xavier/[facility]/12.10.10     Results for orders placed during the hospital encounter of 10/18/22    Adult Stress Echo W/ Cont or Stress Agent if Necessary Per Protocol    Interpretation Summary  •  Baseline ECG of normal sinus rhythm noted at rest. Diffuse T wave inversions noted, consistent with prior ECGs.  •  The patient reported no symptoms during the stress test.  •  There were no clinically significant ST segment changes noted during stress.  •  Post stress wall motion appears to be normal.  •  Overall, this is a low risk stress test  with no significant clinical, ECG or echocardiographic evidence for myocardial ischemia.    Echo:   •  Left ventricular systolic function is normal. Left ventricular ejection fraction appears to be 61 - 65%.  •  Normal right ventricular cavity size and systolic function noted.  •  No hemodynamically significant valvular abnormalities identified on this study.    CT Head Without Contrast    Result Date: 10/18/2022  Impression:  1. No acute intracranial process. This report was finalized on 10/18/2022 21:39 by Dr Contreras Frias, .    CT Angiogram Neck    Result Date: 10/18/2022   1. No arterial occlusion or flow-limiting stenosis in the neck. 2. No intracranial large vessel occlusion or flow-limiting stenosis. 3. Posterior circulation anatomic variation with bilateral fetal-origin posterior cerebral arteries resulting in relatively small caliber intracranial vertebral arteries and basilar artery. 4. C5-C6 level degenerative change with endplate spurring and bilateral neuroforaminal narrowing. This report was finalized on 10/18/2022 21:52 by Dr Contreras Frias, .    CT Angiogram Chest With & Without Contrast    Result Date: 10/19/2022  1. No pulmonary emboli. 2. No thoracic aortic aneurysm or dissection. 3. Small hiatal hernia. 4. Minimal dependent atelectasis with no consolidation or suspicious nodularity. This report was finalized on 10/19/2022 17:31 by Dr. Marleen Ivory MD.    MRI Brain Without Contrast    Result Date: 10/19/2022  Impression:  1. No acute intracranial process.  This report was finalized on 10/19/2022 19:33 by Dr Contreras Frias, .    XR Chest 1 View    Result Date: 10/18/2022  1. No radiographic evidence of acute cardiopulmonary process.   This report was finalized on 10/18/2022 20:33 by Dr Contreras Frias, .    CT Angiogram Head    Result Date: 10/18/2022   1. No arterial occlusion or flow-limiting stenosis in the neck. 2. No intracranial large vessel occlusion or flow-limiting stenosis. 3. Posterior  circulation anatomic variation with bilateral fetal-origin posterior cerebral arteries resulting in relatively small caliber intracranial vertebral arteries and basilar artery. 4. C5-C6 level degenerative change with endplate spurring and bilateral neuroforaminal narrowing. This report was finalized on 10/18/2022 21:52 by Dr Contreras Frias, .      No results found for: PSA     Lab Results:  CBC:  Lab Results - Last 18 Months   Lab Units 10/19/22  0552 10/18/22  1936 09/21/22  0759 06/07/22  1603 05/10/22  0749 03/24/22  0830 09/17/21  0724   WBC 10*3/mm3 5.70 5.03 4.33 5.1 5.18 4.64 4.58   HEMOGLOBIN g/dL 12.2 12.8 12.4 12.7 12.2 12.3 12.4   HEMATOCRIT % 38.7 40.3 38.2 41.5 37.0 37.4 38.9   PLATELETS 10*3/mm3 230 244 244 267 249 246 266      BMP/CMP:  Lab Results - Last 18 Months   Lab Units 10/19/22  0552 10/18/22  1936 09/21/22  0759 06/07/22  1603 05/10/22  0749 03/24/22  0830 09/17/21  0724   SODIUM mmol/L 139 136 145 141 142 142 139   POTASSIUM mmol/L 4.2 4.5 4.5 3.9 4.2 4.1 4.2   CHLORIDE mmol/L 104 101 107 102 104 104 103   TOTAL CO2 mmol/L  --   --  27.7 28 26.5 26.9 27.2   CO2 mmol/L 29.0 28.0  --   --   --   --   --    BUN mg/dL 16 16 11 17 13 10 14   CREATININE mg/dL 1.35* 1.38* 1.25* 1.3* 1.35* 1.16* 1.14*   EGFR IF NONAFRICN AM   --   --   --  42*  --   --  49*   EGFR IF AFRICN AM   --   --   --  50*  --   --  59*   CALCIUM mg/dL 9.5 9.6 9.0 10.0 9.9 9.3 8.9     HEPATIC:  Lab Results - Last 18 Months   Lab Units 10/19/22  0552 10/18/22  1936 09/21/22  0759 06/07/22  1603 05/10/22  0749 03/24/22  0830 09/17/21  0724   ALT (SGPT) U/L 9 10 10 12 9 11 11   AST (SGOT) U/L 14 15 13 18 13 11 18   ALK PHOS U/L 55 62 59 68 57 65 67     THYROID:  Lab Results - Last 18 Months   Lab Units 10/19/22  0552 09/21/22  0759 05/10/22  0749 09/17/21  0724   TSH uIU/mL 1.450 1.120 1.770 2.820   FREE T4 ng/dL  --  1.16 1.29 1.25     A1C:  Lab Results - Last 18 Months   Lab Units 10/19/22  0552 09/21/22  0759 05/10/22  0749  "03/24/22  0830   HEMOGLOBIN A1C % 5.50  5.40 5.50 5.50 5.50     PSA:No results for input(s): PSA in the last 81339 hours.        Objective   /74 (BP Location: Left arm, Patient Position: Sitting, Cuff Size: Large Adult)   Pulse 68   Temp 96.4 °F (35.8 °C) (Infrared)   Resp 16   Ht 157.5 cm (62\")   Wt 84.1 kg (185 lb 6.4 oz)   SpO2 99%   BMI 33.91 kg/m²   Body mass index is 33.91 kg/m².    Physical Exam  GENERAL:  Well nourished/developed in no acute distress.   SKIN: Turgor excellent, without wound, rash, lesion  HEENT: Normal cephalic without trauma.  Pupils equal round reactive to light. Extraocular motions full without nystagmus.   External canals nonobstructive nontender without reddness. Tymphatic membranes josiane with cesar structures intact.   Oral cavity without growths, exudates, and moist.  Posterior pharynx without mass, obstruction, redness.  No thyromegaly, mass, tenderness, lymphadenopathy and supple.  CV: Regular rhythm.  No murmur, gallop,  edema. Posterior pulses intact.  No carotid bruits.  CHEST: No chest wall tenderness or mass.   LUNGS: Symmetric motion with clear to auscultation.   ABD: Soft, nontender without mass.   ORTHO: Symmetric extremities without swelling/point tenderness.  Full gross range of motion; few sore fingers.  NEURO: CN 2-12 grossly intact.  Symmetric facies. 1/4 x bicep equal reflexes.  UE/LE   3/5 strength throughout.  Nonfocal use extremities. Speech clear. Intact light touch with monofilament, vibratory sensation with tuning fork; equal toes/distal feet.    PSYCH: Oriented x 3.  Pleasant calm, well kept.   Purposeful/directed conservation with intact short/long gross memory.     Assessment & Plan     1. Primary hypertension    2. Chest pain, unspecified type    3. Cardiac arrhythmia, unspecified cardiac arrhythmia type    4. Anticoagulated: sx ? TIA/ASA 81    5. Left-sided weakness    6. Gastroesophageal reflux disease, unspecified whether esophagitis present  "     Rx: reviewed/changes:  No orders of the defined types were placed in this encounter.    LAB/Testing/Referrals: reviewed/orders:   Today: none  No orders of the defined types were placed in this encounter.    Usual:   same    Discussions:   Suppose L weakness could be spinal/cervical; ok to watch  Pro/con ASA; appears possible TIA so decision to continue  More chest pain; maybe GI eval and even if suggestive cardiac despite EST/echo cath ?     Body mass index is 33.91 kg/m².   BMI is >= 30 and <35. (Class 1 Obesity). The following options were offered after discussion;: exercise counseling/recommendations and nutrition counseling/recommendations  Non-smoker  Emilie Soto  reports that she has never smoked. She has never used smokeless tobacco..     There are no Patient Instructions on file for this visit.    Follow up: Return for lab/Dr Miles 3.21.23.  Future Appointments   Date Time Provider Department Center   3/21/2023  9:00 AM [facility] [place] [facility]   3/28/2023 11:45 AM Alexandru Miles [place] [facility]              Current outpatient and discharge medications have been reconciled for the patient.    Transitional Care Management Certification  I certify that the following are true:  1. Communication was made within 2 business days of discharge.  2. Complexity of Medical Decision Making is high.  3. Face to face visit occurred within 14 days.    *Note: 20040 is for high complexity patients with a face to face visit within 7 days of discharge.  07032 is for high complexity patients with a face to face on days 8-14 post discharge or medium complexity with face to face visit within 14 days post discharge.

## 2022-11-16 ENCOUNTER — TELEPHONE (OUTPATIENT)
Dept: FAMILY MEDICINE CLINIC | Facility: CLINIC | Age: 62
End: 2022-11-16

## 2022-11-16 NOTE — TELEPHONE ENCOUNTER
Caller: Emilie Soto    Relationship: Self    Best call back number: [phone]    What medication are you requesting: ANTIBIOTIC AND STEROID PACK    What are your current symptoms: RUNNY NOSE, SORE THROAT, COUGH, CONGESTION, FEVER    How long have you been experiencing symptoms: 11.14.22    Have you had these symptoms before:    [x] Yes  [] No    Have you been treated for these symptoms before:   [x] Yes  [] No    If a prescription is needed, what is your preferred pharmacy and phone number: [facility] - [place] - [address] [facility] [phone] [facility] [phone] [facility]     Additional notes: PATIENT WAS TREATED FOR THESE SAME SYMPTOMS IN AUGUST OF THIS YEAR. PATIENT STATED THE LAST PRESCRIPTIONS WERE FILLED ON 8.17.22. PATIENT WOULD LIKE THE SAME MEDICATIONS SENT IN AGAIN. PATIENT IS HAVING THE SAME SYMPTOMS AS THE LAST TIME THESE WERE PRESCRIBED. PATIENT STATES IT WAS AMOXICILLAN AND STEROID PACK. CALL BACK WHEN SOMETHING IS CALLED IN OR LEAVE A MESSAGE.

## 2022-11-17 ENCOUNTER — OFFICE VISIT (OUTPATIENT)
Dept: FAMILY MEDICINE CLINIC | Facility: CLINIC | Age: 62
End: 2022-11-17

## 2022-11-17 VITALS
HEIGHT: 62 IN | WEIGHT: 180 LBS | OXYGEN SATURATION: 96 % | RESPIRATION RATE: 14 BRPM | TEMPERATURE: 98.8 F | BODY MASS INDEX: 33.13 KG/M2 | HEART RATE: 82 BPM

## 2022-11-17 DIAGNOSIS — R05.9 COUGH, UNSPECIFIED TYPE: ICD-10-CM

## 2022-11-17 DIAGNOSIS — J40 BRONCHITIS: Primary | ICD-10-CM

## 2022-11-17 PROCEDURE — 87426 SARSCOV CORONAVIRUS AG IA: CPT | Performed by: NURSE PRACTITIONER

## 2022-11-17 PROCEDURE — 87804 INFLUENZA ASSAY W/OPTIC: CPT | Performed by: NURSE PRACTITIONER

## 2022-11-17 PROCEDURE — 99213 OFFICE O/P EST LOW 20 MIN: CPT | Performed by: NURSE PRACTITIONER

## 2022-11-17 RX ORDER — AMOXICILLIN AND CLAVULANATE POTASSIUM 875; 125 MG/1; MG/1
1 TABLET, FILM COATED ORAL 2 TIMES DAILY
Qty: 20 TABLET | Refills: 0 | Status: SHIPPED | OUTPATIENT
Start: 2022-11-17 | End: 2022-11-27

## 2022-11-17 RX ORDER — PREDNISONE 20 MG/1
20 TABLET ORAL DAILY
Qty: 5 TABLET | Refills: 0 | Status: SHIPPED | OUTPATIENT
Start: 2022-11-17 | End: 2022-11-22

## 2022-11-17 NOTE — PROGRESS NOTES
Subjective   Chief Complaint:  Evaluation of Respiratory Symptoms    History of Present Illness:  This 62 y.o. female was seen in the office today in the drive-thru with cough, sputum, production, sore throat, runny nose, chest congestion onset x4 days.     She reports no exposure to COVID-19 or influenza. She is vaccinated with Moderna x2 and booster x1.      Allergies   Allergen Reactions   • Abilify [Aripiprazole] Other (See Comments)     Insomnia   • Bactrim [Sulfamethoxazole-Trimethoprim] Other (See Comments)     Pt unsure of reaction   • Biaxin [Clarithromycin] Nausea And Vomiting   • Ciprofloxacin Hcl Other (See Comments)     Pt unsure of reaction   • Duricef [Cefadroxil] Other (See Comments)     Pt unsure of reaction   • Ketek [Telithromycin] Other (See Comments)     Pt unsure of reaction   • Relafen [Nabumetone] Itching   • Wellbutrin [Bupropion] Itching      Current Outpatient Medications on File Prior to Visit   Medication Sig   • albuterol sulfate  (90 Base) MCG/ACT inhaler INHALE 1 PUFF EVERY 4 (FOUR) HOURS AS NEEDED FOR WHEEZING OR SHORTNESS OF AIR.   • alendronate (FOSAMAX) 70 MG tablet Take 1 tablet by mouth Every 7 (Seven) Days. Friday   • ALPRAZolam (XANAX) 0.5 MG tablet Take 1 tablet by mouth 2 (Two) Times a Day As Needed for Anxiety.   • amLODIPine (NORVASC) 5 MG tablet TAKE 1 TABLET BY MOUTH DAILY.   • aspirin 81 MG EC tablet Take 1 tablet by mouth Daily.   • carvedilol (Coreg) 12.5 MG tablet Take 1 tablet by mouth 2 (Two) Times a Day With Meals for 30 days.   • cloNIDine (CATAPRES) 0.1 MG tablet Take 1 tablet by mouth 2 (Two) Times a Day As Needed. One by mouth twice daily as needed if BP greater than 160/100   • estradiol (ESTRACE) 2 MG tablet TAKE 0.5 TABLETS BY MOUTH DAILY FOR 90 DAYS. (Patient taking differently: Take 0.5 mg by mouth Daily.)   • FLUoxetine (PROzac) 20 MG capsule Take 1 capsule by mouth Daily. Along with a 40 mg total = 60 mg daily   • FLUoxetine (PROzac) 40 MG  capsule Take 1 capsule by mouth Daily. Along with a 20 mg total 60 mg daily   • folic acid (FOLVITE) 1 MG tablet Take 1 tablet by mouth Daily.   • HYDROcodone-acetaminophen (NORCO) 7.5-325 MG per tablet Take 1 tablet by mouth 2 (Two) Times a Day As Needed.   • hydroxychloroquine (PLAQUENIL) 200 MG tablet Take 1 tablet by mouth 2 (Two) Times a Day.   • loratadine (CLARITIN) 10 MG tablet Take 10 mg by mouth As Needed for Allergies.   • losartan (COZAAR) 100 MG tablet TAKE 1 TABLET BY MOUTH DAILY.   • mycophenolate (CELLCEPT) 500 MG tablet Take 1 tablet by mouth 2 (Two) Times a Day.   • omeprazole (priLOSEC) 20 MG capsule TAKE 1 CAPSULE BY MOUTH DAILY.   • potassium chloride (K-DUR,KLOR-CON) 10 MEQ CR tablet TAKE 1 TABLET BY MOUTH DAILY.   • senna (SENOKOT) 8.6 MG tablet Take 1 tablet by mouth As Needed for Constipation.   • spironolactone (ALDACTONE) 25 MG tablet TAKE 1 TABLET BY MOUTH DAILY.   • traZODone (DESYREL) 50 MG tablet Take 1.5 tablets by mouth Every Night.   • verapamil SR (CALAN-SR) 240 MG CR tablet TAKE 1 TABLET BY MOUTH 2 (TWO) TIMES A DAY.   • vitamin D (ERGOCALCIFEROL) 46904 UNITS capsule capsule Take 1 capsule by mouth 1 (One) Time Per Week.     No current facility-administered medications on file prior to visit.      Past Medical, Surgical, Social, and Family History:  Past Medical History:   Diagnosis Date   • Asthma    • CHF (congestive heart failure) (HCC)    • CKD (chronic kidney disease)    • DDD (degenerative disc disease), cervical    • Depression    • Fibromyalgia    • GERD (gastroesophageal reflux disease)    • Heart murmur    • Hepatitis cholestatic    • History of adenomatous polyp of colon    • HOCM (hypertrophic obstructive cardiomyopathy) (HCC)    • HTN (hypertension)    • RA (rheumatoid arthritis) (HCC)    • Renal disease    • Sleep apnea      Past Surgical History:   Procedure Laterality Date   • COLONOSCOPY  04/22/2016    One 16mm tubulovillous adenomatous polyp at the ileocecal  valve-Clip was placed-injected; The examination was otherwise normal on direct and retroflexion views; Repeat 1 year   • COLONOSCOPY N/A 7/28/2017    A tattoo was seen at the ileocecal valve-A post-polypectomy scar was found at the tattoo site; One 12mm tubular adenomatous flat polyp in the transverse colon-Clip (MR conditional) was placed; The examination was otherwise normal on direct and retroflexion views; Repeat 2 years   • COLONOSCOPY  10/04/2015    Bleeding at the ileocecal valve secondary to previous polypectomy-Clip was placed; No specimens collected; Repeat 6 months for retreatment   • COLONOSCOPY  09/30/2015    Very large multilobulated tubular adenomatous polyp opposite the ileocecal valve-removed piecemeal-at least 95% removed; One less than 1cm tubular adenomatous polyp in the ascending colon; Repeat 6-12 months to reassess the large polyp   • COLONOSCOPY N/A 10/10/2019    One 5mm tubular adenomatous polyp in the transverse colon; Diverticulosis in the left colon; The entire examined colon is normal on direct and retroflexion views; Repeat 5 years   • ENDOSCOPY N/A 10/13/2020    Procedure: ESOPHAGOGASTRODUODENOSCOPY WITH ANESTHESIA;  Surgeon: Abigail Weathers MD;  Location: [facility] ENDOSCOPY;  Service: Gastroenterology;  Laterality: N/A;  pre GERD  post: esophageal dilation with balloon   jocelyne irlanda    • EXPLORATORY LAPAROTOMY     • HYSTERECTOMY     • LIVER BIOPSY  06/14/2011    Cholestatic hepatitis-see report     Social History     Socioeconomic History   • Marital status:    Tobacco Use   • Smoking status: Never   • Smokeless tobacco: Never   Substance and Sexual Activity   • Alcohol use: Yes     Alcohol/week: 2.0 standard drinks     Types: 2 Glasses of wine per week     Comment: Occasionally   • Drug use: No   • Sexual activity: Yes     Partners: Male     Birth control/protection: Post-menopausal     Family History   Problem Relation Age of Onset   • Throat cancer Mother    • Diabetes Father  "   • Cancer Father         Pt reports he had part of his intestines removed   • Breast cancer Sister    • Colon cancer Neg Hx    • Colon polyps Neg Hx    • Esophageal cancer Neg Hx    • Liver cancer Neg Hx    • Liver disease Neg Hx    • Rectal cancer Neg Hx    • Stomach cancer Neg Hx      Objective   Covid Precautions Maintained  Physical Exam  Constitutional:       General: She is not in acute distress.     Appearance: She is ill-appearing.      Comments: Tired-appearing   Pulmonary:      Effort: Pulmonary effort is normal. No respiratory distress.   Neurological:      Mental Status: She is alert.     Pulse 82   Temp 98.8 °F (37.1 °C)   Resp 14   Ht 157.5 cm (62\")   Wt 81.6 kg (180 lb)   SpO2 96%   BMI 32.92 kg/m²     Assessment & Plan   Diagnoses and all orders for this visit:    1. Bronchitis (Primary)    Other orders  -     amoxicillin-clavulanate (Augmentin) 875-125 MG per tablet; Take 1 tablet by mouth 2 (Two) Times a Day for 10 days.  Dispense: 20 tablet; Refill: 0  -     predniSONE (DELTASONE) 20 MG tablet; Take 1 tablet by mouth Daily for 5 days.  Dispense: 5 tablet; Refill: 0    Discussion:  Advised and educated plan of care. Advised patient influenza and COVID-19 test results were negative. We will proceed with antibiotics and steroids. Follow up as needed.    Follow-up:  Return if symptoms worsen or fail to improve.    I have personally performed the services described in this document as transcribed by the above individual, and it is both accurate and complete.    "

## 2022-12-05 RX ORDER — ASPIRIN 81 MG/1
TABLET, COATED ORAL
Qty: 30 TABLET | Refills: 0 | Status: SHIPPED | OUTPATIENT
Start: 2022-12-05

## 2022-12-22 ENCOUNTER — CLINICAL SUPPORT (OUTPATIENT)
Dept: FAMILY MEDICINE CLINIC | Facility: CLINIC | Age: 62
End: 2022-12-22
Payer: MEDICARE

## 2022-12-22 DIAGNOSIS — Z23 NEED FOR INFLUENZA VACCINATION: Primary | ICD-10-CM

## 2022-12-22 PROCEDURE — G0008 ADMIN INFLUENZA VIRUS VAC: HCPCS | Performed by: FAMILY MEDICINE

## 2022-12-22 PROCEDURE — 90686 IIV4 VACC NO PRSV 0.5 ML IM: CPT | Performed by: FAMILY MEDICINE

## 2023-02-07 DIAGNOSIS — N18.32 CHRONIC KIDNEY DISEASE (CKD) STAGE G3B/A1, MODERATELY DECREASED GLOMERULAR FILTRATION RATE (GFR) BETWEEN 30-44 ML/MIN/1.73 SQUARE METER AND ALBUMINURIA CREATININE RATIO LESS THAN 30 MG/G (CMS/H*: Primary | ICD-10-CM

## 2023-02-07 DIAGNOSIS — I10 HYPERTENSION, ESSENTIAL: ICD-10-CM

## 2023-02-08 LAB
25(OH)D3+25(OH)D2 SERPL-MCNC: 63 NG/ML (ref 30–100)
ALBUMIN SERPL-MCNC: 3.9 G/DL (ref 3.5–5.2)
ALBUMIN/GLOB SERPL: 1.3 G/DL
ALP SERPL-CCNC: 61 U/L (ref 39–117)
ALT SERPL-CCNC: 9 U/L (ref 1–33)
APPEARANCE UR: CLEAR
AST SERPL-CCNC: 16 U/L (ref 1–32)
BASOPHILS # BLD AUTO: 0.03 10*3/MM3 (ref 0–0.2)
BASOPHILS NFR BLD AUTO: 0.6 % (ref 0–1.5)
BILIRUB SERPL-MCNC: 0.3 MG/DL (ref 0–1.2)
BILIRUB UR QL STRIP: NEGATIVE
BUN SERPL-MCNC: 13 MG/DL (ref 8–23)
BUN/CREAT SERPL: 10.1 (ref 7–25)
CALCIUM SERPL-MCNC: 9 MG/DL (ref 8.6–10.5)
CHLORIDE SERPL-SCNC: 104 MMOL/L (ref 98–107)
CO2 SERPL-SCNC: 28.3 MMOL/L (ref 22–29)
COLOR UR: YELLOW
CREAT SERPL-MCNC: 1.29 MG/DL (ref 0.57–1)
CREAT UR-MCNC: 81.6 MG/DL
EGFRCR SERPLBLD CKD-EPI 2021: 47.3 ML/MIN/1.73
EOSINOPHIL # BLD AUTO: 0.07 10*3/MM3 (ref 0–0.4)
EOSINOPHIL NFR BLD AUTO: 1.4 % (ref 0.3–6.2)
ERYTHROCYTE [DISTWIDTH] IN BLOOD BY AUTOMATED COUNT: 13.1 % (ref 12.3–15.4)
GLOBULIN SER CALC-MCNC: 2.9 GM/DL
GLUCOSE SERPL-MCNC: 79 MG/DL (ref 65–99)
GLUCOSE UR QL STRIP: NEGATIVE
HCT VFR BLD AUTO: 38 % (ref 34–46.6)
HGB BLD-MCNC: 12.2 G/DL (ref 12–15.9)
HGB UR QL STRIP: NEGATIVE
IMM GRANULOCYTES # BLD AUTO: 0.02 10*3/MM3 (ref 0–0.05)
IMM GRANULOCYTES NFR BLD AUTO: 0.4 % (ref 0–0.5)
KETONES UR QL STRIP: NEGATIVE
LEUKOCYTE ESTERASE UR QL STRIP: NEGATIVE
LYMPHOCYTES # BLD AUTO: 1.46 10*3/MM3 (ref 0.7–3.1)
LYMPHOCYTES NFR BLD AUTO: 29.4 % (ref 19.6–45.3)
MAGNESIUM SERPL-MCNC: 2 MG/DL (ref 1.6–2.4)
MCH RBC QN AUTO: 29 PG (ref 26.6–33)
MCHC RBC AUTO-ENTMCNC: 32.1 G/DL (ref 31.5–35.7)
MCV RBC AUTO: 90.3 FL (ref 79–97)
MONOCYTES # BLD AUTO: 0.68 10*3/MM3 (ref 0.1–0.9)
MONOCYTES NFR BLD AUTO: 13.7 % (ref 5–12)
NEUTROPHILS # BLD AUTO: 2.7 10*3/MM3 (ref 1.7–7)
NEUTROPHILS NFR BLD AUTO: 54.5 % (ref 42.7–76)
NITRITE UR QL STRIP: NEGATIVE
NRBC BLD AUTO-RTO: 0 /100 WBC (ref 0–0.2)
PH UR STRIP: 5.5 [PH] (ref 5–8)
PHOSPHATE SERPL-MCNC: 3.2 MG/DL (ref 2.5–4.5)
PLATELET # BLD AUTO: 235 10*3/MM3 (ref 140–450)
POTASSIUM SERPL-SCNC: 3.9 MMOL/L (ref 3.5–5.2)
PROT SERPL-MCNC: 6.8 G/DL (ref 6–8.5)
PROT UR QL STRIP: NEGATIVE
PROT UR-MCNC: 7.2 MG/DL
PROT/CREAT UR: 88.2 MG/G CREA (ref 0–200)
PTH-INTACT SERPL-MCNC: 45 PG/ML (ref 15–65)
RBC # BLD AUTO: 4.21 10*6/MM3 (ref 3.77–5.28)
SODIUM SERPL-SCNC: 140 MMOL/L (ref 136–145)
SP GR UR STRIP: 1.01 (ref 1–1.03)
URATE SERPL-MCNC: 4.7 MG/DL (ref 2.4–5.7)
UROBILINOGEN UR STRIP-MCNC: NORMAL MG/DL
WBC # BLD AUTO: 4.96 10*3/MM3 (ref 3.4–10.8)

## 2023-03-17 DIAGNOSIS — F41.9 ANXIETY: Primary | ICD-10-CM

## 2023-03-17 RX ORDER — ALPRAZOLAM 0.5 MG/1
TABLET ORAL
Qty: 30 TABLET | Refills: 0 | Status: SHIPPED | OUTPATIENT
Start: 2023-03-17

## 2023-03-17 NOTE — TELEPHONE ENCOUNTER
[facility]  Per Protocol Last Refill 07/29/2022    Rx Refill Note  Requested Prescriptions     Pending Prescriptions Disp Refills   • ALPRAZolam (XANAX) 0.5 MG tablet [Pharmacy Med Name: ALPRAZOLAM 0.5MG TABLET] 30 tablet 0     Sig: TAKE ONE TABLET TWICE DAILY AS NEEDED ANXIETY GENERIC FOR XANAX      Last office visit with prescribing clinician: 11/17/2022      Next office visit with prescribing clinician: Visit date not found            Deena Moses MA  03/17/23, 09:37 CDT

## 2023-03-21 ENCOUNTER — LAB (OUTPATIENT)
Dept: FAMILY MEDICINE CLINIC | Facility: CLINIC | Age: 63
End: 2023-03-21
Payer: MEDICARE

## 2023-03-22 LAB
APPEARANCE UR: CLEAR
BACTERIA #/AREA URNS HPF: ABNORMAL /HPF
BILIRUB UR QL STRIP: NEGATIVE
CASTS URNS MICRO: ABNORMAL
COLOR UR: YELLOW
CREAT UR-MCNC: 117.4 MG/DL
EPI CELLS #/AREA URNS HPF: ABNORMAL /HPF
GLUCOSE UR QL STRIP: NEGATIVE
HGB UR QL STRIP: NEGATIVE
KETONES UR QL STRIP: NEGATIVE
LEUKOCYTE ESTERASE UR QL STRIP: NEGATIVE
NITRITE UR QL STRIP: NEGATIVE
PH UR STRIP: 5.5 [PH] (ref 5–8)
PROT UR QL STRIP: NEGATIVE
PROT UR-MCNC: 6.8 MG/DL
RBC #/AREA URNS HPF: ABNORMAL /HPF
SP GR UR STRIP: 1.01 (ref 1–1.03)
UROBILINOGEN UR STRIP-MCNC: NORMAL MG/DL
WBC #/AREA URNS HPF: ABNORMAL /HPF

## 2023-03-28 ENCOUNTER — OFFICE VISIT (OUTPATIENT)
Dept: FAMILY MEDICINE CLINIC | Facility: CLINIC | Age: 63
End: 2023-03-28
Payer: MEDICARE

## 2023-03-28 VITALS
BODY MASS INDEX: 34.34 KG/M2 | DIASTOLIC BLOOD PRESSURE: 76 MMHG | SYSTOLIC BLOOD PRESSURE: 128 MMHG | HEART RATE: 74 BPM | RESPIRATION RATE: 18 BRPM | TEMPERATURE: 96.9 F | HEIGHT: 62 IN | OXYGEN SATURATION: 97 % | WEIGHT: 186.6 LBS

## 2023-03-28 DIAGNOSIS — F32.A DEPRESSION, UNSPECIFIED DEPRESSION TYPE: ICD-10-CM

## 2023-03-28 DIAGNOSIS — I10 PRIMARY HYPERTENSION: Chronic | ICD-10-CM

## 2023-03-28 DIAGNOSIS — M54.9 CHRONIC BACK PAIN, UNSPECIFIED BACK LOCATION, UNSPECIFIED BACK PAIN LATERALITY: Chronic | ICD-10-CM

## 2023-03-28 DIAGNOSIS — Z79.890 HORMONE REPLACEMENT THERAPY (HRT): ICD-10-CM

## 2023-03-28 DIAGNOSIS — K21.9 GASTROESOPHAGEAL REFLUX DISEASE, UNSPECIFIED WHETHER ESOPHAGITIS PRESENT: Chronic | ICD-10-CM

## 2023-03-28 DIAGNOSIS — F11.90 NARCOTIC DRUG USE: ICD-10-CM

## 2023-03-28 DIAGNOSIS — I50.30 DIASTOLIC CONGESTIVE HEART FAILURE, UNSPECIFIED HF CHRONICITY: Chronic | ICD-10-CM

## 2023-03-28 DIAGNOSIS — G89.29 CHRONIC BACK PAIN, UNSPECIFIED BACK LOCATION, UNSPECIFIED BACK PAIN LATERALITY: Chronic | ICD-10-CM

## 2023-03-28 DIAGNOSIS — R73.09 ELEVATED GLUCOSE: Primary | ICD-10-CM

## 2023-03-28 DIAGNOSIS — K21.9 GASTROESOPHAGEAL REFLUX DISEASE WITHOUT ESOPHAGITIS: ICD-10-CM

## 2023-03-28 DIAGNOSIS — N28.9 RENAL INSUFFICIENCY: ICD-10-CM

## 2023-03-28 LAB
EXPIRATION DATE: NORMAL
HBA1C MFR BLD: 5.4 %
Lab: NORMAL

## 2023-03-28 NOTE — PATIENT INSTRUCTIONS
"Medicare/insurances offer certain visits called \"wellness/annual\" that allows for time to deal with and  review the many aspects of \"being well\" that just might not get mentioned during other visits with your doctor through the year.  This includes things like reviews of health screenings (mammograms, various labs),  weight, exercise, vaccines for just a few examples.      In order to help you with this we wish to make you aware of a few things for you to consider:    1. Advanced directives.  These are documents used to help direct your care if your health/situation should reach a point that you cannot make your own decisions.  While it is likely you do not currently have a need for these documents now; it is something that we all might face at any time.   The hand outs you are being given today are simply for you to review and use to learn more about these documents and consider them as you wish.      2. Vaccines: Certain vaccines are important after age 50, 60, and 65 and some health situations (for example COPD), require even boosters beyond age 65.  We are happy to review with you your vaccine status and vaccines that might be needed for you at this point:      a. Tetanus.   Like anyone this needs to given every 10 years; sooner for/with lacerations/wounds.   Likely when getting this booster it needs to be a tetanus called Tdap (tetanus mixed with diptheria and pertussis).   Years ago you had this vaccine.  We now know we can lose our immunity to pertussis (a part of this vaccine) and run a risk of catching this.  Now only would this make us ill; but more importantly we can spread this to very young children (and for them it can be a much more dangerous illness).   We call this the grandparent vaccine for this reason.     b. Pneumonia (strept).   This comes now with three brands.   Previously it was recommended to take prevnar and a year later pneumovax 23.  Now pneumonia 20 is replacing these with a one time " pneumonia vaccine.   Even if you have had these before; we need to review when and your current health situation/s as you may need boosters and even recently the CDC has made recent/new recommendations for pneumovax.      c. Shingles.  You do not want to catch shingles.  Though you will recover from this; the pain associated with shingles can be severe.  Even if you have had the now older zostavax, or have had shingles; it is recommended you still get the Shingrix (the new vaccine just available early 2018 shingles vaccine).  A new shingles vaccine (a shot to lower your chance of catching shingles) is now available (shingrix).  This vaccine is the second vaccine created for this purpose; (we have had zostavax for years).  Shingrix provides a much better and longer immunity for shingles than zostavax.  For this and other reasons Shingrix can be started at age 50.  If you have had zostavax in the past; you can still take Shingrix.  Beginning 2023 medicare no longer requires a co-pay for this (ie: it is free)    This vaccine is not paid for in a doctor's office by medicare, medicaid and probably most insurances.  Like zostavax; this is covered in drug stores.  This is a vaccine that if you chose to get you need to get at a drug store that gives vaccines (like [facility], [facility] and [facility].      d. Yearly flu vaccine given from September through April each year (there is a special vaccine for those over 65).     e. Travel vaccines:  If you are one to do international travel; be sure and ask us for any particular unusual vaccines you may need.     f.  Miscellaneous:  If you have certain health situations/disease you may need specific/particular vaccines not give to the general public.     g.  Covid: currently recommended everyone over 6m  The brands Pfizer/Moderna are for 3 total shots as immunity will wane from less than this.  [employer] has a version that comes with recommendations for an  initial vaccine and booster after.  I no longer recommend J&J as a first choice as Pfizer/Moderna are readily available.  If you have had an initial J&J I recommend you booster with Moderna.   I strongly recommend covid vaccination; being unvaccinated or partially vaccinated carries real risk for disease and even death.     Because of many restrictions on this office always having all the above vaccines; you may be advised to work with your local health department to keep up with your individual vaccine needs.    The vaccines we have on record for you include:   Immunization History   Administered Date(s) Administered    COVID-19 (MODERNA) 1st, 2nd, 3rd Dose Only 03/15/2021, 04/12/2021    COVID-19 (MODERNA) BOOSTER 12/15/2021    FluLaval/Fluzone >6mos 10/27/2018, 11/12/2020, 10/13/2021, 12/22/2022    Influenza, Unspecified 11/05/2016    Tdap 10/19/2021       If you have record of other vaccines from vaccine clinics, [facility], [facility] and like and want them to show in your chart here; please talk to our nurses about having your vaccine record updated. We would be very pleased if you would take the time to get us this information to keep you main health record here current.     3. Exercise: regular cardio exercise something everyone should consider and try to do; even if health limitations (ie find that exercise UE/LE/cardio that they can tolerate).   Normal weight a goal for everyone (as we discussed)    4. Healthy diet helpful for weight management, illness prevention.     5. If over 50-screening exams include men PSA/rectal exam, women mammograms, and everyone colonoscopy screening for colon cancer.    6. If you use tobacco of any kind or e-products you should stop. We are providing you some information to consider that could make this process easier.      ##################################

## 2023-03-28 NOTE — PROGRESS NOTES
The ABCs of the Annual Wellness Visit  Subsequent Medicare Wellness Visit    Subjective    Emilie Soto is a 62 y.o. female who presents for a Subsequent Medicare Wellness Visit.    The following portions of the patient's history were reviewed and   updated as appropriate: allergies, current medications, past family history, past medical history, past social history, past surgical history and problem list.    Compared to one year ago, the patient feels her physical   health is the same.    Compared to one year ago, the patient feels her mental   health is the same.    Recent Hospitalizations:  This patient has had a [facility] admission record on file within the last 365 days.    Current Medical Providers:  Patient Care Team:  Alexandru Miles MD as PCP - General  Alexandru Miles MD as PCP - Family Medicine  Yue Meza PA (Physician Assistant)  Abigail Weathers MD as Consulting Physician (Gastroenterology)    Outpatient Medications Prior to Visit   Medication Sig Dispense Refill   • albuterol sulfate  (90 Base) MCG/ACT inhaler INHALE 1 PUFF EVERY 4 (FOUR) HOURS AS NEEDED FOR WHEEZING OR SHORTNESS OF AIR. 54 g 3   • alendronate (FOSAMAX) 70 MG tablet Take 1 tablet by mouth Every 7 (Seven) Days. Friday     • ALPRAZolam (XANAX) 0.5 MG tablet TAKE ONE TABLET TWICE DAILY AS NEEDED ANXIETY GENERIC FOR XANAX 30 tablet 0   • amLODIPine (NORVASC) 5 MG tablet TAKE 1 TABLET BY MOUTH DAILY. 90 tablet 3   • Aspirin Low Dose 81 MG EC tablet TAKE 1 TABLET BY MOUTH DAILY. 30 tablet 0   • carvedilol (Coreg) 12.5 MG tablet Take 1 tablet by mouth 2 (Two) Times a Day With Meals for 30 days. 60 tablet 5   • FLUoxetine (PROzac) 20 MG capsule Take 1 capsule by mouth Daily. Along with a 40 mg total = 60 mg daily     • FLUoxetine (PROzac) 40 MG capsule Take 1 capsule by mouth Daily. Along with a 20 mg total 60 mg daily     • folic acid (FOLVITE) 1 MG tablet Take 1 tablet by mouth Daily. 90 tablet 3   •  hydroxychloroquine (PLAQUENIL) 200 MG tablet Take 1 tablet by mouth 2 (Two) Times a Day.     • loratadine (CLARITIN) 10 MG tablet Take 1 tablet by mouth As Needed for Allergies.     • losartan (COZAAR) 100 MG tablet TAKE 1 TABLET BY MOUTH DAILY. 90 tablet 2   • mycophenolate (CELLCEPT) 500 MG tablet Take 1 tablet by mouth 2 (Two) Times a Day.     • omeprazole (priLOSEC) 20 MG capsule TAKE 1 CAPSULE BY MOUTH DAILY. 90 capsule 3   • potassium chloride (K-DUR,KLOR-CON) 10 MEQ CR tablet TAKE 1 TABLET BY MOUTH DAILY. 90 tablet 3   • spironolactone (ALDACTONE) 25 MG tablet TAKE 1 TABLET BY MOUTH DAILY. 90 tablet 3   • traZODone (DESYREL) 50 MG tablet Take 1.5 tablets by mouth Every Night.     • verapamil SR (CALAN-SR) 240 MG CR tablet TAKE 1 TABLET BY MOUTH 2 (TWO) TIMES A DAY. 180 tablet 1   • cloNIDine (CATAPRES) 0.1 MG tablet Take 1 tablet by mouth 2 (Two) Times a Day As Needed. One by mouth twice daily as needed if BP greater than 160/100 (Patient not taking: Reported on 3/28/2023)     • estradiol (ESTRACE) 2 MG tablet TAKE 0.5 TABLETS BY MOUTH DAILY FOR 90 DAYS. (Patient taking differently: Take 0.5 mg by mouth Daily.) 45 tablet 3   • HYDROcodone-acetaminophen (NORCO) 7.5-325 MG per tablet Take 1 tablet by mouth 2 (Two) Times a Day As Needed. (Patient not taking: Reported on 3/28/2023)     • vitamin D (ERGOCALCIFEROL) 94567 UNITS capsule capsule Take 1 capsule by mouth 1 (One) Time Per Week.     • senna (SENOKOT) 8.6 MG tablet Take 1 tablet by mouth As Needed for Constipation. (Patient not taking: Reported on 3/28/2023)       No facility-administered medications prior to visit.       Opioid medication/s are on active medication list.  and I have evaluated her active treatment plan and pain score trends (see table).  There were no vitals filed for this visit.  I have reviewed the chart for potential of high risk medication and harmful drug interactions in the elderly.    Current Rx being used that are controlled were  reviewed: Norco 7.5  Goals for these Rx include: less pain   These Rx are considered the best options for current treatment without other options considered as good.   Warning remain the same as the initial controlled substance form on file.       Aspirin is on active medication list. Aspirin use is indicated based on review of current medical condition/s. Pros and cons of this therapy have been discussed today. Benefits of this medication outweigh potential harm.  Patient has been encouraged to continue taking this medication.  .      Patient Active Problem List   Diagnosis   • Hypertension   • Obesity   • [facility]   [facility]   • Chronic back pain   • Rheumatoid arthritis-harrison   • RHIANNON: (cpap)   • Pulmonary fibrosis (HCC)   • Gastroesophageal reflux   • Murmur-echo benign   • Congestive heart failure-diastolic   • Insomnia   • Wellness examination-done   • Laboratory test*   • Chest pain-11.2017 noncardiac   • Surgical menopause see osteopenia   • HRT-([place])   • Renal insufficiency-(ro) karl   • Hx of colonic polyps   • Family history of colon cancer   • Bilateral hearing loss   • Osteopenia: 2020-2y   • Other dysphagia   • Heartburn   • Gastroesophageal reflux disease without esophagitis   • Hypopotassemia   • Elevated fasting glucose   • Immunosuppressed status ([facility])   • Cardiac arrhythmia-palpitations   • Narcotic drug use-ro   • Chest pain, unspecified type   • CKD (chronic kidney disease)   • Chronic diastolic CHF (congestive heart failure) ([facility])   • Symptomatic bradycardia   • Anticoagulated: sx ? TIA/ASA 81     Advance Care Planning  Advance Directive is not on file.  ACP discussion was held with the patient during this visit. Patient does not have an advance directive, information provided.     Objective    Vitals:    03/28/23 1115   BP: 128/76   BP Location: Left arm   Patient Position: Sitting   Cuff Size: Adult   Pulse: 74   Resp: 18   Temp: 96.9 °F (36.1 °C)  "  TempSrc: Temporal   SpO2: 97%   Weight: 84.6 kg (186 lb 9.6 oz)   Height: 157.5 cm (62\")   PainSc: 0-No pain     Estimated body mass index is 34.13 kg/m² as calculated from the following:    Height as of this encounter: 157.5 cm (62\").    Weight as of this encounter: 84.6 kg (186 lb 9.6 oz).    BMI is >= 30 and <35. (Class 1 Obesity). The following options were offered after discussion;: exercise counseling/recommendations and nutrition counseling/recommendations      Does the patient have evidence of cognitive impairment? No          HEALTH RISK ASSESSMENT    Smoking Status:  Social History     Tobacco Use   Smoking Status Never   Smokeless Tobacco Never     Alcohol Consumption:  Social History     Substance and Sexual Activity   Alcohol Use Yes   • Alcohol/week: 2.0 standard drinks   • Types: 2 Glasses of wine per week    Comment: Occasionally     Fall Risk Screen:    JOSE Fall Risk Assessment has not been completed.    Depression Screening:  PHQ-2/PHQ-9 Depression Screening 3/28/2023   Little Interest or Pleasure in Doing Things 1-->several days   Feeling Down, Depressed or Hopeless 1-->several days   Trouble Falling or Staying Asleep, or Sleeping Too Much 0-->not at all   Feeling Tired or Having Little Energy 3-->nearly every day   Poor Appetite or Overeating 1-->several days   Feeling Bad about Yourself - or that You are a Failure or Have Let Yourself or Your Family Down 0-->not at all   Trouble Concentrating on Things, Such as Reading the Newspaper or Watching Television 0-->not at all   Moving or Speaking So Slowly that Other People Could Have Noticed? Or the Opposite - Being So Fidgety 0-->not at all   Thoughts that You Would be Better Off Dead or of Hurting Yourself in Some Way 0-->not at all   PHQ-9: Brief Depression Severity Measure Score 6   If You Checked Off Any Problems, How Difficult Have These Problems Made It For You to Do Your Work, Take Care of Things at Home, or Get Along with Other People? " somewhat difficult       Health Habits and Functional and Cognitive Screening:  Functional & Cognitive Status 3/28/2023   Do you have difficulty preparing food and eating? No   Do you have difficulty bathing yourself, getting dressed or grooming yourself? No   Do you have difficulty using the toilet? No   Do you have difficulty moving around from place to place? No   Do you have trouble with steps or getting out of a bed or a chair? No   Current Diet Unhealthy Diet   Dental Exam Not up to date   Eye Exam Up to date   Exercise (times per week) 0 times per week   Current Exercises Include No Regular Exercise   Do you need help using the phone?  No   Are you deaf or do you have serious difficulty hearing?  No   Do you need help with transportation? No   Do you need help shopping? No   Do you need help preparing meals?  No   Do you need help with housework?  No   Do you need help with laundry? No   Do you need help taking your medications? No   Do you need help managing money? No   Do you ever drive or ride in a car without wearing a seat belt? No   Have you felt unusual stress, anger or loneliness in the last month? Yes   Who do you live with? Spouse   If you need help, do you have trouble finding someone available to you? No   Have you been bothered in the last four weeks by sexual problems? No   Do you have difficulty concentrating, remembering or making decisions? No       Age-appropriate Screening Schedule:  Refer to the list below for future screening recommendations based on patient's age, sex and/or medical conditions. Orders for these recommended tests are listed in the plan section. The patient has been provided with a written plan.    Health Maintenance   Topic Date Due   • Pneumococcal Vaccine 0-64 (1 - PCV) Never done   • ZOSTER VACCINE (1 of 2) Never done   • COVID-19 Vaccine (3 - Moderna risk series) 01/12/2022   • ANNUAL WELLNESS VISIT  10/19/2022   • PAP SMEAR  07/01/2023   • COLORECTAL CANCER SCREENING   10/10/2024   • MAMMOGRAM  12/16/2024   • DXA SCAN  12/16/2024   • TDAP/TD VACCINES (2 - Td or Tdap) 10/19/2031   • HEPATITIS C SCREENING  Completed   • INFLUENZA VACCINE  Completed                CMS Preventative Services Quick Reference  Risk Factors Identified During Encounter  Immunizations Discussed/Encouraged: Prevnar 20 (Pneumococcal 20-valent conjugate) and Shingrix  The above risks/problems have been discussed with the patient.  Pertinent information has been shared with the patient in the After Visit Summary.  An After Visit Summary and PPPS were made available to the patient.    Follow Up:   Next Medicare Wellness visit to be scheduled in 1 year.       Additional E&M Note during same encounter follows:  Patient has multiple medical problems which are significant and separately identifiable that require additional work above and beyond the Medicare Wellness Visit.        E/M encounter  Subjective   Emilie Soto is a 62 y.o. female presenting with chief complaint of:   Chief Complaint   Patient presents with   • Medicare Wellness-subsequent   • Follow-up     6 month follow up     AWV 10.19.21  Last Completed Annual Wellness Visit     This patient has no relevant Health Maintenance data.           History of Present Illness :  With .   Here for review of chronic problems that includes HTN and others.  Also yearly medicare wellness exam.    Has multiple chronic problems to consider that might have a bearing on today's issues;  an interval appointment.       Chronic/acute problems reviewed today:   1. Primary hypertension Chronic/stable. Stable here past/no recent home blood pressures.  No significant chest pain, SOB, LE edema, orthopnea, near syncope, dizziness/light headness.   Recent Vitals       11/3/2022 11/17/2022 3/28/2023       BP: 120/74 -- 128/76     Pulse: 68 82 74     Temp: 96.4 °F (35.8 °C) 98.8 °F (37.1 °C) 96.9 °F (36.1 °C)     Weight: 84.1 kg (185 lb 6.4 oz) 81.6 kg (180 lb) 84.6 kg (186  lb 9.6 oz)     BMI (Calculated): 33.9 32.9 34.1            2. Diastolic congestive heart failure, unspecified HF chronicity (HCC) Chronic/stable.  Denies significant sob, orthopnea, leg edema, weight gain.  Aware of influence diet/salt and watching weight at home.       3. HRT-(Forrest) continues use hormones.  Is not smoking.  No signs of DVT   5. Gastroesophageal reflux disease without esophagitis Chronic/stable.  Controlled heartburn, reflux without dysphagia, melena.  Rx used, periods not used proven currently needed with symptoms -currently doing ok.      6. Renal insufficiency-(ro) karl Chronic/stable.  Sees nephrology.  No dysuria.  Previously warned/reminded:   To avoid further kidney function decline  A. Treat any time you think you have infection  B. Stay hydrated (dont get dehydrated); drink at least 60 oz fluid every 24 hr (1800 cc or nearly a 2L)  C. Do not allow any xrays with dye WITHOUT the doctor ordering checking your renal function  D. Do not get new medications without the doctor considering your renal condition  E. Do not use motrin/ibuprofen, alleve/naprosyn and these types of medications     7. Depression, unspecified depression type past/chronic significant  mood swings, down moods, nervousness, difficulty with concentration to function home/work.  Others close have not been concerned.  No suicide ideation/intent.  Rx helps.  Sees Psych/[place].        8. Narcotic drug use-ro Long term/current use of opiate analgesic.  The patient has improved activities of daily living, increased function, and decreased pain with opioid pain medication.  There are currently no side effects with this medication.    Walker OK with refills.  Past/current/often asked if desires having Rx narcan (explained it is the antidote for overuse or overdose and can be a life-saving)     9. Chronic back pain, unspecified back location, unspecified back pain laterality : chronic/variable 0-2/10 lower back pain with  infrequent/same radiation to UE/LE.  No change LE numbness.  Has bladder/bowel control. No desire to change approach of care.        Has an/another acute issue today: none.    The following portions of the patient's history were reviewed and updated as appropriate: allergies, current medications, past family history, past medical history, past social history, past surgical history and problem list.      Current Outpatient Medications:   •  albuterol sulfate  (90 Base) MCG/ACT inhaler, INHALE 1 PUFF EVERY 4 (FOUR) HOURS AS NEEDED FOR WHEEZING OR SHORTNESS OF AIR., Disp: 54 g, Rfl: 3  •  alendronate (FOSAMAX) 70 MG tablet, Take 1 tablet by mouth Every 7 (Seven) Days. Friday, Disp: , Rfl:   •  ALPRAZolam (XANAX) 0.5 MG tablet, TAKE ONE TABLET TWICE DAILY AS NEEDED ANXIETY GENERIC FOR XANAX, Disp: 30 tablet, Rfl: 0  •  amLODIPine (NORVASC) 5 MG tablet, TAKE 1 TABLET BY MOUTH DAILY., Disp: 90 tablet, Rfl: 3  •  Aspirin Low Dose 81 MG EC tablet, TAKE 1 TABLET BY MOUTH DAILY., Disp: 30 tablet, Rfl: 0  •  carvedilol (Coreg) 12.5 MG tablet, Take 1 tablet by mouth 2 (Two) Times a Day With Meals for 30 days., Disp: 60 tablet, Rfl: 5  •  FLUoxetine (PROzac) 20 MG capsule, Take 1 capsule by mouth Daily. Along with a 40 mg total = 60 mg daily, Disp: , Rfl:   •  FLUoxetine (PROzac) 40 MG capsule, Take 1 capsule by mouth Daily. Along with a 20 mg total 60 mg daily, Disp: , Rfl:   •  folic acid (FOLVITE) 1 MG tablet, Take 1 tablet by mouth Daily., Disp: 90 tablet, Rfl: 3  •  hydroxychloroquine (PLAQUENIL) 200 MG tablet, Take 1 tablet by mouth 2 (Two) Times a Day., Disp: , Rfl:   •  loratadine (CLARITIN) 10 MG tablet, Take 1 tablet by mouth As Needed for Allergies., Disp: , Rfl:   •  losartan (COZAAR) 100 MG tablet, TAKE 1 TABLET BY MOUTH DAILY., Disp: 90 tablet, Rfl: 2  •  mycophenolate (CELLCEPT) 500 MG tablet, Take 1 tablet by mouth 2 (Two) Times a Day., Disp: , Rfl:   •  omeprazole (priLOSEC) 20 MG capsule, TAKE 1 CAPSULE  BY MOUTH DAILY., Disp: 90 capsule, Rfl: 3  •  potassium chloride (K-DUR,KLOR-CON) 10 MEQ CR tablet, TAKE 1 TABLET BY MOUTH DAILY., Disp: 90 tablet, Rfl: 3  •  spironolactone (ALDACTONE) 25 MG tablet, TAKE 1 TABLET BY MOUTH DAILY., Disp: 90 tablet, Rfl: 3  •  traZODone (DESYREL) 50 MG tablet, Take 1.5 tablets by mouth Every Night., Disp: , Rfl:   •  verapamil SR (CALAN-SR) 240 MG CR tablet, TAKE 1 TABLET BY MOUTH 2 (TWO) TIMES A DAY., Disp: 180 tablet, Rfl: 1  •  cloNIDine (CATAPRES) 0.1 MG tablet, Take 1 tablet by mouth 2 (Two) Times a Day As Needed. One by mouth twice daily as needed if BP greater than 160/100 (Patient not taking: Reported on 3/28/2023), Disp: , Rfl:   •  estradiol (ESTRACE) 2 MG tablet, TAKE 0.5 TABLETS BY MOUTH DAILY FOR 90 DAYS. (Patient taking differently: Take 0.5 mg by mouth Daily.), Disp: 45 tablet, Rfl: 3  •  HYDROcodone-acetaminophen (NORCO) 7.5-325 MG per tablet, Take 1 tablet by mouth 2 (Two) Times a Day As Needed. (Patient not taking: Reported on 3/28/2023), Disp: , Rfl:   •  vitamin D (ERGOCALCIFEROL) 07393 UNITS capsule capsule, Take 1 capsule by mouth 1 (One) Time Per Week., Disp: , Rfl:     No problems with medications.    Allergies   Allergen Reactions   • Abilify [Aripiprazole] Other (See Comments)     Insomnia   • Bactrim [Sulfamethoxazole-Trimethoprim] Other (See Comments)     Pt unsure of reaction   • Biaxin [Clarithromycin] Nausea And Vomiting   • Ciprofloxacin Hcl Other (See Comments)     Pt unsure of reaction   • Duricef [Cefadroxil] Other (See Comments)     Pt unsure of reaction   • Ketek [Telithromycin] Other (See Comments)     Pt unsure of reaction   • Relafen [Nabumetone] Itching   • Wellbutrin [Bupropion] Itching       Review of Systems  GENERAL:  Active/slower with limits, speed, stamina for age and desire. Sleep is ok; occ hard falling/staying with worry. No fever now.  SKIN: No rash/skin lesion of concern:   ENDO:  No syncope, near or diaphoretic sweaty  spells..  HEENT: No recent head injury; but same/occ headache.   No vision change.   Decline in hearing; echo affect on/off L.  Ears without pain/drainage.  No sore throat.  No significant nasal/sinus congestion/drainage. No epistaxis.  CHEST: No chest wall tenderness or mass. No cough, without wheeze.  No SOB; no hemoptysis.  CV: No chest pain, palpitations, ankle edema.  GI: No dysphagia.  No abdominal pain, diarrhea, constipation.  No rectal bleeding, or melena.  Occ heartburn still.   :  Voids without dysuria, or incontinence to completion.  ORTHO: No painful/swollen joints but various on /off sore.  No change some sore neck or back.  No acute neck or back pain without recent injury.  NEURO: No dizziness, weakness of extremities.  No numbness/paresthesias.   PSYCH: No memory loss.  Mood good; often anxious, depressed but/and not suicidal.  Tries to tolerate stress .      Screening:  Gyne: flatter/mendoza confirmed 9.23.21  Mammogram: 12.16.22  Bone density: 6.8.20 Deca-bone d/Sancho/Ts L2-0.9 neck -2.2/6.8.2020-ordered/advised  On fosamox 2.5 yrs; stop end of 2023  Low dose CT chest: Tobacco-smoker/none: NA  GI:   Colon-p, div/Jasiel/DESHAWN/10.10.19/5y  [id number]/Jasiel/DESHAWN/10.13.2020/  Prostate: NA  Usual lab order  Any lab others want; (we wish to attempt not to duplicate so we need copies of labs done outside the office/out of [facility]); OR can have all labs here (bring the order from all other doctors) and we will forward to all specialist  6m CBC, CMP, sed rate, CK-total, CR-protein  12m CBC, CMP, LIPID, TSH, fT4, CK-total, Vit D, sed rate, CR-protein    Copy/paste function used for ROS/exam AND each area of these were reviewed, updated, confirmed and supplemented as needed.  Data reviewed:   Recent admit/ER/MD visits: 11.3.22    1. Primary hypertension    2. Chest pain, unspecified type    3. Cardiac arrhythmia, unspecified cardiac arrhythmia type    4. Anticoagulated: sx ? TIA/ASA 81    5. Left-sided weakness     6. Gastroesophageal reflux disease, unspecified whether esophagitis present       Rx: reviewed/changes:  No orders of the defined types were placed in this encounter.     LAB/Testing/Referrals: reviewed/orders:   Today: none  No orders of the defined types were placed in this encounter.     Usual:   same     Discussions:   Suppose L weakness could be spinal/cervical; ok to watch  Pro/con ASA; appears possible TIA so decision to continue  More chest pain; maybe GI eval and even if suggestive cardiac despite EST/echo cath ?     Last cardiac testing:   Echo: Results for orders placed during the hospital encounter of 10/18/22    Adult Stress Echo W/ Cont or Stress Agent if Necessary Per Protocol    Interpretation Summary  •  Baseline ECG of normal sinus rhythm noted at rest. Diffuse T wave inversions noted, consistent with prior ECGs.  •  The patient reported no symptoms during the stress test.  •  There were no clinically significant ST segment changes noted during stress.  •  Post stress wall motion appears to be normal.  •  Overall, this is a low risk stress test with no significant clinical, ECG or echocardiographic evidence for myocardial ischemia.    Radiology considered:   No radiology results for the last 90 days.    Lab Results:  Results for orders placed or performed in visit on 03/21/23   Microscopic Examination -   Result Value Ref Range    WBC, UA Comment /HPF    RBC, UA Comment /HPF    Epithelial Cells (non renal) 3-6 (A) /HPF    Cast Type Comment     Bacteria, UA Comment None Seen /HPF   Protein, Urine, Random -   Result Value Ref Range    Total Protein, Urine 6.8 mg/dL   Creatinine Urine Random (kidney function) GFR component -   Result Value Ref Range    Creatinine, Urine 117.4 mg/dL   Urinalysis With Microscopic -   Result Value Ref Range    Specific Gravity, UA 1.015 1.005 - 1.030    pH, UA 5.5 5.0 - 8.0    Color, UA Yellow     Appearance, UA Clear Clear    Leukocytes, UA Negative Negative     Protein Negative Negative    Glucose, UA Negative Negative    Ketones Negative Negative    Blood, UA Negative Negative    Bilirubin, UA Negative Negative    Urobilinogen, UA Comment     Nitrite, UA Negative Negative       A1C:  Lab Results - Last 18 Months   Lab Units 10/19/22  0552 09/21/22  0759 05/10/22  0749 03/24/22  0830   HEMOGLOBIN A1C % 5.50  5.40 5.50 5.50 5.50     GLUCOSE:  Lab Results - Last 18 Months   Lab Units 02/07/23  0949 10/19/22  0552 10/18/22  1936 09/21/22  0759 06/07/22  1603 05/10/22  0749 03/24/22  0830   GLUCOSE mg/dL 79 89 81 83 80 89 83     LIPID:  Lab Results - Last 18 Months   Lab Units 10/19/22  0552 10/18/22  1936 09/21/22  0759   CHOLESTEROL mg/dL  --   --  128   LDL CHOL mg/dL 59 73 65   HDL CHOL mg/dL 46 49 50   TRIGLYCERIDES mg/dL 70 85 64     PSA:No results found for: PSA    CBC:  Lab Results - Last 18 Months   Lab Units 02/07/23  0949 10/19/22  0552 10/18/22  1936 09/21/22  0759 06/07/22  1603 05/10/22  0749 03/24/22  0830   WBC 10*3/mm3 4.96 5.70 5.03 4.33 5.1 5.18 4.64   HEMOGLOBIN g/dL 12.2 12.2 12.8 12.4 12.7 12.2 12.3   HEMATOCRIT % 38.0 38.7 40.3 38.2 41.5 37.0 37.4   PLATELETS 10*3/mm3 235 230 244 244 267 249 246      BMP/CMP:  Lab Results - Last 18 Months   Lab Units 02/07/23  0949 10/19/22  0552 10/18/22  1936 09/21/22  0759 06/07/22  1603 05/10/22  0749 03/24/22  0830   SODIUM mmol/L 140 139 136 145 141 142 142   POTASSIUM mmol/L 3.9 4.2 4.5 4.5 3.9 4.2 4.1   CHLORIDE mmol/L 104 104 101 107 102 104 104   TOTAL CO2 mmol/L 28.3  --   --  27.7 28 26.5 26.9   CO2 mmol/L  --  29.0 28.0  --   --   --   --    GLUCOSE mg/dL 79 89 81 83 80 89 83   BUN mg/dL 13 16 16 11 17 13 10   CREATININE mg/dL 1.29* 1.35* 1.38* 1.25* 1.3* 1.35* 1.16*   EGFR IF NONAFRICN AM   --   --   --   --  42*  --   --    EGFR IF AFRICN AM   --   --   --   --  50*  --   --    EGFR RESULT mL/min/1.73 47.3*  --   --  49.4*  --  45.1* 54.1*   CALCIUM mg/dL 9.0 9.5 9.6 9.0 10.0 9.9 9.3     HEPATIC:  Lab  "Results - Last 18 Months   Lab Units 02/07/23  0949 10/19/22  0552 10/18/22  1936 09/21/22  0759 06/07/22  1603 05/10/22  0749 03/24/22  0830   ALT (SGPT) U/L 9 9 10 10 12 9 11   AST (SGOT) U/L 16 14 15 13 18 13 11   ALK PHOS U/L 61 55 62 59 68 57 65     Vit D:  Lab Results - Last 18 Months   Lab Units 02/07/23  0949 06/07/22  1603   VIT D 25 HYDROXY ng/ml 63.0 74.1     THYROID:  Lab Results - Last 18 Months   Lab Units 10/19/22  0552 09/21/22  0759 05/10/22  0749   TSH uIU/mL 1.450 1.120 1.770   FREE T4 ng/dL  --  1.16 1.29         Objective   /76 (BP Location: Left arm, Patient Position: Sitting, Cuff Size: Adult)   Pulse 74   Temp 96.9 °F (36.1 °C) (Temporal)   Resp 18   Ht 157.5 cm (62\")   Wt 84.6 kg (186 lb 9.6 oz)   SpO2 97%   BMI 34.13 kg/m²       Recent Vitals       10/20/2022 11/3/2022 11/17/2022       BP: 129/62 120/74 --     Pulse: 64 68 82     Temp: 97.7 °F (36.5 °C) 96.4 °F (35.8 °C) 98.8 °F (37.1 °C)     Weight: -- 84.1 kg (185 lb 6.4 oz) 81.6 kg (180 lb)     BMI (Calculated): -- 33.9 32.9         Wt Readings from Last 15 Encounters:   03/28/23 1115 84.6 kg (186 lb 9.6 oz)   11/17/22 1043 81.6 kg (180 lb)   11/03/22 1106 84.1 kg (185 lb 6.4 oz)   10/19/22 2000 85.8 kg (189 lb 4 oz)   10/19/22 1126 86.2 kg (190 lb 0.6 oz)   10/19/22 1123 86.2 kg (190 lb)   10/18/22 1902 86.2 kg (190 lb)   09/27/22 1129 85 kg (187 lb 6.4 oz)   05/31/22 1435 86.2 kg (190 lb)   03/23/22 1134 88.5 kg (195 lb 3.2 oz)   11/12/21 1459 88.5 kg (195 lb)   10/19/21 1041 88.5 kg (195 lb)   09/23/21 1041 87.5 kg (193 lb)   03/16/21 1120 87.5 kg (192 lb 12.8 oz)   10/13/20 0730 87.1 kg (192 lb)   09/24/20 0912 86.6 kg (191 lb)   09/15/20 1531 87 kg (191 lb 12.8 oz)   06/29/20 1614 85.4 kg (188 lb 3.2 oz)     Physical Exam  GENERAL:  Well nourished/developed in no acute distress. BMI noted: 33.8  SKIN: Turgor excellent, without wound, rash, lesion  HEENT: Normal cephalic without trauma.  Pupils equal round reactive to " light. Extraocular motions full without nystagmus.   External canals nonobstructive nontender without reddness. Tymphatic membranes josiane with cesar structures intact.   Oral cavity without growths, exudates, and moist.  Posterior pharynx without mass, obstruction, redness.  No thyromegaly, mass, tenderness, lymphadenopathy and supple.  CV: Regular rhythm.  No murmur, gallop,  edema. Posterior pulses intact.  No carotid bruits.  CHEST: No chest wall tenderness or mass.   LUNGS: Symmetric motion with clear to auscultation.   ABD: Soft, nontender without mass.   ORTHO: Symmetric extremities without swelling/point tenderness.  Full gross range of motion; few sore fingers.  NEURO: CN 2-12 grossly intact.  Symmetric facies. 1/4 x bicep equal reflexes.  UE/LE   3/5 strength throughout.  Nonfocal use extremities. Speech clear. Intact light touch with monofilament, vibratory sensation with tuning fork; equal toes/distal feet.    PSYCH: Oriented x 3.  Pleasant calm, well kept.   Purposeful/directed conservation with intact short/long gross memory.     Assessment & Plan     1. Primary hypertension    2. Diastolic congestive heart failure, unspecified HF chronicity ([facility])    3. HRT-([place])    4. Gastroesophageal reflux disease, unspecified whether esophagitis present    5. Gastroesophageal reflux disease without esophagitis    6. Renal insufficiency-(ro) karl    7. Depression, unspecified depression type    8. Narcotic drug use-ro    9. Chronic back pain, unspecified back location, unspecified back pain laterality        Issues that are new, uncontrolled, or required review HPI/ROS/exam and decisions beyond wellness today: (ie: requiring the service of a physician and/or not likely to resolve independently without clinical intervention.)   none    Discussions/medical decisions/reviews:  BP ok  Other vitals ok  DM/BS 5.5 10.19.22  Lipid LDL 59 10.19.22; lifestyle  PSA NA  CBC ok 2.7.23  Renal 47 2.7.23;  "stable-nephrology  Liver ok 2.7.23  Vit D 63 2.7.23  Thyroid TSH ok 10.19.22    Wellness/or annual done   Screening reviewed/updated end of year  Vaccines discussed shingles  Add A1c    Data review above:   Rx: reviewed and decisions:   Rx new/changes:   No orders of the defined types were placed in this encounter.    Orders placed:   LAB/Testing/Referrals: reviewed/orders:   Today:   No orders of the defined types were placed in this encounter.    Chronic/recurrent labs above or change to:   Same     Immunization History   Administered Date(s) Administered   • COVID-19 (MODERNA) 1st, 2nd, 3rd Dose Only 03/15/2021, 04/12/2021   • COVID-19 (MODERNA) BOOSTER 12/15/2021   • FluLaval/Fluzone >6mos 10/27/2018, 11/12/2020, 10/13/2021, 12/22/2022   • Influenza, Unspecified 11/05/2016   • Tdap 10/19/2021     We advised/reaffirmed our support/suggestion for staying complete with covid- covid boosters, seasonal flu/yearly and any missing vaccine from list we supplied; we suggest contact with local health department office to review missing/needed vaccines and then bring nursing documentation for these vaccines to this office or call this information in. Shingles became \"free\" 1.1.23.      Health maintenance:     Tobacco use reviewed:   Emilie Soto  reports that she has never smoked. She has never used smokeless tobacco..    Annual/wellness also done today.  Issues as appropriate discussed as counseling, anticipatory guidance:   Nutrition, physical activity, healthy weight, injury prevention, misuse of tobacco, alcohol and drugs, sexual behavior and STDs, contraception, dental health, mental health, immunizations, screenings as appropriate. As appropriate see AVS.           Assessment and Plan   Diagnoses and all orders for this visit:    1. Primary hypertension    2. Diastolic congestive heart failure, unspecified HF chronicity ([facility])    3. HRT-([place])    4. Gastroesophageal reflux disease, unspecified whether esophagitis " "present    5. Gastroesophageal reflux disease without esophagitis    6. Renal insufficiency-(ro) karl    7. Depression, unspecified depression type    8. Narcotic drug use-ro    9. Chronic back pain, unspecified back location, unspecified back pain laterality           Patient Instructions     Medicare/insurances offer certain visits called \"wellness/annual\" that allows for time to deal with and  review the many aspects of \"being well\" that just might not get mentioned during other visits with your doctor through the year.  This includes things like reviews of health screenings (mammograms, various labs),  weight, exercise, vaccines for just a few examples.      In order to help you with this we wish to make you aware of a few things for you to consider:    1. Advanced directives.  These are documents used to help direct your care if your health/situation should reach a point that you cannot make your own decisions.  While it is likely you do not currently have a need for these documents now; it is something that we all might face at any time.   The hand outs you are being given today are simply for you to review and use to learn more about these documents and consider them as you wish.      2. Vaccines: Certain vaccines are important after age 50, 60, and 65 and some health situations (for example COPD), require even boosters beyond age 65.  We are happy to review with you your vaccine status and vaccines that might be needed for you at this point:      a. Tetanus.   Like anyone this needs to given every 10 years; sooner for/with lacerations/wounds.   Likely when getting this booster it needs to be a tetanus called Tdap (tetanus mixed with diptheria and pertussis).   Years ago you had this vaccine.  We now know we can lose our immunity to pertussis (a part of this vaccine) and run a risk of catching this.  Now only would this make us ill; but more importantly we can spread this to very young children (and for " them it can be a much more dangerous illness).   We call this the grandparent vaccine for this reason.     b. Pneumonia (strept).   This comes now with three brands.   Previously it was recommended to take prevnar and a year later pneumovax 23.  Now pneumonia 20 is replacing these with a one time pneumonia vaccine.   Even if you have had these before; we need to review when and your current health situation/s as you may need boosters and even recently the CDC has made recent/new recommendations for pneumovax.      c. Shingles.  You do not want to catch shingles.  Though you will recover from this; the pain associated with shingles can be severe.  Even if you have had the now older zostavax, or have had shingles; it is recommended you still get the Shingrix (the new vaccine just available early 2018 shingles vaccine).  A new shingles vaccine (a shot to lower your chance of catching shingles) is now available (shingrix).  This vaccine is the second vaccine created for this purpose; (we have had zostavax for years).  Shingrix provides a much better and longer immunity for shingles than zostavax.  For this and other reasons Shingrix can be started at age 50.  If you have had zostavax in the past; you can still take Shingrix.  Beginning 2023 medicare no longer requires a co-pay for this (ie: it is free)    This vaccine is not paid for in a doctor's office by medicare, medicaid and probably most insurances.  Like zostavax; this is covered in drug stores.  This is a vaccine that if you chose to get you need to get at a drug store that gives vaccines (like [facility], [facility] and [facility].      d. Yearly flu vaccine given from September through April each year (there is a special vaccine for those over 65).     e. Travel vaccines:  If you are one to do international travel; be sure and ask us for any particular unusual vaccines you may need.     f.  Miscellaneous:  If you have certain health  situations/disease you may need specific/particular vaccines not give to the general public.     g.  Covid: currently recommended everyone over 6m  The brands Pfizer/Moderna are for 3 total shots as immunity will wane from less than this.  Padilla/Padilla has a version that comes with recommendations for an initial vaccine and booster after.  I no longer recommend J&J as a first choice as Pfizer/Moderna are readily available.  If you have had an initial J&J I recommend you booster with Moderna.   I strongly recommend covid vaccination; being unvaccinated or partially vaccinated carries real risk for disease and even death.     Because of many restrictions on this office always having all the above vaccines; you may be advised to work with your local health department to keep up with your individual vaccine needs.    The vaccines we have on record for you include:   Immunization History   Administered Date(s) Administered   • COVID-19 (MODERNA) 1st, 2nd, 3rd Dose Only 03/15/2021, 04/12/2021   • COVID-19 (MODERNA) BOOSTER 12/15/2021   • FluLaval/Fluzone >6mos 10/27/2018, 11/12/2020, 10/13/2021, 12/22/2022   • Influenza, Unspecified 11/05/2016   • Tdap 10/19/2021       If you have record of other vaccines from vaccine clinics, [facility], [facility] and like and want them to show in your chart here; please talk to our nurses about having your vaccine record updated. We would be very pleased if you would take the time to get us this information to keep you main health record here current.     3. Exercise: regular cardio exercise something everyone should consider and try to do; even if health limitations (ie find that exercise UE/LE/cardio that they can tolerate).   Normal weight a goal for everyone (as we discussed)    4. Healthy diet helpful for weight management, illness prevention.     5. If over 50-screening exams include men PSA/rectal exam, women mammograms, and everyone colonoscopy screening for colon cancer.    6. If  you use tobacco of any kind or e-products you should stop. We are providing you some information to consider that could make this process easier.      ##################################              Follow up: No follow-ups on file.  Future Appointments   Date Time Provider Department Center   3/28/2023 11:45 AM Alexandru Miles MD [facility]

## 2023-04-20 ENCOUNTER — TELEPHONE (OUTPATIENT)
Dept: FAMILY MEDICINE CLINIC | Facility: CLINIC | Age: 63
End: 2023-04-20

## 2023-04-20 NOTE — TELEPHONE ENCOUNTER
Caller: Emilie Soto    Relationship: Self    Best call back number:[phone]    What is the best time to reach you: ANYTIME    Who are you requesting to speak with (clinical staff, provider,  specific staff member): CLINICAL      What was the call regarding: PATIENT STATED THAT SHE HAS BEEN SEEING PSYCHIATRIST DR FERNANDEZ BUT SHE TOLD DR FERNANDEZ SHE DIDN'T THINK SHE NEEDS TO SEE HIM ANYMORE BC SHE ALSO SEES SOMEONE AT [facility]. DR FERNANDEZ TOLD PATIENT THAT WAS FINE BUT SHE NEEDS TO MAKE SURE THAT DR TUTTLE WILL PRESCRIBE THE MEDICATIONS THAT SHES TAKING. PATIENT IS REQUESTING A CALL BACK TO DISCUSS WHAT SHE NEEDS TO DO.     Do you require a callback: YES

## 2023-04-23 NOTE — TELEPHONE ENCOUNTER
Sure    As long as Dr Rueda ok       Current Outpatient Medications psych:   •  ALPRAZolam (XANAX) 0.5 MG tablet, TAKE ONE TABLET TWICE DAILY AS NEEDED ANXIETY GENERIC FOR XANAX, Disp: 30 tablet, Rfl: 0  •  FLUoxetine (PROzac) 20 MG capsule, Take 1 capsule by mouth Daily. Along with a 40 mg total = 60 mg daily, Disp: , Rfl:   •  FLUoxetine (PROzac) 40 MG capsule, Take 1 capsule by mouth Daily. Along with a 20 mg total 60 mg daily, Disp: , Rfl:   •  traZODone (DESYREL) 50 MG tablet, Take 1.5 tablets by mouth Every Night., Disp: , Rfl:   •  verapamil SR (CALAN-SR) 240 MG CR tablet, TAKE 1 TABLET BY MOUTH 2 (TWO) TIMES A DAY., Disp: 180 tablet, Rfl: 1

## 2023-04-24 NOTE — TELEPHONE ENCOUNTER
Notified patient that Dr Miles was ok with sending these meds in.  The patient will call when needing a refill

## 2023-04-26 DIAGNOSIS — K21.9 GASTROESOPHAGEAL REFLUX DISEASE: ICD-10-CM

## 2023-04-26 DIAGNOSIS — I10 HYPERTENSION, UNSPECIFIED TYPE: Chronic | ICD-10-CM

## 2023-04-26 DIAGNOSIS — I50.32 CHRONIC DIASTOLIC CONGESTIVE HEART FAILURE: Chronic | ICD-10-CM

## 2023-04-26 DIAGNOSIS — J84.10 PULMONARY FIBROSIS: ICD-10-CM

## 2023-04-26 RX ORDER — POTASSIUM CHLORIDE 750 MG/1
10 TABLET, EXTENDED RELEASE ORAL DAILY
Qty: 90 TABLET | Refills: 3 | Status: SHIPPED | OUTPATIENT
Start: 2023-04-26

## 2023-04-26 RX ORDER — ALBUTEROL SULFATE 90 UG/1
AEROSOL, METERED RESPIRATORY (INHALATION)
Qty: 54 G | Refills: 3 | Status: SHIPPED | OUTPATIENT
Start: 2023-04-26

## 2023-04-26 RX ORDER — OMEPRAZOLE 20 MG/1
20 CAPSULE, DELAYED RELEASE ORAL DAILY
Qty: 90 CAPSULE | Refills: 3 | Status: SHIPPED | OUTPATIENT
Start: 2023-04-26

## 2023-04-28 RX ORDER — CARVEDILOL 12.5 MG/1
12.5 TABLET ORAL 2 TIMES DAILY WITH MEALS
Qty: 60 TABLET | Refills: 5 | Status: SHIPPED | OUTPATIENT
Start: 2023-04-28

## 2023-05-24 DIAGNOSIS — F41.9 ANXIETY: ICD-10-CM

## 2023-05-25 RX ORDER — ALPRAZOLAM 0.5 MG/1
TABLET ORAL
Qty: 30 TABLET | Refills: 0 | Status: SHIPPED | OUTPATIENT
Start: 2023-05-25

## 2023-06-13 RX ORDER — VERAPAMIL HYDROCHLORIDE 240 MG/1
240 TABLET, FILM COATED, EXTENDED RELEASE ORAL 2 TIMES DAILY
Qty: 180 TABLET | Refills: 1 | Status: SHIPPED | OUTPATIENT
Start: 2023-06-13

## 2023-06-21 ASSESSMENT — ENCOUNTER SYMPTOMS
GASTROINTESTINAL NEGATIVE: 1
RESPIRATORY NEGATIVE: 1
EYES NEGATIVE: 1

## 2023-06-22 ENCOUNTER — OFFICE VISIT (OUTPATIENT)
Dept: OBGYN CLINIC | Age: 63
End: 2023-06-22

## 2023-06-22 VITALS
WEIGHT: 186 LBS | HEIGHT: 62 IN | DIASTOLIC BLOOD PRESSURE: 68 MMHG | BODY MASS INDEX: 34.23 KG/M2 | HEART RATE: 78 BPM | SYSTOLIC BLOOD PRESSURE: 118 MMHG

## 2023-06-22 DIAGNOSIS — Z90.710 HISTORY OF ROBOT-ASSISTED LAPAROSCOPIC HYSTERECTOMY: ICD-10-CM

## 2023-06-22 DIAGNOSIS — N89.8 VAGINAL LESION: ICD-10-CM

## 2023-06-22 DIAGNOSIS — Z01.411 ENCOUNTER FOR GYNECOLOGICAL EXAMINATION (GENERAL) (ROUTINE) WITH ABNORMAL FINDINGS: ICD-10-CM

## 2023-06-22 DIAGNOSIS — Z90.722 HISTORY OF BILATERAL SALPINGO-OOPHORECTOMY (BSO): ICD-10-CM

## 2023-06-22 DIAGNOSIS — Z12.31 ENCOUNTER FOR SCREENING MAMMOGRAM FOR BREAST CANCER: ICD-10-CM

## 2023-06-22 DIAGNOSIS — Z11.51 SCREENING FOR HPV (HUMAN PAPILLOMAVIRUS): ICD-10-CM

## 2023-06-22 DIAGNOSIS — Z90.710 SCREENING FOR MALIGNANT NEOPLASM OF VAGINA AFTER TOTAL HYSTERECTOMY: ICD-10-CM

## 2023-06-22 DIAGNOSIS — Z12.72 SCREENING FOR MALIGNANT NEOPLASM OF VAGINA AFTER TOTAL HYSTERECTOMY: ICD-10-CM

## 2023-06-22 DIAGNOSIS — Z90.79 HISTORY OF BILATERAL SALPINGO-OOPHORECTOMY (BSO): ICD-10-CM

## 2023-06-22 DIAGNOSIS — Z01.419 ENCOUNTER FOR ANNUAL ROUTINE GYNECOLOGICAL EXAMINATION: Primary | ICD-10-CM

## 2023-06-27 LAB
HPV HR 12 DNA SPEC QL NAA+PROBE: NOT DETECTED
HPV16 DNA SPEC QL NAA+PROBE: NOT DETECTED
HPV16+18+H RISK 12 DNA SPEC-IMP: NORMAL
HPV18 DNA SPEC QL NAA+PROBE: NOT DETECTED

## 2023-07-06 ENCOUNTER — TELEPHONE (OUTPATIENT)
Dept: OBGYN CLINIC | Age: 63
End: 2023-07-06

## 2023-07-12 ENCOUNTER — TELEPHONE (OUTPATIENT)
Dept: OBGYN CLINIC | Age: 63
End: 2023-07-12

## 2023-07-12 NOTE — TELEPHONE ENCOUNTER
Spoke to patient, I had spoken to her earlier in the week, she is already scheduled for repeat pap.      JEANETTE Jordan

## 2023-07-12 NOTE — TELEPHONE ENCOUNTER
----- Message from Golden Stephens RN sent at 7/12/2023 11:17 AM CDT -----  Please call and make appt to repeat pap smear in a few months due to not enough cells.    Brandi

## 2023-07-25 ENCOUNTER — TELEPHONE (OUTPATIENT)
Dept: FAMILY MEDICINE CLINIC | Facility: CLINIC | Age: 63
End: 2023-07-25
Payer: MEDICARE

## 2023-07-25 DIAGNOSIS — I10 ESSENTIAL HYPERTENSION: ICD-10-CM

## 2023-07-25 RX ORDER — AMLODIPINE BESYLATE 5 MG/1
TABLET ORAL
Qty: 90 TABLET | Refills: 3 | Status: SHIPPED | OUTPATIENT
Start: 2023-07-25

## 2023-07-25 RX ORDER — ALENDRONATE SODIUM 70 MG/1
TABLET ORAL
Qty: 12 TABLET | Refills: 3 | Status: SHIPPED | OUTPATIENT
Start: 2023-07-25

## 2023-07-25 RX ORDER — LOSARTAN POTASSIUM 100 MG/1
100 TABLET ORAL DAILY
Qty: 90 TABLET | Refills: 2 | Status: SHIPPED | OUTPATIENT
Start: 2023-07-25

## 2023-07-25 RX ORDER — ESTRADIOL 2 MG/1
TABLET ORAL
Qty: 45 TABLET | Refills: 3 | Status: SHIPPED | OUTPATIENT
Start: 2023-07-25

## 2023-07-25 RX ORDER — SPIRONOLACTONE 25 MG/1
25 TABLET ORAL DAILY
Qty: 90 TABLET | Refills: 3 | Status: SHIPPED | OUTPATIENT
Start: 2023-07-25

## 2023-07-25 NOTE — TELEPHONE ENCOUNTER
Rx Refill Note  Requested Prescriptions     Pending Prescriptions Disp Refills    losartan (COZAAR) 100 MG tablet [Pharmacy Med Name: LOSARTAN POTASSIUM 100MG TABLET] 90 tablet 2     Sig: TAKE 1 TABLET BY MOUTH DAILY.    spironolactone (ALDACTONE) 25 MG tablet [Pharmacy Med Name: SPIRONOLACTONE 25MG TABLET] 90 tablet 3     Sig: TAKE 1 TABLET BY MOUTH DAILY.    amLODIPine (NORVASC) 5 MG tablet [Pharmacy Med Name: AMLODIPINE BESYLATE 5MG TABLET] 90 tablet 3     Sig: TAKE ONE TABLET DAILY    alendronate (FOSAMAX) 70 MG tablet [Pharmacy Med Name: ALENDRONATE SODIUM 70MG TABLET] 12 tablet 3     Sig: TAKE 1 TABLET BY MOUTH EVERY 7 (SEVEN) DAYS.    estradiol (ESTRACE) 2 MG tablet [Pharmacy Med Name: ESTRADIOL 2MG TABLET] 45 tablet 3     Sig: TAKE 1/2 TABLET DAILY      Last office visit with prescribing clinician: 3/28/2023   Last telemedicine visit with prescribing clinician: Visit date not found   Next office visit with prescribing clinician: 10/11/2023                         Would you like a call back once the refill request has been completed: [] Yes [x] No    If the office needs to give you a call back, can they leave a voicemail: [] Yes [x] No    Lalita Palacios MA  07/25/23, 10:09 CDT

## 2023-08-10 ENCOUNTER — TELEPHONE (OUTPATIENT)
Dept: OBGYN CLINIC | Age: 63
End: 2023-08-10

## 2023-08-10 NOTE — TELEPHONE ENCOUNTER
Called & spoke with pt yesterday to get rescheduled for her apt. Pt stated she would call us back to r/s her apt.    Raj Sarkar MA

## 2023-08-18 DIAGNOSIS — F41.9 ANXIETY: ICD-10-CM

## 2023-08-18 RX ORDER — ALPRAZOLAM 0.5 MG/1
TABLET ORAL
Qty: 30 TABLET | Refills: 0 | Status: SHIPPED | OUTPATIENT
Start: 2023-08-18

## 2023-08-18 NOTE — TELEPHONE ENCOUNTER
[facility] WNL  Per Protocol Last Refill 07/17/2023    Rx Refill Note  Requested Prescriptions     Pending Prescriptions Disp Refills    ALPRAZolam (XANAX) 0.5 MG tablet [Pharmacy Med Name: ALPRAZOLAM 0.5MG TABLET] 30 tablet 0     Sig: TAKE ONE TABLET TWICE DAILY AS NEEDED ANXIETY GENERIC FOR XANAX      Last office visit with prescribing clinician: 3/28/2023      Next office visit with prescribing clinician: 10/11/2023            Deena Moses MA  08/18/23, 09:09 CDT

## 2023-09-05 ENCOUNTER — OFFICE VISIT (OUTPATIENT)
Dept: FAMILY MEDICINE CLINIC | Facility: CLINIC | Age: 63
End: 2023-09-05
Payer: MEDICARE

## 2023-09-05 VITALS
HEIGHT: 62 IN | OXYGEN SATURATION: 99 % | SYSTOLIC BLOOD PRESSURE: 122 MMHG | BODY MASS INDEX: 33.82 KG/M2 | TEMPERATURE: 97.1 F | HEART RATE: 56 BPM | RESPIRATION RATE: 18 BRPM | WEIGHT: 183.8 LBS | DIASTOLIC BLOOD PRESSURE: 78 MMHG

## 2023-09-05 DIAGNOSIS — J40 BRONCHITIS: Primary | ICD-10-CM

## 2023-09-05 PROCEDURE — 3078F DIAST BP <80 MM HG: CPT | Performed by: NURSE PRACTITIONER

## 2023-09-05 PROCEDURE — 99213 OFFICE O/P EST LOW 20 MIN: CPT | Performed by: NURSE PRACTITIONER

## 2023-09-05 PROCEDURE — 3074F SYST BP LT 130 MM HG: CPT | Performed by: NURSE PRACTITIONER

## 2023-09-05 RX ORDER — METHYLPREDNISOLONE 4 MG/1
TABLET ORAL
Qty: 1 EACH | Refills: 0 | Status: SHIPPED | OUTPATIENT
Start: 2023-09-05

## 2023-09-05 RX ORDER — AZITHROMYCIN 250 MG/1
TABLET, FILM COATED ORAL
Qty: 6 TABLET | Refills: 0 | Status: SHIPPED | OUTPATIENT
Start: 2023-09-05 | End: 2023-09-10

## 2023-09-05 NOTE — PROGRESS NOTES
Subjective   Chief Complaint:  Cough    History of Present Illness:  This 62 y.o. female was seen in the office today.      The patient reports experiencing chest tightness, a deep cough, and sore throat. She reports her symptoms have improved since 08/28/2023 but she does still have a cough and sore throat. She states she tries to spit out what she coughs up. She denies any ear pain. The patient affirms she has done well with steroids in the past.    Allergies   Allergen Reactions    Abilify [Aripiprazole] Other (See Comments)     Insomnia    Bactrim [Sulfamethoxazole-Trimethoprim] Other (See Comments)     Pt unsure of reaction    Biaxin [Clarithromycin] Nausea And Vomiting    Ciprofloxacin Hcl Other (See Comments)     Pt unsure of reaction    Duricef [Cefadroxil] Other (See Comments)     Pt unsure of reaction    Ketek [Telithromycin] Other (See Comments)     Pt unsure of reaction    Relafen [Nabumetone] Itching    Wellbutrin [Bupropion] Itching      Current Outpatient Medications on File Prior to Visit   Medication Sig    alendronate (FOSAMAX) 70 MG tablet TAKE 1 TABLET BY MOUTH EVERY 7 (SEVEN) DAYS.    ALPRAZolam (XANAX) 0.5 MG tablet TAKE ONE TABLET TWICE DAILY AS NEEDED ANXIETY GENERIC FOR XANAX    amLODIPine (NORVASC) 5 MG tablet TAKE ONE TABLET DAILY    Aspirin Low Dose 81 MG EC tablet TAKE 1 TABLET BY MOUTH DAILY.    carvedilol (COREG) 12.5 MG tablet Take 1 tablet by mouth 2 (Two) Times a Day With Meals.    cloNIDine (CATAPRES) 0.1 MG tablet Take 1 tablet by mouth 2 (Two) Times a Day As Needed. One by mouth twice daily as needed if BP greater than 160/100    estradiol (ESTRACE) 2 MG tablet TAKE 1/2 TABLET DAILY    FLUoxetine (PROzac) 20 MG capsule Take 1 capsule by mouth Daily. Along with a 40 mg total = 60 mg daily    FLUoxetine (PROzac) 40 MG capsule Take 1 capsule by mouth Daily. Along with a 20 mg total 60 mg daily    folic acid (FOLVITE) 1 MG tablet Take 1 tablet by mouth Daily.     HYDROcodone-acetaminophen (NORCO) 7.5-325 MG per tablet Take 1 tablet by mouth 2 (Two) Times a Day As Needed.    hydroxychloroquine (PLAQUENIL) 200 MG tablet Take 1 tablet by mouth 2 (Two) Times a Day.    loratadine (CLARITIN) 10 MG tablet Take 1 tablet by mouth As Needed for Allergies.    losartan (COZAAR) 100 MG tablet TAKE 1 TABLET BY MOUTH DAILY.    mycophenolate (CELLCEPT) 500 MG tablet Take 1 tablet by mouth 2 (Two) Times a Day.    omeprazole (priLOSEC) 20 MG capsule TAKE 1 CAPSULE BY MOUTH DAILY.    potassium chloride (K-DUR,KLOR-CON) 10 MEQ CR tablet TAKE 1 TABLET BY MOUTH DAILY.    spironolactone (ALDACTONE) 25 MG tablet TAKE 1 TABLET BY MOUTH DAILY.    traZODone (DESYREL) 50 MG tablet Take 1.5 tablets by mouth Every Night.    Ventolin  (90 Base) MCG/ACT inhaler INHALE 1 PUFF EVERY 4 (FOUR) HOURS AS NEEDED FOR WHEEZING OR SHORTNESS OF AIR.    verapamil SR (CALAN-SR) 240 MG CR tablet TAKE 1 TABLET BY MOUTH 2 (TWO) TIMES A DAY.    vitamin D (ERGOCALCIFEROL) 43867 UNITS capsule capsule Take 1 capsule by mouth 1 (One) Time Per Week.     No current facility-administered medications on file prior to visit.      Past Medical, Surgical, Social, and Family History:  Past Medical History:   Diagnosis Date    Asthma     CHF (congestive heart failure)     CKD (chronic kidney disease)     DDD (degenerative disc disease), cervical     Depression     Fibromyalgia     GERD (gastroesophageal reflux disease)     Heart murmur     Hepatitis cholestatic     History of adenomatous polyp of colon     HOCM (hypertrophic obstructive cardiomyopathy)     HTN (hypertension)     RA (rheumatoid arthritis)     Renal disease     Sleep apnea      Past Surgical History:   Procedure Laterality Date    COLONOSCOPY  04/22/2016    One 16mm tubulovillous adenomatous polyp at the ileocecal valve-Clip was placed-injected; The examination was otherwise normal on direct and retroflexion views; Repeat 1 year    COLONOSCOPY N/A 7/28/2017    A  tattoo was seen at the ileocecal valve-A post-polypectomy scar was found at the tattoo site; One 12mm tubular adenomatous flat polyp in the transverse colon-Clip (MR conditional) was placed; The examination was otherwise normal on direct and retroflexion views; Repeat 2 years    COLONOSCOPY  10/04/2015    Bleeding at the ileocecal valve secondary to previous polypectomy-Clip was placed; No specimens collected; Repeat 6 months for retreatment    COLONOSCOPY  09/30/2015    Very large multilobulated tubular adenomatous polyp opposite the ileocecal valve-removed piecemeal-at least 95% removed; One less than 1cm tubular adenomatous polyp in the ascending colon; Repeat 6-12 months to reassess the large polyp    COLONOSCOPY N/A 10/10/2019    One 5mm tubular adenomatous polyp in the transverse colon; Diverticulosis in the left colon; The entire examined colon is normal on direct and retroflexion views; Repeat 5 years    ENDOSCOPY N/A 10/13/2020    Procedure: ESOPHAGOGASTRODUODENOSCOPY WITH ANESTHESIA;  Surgeon: Abigail Weathers MD;  Location: [facility] ENDOSCOPY;  Service: Gastroenterology;  Laterality: N/A;  pre GERD  post: esophageal dilation with balloon   jocelyne irlanda     EXPLORATORY LAPAROTOMY      HYSTERECTOMY      LIVER BIOPSY  06/14/2011    Cholestatic hepatitis-see report     Social History     Socioeconomic History    Marital status:    Tobacco Use    Smoking status: Never    Smokeless tobacco: Never   Substance and Sexual Activity    Alcohol use: Yes     Alcohol/week: 2.0 standard drinks     Types: 2 Glasses of wine per week     Comment: Occasionally    Drug use: No    Sexual activity: Yes     Partners: Male     Birth control/protection: Post-menopausal     Family History   Problem Relation Age of Onset    Throat cancer Mother     Diabetes Father     Cancer Father         Pt reports he had part of his intestines removed    Breast cancer Sister     Colon cancer Neg Hx     Colon polyps Neg Hx     Esophageal  "cancer Neg Hx     Liver cancer Neg Hx     Liver disease Neg Hx     Rectal cancer Neg Hx     Stomach cancer Neg Hx      Objective   Vital Signs  /78 (BP Location: Left arm, Patient Position: Sitting, Cuff Size: Adult)   Pulse 56   Temp 97.1 °F (36.2 °C) (Infrared)   Resp 18   Ht 157.5 cm (62\")   Wt 83.4 kg (183 lb 12.8 oz)   SpO2 99%   BMI 33.62 kg/m²     Physical Exam  Vitals reviewed.   Constitutional:       General: She is not in acute distress.     Comments: Weak and tired appearing   HENT:      Right Ear: Hearing, tympanic membrane, ear canal and external ear normal.      Left Ear: Hearing, tympanic membrane, ear canal and external ear normal.      Mouth/Throat:      Comments: Thick postnasal drip  Neck:      Comments: Right anterior adenopathy  Cardiovascular:      Rate and Rhythm: Normal rate and regular rhythm.   Pulmonary:      Effort: Pulmonary effort is normal.      Breath sounds: Normal breath sounds.     Prior Visit Notes/Records, Lab, Imaging, and Diagnostic Results Reviewed:  CBC:  Lab Results - Last 18 Months   Lab Units 02/07/23 0949 10/19/22  0552 10/18/22  1936 09/21/22  0759 06/07/22  1603 05/10/22  0749 03/24/22  0830   WBC 10*3/mm3 4.96 5.70 5.03 4.33 5.1 5.18 4.64   HEMOGLOBIN g/dL 12.2 12.2 12.8 12.4 12.7 12.2 12.3   HEMATOCRIT % 38.0 38.7 40.3 38.2 41.5 37.0 37.4   PLATELETS 10*3/mm3 235 230 244 244 267 249 246      Chemistry:  Lab Results - Last 18 Months   Lab Units 02/07/23  0949 10/19/22  0552 10/18/22  1936 09/21/22  0759 06/07/22  1603 05/10/22  0749 03/24/22  0830   SODIUM mmol/L 140 139 136 145 141 142 142   POTASSIUM mmol/L 3.9 4.2 4.5 4.5 3.9 4.2 4.1   CHLORIDE mmol/L 104 104 101 107 102 104 104   TOTAL CO2 mmol/L  --   --   --   --  28  --   --    CO2 mmol/L 28.3 29.0 28.0 27.7  --  26.5 26.9   GLUCOSE mg/dL 79 89 81 83 80 89 83   BUN mg/dL 13 16 16 11 17 13 10   CREATININE mg/dL 1.29* 1.35* 1.38* 1.25* 1.3* 1.35* 1.16*   EGFR IF NONAFRICN AM   --   --   --   --  42*  " --   --    EGFR IF AFRICN AM   --   --   --   --  50*  --   --    EGFR RESULT mL/min/1.73 47.3*  --   --  49.4*  --  45.1* 54.1*   CALCIUM mg/dL 9.0 9.5 9.6 9.0 10.0 9.9 9.3     BMI Trend:  BMI Readings from Last 10 Encounters:   09/05/23 33.62 kg/m²   03/28/23 34.13 kg/m²   11/17/22 32.92 kg/m²   11/03/22 33.91 kg/m²   10/19/22 34.61 kg/m²   09/27/22 34.28 kg/m²   05/31/22 34.75 kg/m²   03/23/22 35.70 kg/m²   11/12/21 35.67 kg/m²   10/19/21 35.67 kg/m²     Assessment & Plan   Diagnoses and all orders for this visit:    1. Bronchitis (Primary)    Other orders  -     methylPREDNISolone (MEDROL) 4 MG dose pack; Take as directed on package instructions.  Dispense: 1 each; Refill: 0  -     azithromycin (Zithromax Z-Jason) 250 MG tablet; Take 2 tablets the first day, then 1 tablet daily for 4 days.  Dispense: 6 tablet; Refill: 0    Discussion:  Advised and educated plan of care. Advised Z-Jason and medrol dose pack.      Follow-up:  Return for follow-up as needed.    Transcribed from ambient dictation for SUGEY Gillespie by Elisabeth Walters.  09/05/23   15:33 CDT    Patient or patient representative verbalized consent to the visit recording.  I have personally performed the services described in this document as transcribed by the above individual, and it is both accurate and complete.    Electronically signed by SUGEY Lara 09/05/23, 3:33 PM CDT.

## 2023-10-03 ENCOUNTER — TELEPHONE (OUTPATIENT)
Dept: FAMILY MEDICINE CLINIC | Facility: CLINIC | Age: 63
End: 2023-10-03
Payer: MEDICARE

## 2023-10-03 NOTE — TELEPHONE ENCOUNTER
Caller: Emilie Soto    Relationship: Self    Best call back number:     [phone] (Home), CAN LEAVE A VOICEMAIL       Who are you requesting to speak with (clinical staff, provider,  specific staff member): CLINICAL    What was the call regarding: THE PATIENT STATES THAT SHE WOULD LIKE TO KNOW IF SHE NEEDS TO GET LABS PRIOR TO HER APPOINTMENT ON 10/11/23. THE PATIENT STATES THAT SHE WOULD NEED TO COME INTO THE OFFICE THIS WEEK TO GET THE LABS IF THEY ARE ORDERED.

## 2023-10-05 ENCOUNTER — LAB (OUTPATIENT)
Dept: FAMILY MEDICINE CLINIC | Facility: CLINIC | Age: 63
End: 2023-10-05
Payer: MEDICARE

## 2023-10-05 DIAGNOSIS — I10 PRIMARY HYPERTENSION: Primary | Chronic | ICD-10-CM

## 2023-10-05 DIAGNOSIS — E55.9 VITAMIN D DEFICIENCY: ICD-10-CM

## 2023-10-05 DIAGNOSIS — I10 HYPERTENSION, UNSPECIFIED TYPE: ICD-10-CM

## 2023-10-05 DIAGNOSIS — R73.01 ELEVATED FASTING GLUCOSE: ICD-10-CM

## 2023-10-06 LAB
25(OH)D3+25(OH)D2 SERPL-MCNC: 45.5 NG/ML (ref 30–100)
ALBUMIN SERPL-MCNC: 4.2 G/DL (ref 3.5–5.2)
ALBUMIN/GLOB SERPL: 1.6 G/DL
ALP SERPL-CCNC: 69 U/L (ref 39–117)
ALT SERPL-CCNC: 10 U/L (ref 1–33)
AST SERPL-CCNC: 16 U/L (ref 1–32)
BASOPHILS # BLD AUTO: 0.04 10*3/MM3 (ref 0–0.2)
BASOPHILS NFR BLD AUTO: 0.7 % (ref 0–1.5)
BILIRUB SERPL-MCNC: <0.2 MG/DL (ref 0–1.2)
BUN SERPL-MCNC: 14 MG/DL (ref 8–23)
BUN/CREAT SERPL: 11.7 (ref 7–25)
CALCIUM SERPL-MCNC: 9.4 MG/DL (ref 8.6–10.5)
CHLORIDE SERPL-SCNC: 105 MMOL/L (ref 98–107)
CHOLEST SERPL-MCNC: 132 MG/DL (ref 0–200)
CHOLEST/HDLC SERPL: 2.54 {RATIO}
CO2 SERPL-SCNC: 26.4 MMOL/L (ref 22–29)
CREAT SERPL-MCNC: 1.2 MG/DL (ref 0.57–1)
EGFRCR SERPLBLD CKD-EPI 2021: 51.3 ML/MIN/1.73
EOSINOPHIL # BLD AUTO: 0.14 10*3/MM3 (ref 0–0.4)
EOSINOPHIL NFR BLD AUTO: 2.5 % (ref 0.3–6.2)
ERYTHROCYTE [DISTWIDTH] IN BLOOD BY AUTOMATED COUNT: 13.1 % (ref 12.3–15.4)
GLOBULIN SER CALC-MCNC: 2.7 GM/DL
GLUCOSE SERPL-MCNC: 87 MG/DL (ref 65–99)
HBA1C MFR BLD: 5.4 % (ref 4.8–5.6)
HCT VFR BLD AUTO: 38.1 % (ref 34–46.6)
HDLC SERPL-MCNC: 52 MG/DL (ref 40–60)
HGB BLD-MCNC: 12.6 G/DL (ref 12–15.9)
IMM GRANULOCYTES # BLD AUTO: 0.04 10*3/MM3 (ref 0–0.05)
IMM GRANULOCYTES NFR BLD AUTO: 0.7 % (ref 0–0.5)
LDLC SERPL CALC-MCNC: 64 MG/DL (ref 0–100)
LYMPHOCYTES # BLD AUTO: 2.06 10*3/MM3 (ref 0.7–3.1)
LYMPHOCYTES NFR BLD AUTO: 36.7 % (ref 19.6–45.3)
MCH RBC QN AUTO: 29.9 PG (ref 26.6–33)
MCHC RBC AUTO-ENTMCNC: 33.1 G/DL (ref 31.5–35.7)
MCV RBC AUTO: 90.5 FL (ref 79–97)
MONOCYTES # BLD AUTO: 0.77 10*3/MM3 (ref 0.1–0.9)
MONOCYTES NFR BLD AUTO: 13.7 % (ref 5–12)
NEUTROPHILS # BLD AUTO: 2.57 10*3/MM3 (ref 1.7–7)
NEUTROPHILS NFR BLD AUTO: 45.7 % (ref 42.7–76)
NRBC BLD AUTO-RTO: 0 /100 WBC (ref 0–0.2)
PLATELET # BLD AUTO: 240 10*3/MM3 (ref 140–450)
POTASSIUM SERPL-SCNC: 4.2 MMOL/L (ref 3.5–5.2)
PROT SERPL-MCNC: 6.9 G/DL (ref 6–8.5)
RBC # BLD AUTO: 4.21 10*6/MM3 (ref 3.77–5.28)
SODIUM SERPL-SCNC: 140 MMOL/L (ref 136–145)
TRIGL SERPL-MCNC: 82 MG/DL (ref 0–150)
TSH SERPL DL<=0.005 MIU/L-ACNC: 2.5 UIU/ML (ref 0.27–4.2)
URATE SERPL-MCNC: 4.3 MG/DL (ref 2.4–5.7)
VLDLC SERPL CALC-MCNC: 16 MG/DL (ref 5–40)
WBC # BLD AUTO: 5.62 10*3/MM3 (ref 3.4–10.8)

## 2023-10-06 NOTE — PROGRESS NOTES
Reviewed results for upcoming appointment as scheduled With Family Medicine (Alexandru Miles MD)  10/11/2023 at 9:45 AM     Electronically signed by SUGEY Gillespie, 10/06/23, 10:01 AM CDT.

## 2023-10-07 DIAGNOSIS — F41.9 ANXIETY: ICD-10-CM

## 2023-10-09 RX ORDER — ALPRAZOLAM 0.5 MG/1
TABLET ORAL
Qty: 30 TABLET | Refills: 0 | Status: SHIPPED | OUTPATIENT
Start: 2023-10-09

## 2023-10-11 ENCOUNTER — OFFICE VISIT (OUTPATIENT)
Dept: FAMILY MEDICINE CLINIC | Facility: CLINIC | Age: 63
End: 2023-10-11
Payer: MEDICARE

## 2023-10-11 VITALS
HEIGHT: 62 IN | RESPIRATION RATE: 18 BRPM | OXYGEN SATURATION: 98 % | DIASTOLIC BLOOD PRESSURE: 84 MMHG | WEIGHT: 183.4 LBS | HEART RATE: 75 BPM | TEMPERATURE: 96.9 F | BODY MASS INDEX: 33.75 KG/M2 | SYSTOLIC BLOOD PRESSURE: 124 MMHG

## 2023-10-11 DIAGNOSIS — E87.6 HYPOPOTASSEMIA: ICD-10-CM

## 2023-10-11 DIAGNOSIS — I50.30 DIASTOLIC CONGESTIVE HEART FAILURE, UNSPECIFIED HF CHRONICITY: Chronic | ICD-10-CM

## 2023-10-11 DIAGNOSIS — N28.9 RENAL INSUFFICIENCY: ICD-10-CM

## 2023-10-11 DIAGNOSIS — Z12.31 ENCOUNTER FOR SCREENING MAMMOGRAM FOR BREAST CANCER: ICD-10-CM

## 2023-10-11 DIAGNOSIS — R69 MULTIPLE CHRONIC DISEASES: ICD-10-CM

## 2023-10-11 DIAGNOSIS — I49.9 CARDIAC ARRHYTHMIA, UNSPECIFIED CARDIAC ARRHYTHMIA TYPE: ICD-10-CM

## 2023-10-11 DIAGNOSIS — Z79.01 ANTICOAGULATED: ICD-10-CM

## 2023-10-11 DIAGNOSIS — K21.9 GASTROESOPHAGEAL REFLUX DISEASE WITHOUT ESOPHAGITIS: ICD-10-CM

## 2023-10-11 DIAGNOSIS — G47.33 OBSTRUCTIVE SLEEP APNEA: Chronic | ICD-10-CM

## 2023-10-11 DIAGNOSIS — F11.90 NARCOTIC DRUG USE: ICD-10-CM

## 2023-10-11 DIAGNOSIS — R73.01 ELEVATED FASTING GLUCOSE: ICD-10-CM

## 2023-10-11 DIAGNOSIS — Z78.0 MENOPAUSE: ICD-10-CM

## 2023-10-11 DIAGNOSIS — I10 PRIMARY HYPERTENSION: Chronic | ICD-10-CM

## 2023-10-11 DIAGNOSIS — M85.80 OSTEOPENIA, UNSPECIFIED LOCATION: ICD-10-CM

## 2023-10-11 DIAGNOSIS — K21.9 GASTROESOPHAGEAL REFLUX DISEASE, UNSPECIFIED WHETHER ESOPHAGITIS PRESENT: Chronic | ICD-10-CM

## 2023-10-11 PROBLEM — Z79.899 POLYPHARMACY: Status: ACTIVE | Noted: 2023-10-11

## 2023-10-11 NOTE — PROGRESS NOTES
"LAMAR".   Subjective   Emilie Soto is a 63 y.o. female presenting with chief complaint of:   Chief Complaint   Patient presents with    Follow-up     AWV NA  Last Completed Annual Wellness Visit            ANNUAL WELLNESS VISIT (Yearly) Next due on 3/28/2024      03/28/2023  Level of Service: ID PPPS, SUBSEQ VISIT    10/19/2021  Level of Service: ID PPPS, SUBSEQ VISIT                     History of Present Illness :  With .  Here for primarily an acute issue today; HTN.   Here for review of chronic problems that includes  and others.     Has multiple chronic problems to consider that might have a bearing on today's issues;  an interval appointment.       Chronic/acute problems reviewed today:   1. Menopause postmenopausal; see osteoporosis   2. Encounter for screening mammogram for breast cancer Chronic/ongoing yearly need to review for breast cancer.  No breast pain, masses, discharge.       3. Primary hypertension Chronic/stable. Stable here past/no recent home blood pressures.  No significant chest pain, SOB, LE edema, orthopnea, near syncope, dizziness/light headness.   Recent Vitals         3/28/2023 9/5/2023 10/11/2023       BP: 128/76 122/78 124/84     Pulse: 74 56 75     Temp: 96.9 øF (36.1 øC) 97.1 øF (36.2 øC) 96.9 øF (36.1 øC)     Weight: 84.6 kg (186 lb 9.6 oz) 83.4 kg (183 lb 12.8 oz) 83.2 kg (183 lb 6.4 oz)     BMI (Calculated): 34.1 33.6 33.5              4. Diastolic congestive heart failure, unspecified HF chronicity Chronic/stable.  Denies significant sob, orthopnea, leg edema, weight gain.  Aware of influence diet/salt and watching weight at home.       5. Cardiac arrhythmia, unspecified cardiac arrhythmia type Chronic/stable.   History of palpitations/benign.  Rhythm currently seems controlled.  Medications seem tolerated.  No syncope near syncope.     6. Anticoagulated: sx ? TIA/ASA 81 Chronic/stable reason for stopping or use of.  Denies bleeding issues; especially epistaxis, " melena, hematochezia.  Upper arms/others do not significantly bruise easily.  No significant bleeding or falls.      7. Elevated fasting glucose Chronic/stable as no problem/pattern hypoglycemia/hyperglycemia manifest by poly- dypsia, phagia, uria, or sweats, diaphoretic episodes, syncope/near.  Plan   9. Gastroesophageal reflux disease without esophagitis Chronic/stable.  Controlled heartburn, reflux without dysphagia, melena.  Rx used, periods not used proven currently needed with symptoms -currently doing ok.  Occ chest pain without exertion.      10. Renal insufficiency-(ro) karl Chronic/stable.  Sees nephrology.  No dysuria.  Previously warned/reminded:   To avoid further kidney function decline  A. Treat any time you think you have infection  B. Stay hydrated (dont get dehydrated); drink at least 60 oz fluid every 24 hr (1800 cc or nearly a 2L)  C. Do not allow any xrays with dye WITHOUT the doctor ordering checking your renal function  D. Do not get new medications without the doctor considering your renal condition  E. Do not use motrin/ibuprofen, alleve/naprosyn and these types of medications     11. Hypopotassemia Chronic/variable high or low K as uses diuretic; has to have lab monitoring.  Nono significant fatigue or muscle cramps     12. Narcotic drug use-ro Long term/current use of opiate analgesic.  The patient has improved activities of daily living, increased function, and decreased pain with opioid pain medication.  There are currently no side effects with this medication.    Walker OK with refills.  Past/current/often asked if desires having Rx narcan (explained it is the antidote for overuse or overdose and can be a life-saving)     13. Osteopenia, unspecified location Chronic/stable.  Last bone density/if below.   Just finished approx 3 y foxmax/ Rx.  Ok further bone density screening when due.      14. RHIANNON: (cpap) Chronic/uncertain: Previous opinion her diagnosis of sleep apnea.  Personal  decision by the patient to forego treatment.  Patient declines having any issues with falling or staying asleep and any issues of hypersomnia with accidents or results of that during the day.  No sleep apnea witnessed.     15. Multiple chronic diseases Has multiple chronic problems with increased risks for management (involves polypharmacy, multiple providers, many required labs/imaging/ to manage)       Has an/another acute issue today: none.    The following portions of the patient's history were reviewed and updated as appropriate: allergies, current medications, past family history, past medical history, past social history, past surgical history, and problem list.      Current Outpatient Medications:     alendronate (FOSAMAX) 70 MG tablet, TAKE 1 TABLET BY MOUTH EVERY 7 (SEVEN) DAYS., Disp: 12 tablet, Rfl: 3    ALPRAZolam (XANAX) 0.5 MG tablet, TAKE ONE TABLET TWICE DAILY AS NEEDED ANXIETY GENERIC FOR XANAX, Disp: 30 tablet, Rfl: 0    amLODIPine (NORVASC) 5 MG tablet, TAKE ONE TABLET DAILY, Disp: 90 tablet, Rfl: 3    Aspirin Low Dose 81 MG EC tablet, TAKE 1 TABLET BY MOUTH DAILY., Disp: 30 tablet, Rfl: 0    carvedilol (COREG) 12.5 MG tablet, Take 1 tablet by mouth 2 (Two) Times a Day With Meals., Disp: 60 tablet, Rfl: 5    cloNIDine (CATAPRES) 0.1 MG tablet, Take 1 tablet by mouth 2 (Two) Times a Day As Needed. One by mouth twice daily as needed if BP greater than 160/100, Disp: , Rfl:     estradiol (ESTRACE) 2 MG tablet, TAKE 1/2 TABLET DAILY, Disp: 45 tablet, Rfl: 3    FLUoxetine (PROzac) 20 MG capsule, Take 1 capsule by mouth Daily. Along with a 40 mg total = 60 mg daily, Disp: , Rfl:     FLUoxetine (PROzac) 40 MG capsule, Take 1 capsule by mouth Daily. Along with a 20 mg total 60 mg daily, Disp: , Rfl:     folic acid (FOLVITE) 1 MG tablet, Take 1 tablet by mouth Daily., Disp: 90 tablet, Rfl: 3    HYDROcodone-acetaminophen (NORCO) 7.5-325 MG per tablet, Take 1 tablet by mouth 2 (Two) Times a Day As Needed.,  Disp: , Rfl:     hydroxychloroquine (PLAQUENIL) 200 MG tablet, Take 1 tablet by mouth 2 (Two) Times a Day., Disp: , Rfl:     loratadine (CLARITIN) 10 MG tablet, Take 1 tablet by mouth As Needed for Allergies., Disp: , Rfl:     losartan (COZAAR) 100 MG tablet, TAKE 1 TABLET BY MOUTH DAILY., Disp: 90 tablet, Rfl: 2    methylPREDNISolone (MEDROL) 4 MG dose pack, Take as directed on package instructions., Disp: 1 each, Rfl: 0    mycophenolate (CELLCEPT) 500 MG tablet, Take 1 tablet by mouth 2 (Two) Times a Day., Disp: , Rfl:     omeprazole (priLOSEC) 20 MG capsule, TAKE 1 CAPSULE BY MOUTH DAILY., Disp: 90 capsule, Rfl: 3    potassium chloride (K-DUR,KLOR-CON) 10 MEQ CR tablet, TAKE 1 TABLET BY MOUTH DAILY., Disp: 90 tablet, Rfl: 3    spironolactone (ALDACTONE) 25 MG tablet, TAKE 1 TABLET BY MOUTH DAILY., Disp: 90 tablet, Rfl: 3    traZODone (DESYREL) 50 MG tablet, Take 1.5 tablets by mouth Every Night., Disp: , Rfl:     Ventolin  (90 Base) MCG/ACT inhaler, INHALE 1 PUFF EVERY 4 (FOUR) HOURS AS NEEDED FOR WHEEZING OR SHORTNESS OF AIR., Disp: 54 g, Rfl: 3    verapamil SR (CALAN-SR) 240 MG CR tablet, TAKE 1 TABLET BY MOUTH 2 (TWO) TIMES A DAY., Disp: 180 tablet, Rfl: 1    vitamin D (ERGOCALCIFEROL) 46339 UNITS capsule capsule, Take 1 capsule by mouth 1 (One) Time Per Week., Disp: , Rfl:     No problems with medications.    Allergies   Allergen Reactions    Abilify [Aripiprazole] Other (See Comments)     Insomnia    Bactrim [Sulfamethoxazole-Trimethoprim] Other (See Comments)     Pt unsure of reaction    Biaxin [Clarithromycin] Nausea And Vomiting    Ciprofloxacin Hcl Other (See Comments)     Pt unsure of reaction    Duricef [Cefadroxil] Other (See Comments)     Pt unsure of reaction    Ketek [Telithromycin] Other (See Comments)     Pt unsure of reaction    Relafen [Nabumetone] Itching    Wellbutrin [Bupropion] Itching       Review of Systems  GENERAL:  Active/slower with limits, speed, stamina for age and desire.  Sleep is ok; occ hard falling/staying with worry. No fever now.  SKIN: No rash/skin lesion of concern:   ENDO:  No syncope, near or diaphoretic sweaty spells..  HEENT: No recent head injury; but same/occ headache.   No vision change.   Decline in hearing; echo affect on/off L.  Ears without pain/drainage.  No sore throat.  No significant nasal/sinus congestion/drainage. No epistaxis.  CHEST: No chest wall tenderness or mass. No cough, without wheeze.  No SOB; no hemoptysis.  CV: No exertional chest pain, palpitations, ankle edema.  GI: No dysphagia; occ chest pressure.   No abdominal pain, diarrhea, constipation.  No rectal bleeding, or melena.  Occ heartburn still.   :  Voids without dysuria, or incontinence to completion.  ORTHO: No painful/swollen joints but various on /off sore.  No change some sore neck or back.  No acute neck or back pain without recent injury.  NEURO: No dizziness, weakness of extremities.  No numbness/paresthesias.   PSYCH: No memory loss.  Mood good; often anxious, depressed but/and not suicidal.  Tries to tolerate stress .      Screening:  Gyne: flatter/mendoza confirmed 10.11.23  Mammogram: 12[facility]16.22-[facility]  Bone density: 6.8.20 Deca-bone d/[place]/Ts L2-0.9 neck -2.2/6.8.2020-ordered/advised  On fosamox 2.5 yrs; stop end of 2023-stopping  Low dose CT chest: Tobacco-smoker/none: NA  GI:   Colon-p, div/Jasiel/DESHAWN/10.10.19/5y  AXC-eiqkx-khu-sandip/Jasiel/DESHAWN/10.13.2020/  Prostate: NA  Usual lab order  Any lab others want; (we wish to attempt not to duplicate so we need copies of labs done outside the office/out of [facility]); OR can have all labs here (bring the order from all other doctors) and we will forward to all specialist  6m CBC, CMP, sed rate, CK-total, CR-protein  12m CBC, CMP, LIPID, TSH, fT4, CK-total, Vit D, sed rate, CR-protein    Copy/paste function used for ROS/exam AND each area of these were reviewed, updated, confirmed and supplemented as needed.  Data reviewed:   Recent admit/ER/MD  visits: 3.28.23    1. Primary hypertension    2. Diastolic congestive heart failure, unspecified HF chronicity (HCC)    3. HRT-(Forrest)    4. Gastroesophageal reflux disease, unspecified whether esophagitis present    5. Gastroesophageal reflux disease without esophagitis    6. Renal insufficiency-(ro) karl    7. Depression, unspecified depression type    8. Narcotic drug use-ro    9. Chronic back pain, unspecified back location, unspecified back pain laterality          Issues that are new, uncontrolled, or required review HPI/ROS/exam and decisions beyond wellness today: (ie: requiring the service of a physician and/or not likely to resolve independently without clinical intervention.)   none     Discussions/medical decisions/reviews:  BP ok  Other vitals ok  DM/BS 5.5 10.19.22  Lipid LDL 59 10.19.22; lifestyle  PSA NA  CBC ok 2.7.23  Renal 47 2.7.23; stable-nephrology  Liver ok 2.7.23  Vit D 63 2.7.23  Thyroid TSH ok 10.19.22     Wellness/or annual done   Screening reviewed/updated end of year  Vaccines discussed shingles  Add A1c       Last cardiac testing:   Echo:   Results for orders placed during the hospital encounter of 10/18/22    Adult Stress Echo W/ Cont or Stress Agent if Necessary Per Protocol    Interpretation Summary    Baseline ECG of normal sinus rhythm noted at rest. Diffuse T wave inversions noted, consistent with prior ECGs.    The patient reported no symptoms during the stress test.    There were no clinically significant ST segment changes noted during stress.    Post stress wall motion appears to be normal.    Overall, this is a low risk stress test with no significant clinical, ECG or echocardiographic evidence for myocardial ischemia.      Radiology considered:   No radiology results for the last 90 days.    Lab Results:  Results for orders placed or performed in visit on 10/05/23   Uric Acid    Specimen: Blood   Result Value Ref Range    Uric Acid 4.3 2.4 - 5.7 mg/dL   Vitamin  D,25-Hydroxy    Specimen: Blood   Result Value Ref Range    25 Hydroxy, Vitamin D 45.5 30.0 - 100.0 ng/ml   Comprehensive Metabolic Panel    Specimen: Blood   Result Value Ref Range    Glucose 87 65 - 99 mg/dL    BUN 14 8 - 23 mg/dL    Creatinine 1.20 (H) 0.57 - 1.00 mg/dL    EGFR Result 51.3 (L) >60.0 mL/min/1.73    BUN/Creatinine Ratio 11.7 7.0 - 25.0    Sodium 140 136 - 145 mmol/L    Potassium 4.2 3.5 - 5.2 mmol/L    Chloride 105 98 - 107 mmol/L    Total CO2 26.4 22.0 - 29.0 mmol/L    Calcium 9.4 8.6 - 10.5 mg/dL    Total Protein 6.9 6.0 - 8.5 g/dL    Albumin 4.2 3.5 - 5.2 g/dL    Globulin 2.7 gm/dL    A/G Ratio 1.6 g/dL    Total Bilirubin <0.2 0.0 - 1.2 mg/dL    Alkaline Phosphatase 69 39 - 117 U/L    AST (SGOT) 16 1 - 32 U/L    ALT (SGPT) 10 1 - 33 U/L   Hemoglobin A1c    Specimen: Blood   Result Value Ref Range    Hemoglobin A1C 5.40 4.80 - 5.60 %   Lipid Panel With / Chol / HDL Ratio    Specimen: Blood   Result Value Ref Range    Total Cholesterol 132 0 - 200 mg/dL    Triglycerides 82 0 - 150 mg/dL    HDL Cholesterol 52 40 - 60 mg/dL    VLDL Cholesterol Sai 16 5 - 40 mg/dL    LDL Chol Calc (NIH) 64 0 - 100 mg/dL    Chol/HDL Ratio 2.54    TSH    Specimen: Blood   Result Value Ref Range    TSH 2.500 0.270 - 4.200 uIU/mL   CBC & Differential    Specimen: Blood   Result Value Ref Range    WBC 5.62 3.40 - 10.80 10*3/mm3    RBC 4.21 3.77 - 5.28 10*6/mm3    Hemoglobin 12.6 12.0 - 15.9 g/dL    Hematocrit 38.1 34.0 - 46.6 %    MCV 90.5 79.0 - 97.0 fL    MCH 29.9 26.6 - 33.0 pg    MCHC 33.1 31.5 - 35.7 g/dL    RDW 13.1 12.3 - 15.4 %    Platelets 240 140 - 450 10*3/mm3    Neutrophil Rel % 45.7 42.7 - 76.0 %    Lymphocyte Rel % 36.7 19.6 - 45.3 %    Monocyte Rel % 13.7 (H) 5.0 - 12.0 %    Eosinophil Rel % 2.5 0.3 - 6.2 %    Basophil Rel % 0.7 0.0 - 1.5 %    Neutrophils Absolute 2.57 1.70 - 7.00 10*3/mm3    Lymphocytes Absolute 2.06 0.70 - 3.10 10*3/mm3    Monocytes Absolute 0.77 0.10 - 0.90 10*3/mm3    Eosinophils  "Absolute 0.14 0.00 - 0.40 10*3/mm3    Basophils Absolute 0.04 0.00 - 0.20 10*3/mm3    Immature Granulocyte Rel % 0.7 (H) 0.0 - 0.5 %    Immature Grans Absolute 0.04 0.00 - 0.05 10*3/mm3    nRBC 0.0 0.0 - 0.2 /100 WBC       A1C:  Lab Results - Last 18 Months   Lab Units 10/05/23  0900 03/28/23  1206 10/19/22  0552 09/21/22  0759 05/10/22  0749   HEMOGLOBIN A1C % 5.40 5.4 5.50  5.40 5.50 5.50     GLUCOSE:  Lab Results - Last 18 Months   Lab Units 10/05/23  0900 02/07/23  0949 10/19/22  0552 10/18/22  1936 09/21/22  0759 06/07/22  1603 05/10/22  0749   GLUCOSE mg/dL 87 79 89 81 83 80 89     LIPID:  Lab Results - Last 18 Months   Lab Units 10/05/23  0900 10/19/22  0552 10/18/22  1936 09/21/22  0759   CHOLESTEROL mg/dL 132  --   --  128   LDL CHOL mg/dL 64 59 73 65   HDL CHOL mg/dL 52 46 49 50   TRIGLYCERIDES mg/dL 82 70 85 64     PSA:No results found for: \"PSA\"    CBC:  Lab Results - Last 18 Months   Lab Units 10/05/23  0900 02/07/23  0949 10/19/22  0552 10/18/22  1936 09/21/22  0759 06/07/22  1603 05/10/22  0749   WBC 10*3/mm3 5.62 4.96 5.70 5.03 4.33 5.1 5.18   HEMOGLOBIN g/dL 12.6 12.2 12.2 12.8 12.4 12.7 12.2   HEMATOCRIT % 38.1 38.0 38.7 40.3 38.2 41.5 37.0   PLATELETS 10*3/mm3 240 235 230 244 244 267 249     BMP/CMP:  Lab Results - Last 18 Months   Lab Units 10/05/23  0900 02/07/23  0949 10/19/22  0552 10/18/22  1936 09/21/22  0759 06/07/22  1603 05/10/22  0749   SODIUM mmol/L 140 140 139 136 145 141 142   POTASSIUM mmol/L 4.2 3.9 4.2 4.5 4.5 3.9 4.2   CHLORIDE mmol/L 105 104 104 101 107 102 104   TOTAL CO2 mmol/L  --   --   --   --   --  28  --    CO2 mmol/L 26.4 28.3 29.0 28.0 27.7  --  26.5   GLUCOSE mg/dL 87 79 89 81 83 80 89   BUN mg/dL 14 13 16 16 11 17 13   CREATININE mg/dL 1.20* 1.29* 1.35* 1.38* 1.25* 1.3* 1.35*   EGFR IF NONAFRICN AM   --   --   --   --   --  42*  --    EGFR IF AFRICN AM   --   --   --   --   --  50*  --    EGFR RESULT mL/min/1.73 51.3* 47.3*  --   --  49.4*  --  45.1*   CALCIUM mg/dL " "9.4 9.0 9.5 9.6 9.0 10.0 9.9   URIC ACID mg/dL 4.3 4.7  --   --   --  5.4  --        HEPATIC:  Lab Results - Last 18 Months   Lab Units 10/05/23  0900 02/07/23  0949 10/19/22  0552 10/18/22  1936 09/21/22  0759 06/07/22  1603 05/10/22  0749   ALT (SGPT) U/L 10 9 9 10 10 12 9   AST (SGOT) U/L 16 16 14 15 13 18 13   ALK PHOS U/L 69 61 55 62 59 68 57     HEPATITIS C ANTIBODY:   Lab Results   Component Value Date/Time    HEPCVIRUSABY <0.1 04/23/2019 09:25 AM     Vit D:  Lab Results - Last 18 Months   Lab Units 10/05/23  0900 02/07/23  0949 06/07/22  1603   VIT D 25 HYDROXY ng/ml 45.5 63.0 74.1     THYROID:  Lab Results - Last 18 Months   Lab Units 10/05/23  0900 10/19/22  0552 09/21/22  0759 05/10/22  0749   TSH uIU/mL 2.500 1.450 1.120 1.770   FREE T4 ng/dL  --   --  1.16 1.29       Objective   /84   Pulse 75   Temp 96.9 øF (36.1 øC) (Temporal)   Resp 18   Ht 157.5 cm (62\")   Wt 83.2 kg (183 lb 6.4 oz)   SpO2 98%   BMI 33.54 kg/mý   Body mass index is 33.54 kg/mý.    Recent Vitals         3/28/2023 9/5/2023 10/11/2023       BP: 128/76 122/78 124/84     Pulse: 74 56 75     Temp: 96.9 øF (36.1 øC) 97.1 øF (36.2 øC) 96.9 øF (36.1 øC)     Weight: 84.6 kg (186 lb 9.6 oz) 83.4 kg (183 lb 12.8 oz) 83.2 kg (183 lb 6.4 oz)     BMI (Calculated): 34.1 33.6 33.5           Wt Readings from Last 15 Encounters:   10/11/23 0901 83.2 kg (183 lb 6.4 oz)   09/05/23 1315 83.4 kg (183 lb 12.8 oz)   03/28/23 1115 84.6 kg (186 lb 9.6 oz)   11/17/22 1043 81.6 kg (180 lb)   11/03/22 1106 84.1 kg (185 lb 6.4 oz)   10/19/22 2000 85.8 kg (189 lb 4 oz)   10/19/22 1126 86.2 kg (190 lb 0.6 oz)   10/19/22 1123 86.2 kg (190 lb)   10/18/22 1902 86.2 kg (190 lb)   09/27/22 1129 85 kg (187 lb 6.4 oz)   05/31/22 1435 86.2 kg (190 lb)   03/23/22 1134 88.5 kg (195 lb 3.2 oz)   11/12/21 1459 88.5 kg (195 lb)   10/19/21 1041 88.5 kg (195 lb)   09/23/21 1041 87.5 kg (193 lb)   03/16/21 1120 87.5 kg (192 lb 12.8 oz)   10/13/20 0730 87.1 kg (192 lb) "   09/24/20 0912 86.6 kg (191 lb)       Physical Exam  GENERAL:  Well nourished/developed in no acute distress. BMI noted: 33.8  SKIN: Turgor excellent, without wound, rash, lesion  HEENT: Normal cephalic without trauma.  Pupils equal round reactive to light. Extraocular motions full without nystagmus.   External canals nonobstructive nontender without reddness. Tymphatic membranes josiane with cesar structures intact.   Oral cavity without growths, exudates, and moist.  Posterior pharynx without mass, obstruction, redness.  No thyromegaly, mass, tenderness, lymphadenopathy and supple.  CV: Regular rhythm.  No murmur, gallop,  edema. Posterior pulses intact.  No carotid bruits.  CHEST: No chest wall tenderness or mass.   LUNGS: Symmetric motion with clear to auscultation.   ABD: Soft, nontender without mass.   ORTHO: Symmetric extremities without swelling/point tenderness.  Full gross range of motion; few sore fingers.  NEURO: CN 2-12 grossly intact.  Symmetric facies. 1/4 x bicep equal reflexes.  UE/LE   3/5 strength throughout.  Nonfocal use extremities. Speech clear. Intact light touch with monofilament, vibratory sensation with tuning fork; equal toes/distal feet.    PSYCH: Oriented x 3.  Pleasant calm, well kept.   Purposeful/directed conservation with intact short/long gross memory.       Assessment & Plan     1. Menopause    2. Encounter for screening mammogram for breast cancer    3. Primary hypertension    4. Diastolic congestive heart failure, unspecified HF chronicity    5. Cardiac arrhythmia, unspecified cardiac arrhythmia type    6. Anticoagulated: sx ? TIA/ASA 81    7. Elevated fasting glucose    8. Gastroesophageal reflux disease, unspecified whether esophagitis present    9. Gastroesophageal reflux disease without esophagitis    10. Renal insufficiency-(ro) karl    11. Hypopotassemia    12. Narcotic drug use-ro    13. Osteopenia, unspecified location    14. RHIANNON: (cpap)    15. Multiple chronic  diseases        Data review above:   Discussions/medical decisions/reviews:  BP ok  Other vitals ok  Recent Vitals         3/28/2023 9/5/2023 10/11/2023       BP: 128/76 122/78 124/84     Pulse: 74 56 75     Temp: 96.9 øF (36.1 øC) 97.1 øF (36.2 øC) 96.9 øF (36.1 øC)     Weight: 84.6 kg (186 lb 9.6 oz) 83.4 kg (183 lb 12.8 oz) 83.2 kg (183 lb 6.4 oz)     BMI (Calculated): 34.1 33.6 33.5           DM/A1c 5.4 10.5.23  DM/BS 87 10.5.23  Uric acid 4.3-no Rx  Lipid LDL 64 10.5.23-lifestyle  PSA NA  CBC ok 10.5.23-folate 1 mg/d  Renal 51 10.5.23  Liver ok 10.5.23  Vit D 45 10.5.23  Thyroid TSH ok 10.5.23; none    Screening reviewed/updated ordered mammogram/bone density ordered (if we dont have a recent; when there she can have [facility] [place] send most current)  Vaccines discussed (see below) advised MD2 (needs ok from [facility] considering cellcept for shingles)  Pro/con hormone replacement  Fosmax; stop for sure end of 2023  US liver; as there are risk  7-10 trial prilosec bid then back to daily; hope will help symtpoms reflux, chest pressure    Follow up: Return for lab/Dr Miles 6m.  Future Appointments   Date Time Provider Department Center   4/11/2024  2:15 PM Alexandru Miles MD [facility]       Data review above:   Rx: reviewed and decisions:   Rx new/changes: none  No orders of the defined types were placed in this encounter.      Orders placed:   LAB/Testing/Referrals: reviewed/orders:   Today:   Orders Placed This Encounter   Procedures    Mammo Screening Digital Tomosynthesis Bilateral With CAD    DEXA Bone Density Axial     Chronic/recurrent labs above or change to:   Same   Immunization History   Administered Date(s) Administered    COVID-19 (MODERNA) 1st,2nd,3rd Dose Monovalent 03/15/2021, 04/12/2021    COVID-19 (MODERNA) Monovalent Original Booster 12/15/2021    Fluzone (or Fluarix & Flulaval for VFC) >6mos 10/27/2018, 11/12/2020, 10/13/2021, 12/22/2022    Influenza, Unspecified 11/05/2016    Tdap  "10/19/2021     We advised/reaffirmed our support/suggestion for staying complete with covid- covid boosters, seasonal flu/yearly and any missing vaccine from list we supplied; when cannot be given here we suggest contact with local health department office or pharmacy to review missing/needed vaccines and then bring nursing documentation for these vaccines to this office or call this information in. Shingles became \"free\" 1.1.23 for medicare insurance.    Health maintenance:   Body mass index is 33.54 kg/mý.         Tobacco use reviewed:   Emilie Soto  reports that she has never smoked. She has never used smokeless tobacco..     Patient Instructions   There are several vaccines used for individuals near/over 65 years old.      1. Tetanus.   Like anyone this needs to given every 10 years; sooner for/with lacerations/wounds.   Likely when getting this booster it needs to be a tetanus called Tdap (tetanus mixed with diptheria and pertussis).   Years ago you had this vaccine.  We now know we can lose our immunity to pertussis (a part of this vaccine) and run a risk of catching this.  Now only would this make us ill; but more importantly we can spread this to very young children (and for them it can be a much more dangerous illness).   We call this the grandparent vaccine for this reason.     2. Pneumonia.   This comes now as a one time vaccine for most persons.  Even if you have had these before; we need to review when and your current health situation/s as you may need a booster     3. Shingles.  You do not want to catch shingles.  Though you will recover from this; the pain associated with shingles can be severe.  Even if you have had the now older zostavax, or have had shingles; it is recommended you still get the Shingrix (the new just available early 2018 shingles vaccine).   Medicare began 1.1.23 waving any co-pays for this; it is therefore free.     4. Flu/influenza: Yearly flu vaccine given from September " "through April each year.     5. RSV: If you are over 60; beginning 10.1.23 you can now take a RSV vaccination.  This would lower your change for catching this winter/\"cold\" virus that can under some circumstances cause significant respiratory symptoms and even require hospitalization.  Being vaccinated also makes it more difficult for you to be contagious and get this to children; particularly children less than 6 months of where this can be a very dangerous illness.      6. COVID: Since the early onset of COVID illness and a the many COVID vaccines that were initially developed; we will have a winter 2023 (and maybe beyond)  booster particularly for those at higher risk of complications and over age 65.    7. Travel vaccines:  If you are one to do international travel; be sure and ask us for any particular unusual vaccines you may need.     8.  Miscellaneous:  If you have certain health situations/disease you may need specific/particular vaccines not give to the general public.     Your records we have on file:   Immunization History   Administered Date(s) Administered    COVID-19 (MODERNA) 1st,2nd,3rd Dose Monovalent 03/15/2021, 04/12/2021    COVID-19 (MODERNA) Monovalent Original Booster 12/15/2021    Fluzone (or Fluarix & Flulaval for VFC) >6mos 10/27/2018, 11/12/2020, 10/13/2021, 12/22/2022    Influenza, Unspecified 11/05/2016    Tdap 10/19/2021       You therefore could need the ones if we listed below:   Winter 2023 shots: latest/updated covid vaccine + 2023 high dose flu (can/should be given same day)                                 2-3 weeks after then get the new RSV vaccine    After this; take a break and then work on getting those other vaccines still needed:   shingles      Because of many restrictions on this office always having all the above vaccines; you may be advised to work with your local health department or various pharmacies to keep up with your individual vaccine needs.  If you are forced to " get a vaccine outside this office in order to keep your health record up to date; we request you personally notify us after any vaccines of the type and date.

## 2023-10-11 NOTE — PATIENT INSTRUCTIONS
"There are several vaccines used for individuals near/over 65 years old.      1. Tetanus.   Like anyone this needs to given every 10 years; sooner for/with lacerations/wounds.   Likely when getting this booster it needs to be a tetanus called Tdap (tetanus mixed with diptheria and pertussis).   Years ago you had this vaccine.  We now know we can lose our immunity to pertussis (a part of this vaccine) and run a risk of catching this.  Now only would this make us ill; but more importantly we can spread this to very young children (and for them it can be a much more dangerous illness).   We call this the grandparent vaccine for this reason.     2. Pneumonia.   This comes now as a one time vaccine for most persons.  Even if you have had these before; we need to review when and your current health situation/s as you may need a booster     3. Shingles.  You do not want to catch shingles.  Though you will recover from this; the pain associated with shingles can be severe.  Even if you have had the now older zostavax, or have had shingles; it is recommended you still get the Shingrix (the new just available early 2018 shingles vaccine).   Medicare began 1.1.23 waving any co-pays for this; it is therefore free.     4. Flu/influenza: Yearly flu vaccine given from September through April each year.     5. RSV: If you are over 60; beginning 10.1.23 you can now take a RSV vaccination.  This would lower your change for catching this winter/\"cold\" virus that can under some circumstances cause significant respiratory symptoms and even require hospitalization.  Being vaccinated also makes it more difficult for you to be contagious and get this to children; particularly children less than 6 months of where this can be a very dangerous illness.      6. COVID: Since the early onset of COVID illness and a the many COVID vaccines that were initially developed; we will have a winter 2023 (and maybe beyond)  booster particularly for those " at higher risk of complications and over age 65.    7. Travel vaccines:  If you are one to do international travel; be sure and ask us for any particular unusual vaccines you may need.     8.  Miscellaneous:  If you have certain health situations/disease you may need specific/particular vaccines not give to the general public.     Your records we have on file:   Immunization History   Administered Date(s) Administered    COVID-19 (MODERNA) 1st,2nd,3rd Dose Monovalent 03/15/2021, 04/12/2021    COVID-19 (MODERNA) Monovalent Original Booster 12/15/2021    Fluzone (or Fluarix & Flulaval for VFC) >6mos 10/27/2018, 11/12/2020, 10/13/2021, 12/22/2022    Influenza, Unspecified 11/05/2016    Tdap 10/19/2021       You therefore could need the ones if we listed below:   Winter 2023 shots: latest/updated covid vaccine + 2023 high dose flu (can/should be given same day)                                 2-3 weeks after then get the new RSV vaccine    After this; take a break and then work on getting those other vaccines still needed:   shingles      Because of many restrictions on this office always having all the above vaccines; you may be advised to work with your local health department or various pharmacies to keep up with your individual vaccine needs.  If you are forced to get a vaccine outside this office in order to keep your health record up to date; we request you personally notify us after any vaccines of the type and date.

## 2023-10-23 DIAGNOSIS — I50.32 CHRONIC DIASTOLIC CONGESTIVE HEART FAILURE: Chronic | ICD-10-CM

## 2023-10-23 DIAGNOSIS — I10 HYPERTENSION, UNSPECIFIED TYPE: Chronic | ICD-10-CM

## 2023-10-23 RX ORDER — VERAPAMIL HYDROCHLORIDE 240 MG/1
240 TABLET, FILM COATED, EXTENDED RELEASE ORAL 2 TIMES DAILY
Qty: 180 TABLET | Refills: 1 | Status: SHIPPED | OUTPATIENT
Start: 2023-10-23

## 2023-10-23 RX ORDER — CARVEDILOL 12.5 MG/1
12.5 TABLET ORAL 2 TIMES DAILY WITH MEALS
Qty: 180 TABLET | Refills: 1 | Status: SHIPPED | OUTPATIENT
Start: 2023-10-23

## 2023-11-01 ENCOUNTER — TELEPHONE (OUTPATIENT)
Dept: OBGYN CLINIC | Age: 63
End: 2023-11-01

## 2023-11-01 NOTE — TELEPHONE ENCOUNTER
Patient requesting return call from office in regards to repeat Pap. Please return call to discuss [phone]      Thank you

## 2023-11-30 DIAGNOSIS — F41.9 ANXIETY: ICD-10-CM

## 2023-11-30 RX ORDER — ALPRAZOLAM 0.5 MG/1
TABLET ORAL
Qty: 30 TABLET | Refills: 0 | Status: SHIPPED | OUTPATIENT
Start: 2023-11-30

## 2023-12-04 ENCOUNTER — TELEPHONE (OUTPATIENT)
Dept: OBGYN CLINIC | Age: 63
End: 2023-12-04

## 2023-12-04 ENCOUNTER — OFFICE VISIT (OUTPATIENT)
Dept: OBGYN CLINIC | Age: 63
End: 2023-12-04
Payer: MEDICARE

## 2023-12-04 VITALS
SYSTOLIC BLOOD PRESSURE: 133 MMHG | DIASTOLIC BLOOD PRESSURE: 75 MMHG | BODY MASS INDEX: 33.13 KG/M2 | HEIGHT: 62 IN | HEART RATE: 61 BPM | WEIGHT: 180 LBS

## 2023-12-04 DIAGNOSIS — R87.615 ENCOUNTER FOR REPEAT PAP SMEAR DUE TO PREVIOUS INSUFF CERVICAL CELLS: Primary | ICD-10-CM

## 2023-12-04 PROCEDURE — 3017F COLORECTAL CA SCREEN DOC REV: CPT | Performed by: OBSTETRICS & GYNECOLOGY

## 2023-12-04 PROCEDURE — 99212 OFFICE O/P EST SF 10 MIN: CPT | Performed by: OBSTETRICS & GYNECOLOGY

## 2023-12-04 PROCEDURE — G8417 CALC BMI ABV UP PARAM F/U: HCPCS | Performed by: OBSTETRICS & GYNECOLOGY

## 2023-12-04 PROCEDURE — 1036F TOBACCO NON-USER: CPT | Performed by: OBSTETRICS & GYNECOLOGY

## 2023-12-04 PROCEDURE — G8427 DOCREV CUR MEDS BY ELIG CLIN: HCPCS | Performed by: OBSTETRICS & GYNECOLOGY

## 2023-12-04 PROCEDURE — G8484 FLU IMMUNIZE NO ADMIN: HCPCS | Performed by: OBSTETRICS & GYNECOLOGY

## 2023-12-04 RX ORDER — POTASSIUM CHLORIDE 750 MG/1
TABLET, EXTENDED RELEASE ORAL
COMMUNITY
Start: 2022-05-06

## 2023-12-04 NOTE — TELEPHONE ENCOUNTER
Pt requested that her results be sent over for Dr. Stanislaw Rangel at [facility].  12/4/23 AM

## 2023-12-04 NOTE — PROGRESS NOTES
Adri Gill is a 59 y.o.  who presents today for a repeat pap smear due to scant cells in June. Review of Systems   Constitutional: Negative. HENT: Negative. Eyes: Negative. Respiratory: Negative. Cardiovascular: Negative. Gastrointestinal: Negative. Genitourinary: Negative. Negative for dysuria, frequency, menstrual problem, pelvic pain, urgency and vaginal discharge. Skin: Negative. Neurological: Negative. Psychiatric/Behavioral: Negative.          Past Medical History:   Diagnosis Date    Anxiety     Gastroesophageal reflux disease     Heart failure ([facility])     Hypertension     Lupus ([facility]) 2011    Renal insufficiency     Rheumatoid arthritis ([facility])        Past Surgical History:   Procedure Laterality Date    APPENDECTOMY      PARTIAL HYSTERECTOMY (CERVIX NOT REMOVED)      TONSILLECTOMY         Family History   Problem Relation Age of Onset    Prostate Cancer Father     Breast Cancer Sister        Social History     Socioeconomic History    Marital status:      Spouse name: Not on file    Number of children: Not on file    Years of education: Not on file    Highest education level: Not on file   Occupational History    Not on file   Tobacco Use    Smoking status: Never    Smokeless tobacco: Never   Vaping Use    Vaping Use: Never used   Substance and Sexual Activity    Alcohol use: Not Currently    Drug use: Never    Sexual activity: Not Currently   Other Topics Concern    Not on file   Social History Narrative    Not on file     Social Determinants of Health     Financial Resource Strain: Not on file   Food Insecurity: Not on file   Transportation Needs: Not on file   Physical Activity: Not on file   Stress: Not on file   Social Connections: Not on file   Intimate Partner Violence: Not on file   Housing Stability: Not on file         Current Outpatient Medications:     KLOR-CON M10 10 MEQ extended release tablet, 1 tablet with food Orally Twice a day, Disp: , Rfl:

## 2023-12-04 NOTE — TELEPHONE ENCOUNTER
Called to let pt know results take about 1 wk to get back, once we get results we will fax to Dr. Mini Barrios. Pt verbalized understanding.    Bismark Johnson MA

## 2023-12-05 DIAGNOSIS — Z12.31 ENCOUNTER FOR SCREENING MAMMOGRAM FOR BREAST CANCER: ICD-10-CM

## 2024-01-19 DIAGNOSIS — I10 HYPERTENSION, UNSPECIFIED TYPE: Chronic | ICD-10-CM

## 2024-01-19 DIAGNOSIS — I50.32 CHRONIC DIASTOLIC CONGESTIVE HEART FAILURE: Chronic | ICD-10-CM

## 2024-01-19 RX ORDER — CARVEDILOL 12.5 MG/1
12.5 TABLET ORAL 2 TIMES DAILY WITH MEALS
Qty: 180 TABLET | Refills: 1 | Status: SHIPPED | OUTPATIENT
Start: 2024-01-19

## 2024-02-02 LAB
25(OH)D3 SERPL-MCNC: 42.9 NG/ML
ALBUMIN SERPL-MCNC: 4 G/DL (ref 3.5–5.2)
ALP SERPL-CCNC: 70 U/L (ref 35–104)
ALT SERPL-CCNC: 10 U/L (ref 5–33)
ANION GAP SERPL CALCULATED.3IONS-SCNC: 14 MMOL/L (ref 7–19)
AST SERPL-CCNC: 16 U/L (ref 5–32)
BASOPHILS # BLD: 0 K/UL (ref 0–0.2)
BASOPHILS NFR BLD: 0.7 % (ref 0–1)
BILIRUB SERPL-MCNC: <0.2 MG/DL (ref 0.2–1.2)
BILIRUB UR STRIP.AUTO-MCNC: NEGATIVE MG/DL
BUN SERPL-MCNC: 14 MG/DL (ref 8–23)
CALCIUM SERPL-MCNC: 9.6 MG/DL (ref 8.8–10.2)
CHLORIDE SERPL-SCNC: 104 MMOL/L (ref 98–111)
CLARITY UR: CLEAR
CO2 SERPL-SCNC: 26 MMOL/L (ref 22–29)
COLOR UR: YELLOW
CREAT SERPL-MCNC: 1.3 MG/DL (ref 0.5–0.9)
CREAT UR-MCNC: 146.6 MG/DL (ref 28–217)
EOSINOPHIL # BLD: 0.2 K/UL (ref 0–0.6)
EOSINOPHIL NFR BLD: 4.6 % (ref 0–5)
ERYTHROCYTE [DISTWIDTH] IN BLOOD BY AUTOMATED COUNT: 12.9 % (ref 11.5–14.5)
GLUCOSE SERPL-MCNC: 80 MG/DL (ref 74–109)
GLUCOSE UR STRIP.AUTO-MCNC: NEGATIVE MG/DL
HCT VFR BLD AUTO: 41.5 % (ref 37–47)
HGB BLD-MCNC: 12.9 G/DL (ref 12–16)
HGB UR STRIP.AUTO-MCNC: NEGATIVE MG/L
IMM GRANULOCYTES # BLD: 0 K/UL
KETONES UR STRIP.AUTO-MCNC: NEGATIVE MG/DL
LEUKOCYTE ESTERASE UR QL STRIP.AUTO: NEGATIVE
LYMPHOCYTES # BLD: 1.4 K/UL (ref 1.1–4.5)
LYMPHOCYTES NFR BLD: 32.1 % (ref 20–40)
MAGNESIUM SERPL-MCNC: 2 MG/DL (ref 1.6–2.4)
MCH RBC QN AUTO: 29.5 PG (ref 27–31)
MCHC RBC AUTO-ENTMCNC: 31.1 G/DL (ref 33–37)
MCV RBC AUTO: 94.7 FL (ref 81–99)
MONOCYTES # BLD: 0.7 K/UL (ref 0–0.9)
MONOCYTES NFR BLD: 15.3 % (ref 0–10)
NEUTROPHILS # BLD: 2 K/UL (ref 1.5–7.5)
NEUTS SEG NFR BLD: 46.4 % (ref 50–65)
NITRITE UR QL STRIP.AUTO: NEGATIVE
PH UR STRIP.AUTO: 6 [PH] (ref 5–8)
PHOSPHATE SERPL-MCNC: 2.8 MG/DL (ref 2.5–4.5)
PLATELET # BLD AUTO: 233 K/UL (ref 130–400)
PMV BLD AUTO: 10 FL (ref 9.4–12.3)
POTASSIUM SERPL-SCNC: 4.5 MMOL/L (ref 3.5–5)
PROT SERPL-MCNC: 7.5 G/DL (ref 6.6–8.7)
PROT UR STRIP.AUTO-MCNC: NEGATIVE MG/DL
PROT UR-MCNC: 12 MG/DL (ref 15–45)
PTH-INTACT SERPL-MCNC: 86.9 PG/ML (ref 15–65)
RBC # BLD AUTO: 4.38 M/UL (ref 4.2–5.4)
SODIUM SERPL-SCNC: 144 MMOL/L (ref 136–145)
SP GR UR STRIP.AUTO: 1.02 (ref 1–1.03)
URATE SERPL-MCNC: 4.2 MG/DL (ref 2.4–5.7)
UROBILINOGEN UR STRIP.AUTO-MCNC: 0.2 E.U./DL
WBC # BLD AUTO: 4.4 K/UL (ref 4.8–10.8)

## 2024-03-18 DIAGNOSIS — F41.9 ANXIETY: ICD-10-CM

## 2024-03-18 RX ORDER — ALPRAZOLAM 0.5 MG/1
TABLET ORAL
Qty: 30 TABLET | Refills: 0 | Status: SHIPPED | OUTPATIENT
Start: 2024-03-18

## 2024-03-18 NOTE — TELEPHONE ENCOUNTER
Rx Refill Note  Requested Prescriptions     Pending Prescriptions Disp Refills    ALPRAZolam (XANAX) 0.5 MG tablet [Pharmacy Med Name: ALPRAZOLAM 0.5MG TABLET] 30 tablet 0     Sig: TAKE ONE TABLET TWICE DAILY AS NEEDED ANXIETY GENERIC FOR XANAX      Last office visit with office: 10/11/2023  Next office visit with office: 04/11/24    UDS: none    DATE OF LAST REFILL: 11/30/2023    Controlled Substance Agreement: not up to date    [place]         {TIP  Is Refill Pharmacy correct?:yes  Jenni Lucero MA  03/18/24, 15:02 CDT

## 2024-03-21 DIAGNOSIS — G89.29 OTHER CHRONIC PAIN: ICD-10-CM

## 2024-03-21 DIAGNOSIS — F41.9 ANXIETY: Primary | ICD-10-CM

## 2024-03-22 LAB
AMPHETAMINES UR QL SCN: NEGATIVE NG/ML
BARBITURATES UR QL SCN: NEGATIVE NG/ML
BENZODIAZ UR QL SCN: NEGATIVE NG/ML
BZE UR QL SCN: NEGATIVE NG/ML
CANNABINOIDS UR QL SCN: NEGATIVE NG/ML
CREAT UR-MCNC: 97.1 MG/DL (ref 20–300)
LABORATORY COMMENT REPORT: NORMAL
METHADONE UR QL SCN: NEGATIVE NG/ML
OPIATES UR QL SCN: NEGATIVE NG/ML
OXYCODONE+OXYMORPHONE UR QL SCN: NEGATIVE NG/ML
PCP UR QL: NEGATIVE NG/ML
PH UR: 5.3 [PH] (ref 4.5–8.9)
PROPOXYPH UR QL SCN: NEGATIVE NG/ML

## 2024-04-11 ENCOUNTER — OFFICE VISIT (OUTPATIENT)
Dept: FAMILY MEDICINE CLINIC | Facility: CLINIC | Age: 64
End: 2024-04-11
Payer: MEDICARE

## 2024-04-11 VITALS
OXYGEN SATURATION: 97 % | SYSTOLIC BLOOD PRESSURE: 102 MMHG | BODY MASS INDEX: 34.04 KG/M2 | WEIGHT: 185 LBS | HEIGHT: 62 IN | HEART RATE: 60 BPM | DIASTOLIC BLOOD PRESSURE: 68 MMHG | TEMPERATURE: 97.7 F

## 2024-04-11 DIAGNOSIS — M85.80 OSTEOPENIA, UNSPECIFIED LOCATION: ICD-10-CM

## 2024-04-11 DIAGNOSIS — M54.9 CHRONIC BACK PAIN, UNSPECIFIED BACK LOCATION, UNSPECIFIED BACK PAIN LATERALITY: Chronic | ICD-10-CM

## 2024-04-11 DIAGNOSIS — R73.01 ELEVATED FASTING GLUCOSE: ICD-10-CM

## 2024-04-11 DIAGNOSIS — D84.9 IMMUNOSUPPRESSED STATUS: ICD-10-CM

## 2024-04-11 DIAGNOSIS — I10 PRIMARY HYPERTENSION: Chronic | ICD-10-CM

## 2024-04-11 DIAGNOSIS — E87.6 HYPOPOTASSEMIA: ICD-10-CM

## 2024-04-11 DIAGNOSIS — R69 MULTIPLE CHRONIC DISEASES: ICD-10-CM

## 2024-04-11 DIAGNOSIS — Z79.899 POLYPHARMACY: ICD-10-CM

## 2024-04-11 DIAGNOSIS — R53.83 OTHER FATIGUE: ICD-10-CM

## 2024-04-11 DIAGNOSIS — K21.9 GASTROESOPHAGEAL REFLUX DISEASE, UNSPECIFIED WHETHER ESOPHAGITIS PRESENT: Chronic | ICD-10-CM

## 2024-04-11 DIAGNOSIS — Z79.01 ANTICOAGULATED: ICD-10-CM

## 2024-04-11 DIAGNOSIS — F32.A DEPRESSION, UNSPECIFIED DEPRESSION TYPE: ICD-10-CM

## 2024-04-11 DIAGNOSIS — I50.32 CHRONIC DIASTOLIC CHF (CONGESTIVE HEART FAILURE): ICD-10-CM

## 2024-04-11 DIAGNOSIS — G89.29 CHRONIC BACK PAIN, UNSPECIFIED BACK LOCATION, UNSPECIFIED BACK PAIN LATERALITY: Chronic | ICD-10-CM

## 2024-04-11 PROBLEM — H40.9 GLAUCOMA: Status: ACTIVE | Noted: 2024-04-11

## 2024-04-11 RX ORDER — ASPIRIN 81 MG/1
81 TABLET ORAL DAILY
Start: 2024-04-11

## 2024-04-11 RX ORDER — LATANOPROST 50 UG/ML
1 SOLUTION/ DROPS OPHTHALMIC NIGHTLY
COMMUNITY

## 2024-04-11 NOTE — PATIENT INSTRUCTIONS
"There are several vaccines used for individuals near/over 65 years old.      1. Tetanus.   Like anyone this needs to given every 10 years; sooner for/with lacerations/wounds.   Likely when getting this booster it needs to be a tetanus called Tdap (tetanus mixed with diptheria and pertussis).   Years ago you had this vaccine.  We now know we can lose our immunity to pertussis (a part of this vaccine) and run a risk of catching this.  Now only would this make us ill; but more importantly we can spread this to very young children (and for them it can be a much more dangerous illness).   We call this the grandparent vaccine for this reason.     2. Pneumonia.   This comes now as a one time vaccine for most persons.  Even if you have had these before; we need to review when and your current health situation/s as you may need a booster     3. Shingles.  You do not want to catch shingles.  Though you will recover from this; the pain associated with shingles can be severe.  Even if you have had the now older zostavax, or have had shingles; it is recommended you still get the Shingrix (the new just available early 2018 shingles vaccine).   Medicare began 1.1.23 waving any co-pays for this; it is therefore free.     4. Flu/influenza: Yearly flu vaccine given from September through April each year.  For those over 65 it should be \"high dose\" flu (to complement the possibility the immune system is alittle weaker)    5. RSV: If you are over 60; beginning 10.1.23 you can now take a RSV vaccination.  This would lower your change for catching this winter/\"cold\" virus that can under some circumstances cause significant respiratory symptoms and even require hospitalization.  Being vaccinated also makes it more difficult for you to be contagious and get this to children; particularly children less than 6 months of where this can be a very dangerous illness.      6. COVID: Since the early onset of COVID illness and a the many COVID " vaccines that were initially developed; we will have a winter 2023 (and maybe beyond)  booster particularly for those at higher risk of complications and over age 65.    7. Travel vaccines:  If you are one to do international travel; be sure and ask us for any particular unusual vaccines you may need.     8.  Miscellaneous:  If you have certain health situations/disease you may need specific/particular vaccines not give to the general public.     Your records we have on file:   Immunization History   Administered Date(s) Administered    COVID-19 (MODERNA) 1st,2nd,3rd Dose Monovalent 03/15/2021, 04/12/2021    COVID-19 (MODERNA) Monovalent Original Booster 12/15/2021    Fluzone (or Fluarix & Flulaval for VFC) >6mos 10/27/2018, 11/12/2020, 10/13/2021, 12/22/2022    Influenza, Unspecified 11/05/2016    Tdap 10/19/2021       You therefore could need the ones if we listed below:   Lola  RSV      Because of many restrictions on this office always having all the above vaccines; you may be advised to work with your local health department or various pharmacies to keep up with your individual vaccine needs.  If you are forced to get a vaccine outside this office in order to keep your health record up to date; we request you personally notify us after any vaccines of the type and date.

## 2024-04-11 NOTE — PROGRESS NOTES
LAMAR”.   Subjective   Emilie Soto is a 63 y.o. female presenting with chief complaint of:   Chief Complaint   Patient presents with    Follow-up     6 month F/U      AWV NA  Last Completed Annual Wellness Visit       This patient has no relevant Health Maintenance data.             History of Present Illness :  With .   Here for review of chronic problems that includes HTN and others.     Has multiple chronic problems to consider that might have a bearing on today's issues;  an interval appointment.       Chronic/acute problems reviewed today:   1. Anticoagulated: sx ? TIA/ASA 82 she has a history of possible TIA; though was somewhat vague.  When she was in the hospital for this she was started on aspirin.  She did not understand the importance of being on it and stopped it not for any particular reason.   2. Chronic diastolic CHF (congestive heart failure) Chronic/stable.  Denies significant sob, orthopnea, leg edema, weight gain.  Aware of influence diet/salt and watching weight at home.       3. Primary hypertension Chronic/stable. Stable here past/no recent home blood pressures.  No significant chest pain, SOB, LE edema, orthopnea, near syncope, dizziness/light headness.   Recent Vitals         9/5/2023 10/11/2023 4/11/2024       BP: 122/78 124/84 102/68     Pulse: 56 75 60     Temp: 97.1 °F (36.2 °C) 96.9 °F (36.1 °C) 97.7 °F (36.5 °C)     Weight: 83.4 kg (183 lb 12.8 oz) 83.2 kg (183 lb 6.4 oz) 83.9 kg (185 lb)     BMI (Calculated): 33.6 33.5 33.8              4. Elevated fasting glucose Chronic/stable. No problem/pattern hypoglycemia/hyperglycemia manifest by poly- dypsia, phagia, uria, or sweats, diaphoretic episodes, syncope/near.     5. Gastroesophageal reflux disease, unspecified whether esophagitis present Chronic/stable.  Controlled heartburn, reflux without dysphagia, melena.  Rx used, periods not used proven currently needed with symptoms -currently doing ok.      6. Hypopotassemia  Chronic/variable high or low K as uses diuretic; has to have lab monitoring.  No significant fatigue or muscle cramps     7. Depression, unspecified depression type past significant  mood swings, down moods, nervousness, difficulty with concentration to function home/work.  Others close have not been concerned.  No suicide ideation/intent.  Rx helps/connel helps      8. Chronic back pain, unspecified back location, unspecified back pain laterality : chronic/variable spinal /lower) 0-2/10 pain with infrequent/rare radiation to UE/LE.  No change LE numbness.  Has bladder/bowel control. No desire to change approach of care.      9. Multiple chronic diseases Has multiple chronic problems with increased risks for management (involves polypharmacy, multiple providers, many required labs/imaging/ to manage)     10. Immunosuppressed status chronic use of immunosuppressive medications to help deal with her arthritis   11. Osteopenia, unspecified location Chronic/stable.  Last bone density/if below.   Has/had (fosmax now on hold) Rx.  Ok further bone density screening when due.      12. Polypharmacy  chronic ongoing need for medications and has several medical problems; has resulted in several medications required and the risk that comes with polypharmacy.  Feels current Rxs are without significant/recognized side effects.     13. Other fatigue chronic stable levels of fatigue; this comes and goes but maybe is worse after she been more physically active.  Emotionally she sometimes to gets desponded and somewhat drained     Has an/another acute issue today: none.    The following portions of the patient's history were reviewed and updated as appropriate: allergies, current medications, past family history, past medical history, past social history, past surgical history, and problem list.      Current Outpatient Medications:     ALPRAZolam (XANAX) 0.5 MG tablet, TAKE ONE TABLET TWICE DAILY AS NEEDED ANXIETY GENERIC FOR XANAX,  Disp: 30 tablet, Rfl: 0    amLODIPine (NORVASC) 5 MG tablet, TAKE ONE TABLET DAILY, Disp: 90 tablet, Rfl: 3    carvedilol (COREG) 12.5 MG tablet, TAKE 1 TABLET BY MOUTH 2 (TWO) TIMES A DAY WITH MEALS., Disp: 180 tablet, Rfl: 1    cloNIDine (CATAPRES) 0.1 MG tablet, Take 1 tablet by mouth 2 (Two) Times a Day As Needed. One by mouth twice daily as needed if BP greater than 160/100, Disp: , Rfl:     estradiol (ESTRACE) 2 MG tablet, TAKE 1/2 TABLET DAILY, Disp: 45 tablet, Rfl: 3    FLUoxetine (PROzac) 20 MG capsule, Take 2 capsules by mouth Daily. Along with a 40 mg total = 60 mg daily, Disp: , Rfl:     FLUoxetine (PROzac) 40 MG capsule, Take 1 capsule by mouth Daily. Along with a 20 mg total 60 mg daily, Disp: , Rfl:     folic acid (FOLVITE) 1 MG tablet, Take 1 tablet by mouth Daily., Disp: 90 tablet, Rfl: 3    HYDROcodone-acetaminophen (NORCO) 7.5-325 MG per tablet, Take 1 tablet by mouth 2 (Two) Times a Day As Needed., Disp: , Rfl:     hydroxychloroquine (PLAQUENIL) 200 MG tablet, Take 1 tablet by mouth 2 (Two) Times a Day., Disp: , Rfl:     latanoprost (XALATAN) 0.005 % ophthalmic solution, 1 drop Every Night., Disp: , Rfl:     loratadine (CLARITIN) 10 MG tablet, Take 1 tablet by mouth As Needed for Allergies., Disp: , Rfl:     losartan (COZAAR) 100 MG tablet, TAKE 1 TABLET BY MOUTH DAILY., Disp: 90 tablet, Rfl: 2    mycophenolate (CELLCEPT) 500 MG tablet, Take 1 tablet by mouth 2 (Two) Times a Day., Disp: , Rfl:     omeprazole (priLOSEC) 20 MG capsule, TAKE 1 CAPSULE BY MOUTH DAILY., Disp: 90 capsule, Rfl: 3    potassium chloride (K-DUR,KLOR-CON) 10 MEQ CR tablet, TAKE 1 TABLET BY MOUTH DAILY., Disp: 90 tablet, Rfl: 3    spironolactone (ALDACTONE) 25 MG tablet, TAKE 1 TABLET BY MOUTH DAILY., Disp: 90 tablet, Rfl: 3    traZODone (DESYREL) 50 MG tablet, Take 1.5 tablets by mouth Every Night., Disp: , Rfl:     Ventolin  (90 Base) MCG/ACT inhaler, INHALE 1 PUFF EVERY 4 (FOUR) HOURS AS NEEDED FOR WHEEZING OR  SHORTNESS OF AIR., Disp: 54 g, Rfl: 3    verapamil SR (CALAN-SR) 240 MG CR tablet, TAKE 1 TABLET BY MOUTH 2 (TWO) TIMES A DAY., Disp: 180 tablet, Rfl: 1    vitamin D (ERGOCALCIFEROL) 07590 UNITS capsule capsule, Take 2,000 Units by mouth 1 (One) Time Per Week., Disp: , Rfl:     Aspirin Low Dose 81 MG EC tablet, TAKE 1 TABLET BY MOUTH DAILY. (Patient not taking: Reported on 4/11/2024), Disp: 30 tablet, Rfl: 0    No problems with medications.    Allergies   Allergen Reactions    Abilify [Aripiprazole] Other (See Comments)     Insomnia    Bactrim [Sulfamethoxazole-Trimethoprim] Other (See Comments)     Pt unsure of reaction    Biaxin [Clarithromycin] Nausea And Vomiting    Ciprofloxacin Hcl Other (See Comments)     Pt unsure of reaction    Duricef [Cefadroxil] Other (See Comments)     Pt unsure of reaction    Ketek [Telithromycin] Other (See Comments)     Pt unsure of reaction    Relafen [Nabumetone] Itching    Wellbutrin [Bupropion] Itching       Review of Systems  GENERAL:  Active/slower with limits, speed, stamina for age and desire. Sleep is ok; occ hard falling/staying with worry. No fever now.  SKIN: No rash/skin lesion of concern:   ENDO:  No syncope, near or diaphoretic sweaty spells..  HEENT: No recent head injury; but same/occ headache.   No vision change.   Decline in hearing; echo affect on/off L.  Ears without pain/drainage.  No sore throat.  No significant nasal/sinus congestion/drainage. No epistaxis.  CHEST: No chest wall tenderness or mass. No cough, without wheeze.  No SOB; no hemoptysis.  CV: No exertional chest pain, palpitations, ankle edema.  GI: No dysphagia; occ chest pressure.   No abdominal pain, diarrhea, constipation.  No rectal bleeding, or melena.  Occ heartburn still.   :  Voids without dysuria, or incontinence to completion.  ORTHO: No painful/swollen joints but various on /off sore.  No change some sore neck or back.  No acute neck or back pain without recent injury.  NEURO: No  dizziness, weakness of extremities.  No numbness/paresthesias.   PSYCH: No memory loss.  Mood good; often anxious, depressed but/and not suicidal.  Tries to tolerate stress .      Screening:  Gyne: flatter/mendoza confirmed 10.11.23  Mammogram: 12.19.23    Bone density:   6.8.20 Deca-bone d/Sancho/Ts L2-0.9 neck -2.2/6.8.2020-ordered/advised  On fosamox 2.5 yrs; stop end of 2023-stopping  12.16.22/Bone d-deca/Yesica/hip -1.7 LS ?/12.16.22/has osteopenia     Low dose CT chest: Tobacco-smoker/none: NA  GI:   Colon-p, div/Jasiel/DESHAWN/10.10.19/5y  VXB-ackzm-[facility]-sandip/Jasiel/DESHAWN/10.13.2020/  Prostate: NA  Usual lab order  Any lab others want; (we wish to attempt not to duplicate so we need copies of labs done outside the office/out of [facility]); OR can have all labs here (bring the order from all other doctors) and we will forward to all specialist  6m CBC, CMP, sed rate, CK-total, CR-protein  12m CBC, CMP, LIPID, TSH, fT4, CK-total, Vit D, sed rate, CR-protein    Copy/paste function used for ROS/exam AND each area of these were reviewed, updated, confirmed and supplemented as needed.  Data reviewed:   Recent admit/ER/MD visits: 10.11.23    1. Menopause    2. Encounter for screening mammogram for breast cancer    3. Primary hypertension    4. Diastolic congestive heart failure, unspecified HF chronicity    5. Cardiac arrhythmia, unspecified cardiac arrhythmia type    6. Anticoagulated: sx ? TIA/ASA 81    7. Elevated fasting glucose    8. Gastroesophageal reflux disease, unspecified whether esophagitis present    9. Gastroesophageal reflux disease without esophagitis    10. Renal insufficiency-(ro) karl    11. Hypopotassemia    12. Narcotic drug use-ro    13. Osteopenia, unspecified location    14. RHIANNON: (cpap)    15. Multiple chronic diseases          Data review above:   Discussions/medical decisions/reviews:  BP ok  Other vitals ok  Recent Vitals           3/28/2023 9/5/2023 10/11/2023          BP: 128/76 122/78 124/84       Pulse:  74 56 75       Temp: 96.9 °F (36.1 °C) 97.1 °F (36.2 °C) 96.9 °F (36.1 °C)       Weight: 84.6 kg (186 lb 9.6 oz) 83.4 kg (183 lb 12.8 oz) 83.2 kg (183 lb 6.4 oz)       BMI (Calculated): 34.1 33.6 33.5               DM/A1c 5.4 10.5.23  DM/BS 87 10.5.23  Uric acid 4.3-no Rx  Lipid LDL 64 10.5.23-lifestyle  PSA NA  CBC ok 10.5.23-folate 1 mg/d  Renal 51 10.5.23  Liver ok 10.5.23  Vit D 45 10.5.23  Thyroid TSH ok 10.5.23; none     Screening reviewed/updated ordered mammogram/bone density ordered (if we dont have a recent; when there she can have rogelio send most current)  Vaccines discussed (see below) advised MD2 (needs ok from Pérez considering cellcept for shingles)  Pro/con hormone replacement  Fosmax; stop for sure end of 2023  US liver; as there are risk  7-10 trial prilosec bid then back to daily; hope will help symtpoms reflux, chest pressure       Last cardiac testing:   Echo:   Results for orders placed during the hospital encounter of 10/18/22    Adult Stress Echo W/ Cont or Stress Agent if Necessary Per Protocol    Interpretation Summary    Baseline ECG of normal sinus rhythm noted at rest. Diffuse T wave inversions noted, consistent with prior ECGs.    The patient reported no symptoms during the stress test.    There were no clinically significant ST segment changes noted during stress.    Post stress wall motion appears to be normal.    Overall, this is a low risk stress test with no significant clinical, ECG or echocardiographic evidence for myocardial ischemia.    Radiology considered:   No radiology results for the last 90 days.    Lab Results:  Results for orders placed or performed in visit on 03/21/24   Urine Drug Screen - Urine, Clean Catch    Specimen: Urine, Clean Catch   Result Value Ref Range    Amphetamine, Urine Qual Negative Bvmazn=9567 ng/mL    Barbiturates Screen, Urine Negative Cupuxx=032 ng/mL    Benzodiazepine Screen, Urine Negative Qkczow=995 ng/mL    THC Screen, Urine Negative  "Cutoff=20 ng/mL    Cocaine Screen, Urine Negative Dkemob=654 ng/mL    Opiate Screen, Urine Negative Xebeis=029 ng/mL    Oxycodone/Oxymorphone, Urine Negative Ybfbnn=234 ng/mL    Phencyclidine (PCP), Urine Negative Cutoff=25 ng/mL    Methadone Screen, Urine Negative Inapcb=435 ng/mL    Propoxyphene Screen Negative Fnvyen=849 ng/mL    Creatinine, Urine 97.1 20.0 - 300.0 mg/dL    pH, UA 5.3 4.5 - 8.9    Please note Comment        A1C:  Lab Results - Last 18 Months   Lab Units 10/05/23  0900 03/28/23  1206 10/19/22  0552   HEMOGLOBIN A1C % 5.40 5.4 5.50  5.40     GLUCOSE:  Lab Results - Last 18 Months   Lab Units 10/05/23  0900 02/07/23  0949 10/19/22  0552 10/18/22  1936   GLUCOSE mg/dL 87 79 89 81     LIPID:  Lab Results - Last 18 Months   Lab Units 10/05/23  0900 10/19/22  0552 10/18/22  1936   CHOLESTEROL mg/dL 132  --   --    LDL CHOL mg/dL 64 59 73   HDL CHOL mg/dL 52 46 49   TRIGLYCERIDES mg/dL 82 70 85     PSA:No results found for: \"PSA\"    CBC:  Lab Results - Last 18 Months   Lab Units 10/05/23  0900 02/07/23  0949 10/19/22  0552 10/18/22  1936   WBC 10*3/mm3 5.62 4.96 5.70 5.03   HEMOGLOBIN g/dL 12.6 12.2 12.2 12.8   HEMATOCRIT % 38.1 38.0 38.7 40.3   PLATELETS 10*3/mm3 240 235 230 244     BMP/CMP:  Lab Results - Last 18 Months   Lab Units 10/05/23  0900 02/07/23  0949 10/19/22  0552 10/18/22  1936   SODIUM mmol/L 140 140 139 136   POTASSIUM mmol/L 4.2 3.9 4.2 4.5   CHLORIDE mmol/L 105 104 104 101   CO2 mmol/L 26.4 28.3 29.0 28.0   GLUCOSE mg/dL 87 79 89 81   BUN mg/dL 14 13 16 16   CREATININE mg/dL 1.20* 1.29* 1.35* 1.38*   EGFR RESULT mL/min/1.73 51.3* 47.3*  --   --    CALCIUM mg/dL 9.4 9.0 9.5 9.6   URIC ACID mg/dL 4.3 4.7  --   --      HEPATIC:  Lab Results - Last 18 Months   Lab Units 10/05/23  0900 02/07/23  0949 10/19/22  0552 10/18/22  1936   ALT (SGPT) U/L 10 9 9 10   AST (SGOT) U/L 16 16 14 15   ALK PHOS U/L 69 61 55 62     HEPATITIS C ANTIBODY:   Lab Results   Component Value Date/Time    " "HEPCVIRUSABY <0.1 04/23/2019 09:25 AM     Vit D:  Lab Results - Last 18 Months   Lab Units 10/05/23  0900 02/07/23  0949   VIT D 25 HYDROXY ng/ml 45.5 63.0     THYROID:  Lab Results - Last 18 Months   Lab Units 10/05/23  0900 10/19/22  0552   TSH uIU/mL 2.500 1.450       Objective   /68   Pulse 60   Temp 97.7 °F (36.5 °C)   Ht 157.5 cm (62\")   Wt 83.9 kg (185 lb)   SpO2 97%   BMI 33.84 kg/m²   Body mass index is 33.84 kg/m².    Recent Vitals         9/5/2023 10/11/2023 4/11/2024       BP: 122/78 124/84 102/68     Pulse: 56 75 60     Temp: 97.1 °F (36.2 °C) 96.9 °F (36.1 °C) 97.7 °F (36.5 °C)     Weight: 83.4 kg (183 lb 12.8 oz) 83.2 kg (183 lb 6.4 oz) 83.9 kg (185 lb)     BMI (Calculated): 33.6 33.5 33.8           Wt Readings from Last 15 Encounters:   04/11/24 1116 83.9 kg (185 lb)   10/11/23 0901 83.2 kg (183 lb 6.4 oz)   09/05/23 1315 83.4 kg (183 lb 12.8 oz)   03/28/23 1115 84.6 kg (186 lb 9.6 oz)   11/17/22 1043 81.6 kg (180 lb)   11/03/22 1106 84.1 kg (185 lb 6.4 oz)   10/19/22 2000 85.8 kg (189 lb 4 oz)   10/19/22 1126 86.2 kg (190 lb 0.6 oz)   10/19/22 1123 86.2 kg (190 lb)   10/18/22 1902 86.2 kg (190 lb)   09/27/22 1129 85 kg (187 lb 6.4 oz)   05/31/22 1435 86.2 kg (190 lb)   03/23/22 1134 88.5 kg (195 lb 3.2 oz)   11/12/21 1459 88.5 kg (195 lb)   10/19/21 1041 88.5 kg (195 lb)   09/23/21 1041 87.5 kg (193 lb)   03/16/21 1120 87.5 kg (192 lb 12.8 oz)   10/13/20 0730 87.1 kg (192 lb)       Physical Exam  GENERAL:  Well nourished/developed in no acute distress. BMI noted: 33.8  SKIN: Turgor excellent, without wound, rash, lesion  HEENT: Normal cephalic without trauma.  Pupils equal round reactive to light. Extraocular motions full without nystagmus.   External canals nonobstructive nontender without reddness. Tymphatic membranes josiane with cesar structures intact.   Oral cavity without growths, exudates, and moist.  Posterior pharynx without mass, obstruction, redness.  No thyromegaly, mass, " tenderness, lymphadenopathy and supple.  CV: Regular rhythm.  No murmur, gallop,  edema. Posterior pulses intact.  No carotid bruits.  CHEST: No chest wall tenderness or mass.   LUNGS: Symmetric motion with clear to auscultation.   ABD: Soft, nontender without mass.   ORTHO: Symmetric extremities without swelling/point tenderness.  Full gross range of motion; few sore fingers.  NEURO: CN 2-12 grossly intact.  Symmetric facies. 1/4 x bicep equal reflexes.  UE/LE   3/5 strength throughout.  Nonfocal use extremities. Speech clear. Intact light touch with monofilament, vibratory sensation with tuning fork; equal toes/distal feet.    PSYCH: Oriented x 3.  Pleasant calm, well kept.   Purposeful/directed conservation with intact short/long gross memory.       Assessment & Plan     1. Anticoagulated: sx ? TIA/ASA 81    2. Chronic diastolic CHF (congestive heart failure)    3. Primary hypertension    4. Elevated fasting glucose    5. Gastroesophageal reflux disease, unspecified whether esophagitis present    6. Hypopotassemia    7. Depression, unspecified depression type    8. Chronic back pain, unspecified back location, unspecified back pain laterality    9. Multiple chronic diseases    10. Immunosuppressed status    11. Osteopenia, unspecified location    12. Polypharmacy    13. Other fatigue        Data review above:   Discussions/medical decisions/reviews:  BP ok  Other vitals ok  Recent Vitals         9/5/2023 10/11/2023 4/11/2024       BP: 122/78 124/84 102/68     Pulse: 56 75 60     Temp: 97.1 °F (36.2 °C) 96.9 °F (36.1 °C) 97.7 °F (36.5 °C)     Weight: 83.4 kg (183 lb 12.8 oz) 83.2 kg (183 lb 6.4 oz) 83.9 kg (185 lb)     BMI (Calculated): 33.6 33.5 33.8           DM/A1c 5.4 10.5.23  DM/BS 87 10.5.23  Lipid LDL 64 10.5.23  PSA NA  CBC ok 10.5.23  Renal 51 10.5.23  Liver ok 10.5.23  Vit D 45 10.5.23  Thyroid TSH ok 10.5.23; none    Screening reviewed/updated mammogram and bone density fall/winter  Vaccines discussed  "(see below) discuss with MD2  Labs next visit here; otherwise karl/christy follow their labs  Feel fatigue has chronic/many illnesses to contribute, Rx to contribute and likely no significant missing cause; would encourage adequate sleep and regular exercise/hydration and healthy diet.  Stress management to help    Follow up: No follow-ups on file.  Future Appointments   Date Time Provider Department Center   10/7/2024 10:15 AM Steve Sanches MD [facility]       Data review above:   Rx: reviewed and decisions:   Rx new/changes: back on ASA  New Medications Ordered This Visit   Medications    aspirin (Aspirin Low Dose) 81 MG EC tablet     Sig: Take 1 tablet by mouth Daily.       Orders placed:   LAB/Testing/Referrals: reviewed/orders:   Today:   No orders of the defined types were placed in this encounter.    Chronic/recurrent labs above or change to:   Same     Immunization History   Administered Date(s) Administered    COVID-19 (MODERNA) 1st,2nd,3rd Dose Monovalent 03/15/2021, 04/12/2021    COVID-19 (MODERNA) Monovalent Original Booster 12/15/2021    Fluzone (or Fluarix & Flulaval for VFC) >6mos 10/27/2018, 11/12/2020, 10/13/2021, 12/22/2022    Influenza, Unspecified 11/05/2016    Tdap 10/19/2021     We advised/reaffirmed our support/suggestion for staying complete with covid- covid boosters, seasonal flu/yearly and any missing vaccine from list we supplied; when cannot be given here we suggest contact with local health department office or pharmacy to review missing/needed vaccines and then bring nursing documentation for these vaccines to this office or call this information in. Shingles became \"free\" 1.1.23 for medicare insurance.    Health maintenance:   Body mass index is 33.84 kg/m².  BMI is >= 30 and <35. (Class 1 Obesity). The following options were offered after discussion;: exercise counseling/recommendations and nutrition counseling/recommendations      Tobacco use reviewed:   Emilie Soto  " "reports that she has never smoked. She has never used smokeless tobacco.     Patient Instructions   There are several vaccines used for individuals near/over 65 years old.      1. Tetanus.   Like anyone this needs to given every 10 years; sooner for/with lacerations/wounds.   Likely when getting this booster it needs to be a tetanus called Tdap (tetanus mixed with diptheria and pertussis).   Years ago you had this vaccine.  We now know we can lose our immunity to pertussis (a part of this vaccine) and run a risk of catching this.  Now only would this make us ill; but more importantly we can spread this to very young children (and for them it can be a much more dangerous illness).   We call this the grandparent vaccine for this reason.     2. Pneumonia.   This comes now as a one time vaccine for most persons.  Even if you have had these before; we need to review when and your current health situation/s as you may need a booster     3. Shingles.  You do not want to catch shingles.  Though you will recover from this; the pain associated with shingles can be severe.  Even if you have had the now older zostavax, or have had shingles; it is recommended you still get the Shingrix (the new just available early 2018 shingles vaccine).   Medicare began 1.1.23 waving any co-pays for this; it is therefore free.     4. Flu/influenza: Yearly flu vaccine given from September through April each year.  For those over 65 it should be \"high dose\" flu (to complement the possibility the immune system is alittle weaker)    5. RSV: If you are over 60; beginning 10.1.23 you can now take a RSV vaccination.  This would lower your change for catching this winter/\"cold\" virus that can under some circumstances cause significant respiratory symptoms and even require hospitalization.  Being vaccinated also makes it more difficult for you to be contagious and get this to children; particularly children less than 6 months of where this can be a " very dangerous illness.      6. COVID: Since the early onset of COVID illness and a the many COVID vaccines that were initially developed; we will have a winter 2023 (and maybe beyond)  booster particularly for those at higher risk of complications and over age 65.    7. Travel vaccines:  If you are one to do international travel; be sure and ask us for any particular unusual vaccines you may need.     8.  Miscellaneous:  If you have certain health situations/disease you may need specific/particular vaccines not give to the general public.     Your records we have on file:   Immunization History   Administered Date(s) Administered    COVID-19 (MODERNA) 1st,2nd,3rd Dose Monovalent 03/15/2021, 04/12/2021    COVID-19 (MODERNA) Monovalent Original Booster 12/15/2021    Fluzone (or Fluarix & Flulaval for VFC) >6mos 10/27/2018, 11/12/2020, 10/13/2021, 12/22/2022    Influenza, Unspecified 11/05/2016    Tdap 10/19/2021       You therefore could need the ones if we listed below:   Lola  RSV      Because of many restrictions on this office always having all the above vaccines; you may be advised to work with your local health department or various pharmacies to keep up with your individual vaccine needs.  If you are forced to get a vaccine outside this office in order to keep your health record up to date; we request you personally notify us after any vaccines of the type and date.                Present (15 x15 bpm)

## 2024-04-16 DIAGNOSIS — K21.9 GASTROESOPHAGEAL REFLUX DISEASE: ICD-10-CM

## 2024-04-16 DIAGNOSIS — I10 ESSENTIAL HYPERTENSION: ICD-10-CM

## 2024-04-16 RX ORDER — POTASSIUM CHLORIDE 750 MG/1
10 TABLET, EXTENDED RELEASE ORAL DAILY
Qty: 90 TABLET | Refills: 3 | Status: SHIPPED | OUTPATIENT
Start: 2024-04-16

## 2024-04-17 RX ORDER — AMLODIPINE BESYLATE 5 MG/1
TABLET ORAL
Qty: 90 TABLET | Refills: 3 | Status: SHIPPED | OUTPATIENT
Start: 2024-04-17

## 2024-04-17 RX ORDER — LOSARTAN POTASSIUM 100 MG/1
100 TABLET ORAL DAILY
Qty: 90 TABLET | Refills: 2 | Status: SHIPPED | OUTPATIENT
Start: 2024-04-17

## 2024-04-17 RX ORDER — VERAPAMIL HYDROCHLORIDE 240 MG/1
240 TABLET, FILM COATED, EXTENDED RELEASE ORAL 2 TIMES DAILY
Qty: 180 TABLET | Refills: 1 | Status: SHIPPED | OUTPATIENT
Start: 2024-04-17

## 2024-07-15 DIAGNOSIS — I10 HYPERTENSION, UNSPECIFIED TYPE: Chronic | ICD-10-CM

## 2024-07-15 DIAGNOSIS — I10 ESSENTIAL HYPERTENSION: ICD-10-CM

## 2024-07-15 DIAGNOSIS — J84.10 PULMONARY FIBROSIS: ICD-10-CM

## 2024-07-15 DIAGNOSIS — I50.32 CHRONIC DIASTOLIC CONGESTIVE HEART FAILURE: Chronic | ICD-10-CM

## 2024-07-15 RX ORDER — ESTRADIOL 2 MG/1
TABLET ORAL
Qty: 45 TABLET | Refills: 3 | Status: SHIPPED | OUTPATIENT
Start: 2024-07-15

## 2024-07-15 RX ORDER — ALBUTEROL SULFATE 90 UG/1
AEROSOL, METERED RESPIRATORY (INHALATION)
Qty: 54 G | Refills: 3 | Status: SHIPPED | OUTPATIENT
Start: 2024-07-15

## 2024-07-15 RX ORDER — SPIRONOLACTONE 25 MG/1
25 TABLET ORAL DAILY
Qty: 90 TABLET | Refills: 3 | Status: SHIPPED | OUTPATIENT
Start: 2024-07-15

## 2024-07-15 RX ORDER — CARVEDILOL 12.5 MG/1
12.5 TABLET ORAL 2 TIMES DAILY WITH MEALS
Qty: 180 TABLET | Refills: 1 | Status: SHIPPED | OUTPATIENT
Start: 2024-07-15

## 2024-07-24 DIAGNOSIS — F41.9 ANXIETY: ICD-10-CM

## 2024-07-24 RX ORDER — ALPRAZOLAM 0.5 MG/1
TABLET ORAL
Qty: 30 TABLET | Refills: 0 | Status: SHIPPED | OUTPATIENT
Start: 2024-07-24

## 2024-07-24 NOTE — TELEPHONE ENCOUNTER
Rx Refill Note  Requested Prescriptions     Pending Prescriptions Disp Refills    ALPRAZolam (XANAX) 0.5 MG tablet [Pharmacy Med Name: ALPRAZOLAM 0.5MG TABLET] 30 tablet 0     Sig: TAKE ONE TABLET TWICE DAILY AS NEEDED ANXIETY GENERIC FOR XANAX      Last office visit with office: 04/11/24  Next office visit with office: 10/07/24    UDS: 03/21/24    DATE OF LAST REFILL: 03/18/24    Controlled Substance Agreement: not up to date    IWONA OR RENATOP: 07/24/24         {TIP  Is Refill Pharmacy correct?:  Tawana Coleman MA  07/24/24, 09:01 CDT

## 2024-10-07 ENCOUNTER — OFFICE VISIT (OUTPATIENT)
Dept: FAMILY MEDICINE CLINIC | Facility: CLINIC | Age: 64
End: 2024-10-07
Payer: MEDICARE

## 2024-10-07 VITALS
HEART RATE: 62 BPM | BODY MASS INDEX: 33.49 KG/M2 | SYSTOLIC BLOOD PRESSURE: 110 MMHG | HEIGHT: 62 IN | OXYGEN SATURATION: 98 % | DIASTOLIC BLOOD PRESSURE: 76 MMHG | WEIGHT: 182 LBS

## 2024-10-07 DIAGNOSIS — N18.31 STAGE 3A CHRONIC KIDNEY DISEASE: ICD-10-CM

## 2024-10-07 DIAGNOSIS — F41.9 ANXIETY: ICD-10-CM

## 2024-10-07 DIAGNOSIS — I50.32 CHRONIC DIASTOLIC CHF (CONGESTIVE HEART FAILURE): ICD-10-CM

## 2024-10-07 DIAGNOSIS — I10 PRIMARY HYPERTENSION: Primary | Chronic | ICD-10-CM

## 2024-10-07 DIAGNOSIS — M06.9 RHEUMATOID ARTHRITIS, INVOLVING UNSPECIFIED SITE, UNSPECIFIED WHETHER RHEUMATOID FACTOR PRESENT: Chronic | ICD-10-CM

## 2024-10-07 DIAGNOSIS — F41.9 ANXIETY: Primary | ICD-10-CM

## 2024-10-07 PROBLEM — K21.9 GASTROESOPHAGEAL REFLUX DISEASE WITHOUT ESOPHAGITIS: Status: RESOLVED | Noted: 2020-09-24 | Resolved: 2024-10-07

## 2024-10-07 PROBLEM — R13.19 OTHER DYSPHAGIA: Status: RESOLVED | Noted: 2020-09-24 | Resolved: 2024-10-07

## 2024-10-07 PROBLEM — R12 HEARTBURN: Status: RESOLVED | Noted: 2020-09-24 | Resolved: 2024-10-07

## 2024-10-07 PROBLEM — Z80.0 FAMILY HISTORY OF COLON CANCER: Status: RESOLVED | Noted: 2019-08-29 | Resolved: 2024-10-07

## 2024-10-07 PROBLEM — I49.9 CARDIAC ARRHYTHMIA: Status: RESOLVED | Noted: 2022-09-27 | Resolved: 2024-10-07

## 2024-10-07 PROBLEM — Z86.0100 HX OF COLONIC POLYPS: Status: RESOLVED | Noted: 2019-08-29 | Resolved: 2024-10-07

## 2024-10-07 PROCEDURE — 1170F FXNL STATUS ASSESSED: CPT

## 2024-10-07 PROCEDURE — 99214 OFFICE O/P EST MOD 30 MIN: CPT

## 2024-10-07 PROCEDURE — 3078F DIAST BP <80 MM HG: CPT

## 2024-10-07 PROCEDURE — G0439 PPPS, SUBSEQ VISIT: HCPCS

## 2024-10-07 PROCEDURE — 1126F AMNT PAIN NOTED NONE PRSNT: CPT

## 2024-10-07 PROCEDURE — 3074F SYST BP LT 130 MM HG: CPT

## 2024-10-07 RX ORDER — ALPRAZOLAM 0.5 MG
0.25 TABLET ORAL NIGHTLY PRN
Qty: 30 TABLET | Refills: 0 | Status: CANCELLED | OUTPATIENT
Start: 2024-10-07

## 2024-10-07 RX ORDER — ALPRAZOLAM 0.25 MG
0.25 TABLET ORAL NIGHTLY PRN
Qty: 30 TABLET | Refills: 0 | Status: SHIPPED | OUTPATIENT
Start: 2024-10-07

## 2024-10-07 NOTE — PROGRESS NOTES
Subjective   The ABCs of the Annual Wellness Visit  Medicare Wellness Visit      Emilie Soto is a 64 y.o. patient who presents for a Medicare Wellness Visit.    The following portions of the patient's history were reviewed and   updated as appropriate: allergies, current medications, past family history, past medical history, past social history, past surgical history, and problem list.    Compared to one year ago, the patient's physical   health is the same.  Compared to one year ago, the patient's mental   health is the same.    Recent Hospitalizations:  She was not admitted to the hospital during the last year.     Current Medical Providers:  Patient Care Team:  Steve Sanches MD as PCP - General (Family Medicine)  Alexandru Miles MD as PCP - Family Medicine  Yue Meza PA (Physician Assistant)  Abigail Weathers MD as Consulting Physician (Gastroenterology)    Outpatient Medications Prior to Visit   Medication Sig Dispense Refill    albuterol sulfate HFA (Ventolin HFA) 108 (90 Base) MCG/ACT inhaler INHALE 1 PUFF EVERY 4 (FOUR) HOURS AS NEEDED FOR WHEEZING OR SHORTNESS OF AIR. 54 g 3    amLODIPine (NORVASC) 5 MG tablet TAKE ONE TABLET DAILY 90 tablet 3    aspirin (Aspirin Low Dose) 81 MG EC tablet Take 1 tablet by mouth Daily.      carvedilol (COREG) 12.5 MG tablet TAKE 1 TABLET BY MOUTH 2 (TWO) TIMES A DAY WITH MEALS. 180 tablet 1    cloNIDine (CATAPRES) 0.1 MG tablet Take 1 tablet by mouth 2 (Two) Times a Day As Needed. One by mouth twice daily as needed if BP greater than 160/100      estradiol (ESTRACE) 2 MG tablet TAKE 1/2 TABLET DAILY 45 tablet 3    FLUoxetine (PROzac) 40 MG capsule Take 1 capsule by mouth Daily. 2 tab daily      folic acid (FOLVITE) 1 MG tablet Take 1 tablet by mouth Daily. 90 tablet 3    HYDROcodone-acetaminophen (NORCO) 7.5-325 MG per tablet Take 1 tablet by mouth 2 (Two) Times a Day As Needed.      hydroxychloroquine (PLAQUENIL) 200 MG tablet Take 1 tablet by mouth 2  (Two) Times a Day.      latanoprost (XALATAN) 0.005 % ophthalmic solution 1 drop Every Night.      loratadine (CLARITIN) 10 MG tablet Take 1 tablet by mouth As Needed for Allergies.      losartan (COZAAR) 100 MG tablet TAKE 1 TABLET BY MOUTH DAILY. 90 tablet 2    mycophenolate (CELLCEPT) 500 MG tablet Take 1 tablet by mouth 2 (Two) Times a Day.      omeprazole (priLOSEC) 20 MG capsule TAKE 1 CAPSULE BY MOUTH DAILY. 90 capsule 3    potassium chloride (KLOR-CON M10) 10 MEQ CR tablet TAKE 1 TABLET BY MOUTH DAILY. 90 tablet 3    spironolactone (ALDACTONE) 25 MG tablet TAKE 1 TABLET BY MOUTH DAILY. 90 tablet 3    traZODone (DESYREL) 50 MG tablet Take 1.5 tablets by mouth Every Night.      verapamil SR (CALAN-SR) 240 MG CR tablet TAKE 1 TABLET BY MOUTH 2 (TWO) TIMES A DAY. 180 tablet 1    vitamin D (ERGOCALCIFEROL) 09175 UNITS capsule capsule Take 2,000 Units by mouth 1 (One) Time Per Week.      ALPRAZolam (XANAX) 0.5 MG tablet TAKE ONE TABLET TWICE DAILY AS NEEDED ANXIETY GENERIC FOR XANAX 30 tablet 0    FLUoxetine (PROzac) 20 MG capsule Take 2 capsules by mouth Daily. Along with a 40 mg total = 60 mg daily       No facility-administered medications prior to visit.     Opioid medication/s are on active medication list.  and I have evaluated her active treatment plan and pain score trends (see table).  Vitals:    10/07/24 1024   PainSc: 0-No pain     I have reviewed the chart for potential of high risk medication and harmful drug interactions in the elderly.        Aspirin is on active medication list. Aspirin use is indicated based on review of current medical condition/s. Pros and cons of this therapy have been discussed today. Benefits of this medication outweigh potential harm.  Patient has been encouraged to continue taking this medication.  .      Patient Active Problem List   Diagnosis    Hypertension    Obesity    Anxiety    Depression: JACKSONananth    Chronic back pain    Rheumatoid arthritis    RHIANNON: (cpap)-?  "inspire    Pulmonary fibrosis    Gastroesophageal reflux    Murmur-echo benign    Insomnia    Wellness examination-done    Laboratory test*    Chest pain-11.2017 noncardiac    Surgical menopause see osteopenia    HRT-(Forrest)    Renal insufficiency-(ro) karl    Bilateral hearing loss    Osteopenia:(fosamax) 2022-2y    Hypopotassemia    Elevated fasting glucose    Immunosuppressed status    Narcotic drug use-ro    Chest pain, unspecified type    CKD (chronic kidney disease)    Chronic diastolic CHF (congestive heart failure)    Symptomatic bradycardia    Anticoagulated: sx ? TIA/ASA 81    Polypharmacy    Multiple chronic diseases    Glaucoma     Advance Care Planning Advance Directive is not on file.  ACP discussion was held with the patient during this visit. Patient does not have an advance directive, information provided.            Objective   Vitals:    10/07/24 1024   BP: 110/76   Pulse: 62   SpO2: 98%   Weight: 82.6 kg (182 lb)   Height: 157.5 cm (62\")   PainSc: 0-No pain       Estimated body mass index is 33.29 kg/m² as calculated from the following:    Height as of this encounter: 157.5 cm (62\").    Weight as of this encounter: 82.6 kg (182 lb).            Does the patient have evidence of cognitive impairment? No                                                                                                Health  Risk Assessment    Smoking Status:  Social History     Tobacco Use   Smoking Status Never   Smokeless Tobacco Never     Alcohol Consumption:  Social History     Substance and Sexual Activity   Alcohol Use Not Currently    Alcohol/week: 2.0 standard drinks of alcohol    Types: 2 Glasses of wine per week    Comment: Occasionally       Fall Risk Screen  STEADI Fall Risk Assessment was completed, and patient is at LOW risk for falls.Assessment completed on:10/7/2024    Depression Screening:      10/7/2024    10:18 AM   PHQ-2/PHQ-9 Depression Screening   Little Interest or Pleasure in Doing " Things 0-->not at all   Feeling Down, Depressed or Hopeless 0-->not at all   PHQ-9: Brief Depression Severity Measure Score 0     Health Habits and Functional and Cognitive Screening:      10/7/2024    10:20 AM   Functional & Cognitive Status   Do you have difficulty preparing food and eating? No   Do you have difficulty bathing yourself, getting dressed or grooming yourself? No   Do you have difficulty using the toilet? No   Do you have difficulty moving around from place to place? No   Do you have trouble with steps or getting out of a bed or a chair? Yes   Current Diet Well Balanced Diet   Dental Exam Up to date   Eye Exam Up to date   Exercise (times per week) 1 times per week   Current Exercises Include Walking;House Cleaning   Do you need help using the phone?  No   Are you deaf or do you have serious difficulty hearing?  No   Do you need help to go to places out of walking distance? No   Do you need help shopping? No   Do you need help preparing meals?  No   Do you need help with housework?  No   Do you need help with laundry? No   Do you need help taking your medications? No   Do you need help managing money? No   Do you ever drive or ride in a car without wearing a seat belt? No   Have you felt unusual stress, anger or loneliness in the last month? No   Who do you live with? Spouse   If you need help, do you have trouble finding someone available to you? No   Have you been bothered in the last four weeks by sexual problems? No   Do you have difficulty concentrating, remembering or making decisions? Yes           Age-appropriate Screening Schedule:  Refer to the list below for future screening recommendations based on patient's age, sex and/or medical conditions. Orders for these recommended tests are listed in the plan section. The patient has been provided with a written plan.    Health Maintenance List  Health Maintenance   Topic Date Due    Pneumococcal Vaccine 0-64 (1 of 2 - PCV) Never done    ZOSTER  VACCINE (1 of 2) Never done    COVID-19 Vaccine (3 - Moderna risk series) 01/12/2022    ANNUAL WELLNESS VISIT  03/28/2024    INFLUENZA VACCINE  08/01/2024    COLORECTAL CANCER SCREENING  10/10/2024    DXA SCAN  12/16/2024    BMI FOLLOWUP  04/11/2025    MAMMOGRAM  12/19/2025    PAP SMEAR  12/04/2026    TDAP/TD VACCINES (2 - Td or Tdap) 10/19/2031    HEPATITIS C SCREENING  Completed                                                                                                                                                CMS Preventative Services Quick Reference  Risk Factors Identified During Encounter  Chronic Pain: Natural history and expected course discussed. Questions answered.  Hearing Problem:  Follows with audiology  Polypharmacy: Medication List reviewed  Dental Screening Recommended  Vision Screening Recommended    The above risks/problems have been discussed with the patient.  Pertinent information has been shared with the patient in the After Visit Summary.  An After Visit Summary and PPPS were made available to the patient.    Follow Up:   Next Medicare Wellness visit to be scheduled in 1 year.         Additional E&M Note during same encounter follows:  Patient has additional, significant, and separately identifiable condition(s)/problem(s) that require work above and beyond the Medicare Wellness Visit     Chief Complaint  Chief Complaint   Patient presents with    Medicare Wellness-subsequent    Anxiety    Hypertension        Subjective      History of Present Illness  The patient is a 64-year-old female who presents for follow-up.    She reports no new health concerns since her last visit and is managing well on her current medications.    She has a history of hypertension and takes spironolactone, verapamil, carvedilol, losartan, and amlodipine. She also has a history of congestive heart failure, although she does not regularly see a cardiologist. She experiences chest pain and shortness of breath  "at least once a week, which she attributes to anxiety. Additionally, she has a history of arrhythmia and palpitations but is unsure of the specific type. She recalls an incident where she believed she was having a heart attack, but hospital tests found no issues. An echo done in 2022 showed an ejection fraction of 61-65%, and a stress echo on 10/19/2022 indicated a low-risk stress test with no significant clinical ECG or echocardiographic evidence for myocardial ischemia.    She takes fluoxetine 40 mg twice daily for anxiety, prescribed by Dr. Rueda, and finds it helpful. She also takes Xanax 0.25 mg as needed, usually nightly, to help clear her mind and aid sleep. She requests a refill of her Xanax prescription.    She occasionally experiences difficulty swallowing large pills.    She sees Dr. Boykin every six months for kidney-related issues. She reports no history of diabetes and has not been hospitalized in the past year.    She has a history of rheumatoid arthritis and is under the care of Dr. Pérez. She takes mycophenolate 500 mg twice daily and Plaquenil for this condition.          Objective   Vital Signs:  /76   Pulse 62   Ht 157.5 cm (62\")   Wt 82.6 kg (182 lb)   SpO2 98%   BMI 33.29 kg/m²     The following labs/imaging/notes were reviewed and discussed with the patient by Steve Sanches MD on 10/07/2024:   Latest Reference Range & Units 10/05/23 09:00   Sodium 136 - 145 mmol/L 140   Potassium 3.5 - 5.2 mmol/L 4.2   Chloride 98 - 107 mmol/L 105   CO2 22.0 - 29.0 mmol/L 26.4   BUN 8 - 23 mg/dL 14   Creatinine 0.57 - 1.00 mg/dL 1.20 (H)   BUN/Creatinine Ratio 7.0 - 25.0  11.7   EGFR Result >60.0 mL/min/1.73 51.3 (L)   Glucose 65 - 99 mg/dL 87   Calcium 8.6 - 10.5 mg/dL 9.4   Alkaline Phosphatase 39 - 117 U/L 69   Total Protein 6.0 - 8.5 g/dL 6.9   Albumin 3.5 - 5.2 g/dL 4.2   A/G Ratio g/dL 1.6   AST (SGOT) 1 - 32 U/L 16   ALT (SGPT) 1 - 33 U/L 10   Total Bilirubin 0.0 - 1.2 mg/dL <0.2 "   Uric Acid 2.4 - 5.7 mg/dL 4.3   (H): Data is abnormally high  (L): Data is abnormally low     Latest Reference Range & Units 10/05/23 09:00   Total Cholesterol 0 - 200 mg/dL 132   HDL Cholesterol 40 - 60 mg/dL 52   LDL Cholesterol  0 - 100 mg/dL 64   Triglycerides 0 - 150 mg/dL 82   Chol/HDL Ratio  2.54   VLDL Cholesterol Sai 5 - 40 mg/dL 16        Latest Reference Range & Units 02/02/24 11:01   WBC 4.8 - 10.8 K/uL 4.4 (L) (E)   RBC 4.20 - 5.40 M/uL 4.38 (E)   Hemoglobin 12.0 - 16.0 g/dL 12.9 (E)   Hematocrit 37.0 - 47.0 % 41.5 (E)   Platelets 130 - 400 K/uL 233 (E)   RDW 11.5 - 14.5 % 12.9 (E)   MCV 81.0 - 99.0 fL 94.7 (E)   MCH 27.0 - 31.0 pg 29.5 (E)   MCHC 33.0 - 37.0 g/dL 31.1 (L) (E)   MPV 9.4 - 12.3 fL 10.0 (E)   Neutrophil Rel % 50.0 - 65.0 % 46.4 (L) (E)   Lymphocyte Rel % 20.0 - 40.0 % 32.1 (E)   Monocyte Rel % 0.0 - 10.0 % 15.3 (H) (E)   Eosinophil Rel % 0.0 - 5.0 % 4.6 (E)   Basophil Rel % 0.0 - 1.0 % 0.7 (E)   Neutrophils Absolute 1.5 - 7.5 K/uL 2.0 (E)   Lymphocytes Absolute 1.1 - 4.5 K/uL 1.4 (E)   Monocytes Absolute 0.00 - 0.90 K/uL 0.70 (E)   Eosinophils Absolute 0.00 - 0.60 K/uL 0.20 (E)   Basophils Absolute 0.00 - 0.20 K/uL 0.00 (E)   Immature Grans, Absolute K/uL 0.0 (E)   (L): Data is abnormally low  (H): Data is abnormally high  (E): External lab result    Physical Exam    Physical Exam          Assessment      Diagnoses and all orders for this visit:    1. Primary hypertension (Primary)  -     Comprehensive Metabolic Panel  -     Lipid Panel    2. Anxiety    3. Rheumatoid arthritis, involving unspecified site, unspecified whether rheumatoid factor present    4. Chronic diastolic CHF (congestive heart failure)    5. Stage 3a chronic kidney disease             Assessment & Plan  1. Hypertension.  Her blood pressure is well controlled with her current medications, including spironolactone, verapamil, carvedilol, losartan, and amlodipine. No changes to her medication regimen are necessary at  this time.    2. Congestive Heart Failure.  She does not follow with a cardiologist. She experiences chest pain and shortness of breath at least once a week, which she attributes to anxiety. An echocardiogram from 2022 showed an ejection fraction of 61-65% with no significant findings. She is advised to monitor her symptoms and report any worsening.    3. Anxiety.  She takes fluoxetine 40 mg twice daily, prescribed by Dr. Loo, and finds it helpful. She also takes Xanax 0.25 mg nightly as needed for anxiety and sleep. A prescription for Xanax 0.25 mg nightly will be provided, with a recommendation to taper the dosage to every other night over the next few weeks before discontinuing.    4. Rheumatoid Arthritis.  She follows with Dr. Pérez and is on mycophenolate (CellCept) 500 mg twice daily and Plaquenil. No changes to her medication regimen are necessary at this time.    5. Chronic Kidney Disease.  She follows with Dr. Boykin every six months. Her CKD is stable. Routine labs, including a complete metabolic panel (CMP) and lipid profile, will be conducted today to monitor her kidney function and overall health.    6. Health Maintenance.  She will receive the influenza vaccine today. She does not have an advanced directive on file and is provided with information to consider completing one.      Orders Placed This Encounter   Procedures    Comprehensive Metabolic Panel     Order Specific Question:   Release to patient     Answer:   Routine Release [[id number]]    Lipid Panel     Order Specific Question:   Release to patient     Answer:   Routine Release [[id number]]                 Follow Up   Return in about 3 months (around 1/7/2025) for Anxiety, congestive heart failure, hypertension, rheumatoid arthritis, annual labs.  Patient was given instructions and counseling regarding her condition or for health maintenance advice. Please see specific information pulled into the AVS if appropriate.    Patient or  patient representative verbalized consent for the use of Ambient Listening during the visit with  Steve Sanches MD for chart documentation. 10/7/2024  13:12 CDT

## 2024-10-07 NOTE — PROGRESS NOTES
Chart reviewed and talked to Dr. Sanches - he is wanting to get her weaned down on the Xanax.  I have sent in a weaning dose to help.    Electronically signed by SUGEY Gillespie, 10/07/24, 12:33 PM CDT.

## 2024-10-08 LAB
ALBUMIN SERPL-MCNC: 4.2 G/DL (ref 3.5–5.2)
ALBUMIN/GLOB SERPL: 1.6 G/DL
ALP SERPL-CCNC: 73 U/L (ref 39–117)
ALT SERPL-CCNC: 9 U/L (ref 1–33)
AST SERPL-CCNC: 14 U/L (ref 1–32)
BILIRUB SERPL-MCNC: 0.2 MG/DL (ref 0–1.2)
BUN SERPL-MCNC: 13 MG/DL (ref 8–23)
BUN/CREAT SERPL: 10 (ref 7–25)
CALCIUM SERPL-MCNC: 9.3 MG/DL (ref 8.6–10.5)
CHLORIDE SERPL-SCNC: 104 MMOL/L (ref 98–107)
CHOLEST SERPL-MCNC: 137 MG/DL (ref 0–200)
CO2 SERPL-SCNC: 28 MMOL/L (ref 22–29)
CREAT SERPL-MCNC: 1.3 MG/DL (ref 0.57–1)
EGFRCR SERPLBLD CKD-EPI 2021: 46.3 ML/MIN/1.73
GLOBULIN SER CALC-MCNC: 2.6 GM/DL
GLUCOSE SERPL-MCNC: 90 MG/DL (ref 65–99)
HDLC SERPL-MCNC: 55 MG/DL (ref 40–60)
LDLC SERPL CALC-MCNC: 70 MG/DL (ref 0–100)
POTASSIUM SERPL-SCNC: 4.3 MMOL/L (ref 3.5–5.2)
PROT SERPL-MCNC: 6.8 G/DL (ref 6–8.5)
SODIUM SERPL-SCNC: 140 MMOL/L (ref 136–145)
TRIGL SERPL-MCNC: 57 MG/DL (ref 0–150)
VLDLC SERPL CALC-MCNC: 12 MG/DL (ref 5–40)

## 2024-10-10 RX ORDER — VERAPAMIL HYDROCHLORIDE 240 MG/1
240 TABLET, FILM COATED, EXTENDED RELEASE ORAL 2 TIMES DAILY
Qty: 180 TABLET | Refills: 1 | Status: SHIPPED | OUTPATIENT
Start: 2024-10-10

## 2024-11-13 ENCOUNTER — LAB (OUTPATIENT)
Dept: LAB | Facility: HOSPITAL | Age: 64
End: 2024-11-13
Payer: MEDICARE

## 2024-11-13 ENCOUNTER — OFFICE VISIT (OUTPATIENT)
Dept: GASTROENTEROLOGY | Facility: CLINIC | Age: 64
End: 2024-11-13
Payer: MEDICARE

## 2024-11-13 VITALS
HEIGHT: 62 IN | WEIGHT: 180 LBS | HEART RATE: 66 BPM | OXYGEN SATURATION: 98 % | SYSTOLIC BLOOD PRESSURE: 116 MMHG | DIASTOLIC BLOOD PRESSURE: 78 MMHG | TEMPERATURE: 97.4 F | BODY MASS INDEX: 33.13 KG/M2

## 2024-11-13 DIAGNOSIS — K21.9 GASTROESOPHAGEAL REFLUX DISEASE, UNSPECIFIED WHETHER ESOPHAGITIS PRESENT: Chronic | ICD-10-CM

## 2024-11-13 DIAGNOSIS — R19.7 DIARRHEA, UNSPECIFIED TYPE: Primary | ICD-10-CM

## 2024-11-13 DIAGNOSIS — R10.13 EPIGASTRIC PAIN: ICD-10-CM

## 2024-11-13 DIAGNOSIS — R19.7 DIARRHEA, UNSPECIFIED TYPE: ICD-10-CM

## 2024-11-13 DIAGNOSIS — Z86.0101 HX OF ADENOMATOUS COLONIC POLYPS: ICD-10-CM

## 2024-11-13 PROCEDURE — 3078F DIAST BP <80 MM HG: CPT | Performed by: NURSE PRACTITIONER

## 2024-11-13 PROCEDURE — 3074F SYST BP LT 130 MM HG: CPT | Performed by: NURSE PRACTITIONER

## 2024-11-13 PROCEDURE — 99214 OFFICE O/P EST MOD 30 MIN: CPT | Performed by: NURSE PRACTITIONER

## 2024-11-13 PROCEDURE — 1160F RVW MEDS BY RX/DR IN RCRD: CPT | Performed by: NURSE PRACTITIONER

## 2024-11-13 PROCEDURE — 1159F MED LIST DOCD IN RCRD: CPT | Performed by: NURSE PRACTITIONER

## 2024-11-13 RX ORDER — FAMOTIDINE 40 MG/1
40 TABLET, FILM COATED ORAL DAILY
Qty: 30 TABLET | Refills: 3 | Status: SHIPPED | OUTPATIENT
Start: 2024-11-13

## 2024-11-13 NOTE — H&P (VIEW-ONLY)
Primary Physician: Steve Sanches MD    Chief Complaint   Patient presents with    Colon Cancer Screening     Pt presents today for colon recall-last colon was 10/10/2019; Personal history of adenomatous polyps; Family history of colon cancer    Abdominal Pain     Pt c/o intermittent epigastric pain for the last couple of weeks-seems worse after eating certain foods; Pt's last endo was 10/13/2020       Subjective     Emilie Soto is a 64 y.o. female.    HPI  History of adenomatous colon polyps  Patient has had a history of piecemeal removal of adenoma.  10/10/2019 most recent colonoscopy revealing a 5 mm adenomatous polyp in the transverse colon and left colon diverticulosis.    Pt complains of non bloody diarrhea today    GERD  10/13/2020 Endoscopy: small HH, stomach normal and duodenum normal. Dilatation of entire esophagus up to 20mm.  10/7/2024 creatinine 1.3  Pt having increased acid reflux and will use Rolaids at least once per week.    Abdominal Pain  Pt having upper mid abdominal pain. It is worse when she eats or drinks.  No nausea. No vomiting.  No NSAIDS.      Diarrhea  Having yellow diarrhea that is mostly watery. It has been ongoing for 3-4 days.  Yesterday she has a total of 5 BM's. This morning she has already had 2 BM's.  No blood in her stools.    Past Medical History:   Diagnosis Date    Asthma     Cardiac arrhythmia-palpitations 09/27/2022    CHF (congestive heart failure)     CKD (chronic kidney disease)     Congestive heart failure-diastolic 10/14/2016    12.10.10 Cath-LVH/[facility] /12.10.10  12.11.10  echo con LVH/EF 65% diastolic dysfunction (not seen on cath)     11.7.17-  EF 75%  MODERATE LVH - CONSIDER HYPERTENSIVE VS HYPERTROPHIC  NORMAL LV AND RV SYSTOLIC  FUNCTION  RVH NOTED  LV DIASTOLIC DYSFUNCTION GRADE 1A  MILD TO MODERATE LA ENLARGEMENT  NO SIGNIFICANT VALVULAR DYSFUNCTION     11.14.17      Below average functional capacity.    Cl    DDD (degenerative disc disease), cervical      Depression     Diverticulosis     Family history of colon cancer 08/29/2019    Added automatically from request for surgery [id number]      Fibromyalgia     GERD (gastroesophageal reflux disease)     Glaucoma 04/11/2024    Heart murmur     Hepatitis cholestatic     History of adenomatous polyp of colon     HOCM (hypertrophic obstructive cardiomyopathy)     HTN (hypertension)     Hx of colonic polyps 08/29/2019    Added automatically from request for surgery [id number]      RA (rheumatoid arthritis)     Renal disease     Sleep apnea        Past Surgical History:   Procedure Laterality Date    COLONOSCOPY  04/22/2016    One 16mm tubulovillous adenomatous polyp at the ileocecal valve-Clip was placed-injected; The examination was otherwise normal on direct and retroflexion views; Repeat 1 year    COLONOSCOPY N/A 07/28/2017    A tattoo was seen at the ileocecal valve-A post-polypectomy scar was found at the tattoo site; One 12mm tubular adenomatous flat polyp in the transverse colon-Clip (MR conditional) was placed; The examination was otherwise normal on direct and retroflexion views; Repeat 2 years    COLONOSCOPY  10/04/2015    Bleeding at the ileocecal valve secondary to previous polypectomy-Clip was placed; No specimens collected; Repeat 6 months for retreatment    COLONOSCOPY  09/30/2015    Very large multilobulated tubular adenomatous polyp opposite the ileocecal valve-removed piecemeal-at least 95% removed; One less than 1cm tubular adenomatous polyp in the ascending colon; Repeat 6-12 months to reassess the large polyp    COLONOSCOPY N/A 10/10/2019    One 5mm tubular adenomatous polyp in the transverse colon; Diverticulosis in the left colon; The entire examined colon is normal on direct and retroflexion views; Repeat 5 years    ENDOSCOPY N/A 10/13/2020    Dilation in the entire esophagus; Small HH; Non-obstructing Schatzki ring-Dilated; Normal stomach; Normal examined duodenum; No specimens collected    EXPLORATORY  LAPAROTOMY      HYSTERECTOMY      LIVER BIOPSY  06/14/2011    Cholestatic hepatitis-see report        Current Outpatient Medications:     albuterol sulfate HFA (Ventolin HFA) 108 (90 Base) MCG/ACT inhaler, INHALE 1 PUFF EVERY 4 (FOUR) HOURS AS NEEDED FOR WHEEZING OR SHORTNESS OF AIR. (Patient taking differently: Inhale 1 puff As Needed.), Disp: 54 g, Rfl: 3    ALPRAZolam (Xanax) 0.25 MG tablet, Take 1 tablet by mouth At Night As Needed for Anxiety or Sleep., Disp: 30 tablet, Rfl: 0    amLODIPine (NORVASC) 5 MG tablet, TAKE ONE TABLET DAILY (Patient taking differently: Take 1 tablet by mouth Daily.), Disp: 90 tablet, Rfl: 3    aspirin (Aspirin Low Dose) 81 MG EC tablet, Take 1 tablet by mouth Daily., Disp: , Rfl:     carvedilol (COREG) 12.5 MG tablet, TAKE 1 TABLET BY MOUTH 2 (TWO) TIMES A DAY WITH MEALS., Disp: 180 tablet, Rfl: 1    cloNIDine (CATAPRES) 0.1 MG tablet, Take 1 tablet by mouth 2 (Two) Times a Day As Needed. One by mouth twice daily as needed if BP greater than 160/100, Disp: , Rfl:     estradiol (ESTRACE) 2 MG tablet, TAKE 1/2 TABLET DAILY, Disp: 45 tablet, Rfl: 3    FLUoxetine (PROzac) 40 MG capsule, Take 1 capsule by mouth Daily. 2 tab daily, Disp: , Rfl:     folic acid (FOLVITE) 1 MG tablet, Take 1 tablet by mouth Daily., Disp: 90 tablet, Rfl: 3    HYDROcodone-acetaminophen (NORCO) 7.5-325 MG per tablet, Take 1 tablet by mouth 2 (Two) Times a Day As Needed., Disp: , Rfl:     hydroxychloroquine (PLAQUENIL) 200 MG tablet, Take 1 tablet by mouth 2 (Two) Times a Day., Disp: , Rfl:     latanoprost (XALATAN) 0.005 % ophthalmic solution, 1 drop Every Night., Disp: , Rfl:     loratadine (CLARITIN) 10 MG tablet, Take 1 tablet by mouth As Needed for Allergies., Disp: , Rfl:     losartan (COZAAR) 100 MG tablet, TAKE 1 TABLET BY MOUTH DAILY., Disp: 90 tablet, Rfl: 2    mycophenolate (CELLCEPT) 500 MG tablet, Take 1 tablet by mouth 2 (Two) Times a Day., Disp: , Rfl:     omeprazole (priLOSEC) 20 MG capsule, TAKE  1 CAPSULE BY MOUTH DAILY., Disp: 90 capsule, Rfl: 3    potassium chloride (KLOR-CON M10) 10 MEQ CR tablet, TAKE 1 TABLET BY MOUTH DAILY., Disp: 90 tablet, Rfl: 3    spironolactone (ALDACTONE) 25 MG tablet, TAKE 1 TABLET BY MOUTH DAILY., Disp: 90 tablet, Rfl: 3    traZODone (DESYREL) 50 MG tablet, Take 1.5 tablets by mouth Every Night., Disp: , Rfl:     verapamil SR (CALAN-SR) 240 MG CR tablet, TAKE 1 TABLET BY MOUTH 2 (TWO) TIMES A DAY., Disp: 180 tablet, Rfl: 1    vitamin D (ERGOCALCIFEROL) 27359 UNITS capsule capsule, Take 2,000 Units by mouth 1 (One) Time Per Week., Disp: , Rfl:     famotidine (Pepcid) 40 MG tablet, Take 1 tablet by mouth Daily., Disp: 30 tablet, Rfl: 3    polyethylene glycol (GoLYTELY) 236 g solution, Take as directed split dose prep, Disp: 4000 mL, Rfl: 0    Allergies   Allergen Reactions    Penicillins Other (See Comments)    Abilify [Aripiprazole] Other (See Comments)     Insomnia    Bactrim [Sulfamethoxazole-Trimethoprim] Other (See Comments)     Pt unsure of reaction    Biaxin [Clarithromycin] Nausea And Vomiting    Ciprofloxacin Hcl Other (See Comments)     Pt unsure of reaction    Duricef [Cefadroxil] Other (See Comments)     Pt unsure of reaction    Ketek [Telithromycin] Other (See Comments)     Pt unsure of reaction    Relafen [Nabumetone] Itching    Wellbutrin [Bupropion] Itching       Social History     Socioeconomic History    Marital status:    Tobacco Use    Smoking status: Never    Smokeless tobacco: Never   Vaping Use    Vaping status: Never Used   Substance and Sexual Activity    Alcohol use: Not Currently     Alcohol/week: 2.0 standard drinks of alcohol     Types: 2 Glasses of wine per week     Comment: Occasionally    Drug use: No    Sexual activity: Not Currently     Partners: Male     Birth control/protection: Post-menopausal       Family History   Problem Relation Age of Onset    Throat cancer Mother     Diabetes Father     Cancer Father         Pt reports he had  "part of his intestines removed    Breast cancer Sister     Colon cancer Paternal Uncle 80    Colon polyps Neg Hx     Esophageal cancer Neg Hx     Liver cancer Neg Hx     Liver disease Neg Hx     Rectal cancer Neg Hx     Stomach cancer Neg Hx        Review of Systems   Constitutional:  Negative for fever.   Respiratory:  Negative for shortness of breath.    Cardiovascular:  Positive for palpitations. Negative for chest pain.   Gastrointestinal:  Positive for abdominal pain and diarrhea.       Objective     /78 (BP Location: Left arm, Patient Position: Sitting, Cuff Size: Adult)   Pulse 66   Temp 97.4 °F (36.3 °C) (Infrared)   Ht 157.5 cm (62\")   Wt 81.6 kg (180 lb)   SpO2 98%   Breastfeeding No   BMI 32.92 kg/m²     Physical Exam  Vitals reviewed.   Constitutional:       Appearance: Normal appearance.   Neurological:      Mental Status: She is alert.         Lab Results - Last 18 Months   Lab Units 10/07/24  1039 10/05/23  0900   GLUCOSE mg/dL 90 87   BUN mg/dL 13 14   CREATININE mg/dL 1.30* 1.20*   SODIUM mmol/L 140 140   POTASSIUM mmol/L 4.3 4.2   CHLORIDE mmol/L 104 105   CO2 mmol/L 28.0 26.4   ALBUMIN g/dL 4.2 4.2   ALT (SGPT) U/L 9 10   AST (SGOT) U/L 14 16   ALK PHOS U/L 73 69   BILIRUBIN mg/dL 0.2 <0.2       Lab Results - Last 18 Months   Lab Units 02/02/24  1101 10/05/23  0900   HEMOGLOBIN g/dL 12.9 12.6   HEMATOCRIT % 41.5 38.1   MCV fL 94.7 90.5   WBC K/uL 4.4* 5.62   RDW % 12.9 13.1   MPV fL 10.0  --    PLATELETS K/uL 233 240       Lab Results - Last 18 Months   Lab Units 02/02/24  1101 10/05/23  0900   TSH uIU/mL  --  2.500   VIT D 25 HYDROXY ng/mL 42.9 45.5                IMPRESSION/PLAN:    Assessment & Plan      Problem List Items Addressed This Visit       Gastroesophageal reflux (Chronic)    Overview     Increased acid reflux using Omeprazole 20mg at night and Rolaids as needed.  10/13/2020 Endoscopy: small HH, stomach normal and duodenum normal. Dilatation of entire esophagus up to " 20mm.  10/7/2024 creatinine 1.3         Relevant Medications    famotidine (Pepcid) 40 MG tablet    Hx of adenomatous colonic polyps    Overview     Previous piecemeal removal.  10/2019 adenomatous polyp TC.         Relevant Medications    polyethylene glycol (GoLYTELY) 236 g solution    Epigastric pain    Overview     RENETTA pain worse with foods         Current Assessment & Plan     Add Pepcid 40mg once daily         Relevant Medications    famotidine (Pepcid) 40 MG tablet    Diarrhea - Primary    Overview     3-4 days of watery stools         Current Assessment & Plan     Check stool studies today         Relevant Orders    Clostridioides difficile Toxin, PCR - Stool, Per Rectum    Stool Culture (Reference Lab) - Stool, Per Rectum    Case Request (Completed)     Endoscopy& Colonoscopy per Dr Jasiel De Dios Prep given CKD  Check stool studies  Add Pepcid in the morning        ..The risks, benefits, and alternatives of colonoscopy were reviewed with the patient today.  Risks including perforation of the colon possibly requiring surgery or colostomy.  Additional risks include risk of bleeding from biopsies or removal of colon tissue.  There is also the risk of a drug reaction or problems with anesthesia.  This will be discussed with the further by the anesthesia team on the day of the procedure.  Lastly there is a possibility of missing a colon polyp or cancer.  The benefits include the diagnosis and management of disease of the colon and rectum.  Alternatives to colonoscopy include barium enema, laboratory testing, radiographic evaluation, or no intervention.  The patient verbalizes understanding and agrees.    In accordance with requirements under the Affordable Care Act, [facility] has provided pricing for all hospital services and items on each of its websites. However, a patient's actual cost may differ based on the services the patient receives to meet individual healthcare needs and based on the benefits  provided under the patient’s insurance coverage.        Bonita Carrillo, APRN  11/13/24  11:19 CST    Part of this note may be an electronic transcription/translation of spoken language to printed text.

## 2024-11-13 NOTE — PROGRESS NOTES
Primary Physician: Steve Sanches MD    Chief Complaint   Patient presents with    Colon Cancer Screening     Pt presents today for colon recall-last colon was 10/10/2019; Personal history of adenomatous polyps; Family history of colon cancer    Abdominal Pain     Pt c/o intermittent epigastric pain for the last couple of weeks-seems worse after eating certain foods; Pt's last endo was 10/13/2020       Subjective     Emilie Soto is a 64 y.o. female.    HPI  History of adenomatous colon polyps  Patient has had a history of piecemeal removal of adenoma.  10/10/2019 most recent colonoscopy revealing a 5 mm adenomatous polyp in the transverse colon and left colon diverticulosis.    Pt complains of non bloody diarrhea today    GERD  10/13/2020 Endoscopy: small HH, stomach normal and duodenum normal. Dilatation of entire esophagus up to 20mm.  10/7/2024 creatinine 1.3  Pt having increased acid reflux and will use Rolaids at least once per week.    Abdominal Pain  Pt having upper mid abdominal pain. It is worse when she eats or drinks.  No nausea. No vomiting.  No NSAIDS.      Diarrhea  Having yellow diarrhea that is mostly watery. It has been ongoing for 3-4 days.  Yesterday she has a total of 5 BM's. This morning she has already had 2 BM's.  No blood in her stools.    Past Medical History:   Diagnosis Date    Asthma     Cardiac arrhythmia-palpitations 09/27/2022    CHF (congestive heart failure)     CKD (chronic kidney disease)     Congestive heart failure-diastolic 10/14/2016    12.10.10 Cath-LVH/[facility] /12.10.10  12.11.10  echo con LVH/EF 65% diastolic dysfunction (not seen on cath)     11.7.17-  EF 75%  MODERATE LVH - CONSIDER HYPERTENSIVE VS HYPERTROPHIC  NORMAL LV AND RV SYSTOLIC  FUNCTION  RVH NOTED  LV DIASTOLIC DYSFUNCTION GRADE 1A  MILD TO MODERATE LA ENLARGEMENT  NO SIGNIFICANT VALVULAR DYSFUNCTION     11.14.17      Below average functional capacity.    Cl    DDD (degenerative disc disease), cervical      Depression     Diverticulosis     Family history of colon cancer 08/29/2019    Added automatically from request for surgery [id number]      Fibromyalgia     GERD (gastroesophageal reflux disease)     Glaucoma 04/11/2024    Heart murmur     Hepatitis cholestatic     History of adenomatous polyp of colon     HOCM (hypertrophic obstructive cardiomyopathy)     HTN (hypertension)     Hx of colonic polyps 08/29/2019    Added automatically from request for surgery [id number]      RA (rheumatoid arthritis)     Renal disease     Sleep apnea        Past Surgical History:   Procedure Laterality Date    COLONOSCOPY  04/22/2016    One 16mm tubulovillous adenomatous polyp at the ileocecal valve-Clip was placed-injected; The examination was otherwise normal on direct and retroflexion views; Repeat 1 year    COLONOSCOPY N/A 07/28/2017    A tattoo was seen at the ileocecal valve-A post-polypectomy scar was found at the tattoo site; One 12mm tubular adenomatous flat polyp in the transverse colon-Clip (MR conditional) was placed; The examination was otherwise normal on direct and retroflexion views; Repeat 2 years    COLONOSCOPY  10/04/2015    Bleeding at the ileocecal valve secondary to previous polypectomy-Clip was placed; No specimens collected; Repeat 6 months for retreatment    COLONOSCOPY  09/30/2015    Very large multilobulated tubular adenomatous polyp opposite the ileocecal valve-removed piecemeal-at least 95% removed; One less than 1cm tubular adenomatous polyp in the ascending colon; Repeat 6-12 months to reassess the large polyp    COLONOSCOPY N/A 10/10/2019    One 5mm tubular adenomatous polyp in the transverse colon; Diverticulosis in the left colon; The entire examined colon is normal on direct and retroflexion views; Repeat 5 years    ENDOSCOPY N/A 10/13/2020    Dilation in the entire esophagus; Small HH; Non-obstructing Schatzki ring-Dilated; Normal stomach; Normal examined duodenum; No specimens collected    EXPLORATORY  LAPAROTOMY      HYSTERECTOMY      LIVER BIOPSY  06/14/2011    Cholestatic hepatitis-see report        Current Outpatient Medications:     albuterol sulfate HFA (Ventolin HFA) 108 (90 Base) MCG/ACT inhaler, INHALE 1 PUFF EVERY 4 (FOUR) HOURS AS NEEDED FOR WHEEZING OR SHORTNESS OF AIR. (Patient taking differently: Inhale 1 puff As Needed.), Disp: 54 g, Rfl: 3    ALPRAZolam (Xanax) 0.25 MG tablet, Take 1 tablet by mouth At Night As Needed for Anxiety or Sleep., Disp: 30 tablet, Rfl: 0    amLODIPine (NORVASC) 5 MG tablet, TAKE ONE TABLET DAILY (Patient taking differently: Take 1 tablet by mouth Daily.), Disp: 90 tablet, Rfl: 3    aspirin (Aspirin Low Dose) 81 MG EC tablet, Take 1 tablet by mouth Daily., Disp: , Rfl:     carvedilol (COREG) 12.5 MG tablet, TAKE 1 TABLET BY MOUTH 2 (TWO) TIMES A DAY WITH MEALS., Disp: 180 tablet, Rfl: 1    cloNIDine (CATAPRES) 0.1 MG tablet, Take 1 tablet by mouth 2 (Two) Times a Day As Needed. One by mouth twice daily as needed if BP greater than 160/100, Disp: , Rfl:     estradiol (ESTRACE) 2 MG tablet, TAKE 1/2 TABLET DAILY, Disp: 45 tablet, Rfl: 3    FLUoxetine (PROzac) 40 MG capsule, Take 1 capsule by mouth Daily. 2 tab daily, Disp: , Rfl:     folic acid (FOLVITE) 1 MG tablet, Take 1 tablet by mouth Daily., Disp: 90 tablet, Rfl: 3    HYDROcodone-acetaminophen (NORCO) 7.5-325 MG per tablet, Take 1 tablet by mouth 2 (Two) Times a Day As Needed., Disp: , Rfl:     hydroxychloroquine (PLAQUENIL) 200 MG tablet, Take 1 tablet by mouth 2 (Two) Times a Day., Disp: , Rfl:     latanoprost (XALATAN) 0.005 % ophthalmic solution, 1 drop Every Night., Disp: , Rfl:     loratadine (CLARITIN) 10 MG tablet, Take 1 tablet by mouth As Needed for Allergies., Disp: , Rfl:     losartan (COZAAR) 100 MG tablet, TAKE 1 TABLET BY MOUTH DAILY., Disp: 90 tablet, Rfl: 2    mycophenolate (CELLCEPT) 500 MG tablet, Take 1 tablet by mouth 2 (Two) Times a Day., Disp: , Rfl:     omeprazole (priLOSEC) 20 MG capsule, TAKE  1 CAPSULE BY MOUTH DAILY., Disp: 90 capsule, Rfl: 3    potassium chloride (KLOR-CON M10) 10 MEQ CR tablet, TAKE 1 TABLET BY MOUTH DAILY., Disp: 90 tablet, Rfl: 3    spironolactone (ALDACTONE) 25 MG tablet, TAKE 1 TABLET BY MOUTH DAILY., Disp: 90 tablet, Rfl: 3    traZODone (DESYREL) 50 MG tablet, Take 1.5 tablets by mouth Every Night., Disp: , Rfl:     verapamil SR (CALAN-SR) 240 MG CR tablet, TAKE 1 TABLET BY MOUTH 2 (TWO) TIMES A DAY., Disp: 180 tablet, Rfl: 1    vitamin D (ERGOCALCIFEROL) 89702 UNITS capsule capsule, Take 2,000 Units by mouth 1 (One) Time Per Week., Disp: , Rfl:     famotidine (Pepcid) 40 MG tablet, Take 1 tablet by mouth Daily., Disp: 30 tablet, Rfl: 3    polyethylene glycol (GoLYTELY) 236 g solution, Take as directed split dose prep, Disp: 4000 mL, Rfl: 0    Allergies   Allergen Reactions    Penicillins Other (See Comments)    Abilify [Aripiprazole] Other (See Comments)     Insomnia    Bactrim [Sulfamethoxazole-Trimethoprim] Other (See Comments)     Pt unsure of reaction    Biaxin [Clarithromycin] Nausea And Vomiting    Ciprofloxacin Hcl Other (See Comments)     Pt unsure of reaction    Duricef [Cefadroxil] Other (See Comments)     Pt unsure of reaction    Ketek [Telithromycin] Other (See Comments)     Pt unsure of reaction    Relafen [Nabumetone] Itching    Wellbutrin [Bupropion] Itching       Social History     Socioeconomic History    Marital status:    Tobacco Use    Smoking status: Never    Smokeless tobacco: Never   Vaping Use    Vaping status: Never Used   Substance and Sexual Activity    Alcohol use: Not Currently     Alcohol/week: 2.0 standard drinks of alcohol     Types: 2 Glasses of wine per week     Comment: Occasionally    Drug use: No    Sexual activity: Not Currently     Partners: Male     Birth control/protection: Post-menopausal       Family History   Problem Relation Age of Onset    Throat cancer Mother     Diabetes Father     Cancer Father         Pt reports he had  "part of his intestines removed    Breast cancer Sister     Colon cancer Paternal Uncle 80    Colon polyps Neg Hx     Esophageal cancer Neg Hx     Liver cancer Neg Hx     Liver disease Neg Hx     Rectal cancer Neg Hx     Stomach cancer Neg Hx        Review of Systems   Constitutional:  Negative for fever.   Respiratory:  Negative for shortness of breath.    Cardiovascular:  Positive for palpitations. Negative for chest pain.   Gastrointestinal:  Positive for abdominal pain and diarrhea.       Objective     /78 (BP Location: Left arm, Patient Position: Sitting, Cuff Size: Adult)   Pulse 66   Temp 97.4 °F (36.3 °C) (Infrared)   Ht 157.5 cm (62\")   Wt 81.6 kg (180 lb)   SpO2 98%   Breastfeeding No   BMI 32.92 kg/m²     Physical Exam  Vitals reviewed.   Constitutional:       Appearance: Normal appearance.   Neurological:      Mental Status: She is alert.         Lab Results - Last 18 Months   Lab Units 10/07/24  1039 10/05/23  0900   GLUCOSE mg/dL 90 87   BUN mg/dL 13 14   CREATININE mg/dL 1.30* 1.20*   SODIUM mmol/L 140 140   POTASSIUM mmol/L 4.3 4.2   CHLORIDE mmol/L 104 105   CO2 mmol/L 28.0 26.4   ALBUMIN g/dL 4.2 4.2   ALT (SGPT) U/L 9 10   AST (SGOT) U/L 14 16   ALK PHOS U/L 73 69   BILIRUBIN mg/dL 0.2 <0.2       Lab Results - Last 18 Months   Lab Units 02/02/24  1101 10/05/23  0900   HEMOGLOBIN g/dL 12.9 12.6   HEMATOCRIT % 41.5 38.1   MCV fL 94.7 90.5   WBC K/uL 4.4* 5.62   RDW % 12.9 13.1   MPV fL 10.0  --    PLATELETS K/uL 233 240       Lab Results - Last 18 Months   Lab Units 02/02/24  1101 10/05/23  0900   TSH uIU/mL  --  2.500   VIT D 25 HYDROXY ng/mL 42.9 45.5                IMPRESSION/PLAN:    Assessment & Plan      Problem List Items Addressed This Visit       Gastroesophageal reflux (Chronic)    Overview     Increased acid reflux using Omeprazole 20mg at night and Rolaids as needed.  10/13/2020 Endoscopy: small HH, stomach normal and duodenum normal. Dilatation of entire esophagus up to " 20mm.  10/7/2024 creatinine 1.3         Relevant Medications    famotidine (Pepcid) 40 MG tablet    Hx of adenomatous colonic polyps    Overview     Previous piecemeal removal.  10/2019 adenomatous polyp TC.         Relevant Medications    polyethylene glycol (GoLYTELY) 236 g solution    Epigastric pain    Overview     RENETTA pain worse with foods         Current Assessment & Plan     Add Pepcid 40mg once daily         Relevant Medications    famotidine (Pepcid) 40 MG tablet    Diarrhea - Primary    Overview     3-4 days of watery stools         Current Assessment & Plan     Check stool studies today         Relevant Orders    Clostridioides difficile Toxin, PCR - Stool, Per Rectum    Stool Culture (Reference Lab) - Stool, Per Rectum    Case Request (Completed)     Endoscopy& Colonoscopy per Dr Jasiel De Dios Prep given CKD  Check stool studies  Add Pepcid in the morning        ..The risks, benefits, and alternatives of colonoscopy were reviewed with the patient today.  Risks including perforation of the colon possibly requiring surgery or colostomy.  Additional risks include risk of bleeding from biopsies or removal of colon tissue.  There is also the risk of a drug reaction or problems with anesthesia.  This will be discussed with the further by the anesthesia team on the day of the procedure.  Lastly there is a possibility of missing a colon polyp or cancer.  The benefits include the diagnosis and management of disease of the colon and rectum.  Alternatives to colonoscopy include barium enema, laboratory testing, radiographic evaluation, or no intervention.  The patient verbalizes understanding and agrees.    In accordance with requirements under the Affordable Care Act, [facility] has provided pricing for all hospital services and items on each of its websites. However, a patient's actual cost may differ based on the services the patient receives to meet individual healthcare needs and based on the benefits  provided under the patient’s insurance coverage.        Bonita Carrillo, APRN  11/13/24  11:19 CST    Part of this note may be an electronic transcription/translation of spoken language to printed text.

## 2024-11-14 ENCOUNTER — LAB (OUTPATIENT)
Dept: LAB | Facility: HOSPITAL | Age: 64
End: 2024-11-14
Payer: MEDICARE

## 2024-11-14 DIAGNOSIS — R19.7 DIARRHEA, UNSPECIFIED TYPE: ICD-10-CM

## 2024-11-14 LAB — C DIFF TOX GENS STL QL NAA+PROBE: NEGATIVE

## 2024-11-14 PROCEDURE — 87046 STOOL CULTR AEROBIC BACT EA: CPT

## 2024-11-14 PROCEDURE — 87427 SHIGA-LIKE TOXIN AG IA: CPT

## 2024-11-14 PROCEDURE — 87493 C DIFF AMPLIFIED PROBE: CPT

## 2024-11-14 PROCEDURE — 87045 FECES CULTURE AEROBIC BACT: CPT

## 2024-11-18 LAB
BACTERIA SPEC CULT: NORMAL
BACTERIA SPEC CULT: NORMAL
CAMPYLOBACTER STL CULT: NORMAL
E COLI SXT STL QL IA: NEGATIVE
SALM + SHIG STL CULT: NORMAL

## 2024-12-05 ENCOUNTER — OFFICE VISIT (OUTPATIENT)
Dept: OBGYN CLINIC | Age: 64
End: 2024-12-05
Payer: MEDICARE

## 2024-12-05 VITALS
HEART RATE: 56 BPM | WEIGHT: 183 LBS | HEIGHT: 62 IN | BODY MASS INDEX: 33.68 KG/M2 | SYSTOLIC BLOOD PRESSURE: 138 MMHG | DIASTOLIC BLOOD PRESSURE: 78 MMHG

## 2024-12-05 DIAGNOSIS — N28.9 RENAL INSUFFICIENCY: ICD-10-CM

## 2024-12-05 DIAGNOSIS — M32.8 OTHER FORMS OF SYSTEMIC LUPUS ERYTHEMATOSUS, UNSPECIFIED ORGAN INVOLVEMENT STATUS (HCC): ICD-10-CM

## 2024-12-05 DIAGNOSIS — Z12.31 SCREENING MAMMOGRAM FOR BREAST CANCER: ICD-10-CM

## 2024-12-05 DIAGNOSIS — R59.9 SWELLING OF LYMPH NODE: ICD-10-CM

## 2024-12-05 DIAGNOSIS — R59.1 LYMPHADENOPATHY, GENERALIZED: ICD-10-CM

## 2024-12-05 DIAGNOSIS — R79.9 ABNORMAL FINDING OF BLOOD CHEMISTRY, UNSPECIFIED: ICD-10-CM

## 2024-12-05 DIAGNOSIS — Z01.419 WELL WOMAN EXAM WITH ROUTINE GYNECOLOGICAL EXAM: Primary | ICD-10-CM

## 2024-12-05 DIAGNOSIS — I15.8 OTHER SECONDARY HYPERTENSION: ICD-10-CM

## 2024-12-05 LAB
ERYTHROCYTE [DISTWIDTH] IN BLOOD BY AUTOMATED COUNT: 13.2 % (ref 11.5–14.5)
HBA1C MFR BLD: 5.2 % (ref 4–5.6)
HCT VFR BLD AUTO: 41.3 % (ref 37–47)
HGB BLD-MCNC: 12.5 G/DL (ref 12–16)
MCH RBC QN AUTO: 29.4 PG (ref 27–31)
MCHC RBC AUTO-ENTMCNC: 30.3 G/DL (ref 33–37)
MCV RBC AUTO: 97.2 FL (ref 81–99)
PLATELET # BLD AUTO: 251 K/UL (ref 130–400)
PMV BLD AUTO: 9.9 FL (ref 9.4–12.3)
RBC # BLD AUTO: 4.25 M/UL (ref 4.2–5.4)
TSH SERPL DL<=0.005 MIU/L-ACNC: 1.02 UIU/ML (ref 0.27–4.2)
WBC # BLD AUTO: 5.9 K/UL (ref 4.8–10.8)

## 2024-12-05 PROCEDURE — G0101 CA SCREEN;PELVIC/BREAST EXAM: HCPCS | Performed by: OBSTETRICS & GYNECOLOGY

## 2024-12-05 PROCEDURE — G8484 FLU IMMUNIZE NO ADMIN: HCPCS | Performed by: OBSTETRICS & GYNECOLOGY

## 2024-12-05 PROCEDURE — 3078F DIAST BP <80 MM HG: CPT | Performed by: OBSTETRICS & GYNECOLOGY

## 2024-12-05 PROCEDURE — 3075F SYST BP GE 130 - 139MM HG: CPT | Performed by: OBSTETRICS & GYNECOLOGY

## 2024-12-05 NOTE — PROGRESS NOTES
Pt presents today for pap smear and breast exam.      Last mammogram:   - [facility]   Last pap smear:  23  Contraception:  partial hyst   :  2  Para:  2  AB:  0  Last bone density:  20  Last colonoscopy: 10/10/19      
hydroxychloroquine (PLAQUENIL) 200 MG tablet, Take by mouth daily, Disp: , Rfl:     losartan (COZAAR) 100 MG tablet, Take 1 tablet by mouth daily, Disp: , Rfl:     mycophenolate (CELLCEPT) 500 MG tablet, Take by mouth 2 times daily, Disp: , Rfl:     omeprazole (PRILOSEC) 20 MG delayed release capsule, Take 1 capsule by mouth daily, Disp: , Rfl:     senna (SENOKOT) 8.6 MG tablet, Take 1 tablet by mouth daily, Disp: , Rfl:     spironolactone (ALDACTONE) 25 MG tablet, Take 1 tablet by mouth daily, Disp: , Rfl:     traZODone (DESYREL) 50 MG tablet, Take 1 tablet by mouth nightly, Disp: , Rfl:     venlafaxine 225 MG extended release tablet, Take 1 tablet by mouth daily (with breakfast), Disp: , Rfl:     verapamil (CALAN SR) 240 MG extended release tablet, Take 1 tablet by mouth nightly, Disp: , Rfl:     Allergies   Allergen Reactions    Clarithromycin Nausea Only    Penicillins Other (See Comments)    Aripiprazole Other (See Comments)     Insomnia    Bupropion Itching    Cefadroxil Other (See Comments)     Pt unsure of reaction    Ciprofloxacin Hcl Other (See Comments)     Pt unsure of reaction    Nabumetone Itching       /78 Comment: manual  Pulse 56   Ht 1.575 m (5' 2\")   Wt 83 kg (183 lb)   BMI 33.47 kg/m²   Physical Exam  Constitutional:       General: She is not in acute distress.     Appearance: She is well-developed.   HENT:      Head: Normocephalic and atraumatic.      Right Ear: External ear normal.      Left Ear: External ear normal.      Nose: Nose normal. No rhinorrhea.   Eyes:      General: No scleral icterus.        Right eye: No discharge.      Conjunctiva/sclera: Conjunctivae normal.      Pupils: Pupils are equal, round, and reactive to light.   Neck:      Thyroid: No thyromegaly.      Trachea: No tracheal deviation.      Comments: Small, mobile, posterior auricular node on the right  Cardiovascular:      Rate and Rhythm: Normal rate.   Pulmonary:      Effort: Pulmonary effort is normal. No

## 2024-12-09 ENCOUNTER — ANESTHESIA EVENT (OUTPATIENT)
Dept: GASTROENTEROLOGY | Facility: HOSPITAL | Age: 64
End: 2024-12-09
Payer: MEDICARE

## 2024-12-09 ENCOUNTER — HOSPITAL ENCOUNTER (OUTPATIENT)
Facility: HOSPITAL | Age: 64
Setting detail: HOSPITAL OUTPATIENT SURGERY
Discharge: HOME | End: 2024-12-09
Attending: INTERNAL MEDICINE | Admitting: INTERNAL MEDICINE
Payer: MEDICARE

## 2024-12-09 ENCOUNTER — ANESTHESIA (OUTPATIENT)
Dept: GASTROENTEROLOGY | Facility: HOSPITAL | Age: 64
End: 2024-12-09
Payer: MEDICARE

## 2024-12-09 VITALS
BODY MASS INDEX: 34.04 KG/M2 | DIASTOLIC BLOOD PRESSURE: 59 MMHG | RESPIRATION RATE: 18 BRPM | SYSTOLIC BLOOD PRESSURE: 106 MMHG | WEIGHT: 185 LBS | TEMPERATURE: 96.8 F | HEIGHT: 62 IN | HEART RATE: 64 BPM | OXYGEN SATURATION: 100 %

## 2024-12-09 DIAGNOSIS — R19.7 DIARRHEA, UNSPECIFIED TYPE: ICD-10-CM

## 2024-12-09 PROCEDURE — 25010000002 PROPOFOL 10 MG/ML EMULSION

## 2024-12-09 PROCEDURE — 45385 COLONOSCOPY W/LESION REMOVAL: CPT | Performed by: INTERNAL MEDICINE

## 2024-12-09 PROCEDURE — 88305 TISSUE EXAM BY PATHOLOGIST: CPT | Performed by: INTERNAL MEDICINE

## 2024-12-09 PROCEDURE — 25010000002 LIDOCAINE PF 2% 2 % SOLUTION

## 2024-12-09 PROCEDURE — 43239 EGD BIOPSY SINGLE/MULTIPLE: CPT | Performed by: INTERNAL MEDICINE

## 2024-12-09 RX ORDER — LIDOCAINE HYDROCHLORIDE 10 MG/ML
0.5 INJECTION, SOLUTION EPIDURAL; INFILTRATION; INTRACAUDAL; PERINEURAL ONCE AS NEEDED
Status: DISCONTINUED | OUTPATIENT
Start: 2024-12-09 | End: 2024-12-09 | Stop reason: HOSPADM

## 2024-12-09 RX ORDER — PROPOFOL 10 MG/ML
VIAL (ML) INTRAVENOUS AS NEEDED
Status: DISCONTINUED | OUTPATIENT
Start: 2024-12-09 | End: 2024-12-09 | Stop reason: SURG

## 2024-12-09 RX ORDER — LIDOCAINE HYDROCHLORIDE 20 MG/ML
INJECTION, SOLUTION EPIDURAL; INFILTRATION; INTRACAUDAL; PERINEURAL AS NEEDED
Status: DISCONTINUED | OUTPATIENT
Start: 2024-12-09 | End: 2024-12-09 | Stop reason: SURG

## 2024-12-09 RX ORDER — SODIUM CHLORIDE 9 MG/ML
500 INJECTION, SOLUTION INTRAVENOUS ONCE
Status: DISCONTINUED | OUTPATIENT
Start: 2024-12-09 | End: 2024-12-09 | Stop reason: HOSPADM

## 2024-12-09 RX ORDER — SODIUM CHLORIDE 0.9 % (FLUSH) 0.9 %
10 SYRINGE (ML) INJECTION AS NEEDED
Status: DISCONTINUED | OUTPATIENT
Start: 2024-12-09 | End: 2024-12-09 | Stop reason: HOSPADM

## 2024-12-09 RX ADMIN — LIDOCAINE HYDROCHLORIDE 100 MG: 20 INJECTION, SOLUTION EPIDURAL; INFILTRATION; INTRACAUDAL; PERINEURAL at 08:25

## 2024-12-09 RX ADMIN — GLYCOPYRROLATE 0.2 MG: 0.2 INJECTION INTRAMUSCULAR; INTRAVENOUS at 08:23

## 2024-12-09 RX ADMIN — PROPOFOL 400 MG: 10 INJECTION, EMULSION INTRAVENOUS at 08:25

## 2024-12-09 RX ADMIN — Medication 10 ML: at 07:38

## 2024-12-09 NOTE — ANESTHESIA POSTPROCEDURE EVALUATION
"Patient: Emilie Soto    Procedure Summary       Date: 12/09/24 Room / Location: [facility] ENDOSCOPY 2 / [facility] ENDOSCOPY    Anesthesia Start: 0821 Anesthesia Stop: 0856    Procedures:       ESOPHAGOGASTRODUODENOSCOPY WITH ANESTHESIA      COLONOSCOPY WITH ANESTHESIA Diagnosis:       Diarrhea, unspecified type      (Diarrhea, unspecified type [R19.7])    Surgeons: Abigail Weathers MD Provider: Oumar Callejas CRNA    Anesthesia Type: MAC ASA Status: 3            Anesthesia Type: MAC    Vitals  Vitals Value Taken Time   BP     Temp     Pulse 62 12/09/24 0857   Resp     SpO2 97 % 12/09/24 0857   Vitals shown include unfiled device data.        Post Anesthesia Care and Evaluation    Patient location during evaluation: PHASE II  Patient participation: complete - patient participated  Level of consciousness: awake and alert  Pain score: 0  Pain management: adequate    Airway patency: patent  Anesthetic complications: No anesthetic complications  PONV Status: none  Cardiovascular status: acceptable and blood pressure returned to baseline  Respiratory status: acceptable  Hydration status: acceptable    Comments: Blood pressure 100/58, pulse 62, temperature 96.8 °F (36 °C), temperature source Temporal, resp. rate 19, height 157.5 cm (62\"), weight 83.9 kg (185 lb), SpO2 97%  No anesthesia care post op    "

## 2024-12-09 NOTE — ANESTHESIA PREPROCEDURE EVALUATION
Anesthesia Evaluation     no history of anesthetic complications:   NPO Solid Status: > 8 hours  NPO Liquid Status: > 2 hours           Airway   Mallampati: I  No difficulty expected  Dental          Pulmonary    (+) asthma,sleep apnea  (-) not a smoker  Cardiovascular   Exercise tolerance: poor (<4 METS)    (+) hypertension, valvular problems/murmurs murmur, CHF Diastolic >=55%    ROS comment: Echo 2022    Left ventricular systolic function is normal. Left ventricular ejection fraction appears to be 61 - 65%.  ·  Normal right ventricular cavity size and systolic function noted.  ·  No hemodynamically significant valvular abnormalities identified on this study.         Neuro/Psych  (-) seizures, TIA, CVA  GI/Hepatic/Renal/Endo    (+) obesity, GERD, renal disease- CRI  (-) diabetes    Musculoskeletal     Abdominal    Substance History      OB/GYN          Other   arthritis, autoimmune disease lupus and rheumatoid arthritis,                   Anesthesia Plan    ASA 3     MAC     intravenous induction     Anesthetic plan, risks, benefits, and alternatives have been provided, discussed and informed consent has been obtained with: patient.    CODE STATUS:

## 2024-12-10 LAB
CYTO UR: NORMAL
LAB AP CASE REPORT: NORMAL
Lab: NORMAL
PATH REPORT.FINAL DX SPEC: NORMAL
PATH REPORT.GROSS SPEC: NORMAL

## 2024-12-11 ASSESSMENT — ENCOUNTER SYMPTOMS
GASTROINTESTINAL NEGATIVE: 1
ALLERGIC/IMMUNOLOGIC NEGATIVE: 1
RESPIRATORY NEGATIVE: 1
EYES NEGATIVE: 1

## 2024-12-23 ENCOUNTER — TELEPHONE (OUTPATIENT)
Dept: FAMILY MEDICINE CLINIC | Facility: CLINIC | Age: 64
End: 2024-12-23
Payer: MEDICARE

## 2024-12-23 DIAGNOSIS — Z12.31 ENCOUNTER FOR SCREENING MAMMOGRAM FOR MALIGNANT NEOPLASM OF BREAST: Primary | ICD-10-CM

## 2024-12-23 NOTE — TELEPHONE ENCOUNTER
Caller: Emilie Soto    Relationship: Self    Best call back number: [phone]     What orders are you requesting (i.e. lab or imaging): MAMMOGRAM    In what timeframe would the patient need to come in: 12.26.24 AT 10:45AM    Where will you receive your lab/imaging services: [facility]     Additional notes: NEEDS AN ORDER SENT ASAP

## 2025-01-07 DIAGNOSIS — F41.9 ANXIETY: ICD-10-CM

## 2025-01-07 RX ORDER — LOSARTAN POTASSIUM 100 MG/1
100 TABLET ORAL DAILY
Qty: 90 TABLET | Refills: 2 | Status: SHIPPED | OUTPATIENT
Start: 2025-01-07

## 2025-01-07 RX ORDER — ALPRAZOLAM 0.25 MG/1
0.25 TABLET ORAL NIGHTLY PRN
Qty: 30 TABLET | Refills: 0 | Status: SHIPPED | OUTPATIENT
Start: 2025-01-07

## 2025-01-07 NOTE — TELEPHONE ENCOUNTER
Rx Refill Note  Requested Prescriptions     Pending Prescriptions Disp Refills    ALPRAZolam (XANAX) 0.25 MG tablet [Pharmacy Med Name: ALPRAZOLAM 0.25MG TABLET] 30 tablet 0     Sig: TAKE 1 TABLET BY MOUTH AT NIGHT AS NEEDED FOR ANXIETY OR SLEEP.    losartan (COZAAR) 100 MG tablet [Pharmacy Med Name: LOSARTAN POTASSIUM 100MG TABLET] 90 tablet 2     Sig: TAKE 1 TABLET BY MOUTH DAILY.      Last office visit with office: 10/07/2024  Next office visit with office: 04/09/2025    UDS: 03/21/2024    DATE OF LAST REFILL: 10/07/2024    Controlled Substance Agreement: up to date    IWONA OR RENATOP: wnl         {TIP  Is Refill Pharmacy correct?:  Elle Rivera MA  01/07/25, 14:52 CST

## 2025-01-20 DIAGNOSIS — R10.13 EPIGASTRIC PAIN: ICD-10-CM

## 2025-01-20 RX ORDER — FAMOTIDINE 40 MG/1
40 TABLET, FILM COATED ORAL DAILY
Qty: 30 TABLET | Refills: 6 | Status: SHIPPED | OUTPATIENT
Start: 2025-01-20

## 2025-01-20 NOTE — TELEPHONE ENCOUNTER
Rx Refill Note  Requested Prescriptions      No prescriptions requested or ordered in this encounter      Last office visit with prescribing clinician: 12/9/2024 for endo/colon     Last telemedicine visit with prescribing clinician: Visit date not found     Next office visit with prescribing clinician: Visit date not found     Pt called me just now stating she has not been using her Pepcid and she can really tell a difference-is having issues with her stomach. She was asking if we could send in refills for her. I advised her I would pend refills to Molly. She knows to call me back if that doesn't help and we'll get her an OV with Molly to discuss further.                           Would you like a call back once the refill request has been completed: [] Yes [] No    If the office needs to give you a call back, can they leave a voicemail: [] Yes [] No    Carla Mendez MA  01/20/25, 12:47 CST

## 2025-04-07 DIAGNOSIS — I10 HYPERTENSION, UNSPECIFIED TYPE: Chronic | ICD-10-CM

## 2025-04-07 DIAGNOSIS — I50.32 CHRONIC DIASTOLIC CONGESTIVE HEART FAILURE: Chronic | ICD-10-CM

## 2025-04-07 DIAGNOSIS — I10 ESSENTIAL HYPERTENSION: ICD-10-CM

## 2025-04-07 RX ORDER — CARVEDILOL 12.5 MG/1
12.5 TABLET ORAL 2 TIMES DAILY WITH MEALS
Qty: 180 TABLET | Refills: 1 | Status: SHIPPED | OUTPATIENT
Start: 2025-04-07

## 2025-04-07 RX ORDER — POTASSIUM CHLORIDE 750 MG/1
10 TABLET, EXTENDED RELEASE ORAL DAILY
Qty: 90 TABLET | Refills: 3 | Status: SHIPPED | OUTPATIENT
Start: 2025-04-07

## 2025-04-07 RX ORDER — VERAPAMIL HYDROCHLORIDE 240 MG/1
240 TABLET, FILM COATED, EXTENDED RELEASE ORAL 2 TIMES DAILY
Qty: 180 TABLET | Refills: 1 | Status: SHIPPED | OUTPATIENT
Start: 2025-04-07

## 2025-04-07 RX ORDER — AMLODIPINE BESYLATE 5 MG/1
5 TABLET ORAL DAILY
Qty: 90 TABLET | Refills: 2 | Status: SHIPPED | OUTPATIENT
Start: 2025-04-07

## 2025-04-09 ENCOUNTER — OFFICE VISIT (OUTPATIENT)
Dept: FAMILY MEDICINE CLINIC | Facility: CLINIC | Age: 65
End: 2025-04-09
Payer: MEDICARE

## 2025-04-09 VITALS
OXYGEN SATURATION: 98 % | SYSTOLIC BLOOD PRESSURE: 128 MMHG | DIASTOLIC BLOOD PRESSURE: 72 MMHG | WEIGHT: 184 LBS | BODY MASS INDEX: 33.86 KG/M2 | HEART RATE: 56 BPM | HEIGHT: 62 IN

## 2025-04-09 DIAGNOSIS — N18.31 STAGE 3A CHRONIC KIDNEY DISEASE: ICD-10-CM

## 2025-04-09 DIAGNOSIS — F41.9 ANXIETY: ICD-10-CM

## 2025-04-09 DIAGNOSIS — I50.32 CHRONIC DIASTOLIC CHF (CONGESTIVE HEART FAILURE): ICD-10-CM

## 2025-04-09 DIAGNOSIS — G47.00 INSOMNIA, UNSPECIFIED TYPE: ICD-10-CM

## 2025-04-09 DIAGNOSIS — D84.9 IMMUNOSUPPRESSED STATUS: ICD-10-CM

## 2025-04-09 DIAGNOSIS — M25.511 TRIGGER POINT OF RIGHT SHOULDER REGION: Primary | ICD-10-CM

## 2025-04-09 DIAGNOSIS — I10 PRIMARY HYPERTENSION: Chronic | ICD-10-CM

## 2025-04-09 DIAGNOSIS — K21.9 GASTROESOPHAGEAL REFLUX DISEASE: ICD-10-CM

## 2025-04-09 PROBLEM — R73.01 ELEVATED FASTING GLUCOSE: Status: RESOLVED | Noted: 2022-05-09 | Resolved: 2025-04-09

## 2025-04-09 PROBLEM — Z01.89 LABORATORY TEST: Status: RESOLVED | Noted: 2017-09-22 | Resolved: 2025-04-09

## 2025-04-09 PROBLEM — Z00.00 WELLNESS EXAMINATION: Status: RESOLVED | Noted: 2017-04-20 | Resolved: 2025-04-09

## 2025-04-09 RX ORDER — ALPRAZOLAM 0.25 MG
0.25 TABLET ORAL NIGHTLY PRN
Qty: 30 TABLET | Refills: 0 | Status: SHIPPED | OUTPATIENT
Start: 2025-04-09

## 2025-04-09 RX ORDER — OMEPRAZOLE 20 MG/1
20 CAPSULE, DELAYED RELEASE ORAL DAILY
Qty: 90 CAPSULE | Refills: 3 | OUTPATIENT
Start: 2025-04-09

## 2025-04-09 RX ORDER — OMEPRAZOLE 20 MG/1
20 CAPSULE, DELAYED RELEASE ORAL DAILY
Qty: 90 CAPSULE | Refills: 3 | Status: SHIPPED | OUTPATIENT
Start: 2025-04-09

## 2025-04-09 NOTE — PROGRESS NOTES
Chief Complaint  Hypertension    Subjective      Emilie Soto presents to [facility]  History of Present Illness  The patient presents for evaluation of back pain, anxiety and depression, rheumatoid arthritis, and darkening of the skin on her legs.    She has been experiencing persistent, achy pain in her shoulder blade region for approximately 1 week. The pain is exacerbated when she sleeps on her side. She has attempted to alleviate the discomfort with Lidocaine patches, IcyHot, and Tylenol, but these measures have not provided significant relief. She has Voltaren gel at home.    She is currently under the care of a psychiatrist for anxiety and depression but wishes to discontinue these visits due to concurrent bi-monthly mental health consultations. She is on Prozac and trazodone, which she reports as effective in managing her symptoms. However, she experiences emotional instability if she discontinues these medications. She also takes Xanax 0.25 mg occasionally during periods of high stress, particularly when worrying about her children and grandchildren. She is seeking refills for all these medications.    She is on immunosuppressive therapy for rheumatoid arthritis, specifically CellCept and Plaquenil, which she reports as effective. She is under the care of a rheumatologist, Dr. Pérez, whom she consults biannually unless additional issues arise.    She has observed darkening of the skin on both legs and is concerned about potential circulatory issues.    She is on amlodipine 5 mg, carvedilol 12.5 mg, clonidine 0.1 mg twice daily as needed, losartan 100 mg, spironolactone 25 mg, and verapamil 240 mg twice daily for blood pressure management. She has not taken clonidine yet.    She has a history of congestive heart failure and occasional chest pain. She is not under the care of a cardiologist.    She is under the care of a nephrologist, Dr. Smith, but is not currently on  "any renal medications. She does not have diabetes.    She is on omeprazole for chest pain, which she takes once at night before bed. She had stopped taking it on her own due to concerns about kidney damage but has since resumed it.    MEDICATIONS  Current: amlodipine, carvedilol, clonidine, losartan, spironolactone, verapamil, Prozac, trazodone, Xanax, CellCept, Plaquenil, omeprazole      Objective   Vital Signs:  /72   Pulse 56   Ht 157.5 cm (62\")   Wt 83.5 kg (184 lb)   SpO2 98%   BMI 33.65 kg/m²   Estimated body mass index is 33.65 kg/m² as calculated from the following:    Height as of this encounter: 157.5 cm (62\").    Weight as of this encounter: 83.5 kg (184 lb).            Physical Exam     Physical Exam  There is a trigger point in the back.  There is some scaling on the skin of the legs.    Vital Signs  Blood pressure is 128/72.      Result Review :  The following labs/imaging/notes were reviewed and discussed with the patient by Steve Sanches MD on 04/09/2025:  aseline ECG of normal sinus rhythm noted at rest. Diffuse T wave inversions noted, consistent with prior ECGs.    The patient reported no symptoms during the stress test.    There were no clinically significant ST segment changes noted during stress.    Post stress wall motion appears to be normal.    Overall, this is a low risk stress test with no significant clinical, ECG or echocardiographic evidence for myocardial ischemia.      Latest Reference Range & Units 10/07/24 10:39 12/05/24 12:14   Sodium 136 - 145 mmol/L 140    Potassium 3.5 - 5.2 mmol/L 4.3    Chloride 98 - 107 mmol/L 104    CO2 22.0 - 29.0 mmol/L 28.0    BUN 8 - 23 mg/dL 13    Creatinine 0.57 - 1.00 mg/dL 1.30 (H)    BUN/Creatinine Ratio 7.0 - 25.0  10.0    EGFR Result >60.0 mL/min/1.73 46.3 (L)    Glucose 65 - 99 mg/dL 90    Calcium 8.6 - 10.5 mg/dL 9.3    Alkaline Phosphatase 39 - 117 U/L 73    Total Protein 6.0 - 8.5 g/dL 6.8    Albumin 3.5 - 5.2 g/dL 4.2    A/G Ratio " g/dL 1.6    AST (SGOT) 1 - 32 U/L 14    ALT (SGPT) 1 - 33 U/L 9    Total Bilirubin 0.0 - 1.2 mg/dL 0.2    Hemoglobin A1C 4.0 - 5.6 %  5.2 (E)   TSH Baseline 0.270 - 4.200 uIU/mL  1.020 (E)   Total Cholesterol 0 - 200 mg/dL 137    HDL Cholesterol 40 - 60 mg/dL 55    LDL Cholesterol  0 - 100 mg/dL 70    Triglycerides 0 - 150 mg/dL 57    VLDL Cholesterol Sai 5 - 40 mg/dL 12    Globulin gm/dL 2.6    WBC 4.8 - 10.8 K/uL  5.9 (E)   RBC 4.20 - 5.40 M/uL  4.25 (E)   Hemoglobin 12.0 - 16.0 g/dL  12.5 (E)   Hematocrit 37.0 - 47.0 %  41.3 (E)   Platelets 130 - 400 K/uL  251 (E)   RDW 11.5 - 14.5 %  13.2 (E)   MCV 81.0 - 99.0 fL  97.2 (E)   MCH 27.0 - 31.0 pg  29.4 (E)   MCHC 33.0 - 37.0 g/dL  30.3 (L) (E)   MPV 9.4 - 12.3 fL  9.9 (E)   (H): Data is abnormally high  (L): Data is abnormally low  (E): External lab result          Assessment      Diagnoses and all orders for this visit:    1. Trigger point of right shoulder region (Primary)  -     CBC & Differential  -     Comprehensive Metabolic Panel  -     Lipid Panel  -     Magnesium  -     Vitamin B12 & Folate    2. Primary hypertension  -     CBC & Differential  -     Comprehensive Metabolic Panel  -     Lipid Panel  -     Magnesium  -     Vitamin B12 & Folate    3. Chronic diastolic CHF (congestive heart failure)  -     CBC & Differential  -     Comprehensive Metabolic Panel  -     Lipid Panel  -     Magnesium  -     Vitamin B12 & Folate    4. Stage 3a chronic kidney disease  -     CBC & Differential  -     Comprehensive Metabolic Panel  -     Lipid Panel  -     Magnesium  -     Vitamin B12 & Folate    5. Anxiety  -     ALPRAZolam (XANAX) 0.25 MG tablet; Take 1 tablet by mouth At Night As Needed for Anxiety or Sleep.  Dispense: 30 tablet; Refill: 0  -     CBC & Differential  -     Comprehensive Metabolic Panel  -     Lipid Panel  -     Magnesium  -     Vitamin B12 & Folate    6. Insomnia, unspecified type  -     CBC & Differential  -     Comprehensive Metabolic Panel  -      Lipid Panel  -     Magnesium  -     Vitamin B12 & Folate    7. Immunosuppressed status  -     CBC & Differential  -     Comprehensive Metabolic Panel  -     Lipid Panel  -     Magnesium  -     Vitamin B12 & Folate    8. Gastroesophageal reflux disease  -     omeprazole (priLOSEC) 20 MG capsule; Take 1 capsule by mouth Daily.  Dispense: 90 capsule; Refill: 3  -     CBC & Differential  -     Comprehensive Metabolic Panel  -     Lipid Panel  -     Magnesium  -     Vitamin B12 & Folate    Other orders  -     empagliflozin (Jardiance) 10 MG tablet tablet; Take 1 tablet by mouth Daily.  Dispense: 90 tablet; Refill: 1             Assessment & Plan  1. Back pain.  She has been experiencing persistent, achy pain in her shoulder blade region for approximately 1 week. The pain is exacerbated when she sleeps on her side. There is a trigger point in the back. She was advised to apply Voltaren gel (diclofenac) to the affected area twice daily and perform gentle massages. Additionally, the use of an ice pack was recommended. If the pain persists after a few weeks, she should inform the clinic.    2. Anxiety and depression.  She is currently on Prozac and trazodone, which are effectively managing her symptoms. She was advised to continue her counseling sessions with mental health professionals. Prescriptions for Prozac and trazodone were provided. A new controlled substance agreement was signed today.    3. Rheumatoid arthritis.  She is currently on CellCept and Plaquenil, which are effectively managing her symptoms. She was advised to continue her follow-ups with Dr. Pérez, her rheumatologist.    4. Darkened skin on legs.  The darkening of the skin on her legs appears to be due to dryness. She was advised to monitor the condition and apply unscented lotion with vitamin E. If the condition worsens, she should inform the clinic.    5. Blood pressure management.  Her blood pressure is well-controlled at 128/72 mmHg. She is  currently on amlodipine 5 mg, carvedilol 12.5 mg, clonidine 0.1 mg twice daily as needed, losartan 100 mg, spironolactone 25 mg, and verapamil 240 mg twice daily. She was advised to continue her current medication regimen and not to take clonidine unless necessary.    6. Congestive heart failure.  She has a history of congestive heart failure with left ventricular hypertrophy. Her last echocardiogram in 2022 was normal. She is currently on a beta blocker and a calcium channel blocker. Jardiance was prescribed to manage her heart failure and chronic kidney disease.    7. Chronic kidney disease stage 3a.  Her last GFR was 46.3, placing her in CKD stage 3a. She follows up with Dr. Smith, her nephrologist. Jardiance was prescribed to manage her chronic kidney disease and heart failure.    8. Medication management.  She requested a refill for omeprazole, which she takes once at night before bed. A prescription for omeprazole was provided.    Follow-up  The patient will follow up in 6 months for a Medicare wellness visit and lab work.    Orders Placed This Encounter   Procedures    Comprehensive Metabolic Panel     Release to patient:   Routine Release [[id number]]    Lipid Panel     Release to patient:   Routine Release [[id number]]    Magnesium     Release to patient:   Routine Release [[id number]]    Vitamin B12 & Folate     Release to patient:   Routine Release [[id number]]    CBC & Differential     Manual Differential:   No     Release to patient:   Routine Release [[id number]]       Follow Up   Return in about 6 months (around 10/9/2025) for Medicare Wellness, annual labs, HTN, CHFwPEF w/ new med, CKD w/ new med, ANxiety, .  Patient was given instructions and counseling regarding her condition or for health maintenance advice. Please see specific information pulled into the AVS if appropriate.         Patient or patient representative verbalized consent for the use of Ambient Listening during the visit with   Steve Sanches MD for chart documentation. 4/13/2025  15:33 CDT

## 2025-06-18 DIAGNOSIS — F41.9 ANXIETY: ICD-10-CM

## 2025-06-18 RX ORDER — ALPRAZOLAM 0.25 MG
TABLET ORAL
Qty: 30 TABLET | Refills: 0 | Status: SHIPPED | OUTPATIENT
Start: 2025-06-18

## 2025-06-18 NOTE — TELEPHONE ENCOUNTER
Rx Refill Note  Requested Prescriptions     Pending Prescriptions Disp Refills    ALPRAZolam (XANAX) 0.25 MG tablet [Pharmacy Med Name: ALPRAZOLAM 0.25MG TABLET] 30 tablet 0     Sig: TAKE ONE TABLET  AT NIGHT AS NEEDED FOR ANXIETY OR SLEEP.      Last office visit with office: 04/09/25  Next office visit with office: 10/08/25    UDS: 03/21/24    DATE OF LAST REFILL: 04-09-25    Controlled Substance Agreement: not up to date    [place]: 06-18-25         {TIP  Is Refill Pharmacy correct?:  Tawana Coleman MA  06/18/25, 10:33 CDT

## 2025-07-12 DIAGNOSIS — F41.9 ANXIETY: ICD-10-CM

## 2025-07-14 RX ORDER — ALPRAZOLAM 0.25 MG
TABLET ORAL
Qty: 30 TABLET | Refills: 0 | Status: SHIPPED | OUTPATIENT
Start: 2025-07-14

## 2025-07-14 NOTE — TELEPHONE ENCOUNTER
Rx Refill Note  Requested Prescriptions     Pending Prescriptions Disp Refills    ALPRAZolam (XANAX) 0.25 MG tablet [Pharmacy Med Name: ALPRAZOLAM 0.25MG TABLET] 30 tablet 0     Sig: TAKE ONE TABLET AT NIGHT AS NEEDED FOR ANXIETY OR SLEEP.      Last office visit with office: [id number]  Next office visit with office: [id number]    UDS: [id number]    DATE OF LAST REFILL: [id number]    Controlled Substance Agreement: [id number]    [facility] OR [facility]: [id number]         {TIP  Is Refill Pharmacy correct?:  Laine Gates MA  07/14/25, 16:19 CDT

## (undated) DEVICE — CONMED SCOPE SAVER BITE BLOCK, 20X27 MM: Brand: SCOPE SAVER

## (undated) DEVICE — THE CHANNEL CLEANING BRUSH IS A NYLON FLEXI BRUSH ATTACHED TO A FLEXIBLE PLASTIC SHEATH DESIGNED TO SAFELY REMOVE DEBRIS FROM FLEXIBLE ENDOSCOPES.

## (undated) DEVICE — CUFF,BP,DISP,1 TUBE,ADULT,HP: Brand: MEDLINE

## (undated) DEVICE — THE SINGLE USE ETRAP – POLYP TRAP IS USED FOR SUCTION RETRIEVAL OF ENDOSCOPICALLY REMOVED POLYPS.: Brand: ETRAP

## (undated) DEVICE — SENSR O2 OXIMAX FNGR A/ 18IN NONSTR

## (undated) DEVICE — Device: Brand: SINGLE USE ELECTROSURGICAL SNARE SD-400

## (undated) DEVICE — FRCP BX RADJAW4 NDL 2.8 240 STD OG

## (undated) DEVICE — Device: Brand: DEFENDO AIR/WATER/SUCTION AND BIOPSY VALVE

## (undated) DEVICE — ENDOGATOR AUXILIARY WATER JET CONNECTOR: Brand: ENDOGATOR

## (undated) DEVICE — MASK,OXYGEN,MED CONC,ADLT,7' TUB, UC: Brand: PENDING

## (undated) DEVICE — YANKAUER,BULB TIP WITH VENT: Brand: ARGYLE

## (undated) DEVICE — TBG SMPL FLTR LINE NASL 02/C02 A/ BX/100

## (undated) DEVICE — ESOPHAGEAL BALLOON DILATATION CATHETER: Brand: CRE FIXED WIRE

## (undated) DEVICE — MSK O2 MD CONCENTR A/ LF 7FT 1P/U

## (undated) DEVICE — DEV INFL ALLIANCE2 SYS

## (undated) DEVICE — SNAR POLYP SENSATION MICRO OVL 13 240X40